# Patient Record
Sex: MALE | Race: WHITE | NOT HISPANIC OR LATINO | Employment: FULL TIME | URBAN - METROPOLITAN AREA
[De-identification: names, ages, dates, MRNs, and addresses within clinical notes are randomized per-mention and may not be internally consistent; named-entity substitution may affect disease eponyms.]

---

## 2017-02-02 ENCOUNTER — TELEPHONE (OUTPATIENT)
Dept: TRANSPLANT | Facility: CLINIC | Age: 47
End: 2017-02-02

## 2017-02-02 DIAGNOSIS — Z94.0 KIDNEY REPLACED BY TRANSPLANT: Primary | ICD-10-CM

## 2017-02-02 RX ORDER — CYCLOSPORINE 100 MG/1
100 CAPSULE, LIQUID FILLED ORAL 2 TIMES DAILY
Qty: 60 CAPSULE | Refills: 0 | Status: SHIPPED | OUTPATIENT
Start: 2017-02-02 | End: 2017-06-06

## 2017-02-02 RX ORDER — SULFAMETHOXAZOLE AND TRIMETHOPRIM 400; 80 MG/1; MG/1
1 TABLET ORAL
Qty: 13 TABLET | Refills: 0 | Status: SHIPPED | OUTPATIENT
Start: 2017-02-03 | End: 2017-06-06

## 2017-02-02 RX ORDER — PREDNISONE 1 MG/1
5 TABLET ORAL DAILY
Qty: 30 TABLET | Refills: 0 | Status: SHIPPED | OUTPATIENT
Start: 2017-02-02 | End: 2017-06-06

## 2017-02-02 RX ORDER — CYCLOSPORINE 25 MG/1
25 CAPSULE, LIQUID FILLED ORAL 2 TIMES DAILY
Qty: 60 CAPSULE | Refills: 0 | Status: SHIPPED | OUTPATIENT
Start: 2017-02-02 | End: 2017-06-06

## 2017-02-02 NOTE — TELEPHONE ENCOUNTER
D: Received call from Kvng Clancy, Pharmacist in [facility], indicating patient in need of medication.   I/A: Patient recently learned needs to use his insurance pharmacy and can no longer use [facility].  Transplant funds will be used to assist with one week of medication until able to establish with new pharmacy.  P: SW will remain available if further needs are identified.

## 2017-02-13 ENCOUNTER — TELEPHONE (OUTPATIENT)
Dept: TRANSPLANT | Facility: CLINIC | Age: 47
End: 2017-02-13

## 2017-02-13 NOTE — TELEPHONE ENCOUNTER
D: Neoral assistance  I/A: Received a voicemail from patient that he had to switch his pharmacy due to insurance reasons and now his neoral is over $100/month. This writer contacted patient to discuss co-pay card eligibility since he has private insurance. Patient reported he found his co-pay card since he left this writer a message and will submit that to his new pharmacy. Patient denied any other needs at this time.  P: Patient to contact this writer should other issues arise.     Yamile Stone [facility]    Kidney/Pancreas/[facility] Transplant Programs

## 2017-05-10 ENCOUNTER — COMMUNICATION - RIVER FALLS (OUTPATIENT)
Dept: FAMILY MEDICINE | Facility: CLINIC | Age: 47
End: 2017-05-10
Payer: COMMERCIAL

## 2017-05-10 ENCOUNTER — OFFICE VISIT - RIVER FALLS (OUTPATIENT)
Dept: FAMILY MEDICINE | Facility: CLINIC | Age: 47
End: 2017-05-10
Payer: COMMERCIAL

## 2017-05-10 PROCEDURE — 80158 DRUG ASSAY CYCLOSPORINE: CPT | Performed by: INTERNAL MEDICINE

## 2017-05-11 LAB
CREAT SERPL-MCNC: 1.74 MG/DL (ref 0.6–1.35)
GLUCOSE BLD-MCNC: 98 MG/DL (ref 65–99)

## 2017-05-12 ENCOUNTER — OFFICE VISIT - RIVER FALLS (OUTPATIENT)
Dept: FAMILY MEDICINE | Facility: CLINIC | Age: 47
End: 2017-05-12
Payer: COMMERCIAL

## 2017-05-12 DIAGNOSIS — Z79.899 ENCOUNTER FOR LONG-TERM CURRENT USE OF MEDICATION: ICD-10-CM

## 2017-05-12 DIAGNOSIS — Z94.0 KIDNEY REPLACED BY TRANSPLANT: Primary | ICD-10-CM

## 2017-05-12 DIAGNOSIS — Z48.298 AFTERCARE FOLLOWING ORGAN TRANSPLANT: ICD-10-CM

## 2017-05-12 LAB
CYCLOSPORINE BLD LC/MS/MS-MCNC: 92 UG/L (ref 50–400)
TME LAST DOSE: NORMAL H

## 2017-05-12 ASSESSMENT — MIFFLIN-ST. JEOR: SCORE: 2062.7

## 2017-05-16 ENCOUNTER — TELEPHONE (OUTPATIENT)
Dept: TRANSPLANT | Facility: CLINIC | Age: 47
End: 2017-05-16

## 2017-05-16 LAB
CREAT SERPL-MCNC: 1.74 MG/DL (ref 0.6–1.35)
GLUCOSE BLD-MCNC: 98 MG/DL (ref 65–99)

## 2017-05-16 NOTE — TELEPHONE ENCOUNTER
Overdue for follow up at the [facility] transplant center [facility] for follow up appointment  With Dr Hurtado  Please call and send a certified  letter for the need for a face to face encounter with transplant  Nephrologist  to obtain RX's   Instructions on how to make an appointment

## 2017-05-16 NOTE — TELEPHONE ENCOUNTER
Call placed to patient: Patient verbalize understanding to schedule annual transplant appointment for future refills. Scheduling number given. Reminder letter sent.

## 2017-05-16 NOTE — LETTER
May 16, 2017      This letter is regarding your medication refills.    For the best outcome for your transplant and in order for us to refill your prescriptions, we encourage you to have a yearly clinic appointment with a physician and to have current labs. If you are unable to return to our transplant center there are several alternatives. Please call your coordinator to discuss them. .  To make an appointment with a physician here at [facility], please call:  The Transplant Office at [phone] or [phone].  .      The Transplant Staff at [facility]

## 2017-06-06 DIAGNOSIS — Z94.0 KIDNEY REPLACED BY TRANSPLANT: Primary | ICD-10-CM

## 2017-06-06 RX ORDER — CYCLOSPORINE 25 MG/1
25 CAPSULE, LIQUID FILLED ORAL 2 TIMES DAILY
Qty: 60 CAPSULE | Refills: 3 | Status: SHIPPED | OUTPATIENT
Start: 2017-06-06 | End: 2017-09-13

## 2017-06-06 RX ORDER — CYCLOSPORINE 100 MG/1
100 CAPSULE, LIQUID FILLED ORAL 2 TIMES DAILY
Qty: 60 CAPSULE | Refills: 3 | Status: SHIPPED | OUTPATIENT
Start: 2017-06-06 | End: 2017-09-13

## 2017-06-06 RX ORDER — PREDNISONE 5 MG/1
5 TABLET ORAL DAILY
Qty: 30 TABLET | Refills: 3 | Status: SHIPPED | OUTPATIENT
Start: 2017-06-06 | End: 2019-11-21

## 2017-06-06 RX ORDER — MYCOPHENOLATE MOFETIL 250 MG/1
250 CAPSULE ORAL 2 TIMES DAILY
Qty: 60 CAPSULE | Refills: 3 | Status: SHIPPED | OUTPATIENT
Start: 2017-06-06 | End: 2017-06-22

## 2017-06-06 RX ORDER — SULFAMETHOXAZOLE AND TRIMETHOPRIM 400; 80 MG/1; MG/1
1 TABLET ORAL
Qty: 13 TABLET | Refills: 3 | Status: SHIPPED | OUTPATIENT
Start: 2017-06-07 | End: 2017-10-23

## 2017-06-22 ENCOUNTER — TELEPHONE (OUTPATIENT)
Dept: TRANSPLANT | Facility: CLINIC | Age: 47
End: 2017-06-22

## 2017-06-22 DIAGNOSIS — Z94.0 KIDNEY REPLACED BY TRANSPLANT: Primary | ICD-10-CM

## 2017-06-22 RX ORDER — MYCOPHENOLATE MOFETIL 250 MG/1
1000 CAPSULE ORAL 2 TIMES DAILY
Qty: 240 CAPSULE | Refills: 0 | Status: SHIPPED | OUTPATIENT
Start: 2017-06-22 | End: 2017-07-11

## 2017-06-22 NOTE — TELEPHONE ENCOUNTER
Issue: Patient overdue for clinic follow up, last appointment 2015.  Plan: Please call patient to review policy for refills and make yearly follow up for requested refills.    Thanks,  Bhavya

## 2017-07-11 DIAGNOSIS — Z94.0 KIDNEY REPLACED BY TRANSPLANT: ICD-10-CM

## 2017-07-11 RX ORDER — MYCOPHENOLATE MOFETIL 250 MG/1
1000 CAPSULE ORAL 2 TIMES DAILY
Qty: 240 CAPSULE | Refills: 11 | Status: SHIPPED | OUTPATIENT
Start: 2017-07-11 | End: 2018-07-23

## 2017-09-06 ENCOUNTER — HOSPITAL ENCOUNTER (OUTPATIENT)
Facility: CLINIC | Age: 47
Setting detail: SPECIMEN
Discharge: HOME | End: 2017-09-06
Admitting: INTERNAL MEDICINE
Payer: COMMERCIAL

## 2017-09-06 PROCEDURE — 80158 DRUG ASSAY CYCLOSPORINE: CPT | Performed by: INTERNAL MEDICINE

## 2017-09-07 ENCOUNTER — OFFICE VISIT - RIVER FALLS (OUTPATIENT)
Dept: FAMILY MEDICINE | Facility: CLINIC | Age: 47
End: 2017-09-07
Payer: COMMERCIAL

## 2017-09-07 ENCOUNTER — AMBULATORY - RIVER FALLS (OUTPATIENT)
Dept: FAMILY MEDICINE | Facility: CLINIC | Age: 47
End: 2017-09-07
Payer: COMMERCIAL

## 2017-09-07 ASSESSMENT — MIFFLIN-ST. JEOR: SCORE: 2064.51

## 2017-09-08 LAB
CREAT SERPL-MCNC: 1.49 MG/DL (ref 0.6–1.35)
GLUCOSE BLD-MCNC: 99 MG/DL (ref 65–99)

## 2017-09-09 DIAGNOSIS — Z48.298 AFTERCARE FOLLOWING ORGAN TRANSPLANT: ICD-10-CM

## 2017-09-09 DIAGNOSIS — Z79.899 ENCOUNTER FOR LONG-TERM CURRENT USE OF MEDICATION: ICD-10-CM

## 2017-09-09 DIAGNOSIS — Z94.0 KIDNEY REPLACED BY TRANSPLANT: ICD-10-CM

## 2017-09-10 LAB
CYCLOSPORINE BLD LC/MS/MS-MCNC: 97 UG/L (ref 50–400)
TME LAST DOSE: NORMAL H

## 2017-09-11 DIAGNOSIS — Z94.0 KIDNEY REPLACED BY TRANSPLANT: Primary | ICD-10-CM

## 2017-09-11 NOTE — TELEPHONE ENCOUNTER
Left detailed message for patient with dose change to CSA if accurate 12 hour level, asked to call back and confirm.

## 2017-09-11 NOTE — TELEPHONE ENCOUNTER
CSA level 97  Goal 75  Please phone patient and ask if CSA level was a good 12 hour level.  If so, decrease CSA from 125 mg Bid to 100 mg Bid  Repeat labs in 1-2 weeks.   today

## 2017-09-13 RX ORDER — CYCLOSPORINE 100 MG/1
100 CAPSULE, LIQUID FILLED ORAL 2 TIMES DAILY
Qty: 60 CAPSULE | Refills: 3
Start: 2017-09-13 | End: 2017-09-27

## 2017-09-13 RX ORDER — CYCLOSPORINE 25 MG/1
CAPSULE, LIQUID FILLED ORAL
Qty: 60 CAPSULE | Refills: 3
Start: 2017-09-13 | End: 2017-09-27

## 2017-09-13 NOTE — TELEPHONE ENCOUNTER
F\u call placed to patient: Patient confirms current lab draw and accurate twelve hour lab draw. Patient verbalize understanding to decrease dose to 100 mg twice a day and recheck level in 1-2 weeks. Rx.order placed.

## 2017-09-22 ENCOUNTER — AMBULATORY - RIVER FALLS (OUTPATIENT)
Dept: FAMILY MEDICINE | Facility: CLINIC | Age: 47
End: 2017-09-22
Payer: COMMERCIAL

## 2017-09-22 ENCOUNTER — OFFICE VISIT - RIVER FALLS (OUTPATIENT)
Dept: FAMILY MEDICINE | Facility: CLINIC | Age: 47
End: 2017-09-22
Payer: COMMERCIAL

## 2017-09-22 DIAGNOSIS — Z94.0 KIDNEY REPLACED BY TRANSPLANT: ICD-10-CM

## 2017-09-22 DIAGNOSIS — Z79.899 ENCOUNTER FOR LONG-TERM CURRENT USE OF MEDICATION: ICD-10-CM

## 2017-09-22 DIAGNOSIS — Z48.298 AFTERCARE FOLLOWING ORGAN TRANSPLANT: ICD-10-CM

## 2017-09-22 PROCEDURE — 80158 DRUG ASSAY CYCLOSPORINE: CPT | Performed by: INTERNAL MEDICINE

## 2017-09-22 ASSESSMENT — MIFFLIN-ST. JEOR: SCORE: 2073.59

## 2017-09-23 LAB
CREAT SERPL-MCNC: 1.66 MG/DL (ref 0.6–1.35)
GLUCOSE BLD-MCNC: 87 MG/DL (ref 65–99)

## 2017-09-26 LAB
CYCLOSPORINE BLD LC/MS/MS-MCNC: 62 UG/L (ref 50–400)
TME LAST DOSE: NORMAL H

## 2017-09-27 ENCOUNTER — TELEPHONE (OUTPATIENT)
Dept: TRANSPLANT | Facility: CLINIC | Age: 47
End: 2017-09-27

## 2017-09-27 DIAGNOSIS — Z94.0 KIDNEY REPLACED BY TRANSPLANT: ICD-10-CM

## 2017-09-27 RX ORDER — CYCLOSPORINE 25 MG/1
25 CAPSULE, LIQUID FILLED ORAL 2 TIMES DAILY
Qty: 60 CAPSULE | Refills: 3 | Status: SHIPPED | OUTPATIENT
Start: 2017-09-27 | End: 2018-01-25

## 2017-09-27 RX ORDER — CYCLOSPORINE 100 MG/1
100 CAPSULE, LIQUID FILLED ORAL 2 TIMES DAILY
Qty: 60 CAPSULE | Refills: 3 | Status: SHIPPED | OUTPATIENT
Start: 2017-09-27 | End: 2018-01-25

## 2017-09-27 NOTE — TELEPHONE ENCOUNTER
CSA level 62   Goal 75  Please phone patient and ask if last Tacrolimus was a good 12 hour level.  If so, increase CSA from 100 mg Bid to 125 mg bid.  Repeat labs in 1-2 weeks

## 2017-09-27 NOTE — TELEPHONE ENCOUNTER
Call placed to patient. Patient confirms current dose and accurate lab level. Patient verbalize understanding to increase dose to 125 mg twice a day and recheck level in 1-2 weeks of dose change. Rx/order sent

## 2017-09-27 NOTE — LETTER
PHYSICIAN ORDERS      DATE & TIME ISSUED: 2017 10:12 AM  PATIENT NAME: Hakeem Romero   : 1970     [facility] MR# [if applicable]: [id number]     DIAGNOSIS:  Kidney transplant   ICD-9 CODE: Z94.0     Please recheck the following lab level within 1-2 weeks of dose change.   Cyclosporine Level     Any questions please call: [phone]    Please fax these results to [phone].    .

## 2017-10-06 ENCOUNTER — AMBULATORY - RIVER FALLS (OUTPATIENT)
Dept: FAMILY MEDICINE | Facility: CLINIC | Age: 47
End: 2017-10-06
Payer: COMMERCIAL

## 2017-10-06 DIAGNOSIS — Z94.0 KIDNEY REPLACED BY TRANSPLANT: ICD-10-CM

## 2017-10-06 PROCEDURE — 80158 DRUG ASSAY CYCLOSPORINE: CPT | Performed by: INTERNAL MEDICINE

## 2017-10-13 ENCOUNTER — TELEPHONE (OUTPATIENT)
Dept: TRANSPLANT | Facility: CLINIC | Age: 47
End: 2017-10-13

## 2017-10-13 LAB
CYCLOSPORINE BLD LC/MS/MS-MCNC: 107 UG/L (ref 50–400)
TME LAST DOSE: NORMAL H

## 2017-10-13 NOTE — TELEPHONE ENCOUNTER
CSA level 107    Goal 75  Please phone patient and ask if last Tacrolimus was a good 12 hour level.  If so,decrease CSA from 125 mg Bid to 100 mg bid.  Repeat labs in 1-2 weeks

## 2017-10-13 NOTE — LETTER
PHYSICIAN ORDERS      DATE & TIME ISSUED: 2017 10:48 AM  PATIENT NAME: Hakeem Romero   : 1970     [facility] MR# [if applicable]: [id number]     DIAGNOSIS:  Kidney transplant   ICD-9 CODE: Z94.0      Recheck cyclosporine level in 1-2 weeks    Any questions please call: [phone] fax these results to [phone].        .

## 2017-10-13 NOTE — TELEPHONE ENCOUNTER
Call placed to patient: No answer. Detailed voice message left requesting patient return call to discuss tacrolimus level, dose change and repeat labs. Will try back

## 2017-10-16 NOTE — TELEPHONE ENCOUNTER
F\U call placed to patient: Patient states that he's going to follow up with his nephrologist prior to making any dose change. States that he's concerned with his level going up and down and is seeking an explanation for this prior to making any future changes. Patient verbalize understanding to recheck level in 1-2 weeks. Order sent

## 2017-10-23 DIAGNOSIS — Z94.0 KIDNEY REPLACED BY TRANSPLANT: ICD-10-CM

## 2017-10-23 RX ORDER — SULFAMETHOXAZOLE AND TRIMETHOPRIM 400; 80 MG/1; MG/1
TABLET ORAL
Qty: 39 TABLET | Refills: 11 | Status: SHIPPED | OUTPATIENT
Start: 2017-10-23

## 2017-12-07 ENCOUNTER — OFFICE VISIT - RIVER FALLS (OUTPATIENT)
Dept: FAMILY MEDICINE | Facility: CLINIC | Age: 47
End: 2017-12-07
Payer: COMMERCIAL

## 2017-12-26 ENCOUNTER — TELEPHONE (OUTPATIENT)
Dept: NEPHROLOGY | Facility: CLINIC | Age: 47
End: 2017-12-26

## 2017-12-26 NOTE — TELEPHONE ENCOUNTER
Contacted pt twice but mailbox is full and it cannot receive messages at this time.  Deisy Henson., CMA

## 2018-01-03 ENCOUNTER — TELEPHONE (OUTPATIENT)
Dept: NEPHROLOGY | Facility: CLINIC | Age: 48
End: 2018-01-03

## 2018-01-05 ENCOUNTER — TELEPHONE (OUTPATIENT)
Dept: TRANSPLANT | Facility: CLINIC | Age: 48
End: 2018-01-05

## 2018-01-05 NOTE — TELEPHONE ENCOUNTER
Provider Call: Transplant Lab  Facility Name: [facility]  Facility Location: [place], [place]  Reason for Call: Annual lab reorder  Callback needed? No           Hakeem is scheduled to come in for lab draw this Monday, January 8th, 2018    [facility] Fax# [phone]

## 2018-01-05 NOTE — LETTER
The Transplant Center  Room 2-200  [facility],  [address]  [address]  Tel [phone]  Toll Free [phone]                OUTPATIENT LABORATORY TEST ORDER    Patient Name: Hakeem Romero  Transplant Date: 8/19/2008 (Kidney)  YOB: 1970  Issue Date & Time:January 5, 201811:36 AM    [facility] MR:  [id number]  Exp. Date (1 year after date issued)      Diagnoses: Kidney Transplant (ICD-10  Z94.0)   Long term use of medications (ICD-10  Z79.899)     Lab results to be available on the same day drawn.   Patient should release information to the [facility], [facility].  Please fax to the Transplant Center at [phone].    Monthly    ?Hemogram and Platelet   ?Basic Metabolic Panel (Sodium, Potassium, Chloride, CO2, Creatinine, Urea Nitrogen, Glucose, Calcium)           ?CSA mail to [facility] mailers and instructions provided by the patient)                     Every 6 Months                                                       ?Urine for protein/creatinine      If you have any questions, please call The Transplant Center at [phone] or [phone].    Please fax labs to [phone]

## 2018-01-08 ENCOUNTER — OFFICE VISIT (OUTPATIENT)
Dept: NEPHROLOGY | Facility: CLINIC | Age: 48
End: 2018-01-08
Attending: INTERNAL MEDICINE
Payer: COMMERCIAL

## 2018-01-08 ENCOUNTER — AMBULATORY - RIVER FALLS (OUTPATIENT)
Dept: FAMILY MEDICINE | Facility: CLINIC | Age: 48
End: 2018-01-08
Payer: COMMERCIAL

## 2018-01-08 VITALS
SYSTOLIC BLOOD PRESSURE: 145 MMHG | WEIGHT: 270 LBS | BODY MASS INDEX: 37.8 KG/M2 | OXYGEN SATURATION: 96 % | HEIGHT: 71 IN | DIASTOLIC BLOOD PRESSURE: 89 MMHG | HEART RATE: 80 BPM

## 2018-01-08 DIAGNOSIS — D84.9 IMMUNOSUPPRESSED STATUS (H): ICD-10-CM

## 2018-01-08 DIAGNOSIS — Z48.298 AFTERCARE FOLLOWING ORGAN TRANSPLANT: ICD-10-CM

## 2018-01-08 DIAGNOSIS — I15.1 HYPERTENSION SECONDARY TO OTHER RENAL DISORDERS: ICD-10-CM

## 2018-01-08 DIAGNOSIS — Z94.0 KIDNEY REPLACED BY TRANSPLANT: Primary | ICD-10-CM

## 2018-01-08 PROCEDURE — G0463 HOSPITAL OUTPT CLINIC VISIT: HCPCS | Mod: ZF

## 2018-01-08 RX ORDER — LOSARTAN POTASSIUM 50 MG/1
50 TABLET ORAL DAILY
COMMUNITY
Start: 2018-01-08

## 2018-01-08 RX ORDER — LOSARTAN POTASSIUM 25 MG/1
50 TABLET ORAL DAILY
Qty: 30 TABLET | COMMUNITY
Start: 2018-01-08 | End: 2018-01-08

## 2018-01-08 ASSESSMENT — PAIN SCALES - GENERAL: PAINLEVEL: NO PAIN (0)

## 2018-01-08 NOTE — PROGRESS NOTES
Assessment and Plan:   1. LDKT - baseline Cr ~ 1.6-1.8, which has remained stable.  Mild proteinuria.  Will make no changes in immunosuppression.  2. HTN - fair control right at target of less than 140/90, but patient forgot to take his antihypertensive medications today.  Will make no changes at this time, but recommend patient check BP at home.  3. Post transplant erythrocytosis - increased Hgb with last labs in September, but patient was taken off losartan prior to those labs for some reason.  He is back on losartan now and today's labs pending.  Recommend patient stay on ACEI/ARB.  4. Hyperkalemia - high normal serum potassium level with last check.  Today's labs pending.  5. Gout - no recent flares since starting probenecid.  6. Overweight - recommend increased exercise and watch caloric intake.  7. Skin cancer risk - no new skin lesions.  Recommend regular follow up with Dermatology.  8. Medical noncompliance - patient only has 2 labs recorded in our system in the last year.  He reports he did get other labs, but isn't sure when.  Will have transplant coordinator call his outside lab.  Discussed importance of regular labs, taking medications and regular medical appointments.  9. Recommend return visit in 12 months.    Assessment and plan was discussed with patient and he voiced his understanding and agreement.    Reason for Visit:  Mr. Romero is here for routine follow up.    HPI:  Mr. 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         Transplant Hx:       Tx: LDKT  Date: 8/19/08       Present Maintenance IS: Cyclosporine and Mycophenolate mofetil       Baseline Creatinine: 1.6-1.8       Recent DSA: Yes  Date last checked: 8/2009       Biopsy: No    Mr. Romero reports feeling good overall with minimal medical complaints.  He was last seen about 3 years ago in clinic.  His only new medical issue is being diagnosed with narcolepsy.  Patient was tried on a  couple of different medications, but seems to be doing okay after starting ritalin.  He has also struggled with gout symptoms, but after being started on probenecid about a year ago, he hasn't had any recent flares.  His energy level has been good and remains normal.  He is active and does get some exercise.  Denies any chest pain or shortness of breath with exertion.  Appetite is good and he has gained about 10 lbs since his last clinic visit.  No nausea, vomiting or diarrhea.  No fever, sweats or chills.  Occasional leg swelling, depending on how much salt he eats.    Home BP: Not checked.  Patient forgot to take his antihypertensive medications this morning.      ROS:  A comprehensive review of systems was obtained and negative, except as noted in the HPI or PMH.    Active Medical Problems:  Patient Active Problem List   Diagnosis     Kidney replaced by transplant     Aftercare following organ transplant     Immunosuppressed status (H)     Hypertension secondary to other renal disorders     Dyslipidemia     Gout     RAUL on CPAP     Narcolepsy     Personal Hx:   Social History     Social History     Marital status:      Spouse name: N/A     Number of children: 0     Years of education: N/A     Occupational History     Not on file.     Social History Main Topics     Smoking status: Never Smoker     Smokeless tobacco: Not on file     Alcohol use 2.0 oz/week     4 drink(s) per week     Drug use: No     Sexual activity: Not on file     Other Topics Concern     Not on file     Social History Narrative     Allergies:  No Known Allergies  Medications:  Prior to Admission medications    Medication Sig Start Date End Date Taking? Authorizing Provider   SIMVASTATIN PO Take 40 mg by mouth At Bedtime   Yes Reported, Patient   METOPROLOL TARTRATE PO Take 30 mg by mouth 2 times daily   Yes Reported, Patient   CLONIDINE HCL PO Take 10 mg by mouth 3 times daily   Yes Reported, Patient   ALLOPURINOL PO Take 30 mg by mouth  "daily   Yes Reported, Patient   PREDNISONE PO Take by mouth as needed   Yes Reported, Patient   NEORAL 100 MG capsule Take 1 capsule (100 mg) by mouth 2 times daily Total dose 125mg 11/7/14  Yes Bakari Hurtado MD   cycloSPORINE modified (NEORAL) 25 MG capsule Take 1 capsule (25 mg) by mouth 2 times daily Total dose 125mg 11/7/14  Yes Bakari Hurtado MD   sulfamethoxazole-trimethoprim (BACTRIM,SEPTRA) 400-80 MG per tablet Take 1 tablet by mouth three times a week 7/17/14  Yes Bakari Hurtado MD   CELLCEPT 250 MG capsule Take 4 capsules (1,000 mg) by mouth 2 times daily 4/2/14  Yes Bakari Hurtado MD     Vitals:  /89  Pulse 80  Ht 1.803 m (5' 11\")  Wt 122.5 kg (270 lb)  SpO2 96%  BMI 37.66 kg/m2    Exam:   GENERAL APPEARANCE: alert and no distress  HENT: mouth without ulcers or lesions  LYMPHATICS: no cervical or supraclavicular nodes  RESP: lungs clear to auscultation - no rales, rhonchi or wheezes  CV: regular rhythm, normal rate, no rub, no murmur  EDEMA: no LE edema bilaterally  ABDOMEN: soft, nondistended, nontender, bowel sounds normal, overweight  MS: extremities normal - no gross deformities noted, no evidence of inflammation in joints, no muscle tenderness  SKIN: no rash  TX KIDNEY: normal    Results:  Recent Results (from the past 3360 hour(s))   Cyclosporine    Collection Time: 09/06/17  8:55 AM   Result Value Ref Range    Cyclosporine Last Dose 09/06/2017 21:15     Cyclosporine Level 97 50 - 400 ug/L   TXP External Lab Result    Collection Time: 09/07/17  8:51 AM   Result Value Ref Range    Glucose (External) 99 65 - 99 mg/dL    Urea Nitrogen (External) 25 7 - 25 mg/dL    Creatinine (External) 1.49 (H) 0.60 - 1.35 mg/dL    GFR Estimated (External) 55 (L) >=60 mL/min/1.73m2    GFR Est if Black (External) 64 >=60 mL/min/1.73m2    BUN/Creatinine Ratio (External) 17 6 - 22 (calc)    Sodium (External) 142 135 - 146 mmol/L    Potassium (External) 5.3 3.5 - 5.3 mmol/L    " Chloride (External) 107 98 - 110 mmol/L    CO2 (External) 28 20 - 31 mmol/L    Calcium (External) 9.7 8.6 - 10.3 mg/dL    WBC Count (External) 6.2 3.8 - 10.8 Thousand/uL    RBC Count (External) 5.72 4.20 - 5.80 Million/uL    Hemoglobin (External) 17.4 (H) 13.2 - 17.1 g/dL    Hematocrit (External) 49.2 38.5 - 50.0 %    MCV (External) 86.0 80.0 - 100.0 fL    MCH (External) 30.4 27.0 - 33.0 pg    MCHC (External) 35.4 32.0 - 36.0 g/dL    RDW (External) 13.4 11.0 - 15.0 %    Platelet Count (External) 190 140 - 400 Thousand/uL    MPV (External) 10.6 7.5 - 12.5 fL   Cyclosporine    Collection Time: 09/22/17 10:10 AM   Result Value Ref Range    Cyclosporine Last Dose 09/21/17 2130     Cyclosporine Level 62 50 - 400 ug/L   Cyclosporine    Collection Time: 10/06/17  9:00 AM   Result Value Ref Range    Cyclosporine Last Dose 2200 10/05/17     Cyclosporine Level 107 50 - 400 ug/L

## 2018-01-08 NOTE — LETTER
1/8/2018      RE: Hakeem Romero  [address]  [facility] [address]       Assessment and Plan:   1. LDKT - baseline Cr ~ 1.6-1.8, which has remained stable.  Mild proteinuria.  Will make no changes in immunosuppression.  2. HTN - fair control right at target of less than 140/90, but patient forgot to take his antihypertensive medications today.  Will make no changes at this time, but recommend patient check BP at home.  3. Post transplant erythrocytosis - increased Hgb with last labs in September, but patient was taken off losartan prior to those labs for some reason.  He is back on losartan now and today's labs pending.  Recommend patient stay on ACEI/ARB.  4. Hyperkalemia - high normal serum potassium level with last check.  Today's labs pending.  5. Gout - no recent flares since starting probenecid.  6. Overweight - recommend increased exercise and watch caloric intake.  7. Skin cancer risk - no new skin lesions.  Recommend regular follow up with Dermatology.  8. Medical noncompliance - patient only has 2 labs recorded in our system in the last year.  He reports he did get other labs, but isn't sure when.  Will have transplant coordinator call his outside lab.  Discussed importance of regular labs, taking medications and regular medical appointments.  9. Recommend return visit in 12 months.    Assessment and plan was discussed with patient and he voiced his understanding and agreement.    Reason for Visit:  Mr. Romero is here for routine follow up.    HPI:  Mr. 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         Transplant Hx:       Tx: LDKT  Date: 8/19/08       Present Maintenance IS: Cyclosporine and Mycophenolate mofetil       Baseline Creatinine: 1.6-1.8       Recent DSA: Yes  Date last checked: 8/2009       Biopsy: No    Mr. Romero reports feeling good overall with minimal medical complaints.  He was last seen about 3 years ago in clinic.  His only new  medical issue is being diagnosed with narcolepsy.  Patient was tried on a couple of different medications, but seems to be doing okay after starting ritalin.  He has also struggled with gout symptoms, but after being started on probenecid about a year ago, he hasn't had any recent flares.  His energy level has been good and remains normal.  He is active and does get some exercise.  Denies any chest pain or shortness of breath with exertion.  Appetite is good and he has gained about 10 lbs since his last clinic visit.  No nausea, vomiting or diarrhea.  No fever, sweats or chills.  Occasional leg swelling, depending on how much salt he eats.    Home BP: Not checked.  Patient forgot to take his antihypertensive medications this morning.      ROS:  A comprehensive review of systems was obtained and negative, except as noted in the HPI or PMH.    Active Medical Problems:  Patient Active Problem List   Diagnosis     Kidney replaced by transplant     Aftercare following organ transplant     Immunosuppressed status (H)     Hypertension secondary to other renal disorders     Dyslipidemia     Gout     RAUL on CPAP     Narcolepsy     Personal Hx:   Social History     Social History     Marital status:      Spouse name: N/A     Number of children: 0     Years of education: N/A     Occupational History     Not on file.     Social History Main Topics     Smoking status: Never Smoker     Smokeless tobacco: Not on file     Alcohol use 2.0 oz/week     4 drink(s) per week     Drug use: No     Sexual activity: Not on file     Other Topics Concern     Not on file     Social History Narrative     Allergies:  No Known Allergies  Medications:  Prior to Admission medications    Medication Sig Start Date End Date Taking? Authorizing Provider   SIMVASTATIN PO Take 40 mg by mouth At Bedtime   Yes Reported, Patient   METOPROLOL TARTRATE PO Take 30 mg by mouth 2 times daily   Yes Reported, Patient   CLONIDINE HCL PO Take 10 mg by mouth 3  "times daily   Yes Reported, Patient   ALLOPURINOL PO Take 30 mg by mouth daily   Yes Reported, Patient   PREDNISONE PO Take by mouth as needed   Yes Reported, Patient   NEORAL 100 MG capsule Take 1 capsule (100 mg) by mouth 2 times daily Total dose 125mg 11/7/14  Yes Bakari Hurtado MD   cycloSPORINE modified (NEORAL) 25 MG capsule Take 1 capsule (25 mg) by mouth 2 times daily Total dose 125mg 11/7/14  Yes Bakari Hurtado MD   sulfamethoxazole-trimethoprim (BACTRIM,SEPTRA) 400-80 MG per tablet Take 1 tablet by mouth three times a week 7/17/14  Yes Bakari Hurtado MD   CELLCEPT 250 MG capsule Take 4 capsules (1,000 mg) by mouth 2 times daily 4/2/14  Yes Bakari Hurtado MD     Vitals:  /89  Pulse 80  Ht 1.803 m (5' 11\")  Wt 122.5 kg (270 lb)  SpO2 96%  BMI 37.66 kg/m2    Exam:   GENERAL APPEARANCE: alert and no distress  HENT: mouth without ulcers or lesions  LYMPHATICS: no cervical or supraclavicular nodes  RESP: lungs clear to auscultation - no rales, rhonchi or wheezes  CV: regular rhythm, normal rate, no rub, no murmur  EDEMA: no LE edema bilaterally  ABDOMEN: soft, nondistended, nontender, bowel sounds normal, overweight  MS: extremities normal - no gross deformities noted, no evidence of inflammation in joints, no muscle tenderness  SKIN: no rash  TX KIDNEY: normal    Results:  Recent Results (from the past 3360 hour(s))   Cyclosporine    Collection Time: 09/06/17  8:55 AM   Result Value Ref Range    Cyclosporine Last Dose 09/06/2017 21:15     Cyclosporine Level 97 50 - 400 ug/L   TXP External Lab Result    Collection Time: 09/07/17  8:51 AM   Result Value Ref Range    Glucose (External) 99 65 - 99 mg/dL    Urea Nitrogen (External) 25 7 - 25 mg/dL    Creatinine (External) 1.49 (H) 0.60 - 1.35 mg/dL    GFR Estimated (External) 55 (L) >=60 mL/min/1.73m2    GFR Est if Black (External) 64 >=60 mL/min/1.73m2    BUN/Creatinine Ratio (External) 17 6 - 22 (calc)    Sodium " (External) 142 135 - 146 mmol/L    Potassium (External) 5.3 3.5 - 5.3 mmol/L    Chloride (External) 107 98 - 110 mmol/L    CO2 (External) 28 20 - 31 mmol/L    Calcium (External) 9.7 8.6 - 10.3 mg/dL    WBC Count (External) 6.2 3.8 - 10.8 Thousand/uL    RBC Count (External) 5.72 4.20 - 5.80 Million/uL    Hemoglobin (External) 17.4 (H) 13.2 - 17.1 g/dL    Hematocrit (External) 49.2 38.5 - 50.0 %    MCV (External) 86.0 80.0 - 100.0 fL    MCH (External) 30.4 27.0 - 33.0 pg    MCHC (External) 35.4 32.0 - 36.0 g/dL    RDW (External) 13.4 11.0 - 15.0 %    Platelet Count (External) 190 140 - 400 Thousand/uL    MPV (External) 10.6 7.5 - 12.5 fL   Cyclosporine    Collection Time: 09/22/17 10:10 AM   Result Value Ref Range    Cyclosporine Last Dose 09/21/17 2130     Cyclosporine Level 62 50 - 400 ug/L   Cyclosporine    Collection Time: 10/06/17  9:00 AM   Result Value Ref Range    Cyclosporine Last Dose 2200 10/05/17     Cyclosporine Level 107 50 - 400 ug/L       Bakari Hurtado MD

## 2018-01-08 NOTE — MR AVS SNAPSHOT
"              After Visit Summary   1/8/2018    Hakeem Romero    MRN: [id number]           Patient Information     Date Of Birth          1970        Visit Information        Provider Department      1/8/2018 4:00 PM Bakari Hurtado MD [facility] Nephrology        Today's Diagnoses     Kidney replaced by transplant    -  1    Aftercare following organ transplant        Immunosuppressed status (H)           Follow-ups after your visit        Follow-up notes from your care team     Return in about 1 year (around 1/8/2019).      Your next 10 appointments already scheduled     Jan 07, 2019  4:00 PM CST   (Arrive by 3:30 PM)   Return Kidney Transplant with Bakari Hurtado MD   [facility] Nephrology ([facility])    [address] [place] [address]  [address]  [facility] [address] [phone]              Who to contact     If you have questions or need follow up information about today's clinic visit or your schedule please contact [facility] NEPHROLOGY directly at [phone].  Normal or non-critical lab and imaging results will be communicated to you by [facility], letter or phone within 4 business days after the clinic has received the results. If you do not hear from us within 7 days, please contact the clinic through [employer] or phone. If you have a critical or abnormal lab result, we will notify you by phone as soon as possible.  Submit refill requests through "[employer] or call your pharmacy and they will forward the refill request to us. Please allow 3 business days for your refill to be completed.          Additional Information About Your Visit        [facility] Information     "[employer] lets you send messages to your doctor, view your test results, renew your prescriptions, schedule appointments and more. To sign up, go to [email or web address] . Click on \"Log in\" on the left side of the screen, which will take you to the Welcome page. Then click on \"Sign up Now\" on the right side of " "the page.     You will be asked to enter the access code listed below, as well as some personal information. Please follow the directions to create your username and password.     Your access code is: [email or web address]  Expires: 3/25/2018  6:30 AM     Your access code will  in 90 days. If you need help or a new code, please call your [place] clinic or [phone].        Care EveryWhere ID     This is your Care EveryWhere ID. This could be used by other organizations to access your [place] medical records  [id number]        Your Vitals Were     Pulse Height Pulse Oximetry BMI (Body Mass Index)          80 1.803 m (5' 11\") 96% 37.66 kg/m2         Blood Pressure from Last 3 Encounters:   18 145/89   01/06/15 (!) 153/93    Weight from Last 3 Encounters:   18 122.5 kg (270 lb)   01/06/15 117.6 kg (259 lb 3.2 oz)              Today, you had the following     No orders found for display         Today's Medication Changes          These changes are accurate as of: 18  4:30 PM.  If you have any questions, ask your nurse or doctor.               These medicines have changed or have updated prescriptions.        Dose/Directions    losartan 50 MG tablet   Commonly known as:  COZAAR   This may have changed:  Another medication with the same name was removed. Continue taking this medication, and follow the directions you see here.   Changed by:  Bakari Hurtado MD        Dose:  50 mg   Take 1 tablet (50 mg) by mouth daily   Refills:  0                Primary Care Provider Office Phone # Fax #    Eric Lei [phone]       [facility] [address] [facility] [address]        Equal Access to Services     [facility]: Hadkerry Richardson, jann maurer, geovani hagen. So [facility] [phone].    ATENCIÓN: Si habla español, tiene a mcgregor disposición servicios gratuitos de asistencia lingüística. " Angela brock [phone].    We comply with applicable federal civil rights laws and [place] laws. We do not discriminate on the basis of race, color, national origin, age, disability, sex, sexual orientation, or gender identity.            Thank you!     Thank you for choosing [facility] NEPHROLOGY  for your care. Our goal is always to provide you with excellent care. Hearing back from our patients is one way we can continue to improve our services. Please take a few minutes to complete the written survey that you may receive in the mail after your visit with us. Thank you!             Your Updated Medication List - Protect others around you: Learn how to safely use, store and throw away your medicines at www.disposemymeds.org.          This list is accurate as of: 1/8/18  4:30 PM.  Always use your most recent med list.                   Brand Name Dispense Instructions for use Diagnosis    ALLOPURINOL PO      Take 30 mg by mouth daily        CLONIDINE HCL PO      Take 10 mg by mouth 3 times daily        * cycloSPORINE modified 100 MG capsule     60 capsule    Take 1 capsule (100 mg) by mouth 2 times daily Total dose 125 mg twice a day    Kidney replaced by transplant       * cycloSPORINE modified 25 MG capsule     60 capsule    Take 1 capsule (25 mg) by mouth 2 times daily Total dose 125 mg twice a day    Kidney replaced by transplant       losartan 50 MG tablet    COZAAR     Take 1 tablet (50 mg) by mouth daily        METOPROLOL TARTRATE PO      Take 30 mg by mouth 2 times daily        mycophenolate 250 MG capsule     240 capsule    Take 4 capsules (1,000 mg) by mouth 2 times daily    Kidney replaced by transplant       PRAVASTATIN SODIUM PO      Take 40 mg by mouth daily        predniSONE 5 MG tablet    DELTASONE    30 tablet    Take 1 tablet (5 mg) by mouth daily    Kidney replaced by transplant       RITALIN PO      Take by mouth 2 times daily        SIMVASTATIN PO      Take 40 mg by mouth At Bedtime         sulfamethoxazole-trimethoprim 400-80 MG per tablet    BACTRIM/SEPTRA    39 tablet    TAKE 1 TABLET BY MOUTH  EVERY MON, WED, FRI MORNING    Kidney replaced by transplant       * Notice:  This list has 2 medication(s) that are the same as other medications prescribed for you. Read the directions carefully, and ask your doctor or other care provider to review them with you.

## 2018-01-08 NOTE — NURSING NOTE
"Chief Complaint   Patient presents with     RECHECK     Post kidney tx follow up        Initial /89  Pulse 80  Ht 1.803 m (5' 11\")  Wt 122.5 kg (270 lb)  SpO2 96%  BMI 37.66 kg/m2 Estimated body mass index is 37.66 kg/(m^2) as calculated from the following:    Height as of this encounter: 1.803 m (5' 11\").    Weight as of this encounter: 122.5 kg (270 lb).  Medication Reconciliation: complete   Rebekah Pedro CMA    "

## 2018-01-09 LAB
CREAT SERPL-MCNC: 1.68 MG/DL (ref 0.6–1.35)
GLUCOSE BLD-MCNC: 96 MG/DL (ref 65–99)

## 2018-01-23 ENCOUNTER — OFFICE VISIT - RIVER FALLS (OUTPATIENT)
Dept: FAMILY MEDICINE | Facility: CLINIC | Age: 48
End: 2018-01-23
Payer: COMMERCIAL

## 2018-01-23 ENCOUNTER — COMMUNICATION - RIVER FALLS (OUTPATIENT)
Dept: FAMILY MEDICINE | Facility: CLINIC | Age: 48
End: 2018-01-23
Payer: COMMERCIAL

## 2018-01-25 DIAGNOSIS — Z94.0 KIDNEY REPLACED BY TRANSPLANT: ICD-10-CM

## 2018-01-26 ENCOUNTER — TELEPHONE (OUTPATIENT)
Dept: TRANSPLANT | Facility: CLINIC | Age: 48
End: 2018-01-26

## 2018-01-26 RX ORDER — CYCLOSPORINE 100 MG/1
100 CAPSULE, LIQUID FILLED ORAL 2 TIMES DAILY
Qty: 60 CAPSULE | Refills: 3 | Status: SHIPPED | OUTPATIENT
Start: 2018-01-26 | End: 2018-05-23

## 2018-01-26 RX ORDER — CYCLOSPORINE 25 MG/1
25 CAPSULE, LIQUID FILLED ORAL 2 TIMES DAILY
Qty: 60 CAPSULE | Refills: 3 | Status: SHIPPED | OUTPATIENT
Start: 2018-01-26 | End: 2018-05-23

## 2018-01-26 NOTE — TELEPHONE ENCOUNTER
Patient Call: Transplant   Reason for call:  Hakeem called to let us know his office has mold.  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  I asked Hakeem if he is feeling ill?  No symptoms of illness per Hakeem.

## 2018-02-16 ENCOUNTER — OFFICE VISIT - RIVER FALLS (OUTPATIENT)
Dept: FAMILY MEDICINE | Facility: CLINIC | Age: 48
End: 2018-02-16
Payer: COMMERCIAL

## 2018-02-16 ASSESSMENT — MIFFLIN-ST. JEOR: SCORE: 2100.8

## 2018-05-22 ENCOUNTER — OFFICE VISIT - RIVER FALLS (OUTPATIENT)
Dept: FAMILY MEDICINE | Facility: CLINIC | Age: 48
End: 2018-05-22
Payer: COMMERCIAL

## 2018-05-22 ENCOUNTER — AMBULATORY - RIVER FALLS (OUTPATIENT)
Dept: FAMILY MEDICINE | Facility: CLINIC | Age: 48
End: 2018-05-22
Payer: COMMERCIAL

## 2018-05-22 DIAGNOSIS — Z48.298 AFTERCARE FOLLOWING ORGAN TRANSPLANT: ICD-10-CM

## 2018-05-22 DIAGNOSIS — Z94.0 KIDNEY REPLACED BY TRANSPLANT: ICD-10-CM

## 2018-05-22 DIAGNOSIS — Z79.899 ENCOUNTER FOR LONG-TERM CURRENT USE OF MEDICATION: ICD-10-CM

## 2018-05-22 PROCEDURE — 80158 DRUG ASSAY CYCLOSPORINE: CPT | Performed by: INTERNAL MEDICINE

## 2018-05-22 ASSESSMENT — MIFFLIN-ST. JEOR
SCORE: 2082.66
SCORE: 2083.57

## 2018-05-23 DIAGNOSIS — Z94.0 KIDNEY REPLACED BY TRANSPLANT: Primary | ICD-10-CM

## 2018-05-23 LAB
CHOLEST SERPL-MCNC: 167 MG/DL
CHOLEST/HDLC SERPL: 4.4 {RATIO}
CREAT SERPL-MCNC: 1.66 MG/DL (ref 0.6–1.35)
GLUCOSE BLD-MCNC: 96 MG/DL (ref 65–99)
HDLC SERPL-MCNC: 38 MG/DL
LDLC SERPL CALC-MCNC: 98 MG/DL
NONHDLC SERPL-MCNC: 129 MG/DL
TRIGL SERPL-MCNC: 222 MG/DL

## 2018-05-23 RX ORDER — CYCLOSPORINE 25 MG/1
25 CAPSULE, LIQUID FILLED ORAL 2 TIMES DAILY
Qty: 60 CAPSULE | Refills: 0 | Status: SHIPPED | OUTPATIENT
Start: 2018-05-23 | End: 2018-07-05

## 2018-05-23 RX ORDER — CYCLOSPORINE 100 MG/1
100 CAPSULE, LIQUID FILLED ORAL 2 TIMES DAILY
Qty: 60 CAPSULE | Refills: 0 | Status: SHIPPED | OUTPATIENT
Start: 2018-05-23 | End: 2018-07-05

## 2018-05-24 LAB
CYCLOSPORINE BLD LC/MS/MS-MCNC: 92 UG/L (ref 50–400)
TME LAST DOSE: NORMAL H

## 2018-07-05 DIAGNOSIS — Z94.0 KIDNEY REPLACED BY TRANSPLANT: ICD-10-CM

## 2018-07-06 RX ORDER — CYCLOSPORINE 100 MG/1
CAPSULE, LIQUID FILLED ORAL
Qty: 60 CAPSULE | Refills: 0 | Status: SHIPPED | OUTPATIENT
Start: 2018-07-06 | End: 2018-08-13

## 2018-07-06 RX ORDER — CYCLOSPORINE 25 MG/1
CAPSULE, LIQUID FILLED ORAL
Qty: 60 CAPSULE | Refills: 0 | Status: SHIPPED | OUTPATIENT
Start: 2018-07-06 | End: 2018-08-13

## 2018-07-23 DIAGNOSIS — Z94.0 KIDNEY REPLACED BY TRANSPLANT: ICD-10-CM

## 2018-07-23 RX ORDER — MYCOPHENOLATE MOFETIL 250 MG/1
CAPSULE ORAL
Qty: 240 CAPSULE | Refills: 2 | Status: SHIPPED | OUTPATIENT
Start: 2018-07-23 | End: 2018-11-04

## 2018-08-13 DIAGNOSIS — Z94.0 KIDNEY REPLACED BY TRANSPLANT: ICD-10-CM

## 2018-08-14 RX ORDER — CYCLOSPORINE 25 MG/1
CAPSULE, LIQUID FILLED ORAL
Qty: 60 CAPSULE | Refills: 0 | Status: SHIPPED | OUTPATIENT
Start: 2018-08-14 | End: 2018-09-25

## 2018-08-14 RX ORDER — CYCLOSPORINE 100 MG/1
CAPSULE, LIQUID FILLED ORAL
Qty: 60 CAPSULE | Refills: 0 | Status: SHIPPED | OUTPATIENT
Start: 2018-08-14 | End: 2018-09-25

## 2018-09-25 DIAGNOSIS — Z94.0 KIDNEY REPLACED BY TRANSPLANT: Primary | ICD-10-CM

## 2018-09-25 RX ORDER — CYCLOSPORINE 100 MG/1
100 CAPSULE, LIQUID FILLED ORAL 2 TIMES DAILY
Qty: 60 CAPSULE | Refills: 0 | Status: SHIPPED | OUTPATIENT
Start: 2018-09-25 | End: 2018-11-04

## 2018-09-25 RX ORDER — CYCLOSPORINE 25 MG/1
25 CAPSULE, LIQUID FILLED ORAL 2 TIMES DAILY
Qty: 60 CAPSULE | Refills: 0 | Status: SHIPPED | OUTPATIENT
Start: 2018-09-25 | End: 2018-11-04

## 2018-10-29 ENCOUNTER — OFFICE VISIT - RIVER FALLS (OUTPATIENT)
Dept: FAMILY MEDICINE | Facility: CLINIC | Age: 48
End: 2018-10-29
Payer: COMMERCIAL

## 2018-10-29 ASSESSMENT — MIFFLIN-ST. JEOR: SCORE: 2105.34

## 2018-10-30 LAB
CREAT SERPL-MCNC: 1.69 MG/DL (ref 0.6–1.35)
GLUCOSE BLD-MCNC: 102 MG/DL (ref 65–99)

## 2018-11-02 ENCOUNTER — TELEPHONE (OUTPATIENT)
Dept: TRANSPLANT | Facility: CLINIC | Age: 48
End: 2018-11-02

## 2018-11-02 NOTE — LETTER
[facility]  Room 2-200  [facility],  [address]  [address]  Tel [phone]  Toll Free [phone]                OUTPATIENT LABORATORY TEST ORDER    Patient Name: Hakeem Romero  Transplant Date: 8/19/2008 (Kidney)  YOB: 1970  Issue Date & Time:November 2, 20182:12 PM    [facility] MR:  [id number]  Exp. Date (1 year after date issued)      Diagnoses: Kidney Transplant (ICD-9  V42.0)   Long term use of medications (ICD-9  V58.69)     Lab results to be available on the same day drawn.   Patient should release information to the [facility].  Please fax to the Transplant Center at [phone].    Every 3 Months    ?Hemogram and Platelet   ?Basic Metabolic Panel (Sodium, Potassium, Chloride, CO2, Creatinine, BUN, Glucose, Calcium)           ?CSA mail to [facility] - [facility] mailers and instructions provided by the patient)                   Every 6 Months                                            ?Urine for protein/creatinine      If you have any questions, please call [facility] at [phone] or [phone].    Please fax labs to [phone]  .

## 2018-11-02 NOTE — TELEPHONE ENCOUNTER
ISSUE:  No CSA level since May and was not collected just last week.     PLAN/TASK:  Please call pt and ask that he obtain CSA level when able. Update lab orders (lab telling the admins they need updated orders)

## 2018-11-04 DIAGNOSIS — Z94.0 KIDNEY REPLACED BY TRANSPLANT: Primary | ICD-10-CM

## 2018-11-05 RX ORDER — CYCLOSPORINE 25 MG/1
25 CAPSULE, LIQUID FILLED ORAL 2 TIMES DAILY
Qty: 60 CAPSULE | Refills: 11 | Status: SHIPPED | OUTPATIENT
Start: 2018-11-05 | End: 2019-02-08

## 2018-11-05 RX ORDER — MYCOPHENOLATE MOFETIL 250 MG/1
1000 CAPSULE ORAL 2 TIMES DAILY
Qty: 240 CAPSULE | Refills: 11 | Status: SHIPPED | OUTPATIENT
Start: 2018-11-05 | End: 2024-02-26

## 2018-11-05 RX ORDER — CYCLOSPORINE 100 MG/1
100 CAPSULE, LIQUID FILLED ORAL 2 TIMES DAILY
Qty: 60 CAPSULE | Refills: 11 | Status: SHIPPED | OUTPATIENT
Start: 2018-11-05 | End: 2022-10-31

## 2018-11-05 NOTE — TELEPHONE ENCOUNTER
Call placed to patient. Patient v\u that repeat CSA level isn't needed and to continue with current quarterly labs. Orders sent

## 2018-12-17 ENCOUNTER — TELEPHONE (OUTPATIENT)
Dept: NEPHROLOGY | Facility: CLINIC | Age: 48
End: 2018-12-17

## 2018-12-17 NOTE — TELEPHONE ENCOUNTER
Spoke with patient for transplant follow up. States he's doing well, denied symptoms or concerns. No questions prior to appointment.    Jumana Salas RN

## 2018-12-21 ENCOUNTER — DOCUMENTATION ONLY (OUTPATIENT)
Dept: TRANSPLANT | Facility: CLINIC | Age: 48
End: 2018-12-21

## 2018-12-21 NOTE — PROGRESS NOTES
Chart Prep    Clinic Visit on: 1/17/19    Last lab completed:  10/29/18    Lab letter updated: 11/2/18    Lab orders up to date in Epic.

## 2019-01-17 ENCOUNTER — OFFICE VISIT (OUTPATIENT)
Dept: NEPHROLOGY | Facility: CLINIC | Age: 49
End: 2019-01-17
Attending: INTERNAL MEDICINE
Payer: COMMERCIAL

## 2019-01-17 VITALS
SYSTOLIC BLOOD PRESSURE: 127 MMHG | HEART RATE: 75 BPM | WEIGHT: 272.2 LBS | BODY MASS INDEX: 38.11 KG/M2 | OXYGEN SATURATION: 96 % | DIASTOLIC BLOOD PRESSURE: 84 MMHG | HEIGHT: 71 IN

## 2019-01-17 DIAGNOSIS — I15.1 HTN, KIDNEY TRANSPLANT RELATED: ICD-10-CM

## 2019-01-17 DIAGNOSIS — Z48.298 AFTERCARE FOLLOWING ORGAN TRANSPLANT: ICD-10-CM

## 2019-01-17 DIAGNOSIS — D75.1 POST-TRANSPLANT ERYTHROCYTOSIS: ICD-10-CM

## 2019-01-17 DIAGNOSIS — Z94.0 HTN, KIDNEY TRANSPLANT RELATED: ICD-10-CM

## 2019-01-17 DIAGNOSIS — Z94.0 KIDNEY REPLACED BY TRANSPLANT: Primary | ICD-10-CM

## 2019-01-17 DIAGNOSIS — D84.9 IMMUNOSUPPRESSION (H): ICD-10-CM

## 2019-01-17 PROCEDURE — G0463 HOSPITAL OUTPT CLINIC VISIT: HCPCS | Mod: ZF

## 2019-01-17 RX ORDER — METHYLPHENIDATE HYDROCHLORIDE 5 MG/1
TABLET ORAL
Refills: 0 | COMMUNITY
Start: 2018-10-29

## 2019-01-17 RX ORDER — PROBENECID 500 MG/1
500 TABLET, FILM COATED ORAL 2 TIMES DAILY
COMMUNITY

## 2019-01-17 ASSESSMENT — MIFFLIN-ST. JEOR: SCORE: 2126.82

## 2019-01-17 ASSESSMENT — PAIN SCALES - GENERAL: PAINLEVEL: NO PAIN (0)

## 2019-01-17 NOTE — PROGRESS NOTES
[facility]  Nephrology Clinic  Kidney/Pancreas Recipient  2019     Name: Hakeem Romero  MRN: [id number]   Age: 48 year old  : 1970  Referring provider: Ronnell De Luna     CHRONIC TRANSPLANT NEPHROLOGY VISIT    Assessment and Plan:   # LDKT: Stable   - Baseline Cr ~ 1.6-1.8;    - Proteinuria: Moderate   - Date of DSA last checked: 2009 Latest DSA: Not checked recently   - BK Viremia: Not checked recently   - Kidney Tx Biopsy: 08; No evidence of acute rejection.  Mild acute tubular injury with minimal interstitial fibrosis and tubular atrophy.    # Immunosuppression: Cyclosporine (goal: , Mycophenolate mofetil (goal: 1-3.5) and Prednisone (dose: 5mg daily)   - Changes: No    # Prophylaxis:    - PJP: Sulfa/TMP (Bactrim).     # Hypertension: Controlled; Goal BP: < 130/80   - Changes: No, continue ARB.    # Post Transplant Erythrocytosis: Hgb: Stable    - Will continue on ARB.    # Gout: Gout flares 2x/year, with recent gout flare about two weeks ago.     # Overweight: Patient has increased exercise, currently strength trains 3x/week.   - Recommend increased exercise and watch caloric intake.    # Narcolepsy: Treating with Ritalin daily in the morning, and two-three times a week at night. Follows up with primary care.    # Skin Cancer:   - New lesions: one   - Discussed sun protection and recommend regular follow up with Dermatology    # Medical Compliance: No.  Discussed importance of checking labs regularly as recommended, taking medications as prescribed and attending scheduled medical appointments    Follow-up: Return in about 1 year (around 2020).     # Transplant History:  Etiology of kidney failure: IgA nephropathy  Tx: DDKT  Transplant: 2008 (Kidney)  Donor Type: Living Donor Class: Standard Criteria Donor  Significant changes in immunosuppression: None  Significant transplant-related complications: None    Transplant Office Phone Number: [phone]    Assessment and plan  was discussed with the patient and they voiced understanding and agreement.    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 The patient was last evaluated on 01/08/2018 by me. At that time, I recommended the patient regularly check his blood pressure at home and discussed the importance of having labs regularly. Please see note for further details. Since the patient's last evaluation, he has increased his exercise to three times a week and notes that Ritalin is working well to treat his narcolepsy. He takes the medication daily in the morning, and two to three times per week at night. His wife recently noticed a lesion a couple days ago, and he plans on following up with Dermatology. The patient recently had a gout flare around two weeks ago, but he said he typically only has them 2x/year.      Recent Hospitalizations:  [x] No [] Yes    New Medical Issues: [x] No [] Yes    Decreased energy: [x] No [] Yes    Chest pain or SOB with exertion:  [x] No [] Yes    Appetite change or weight change: [x] No [] Yes    Nausea, vomiting or diarrhea:  [x] No [] Yes    Fever, sweats or chills: [x] No [] Yes    Leg swelling: [x] No [] Yes If he is on his feet all day he notices it but otherwise he doesn't.      Other medical issues:  No    Home BP: 120-130/70-80     Review of Systems:   A comprehensive review of systems was obtained and negative, except as noted in the HPI or past medical history.     Active Medications:     Current Outpatient Medications:      ALLOPURINOL PO, Take 30 mg by mouth daily, Disp: , Rfl:      CELLCEPT (BRAND) 250 MG CAPSULE, Take 4 capsules (1,000 mg) by mouth 2 times daily, Disp: 240 capsule, Rfl: 11     CLONIDINE HCL PO, Take 10 mg by mouth 3 times daily, Disp: , Rfl:      losartan (COZAAR) 50 MG tablet, Take 1 tablet (50 mg) by  "mouth daily, Disp: , Rfl:      methylphenidate (RITALIN) 5 MG tablet, TAKE 1-2 TABS BY MOUTH TWICE A DAY .LAST DOSE BEFORE 6 PM, Disp: , Rfl: 0     Methylphenidate HCl (RITALIN PO), Take by mouth 2 times daily, Disp: , Rfl:      METOPROLOL TARTRATE PO, Take 30 mg by mouth 2 times daily, Disp: , Rfl:      NEORAL (BRAND) 100 MG CAPSULE, Take 1 capsule (100 mg) by mouth 2 times daily, Disp: 60 capsule, Rfl: 11     NEORAL (BRAND) 25 MG CAPSULE, Take 1 capsule (25 mg) by mouth 2 times daily, Disp: 60 capsule, Rfl: 11     PRAVASTATIN SODIUM PO, Take 40 mg by mouth daily, Disp: , Rfl:      predniSONE (DELTASONE) 5 MG tablet, Take 1 tablet (5 mg) by mouth daily, Disp: 30 tablet, Rfl: 3     probenecid (BENEMID) 500 MG tablet, Take 500 mg by mouth 2 times daily, Disp: , Rfl:      sulfamethoxazole-trimethoprim (BACTRIM/SEPTRA) 400-80 MG per tablet, TAKE 1 TABLET BY MOUTH  EVERY MON, WED, FRI MORNING, Disp: 39 tablet, Rfl: 11     SIMVASTATIN PO, Take 40 mg by mouth At Bedtime, Disp: , Rfl:       Allergies:   No known drug allergies.      Active Medical Problems:  Patient Active Problem List   Diagnosis     Kidney replaced by transplant     Aftercare following organ transplant     Immunosuppression (H)     HTN, kidney transplant related     Dyslipidemia     Gout     RAUL on CPAP     Narcolepsy     Post-transplant erythrocytosis     Social History:   Social History     Tobacco Use     Smoking status: Never Smoker     Smokeless tobacco: Never Used   Substance Use Topics     Alcohol use: Yes     Alcohol/week: 2.0 oz     Types: 4 Standard drinks or equivalent per week     Drug use: No     Physical Exam:   /84   Pulse 75   Ht 1.803 m (5' 11\")   Wt 123.5 kg (272 lb 3.2 oz)   SpO2 96%   BMI 37.96 kg/m     Wt Readings from Last 4 Encounters:   01/17/19 123.5 kg (272 lb 3.2 oz)   01/08/18 122.5 kg (270 lb)   01/06/15 117.6 kg (259 lb 3.2 oz)     GENERAL APPEARANCE: alert and no distress  HENT: mouth without ulcers or " lesions  LYMPHATICS: no cervical or supraclavicular nodes  RESP: lungs clear to auscultation - no rales, rhonchi or wheezes  CV: regular rhythm, normal rate, no rub, no murmur  EDEMA: no LE edema bilaterally  ABDOMEN: soft, nondistended, nontender, bowel sounds normal  MS: extremities normal - no gross deformities noted, no evidence of inflammation in joints, no muscle tenderness  SKIN: no rash  TX KIDNEY: normal  DIALYSIS ACCESS:  None    Data:   Renal Latest Ref Rng & Units 10/29/2018 5/22/2018 1/8/2018   Na 133 - 144 mmol/L - - -   Na (external) 135 - 146 mmol/L 139 139 142   K 3.4 - 5.3 mmol/L - - -   K (external) 3.5 - 5.3 mmol/L 4.7 4.9 5.0   Cl 94 - 109 mmol/L - - -   Cl (external) 98 - 110 mmol/L 104 105 108   CO2 20 - 32 mmol/L - - -   CO2 (external) 20 - 32 mmol/L 28 28 28   BUN 5 - 24 mg/dL - - -   BUN (external) 7 - 25 mg/dL 30(H) 30(H) 31(H)   Cr 0.66 - 1.25 mg/dL - - -   Cr (external) 0.60 - 1.35 mg/dL 1.69(H) 1.66(H) 1.68(H)   Glucose 60 - 99 mg/dL - - -   Glucose (external) 65 - 99 mg/dL 102(H) 96 96   Ca  8.5 - 10.4 mg/dL - - -   Ca (external) 8.6 - 10.3 mg/dL 9.7 9.6 9.3   Mg 1.6 - 2.3 mg/dL - - -     Bone Health Latest Ref Rng & Units 11/24/2008 10/13/2008 10/12/2008   Phos 2.5 - 4.5 mg/dL 4.4 2.9 2.9   PTHi 12 - 72 pg/mL - - 67     Heme Latest Ref Rng & Units 10/29/2018 5/22/2018 1/8/2018   WBC 4.0 - 11.0 10e9/L - - -   WBC (external) 3.8 - 10.8 Thousand/uL 9.7 7.7 6.7   Hgb 13.3 - 17.7 g/dL - - -   Hgb (external) 13.2 - 17.1 g/dL 16.7 17.0 15.5   Plt 150 - 450 10e9/L - - -   Plt (external) 140 - 400 Thousand/uL 230 195 182     Liver Latest Ref Rng & Units 11/24/2008 9/15/2008 8/18/2008   AP 40 - 150 U/L 163(H) 128 76   TBili 0.2 - 1.3 mg/dL 0.5 0.5 0.3   ALT 0 - 70 U/L 28 22 15   AST 0 - 55 U/L 26 27 22   Tot Protein 6.8 - 8.8 g/dL 8.1 7.5 7.5   Albumin 3.9 - 5.1 g/dL 4.7 4.3 4.4     Pancreas Latest Ref Rng & Units 8/18/2008   A1C 4.3 - 6.0 % 4.5     Iron studies Latest Ref Rng & Units  10/12/2008 8/21/2008   Iron 35 - 180 ug/dL 35 36   Ferritin 20 - 300 ng/mL - 727(H)     [facility] Txp Virology Latest Ref Rng & Units 12/4/2008 11/24/2008 9/15/2008   CMV IgG EU/mL - - -   CVM DNA Quant - Whole Blood Whole blood, EDTA anticoagulant Whole blood, EDTA anticoagulant   CMV Quant <100 Copies/mL <100 <100 <100   CMV QT Log <2.0 Log copies/mL <2.0 <2.0 <2.0   BK Spec - Plasma, EDTA anticoagulant Plasma, EDTA anticoagulant Plasma, EDTA anticoagulant   BK Res <1000 copies/mL <1000 <1000 <1000   BK Log <3.0 Log copies/mL <3.0 <3.0 <3.0   EBV IgG - - - -   Hep B Core NEG - - -   Hep B Surf - - - -   HIV 1&2 NEG - - -     Scribe Disclosure:   I, Eri Thomas, am serving as a scribe to document services personally performed by Dr. Hurtado at this visit, based upon the provider's statements to me. All documentation has been reviewed by the aforementioned provider prior to being entered into the official medical record.     Portions of this medical record were completed by a scribe. UPON MY REVIEW AND AUTHENTICATION BY ELECTRONIC SIGNATURE, this confirms (a) I performed the applicable clinical services, and (b) the record is accurate.

## 2019-01-17 NOTE — LETTER
2019      RE: Hakeem Romero  [address]  [facility] [address]       [facility]  Nephrology Clinic  Kidney/Pancreas Recipient  2019     Name: Hakeem Romero  MRN: [id number]   Age: 48 year old  : 1970  Referring provider: Ronnell De Luna     CHRONIC TRANSPLANT NEPHROLOGY VISIT    Assessment and Plan:   # LDKT: Stable   - Baseline Cr ~ 1.6-1.8;    - Proteinuria: Moderate   - Date of DSA last checked: 2009 Latest DSA: Not checked recently   - BK Viremia: Not checked recently   - Kidney Tx Biopsy: 08; No evidence of acute rejection.  Mild acute tubular injury with minimal interstitial fibrosis and tubular atrophy.    # Immunosuppression: Cyclosporine (goal: , Mycophenolate mofetil (goal: 1-3.5) and Prednisone (dose: 5mg daily)   - Changes: No    # Prophylaxis:    - PJP: Sulfa/TMP (Bactrim).     # Hypertension: Controlled; Goal BP: < 130/80   - Changes: No, continue ARB.    # Post Transplant Erythrocytosis: Hgb: Stable    - Will continue on ARB.    # Gout: Gout flares 2x/year, with recent gout flare about two weeks ago.     # Overweight: Patient has increased exercise, currently strength trains 3x/week.   - Recommend increased exercise and watch caloric intake.    # Narcolepsy: Treating with Ritalin daily in the morning, and two-three times a week at night. Follows up with primary care.    # Skin Cancer:   - New lesions: one   - Discussed sun protection and recommend regular follow up with Dermatology    # Medical Compliance: No.  Discussed importance of checking labs regularly as recommended, taking medications as prescribed and attending scheduled medical appointments    Follow-up: Return in about 1 year (around 2020).     # Transplant History:  Etiology of kidney failure: IgA nephropathy  Tx: DDKT  Transplant: 2008 (Kidney)  Donor Type: Living Donor Class: Standard Criteria Donor  Significant changes in immunosuppression: None  Significant transplant-related complications:  None    Transplant Office Phone Number: [phone]    Assessment and plan was discussed with the patient and they voiced understanding and agreement.    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 The patient was last evaluated on 01/08/2018 by me. At that time, I recommended the patient regularly check his blood pressure at home and discussed the importance of having labs regularly. Please see note for further details. Since the patient's last evaluation, he has increased his exercise to three times a week and notes that Ritalin is working well to treat his narcolepsy. He takes the medication daily in the morning, and two to three times per week at night. His wife recently noticed a lesion a couple days ago, and he plans on following up with Dermatology. The patient recently had a gout flare around two weeks ago, but he said he typically only has them 2x/year.      Recent Hospitalizations:  [x] No [] Yes    New Medical Issues: [x] No [] Yes    Decreased energy: [x] No [] Yes    Chest pain or SOB with exertion:  [x] No [] Yes    Appetite change or weight change: [x] No [] Yes    Nausea, vomiting or diarrhea:  [x] No [] Yes    Fever, sweats or chills: [x] No [] Yes    Leg swelling: [x] No [] Yes If he is on his feet all day he notices it but otherwise he doesn't.      Other medical issues:  No    Home BP: 120-130/70-80     Review of Systems:   A comprehensive review of systems was obtained and negative, except as noted in the HPI or past medical history.     Active Medications:     Current Outpatient Medications:      ALLOPURINOL PO, Take 30 mg by mouth daily, Disp: , Rfl:      CELLCEPT (BRAND) 250 MG CAPSULE, Take 4 capsules (1,000 mg) by mouth 2 times daily, Disp: 240 capsule, Rfl: 11     CLONIDINE HCL PO, Take 10 mg by mouth 3 times  "daily, Disp: , Rfl:      losartan (COZAAR) 50 MG tablet, Take 1 tablet (50 mg) by mouth daily, Disp: , Rfl:      methylphenidate (RITALIN) 5 MG tablet, TAKE 1-2 TABS BY MOUTH TWICE A DAY .LAST DOSE BEFORE 6 PM, Disp: , Rfl: 0     Methylphenidate HCl (RITALIN PO), Take by mouth 2 times daily, Disp: , Rfl:      METOPROLOL TARTRATE PO, Take 30 mg by mouth 2 times daily, Disp: , Rfl:      NEORAL (BRAND) 100 MG CAPSULE, Take 1 capsule (100 mg) by mouth 2 times daily, Disp: 60 capsule, Rfl: 11     NEORAL (BRAND) 25 MG CAPSULE, Take 1 capsule (25 mg) by mouth 2 times daily, Disp: 60 capsule, Rfl: 11     PRAVASTATIN SODIUM PO, Take 40 mg by mouth daily, Disp: , Rfl:      predniSONE (DELTASONE) 5 MG tablet, Take 1 tablet (5 mg) by mouth daily, Disp: 30 tablet, Rfl: 3     probenecid (BENEMID) 500 MG tablet, Take 500 mg by mouth 2 times daily, Disp: , Rfl:      sulfamethoxazole-trimethoprim (BACTRIM/SEPTRA) 400-80 MG per tablet, TAKE 1 TABLET BY MOUTH  EVERY MON, WED, FRI MORNING, Disp: 39 tablet, Rfl: 11     SIMVASTATIN PO, Take 40 mg by mouth At Bedtime, Disp: , Rfl:       Allergies:   No known drug allergies.      Active Medical Problems:  Patient Active Problem List   Diagnosis     Kidney replaced by transplant     Aftercare following organ transplant     Immunosuppression (H)     HTN, kidney transplant related     Dyslipidemia     Gout     RAUL on CPAP     Narcolepsy     Post-transplant erythrocytosis     Social History:   Social History     Tobacco Use     Smoking status: Never Smoker     Smokeless tobacco: Never Used   Substance Use Topics     Alcohol use: Yes     Alcohol/week: 2.0 oz     Types: 4 Standard drinks or equivalent per week     Drug use: No     Physical Exam:   /84   Pulse 75   Ht 1.803 m (5' 11\")   Wt 123.5 kg (272 lb 3.2 oz)   SpO2 96%   BMI 37.96 kg/m      Wt Readings from Last 4 Encounters:   01/17/19 123.5 kg (272 lb 3.2 oz)   01/08/18 122.5 kg (270 lb)   01/06/15 117.6 kg (259 lb 3.2 oz) "     GENERAL APPEARANCE: alert and no distress  HENT: mouth without ulcers or lesions  LYMPHATICS: no cervical or supraclavicular nodes  RESP: lungs clear to auscultation - no rales, rhonchi or wheezes  CV: regular rhythm, normal rate, no rub, no murmur  EDEMA: no LE edema bilaterally  ABDOMEN: soft, nondistended, nontender, bowel sounds normal  MS: extremities normal - no gross deformities noted, no evidence of inflammation in joints, no muscle tenderness  SKIN: no rash  TX KIDNEY: normal  DIALYSIS ACCESS:  None    Data:   Renal Latest Ref Rng & Units 10/29/2018 5/22/2018 1/8/2018   Na 133 - 144 mmol/L - - -   Na (external) 135 - 146 mmol/L 139 139 142   K 3.4 - 5.3 mmol/L - - -   K (external) 3.5 - 5.3 mmol/L 4.7 4.9 5.0   Cl 94 - 109 mmol/L - - -   Cl (external) 98 - 110 mmol/L 104 105 108   CO2 20 - 32 mmol/L - - -   CO2 (external) 20 - 32 mmol/L 28 28 28   BUN 5 - 24 mg/dL - - -   BUN (external) 7 - 25 mg/dL 30(H) 30(H) 31(H)   Cr 0.66 - 1.25 mg/dL - - -   Cr (external) 0.60 - 1.35 mg/dL 1.69(H) 1.66(H) 1.68(H)   Glucose 60 - 99 mg/dL - - -   Glucose (external) 65 - 99 mg/dL 102(H) 96 96   Ca  8.5 - 10.4 mg/dL - - -   Ca (external) 8.6 - 10.3 mg/dL 9.7 9.6 9.3   Mg 1.6 - 2.3 mg/dL - - -     Bone Health Latest Ref Rng & Units 11/24/2008 10/13/2008 10/12/2008   Phos 2.5 - 4.5 mg/dL 4.4 2.9 2.9   PTHi 12 - 72 pg/mL - - 67     Heme Latest Ref Rng & Units 10/29/2018 5/22/2018 1/8/2018   WBC 4.0 - 11.0 10e9/L - - -   WBC (external) 3.8 - 10.8 Thousand/uL 9.7 7.7 6.7   Hgb 13.3 - 17.7 g/dL - - -   Hgb (external) 13.2 - 17.1 g/dL 16.7 17.0 15.5   Plt 150 - 450 10e9/L - - -   Plt (external) 140 - 400 Thousand/uL 230 195 182     Liver Latest Ref Rng & Units 11/24/2008 9/15/2008 8/18/2008   AP 40 - 150 U/L 163(H) 128 76   TBili 0.2 - 1.3 mg/dL 0.5 0.5 0.3   ALT 0 - 70 U/L 28 22 15   AST 0 - 55 U/L 26 27 22   Tot Protein 6.8 - 8.8 g/dL 8.1 7.5 7.5   Albumin 3.9 - 5.1 g/dL 4.7 4.3 4.4     Pancreas Latest Ref Rng & Units  8/18/2008   A1C 4.3 - 6.0 % 4.5     Iron studies Latest Ref Rng & Units 10/12/2008 8/21/2008   Iron 35 - 180 ug/dL 35 36   Ferritin 20 - 300 ng/mL - 727(H)     [facility] Txp Virology Latest Ref Rng & Units 12/4/2008 11/24/2008 9/15/2008   CMV IgG EU/mL - - -   CVM DNA Quant - Whole Blood Whole blood, EDTA anticoagulant Whole blood, EDTA anticoagulant   CMV Quant <100 Copies/mL <100 <100 <100   CMV QT Log <2.0 Log copies/mL <2.0 <2.0 <2.0   BK Spec - Plasma, EDTA anticoagulant Plasma, EDTA anticoagulant Plasma, EDTA anticoagulant   BK Res <1000 copies/mL <1000 <1000 <1000   BK Log <3.0 Log copies/mL <3.0 <3.0 <3.0   EBV IgG - - - -   Hep B Core NEG - - -   Hep B Surf - - - -   HIV 1&2 NEG - - -     Scribe Disclosure:   I, Eri Thomas, am serving as a scribe to document services personally performed by Dr. Hurtado at this visit, based upon the provider's statements to me. All documentation has been reviewed by the aforementioned provider prior to being entered into the official medical record.     Portions of this medical record were completed by a scribe. UPON MY REVIEW AND AUTHENTICATION BY ELECTRONIC SIGNATURE, this confirms (a) I performed the applicable clinical services, and (b) the record is accurate.     Bakari Hurtado MD

## 2019-01-17 NOTE — NURSING NOTE
"Chief Complaint   Patient presents with     RECHECK     post kidney tx     /84   Pulse 75   Ht 1.803 m (5' 11\")   Wt 123.5 kg (272 lb 3.2 oz)   SpO2 96%   BMI 37.96 kg/m    Constance Casas CMA    "

## 2019-01-19 PROBLEM — D75.1 POST-TRANSPLANT ERYTHROCYTOSIS: Status: ACTIVE | Noted: 2019-01-19

## 2019-02-04 ENCOUNTER — AMBULATORY - RIVER FALLS (OUTPATIENT)
Dept: FAMILY MEDICINE | Facility: CLINIC | Age: 49
End: 2019-02-04
Payer: COMMERCIAL

## 2019-02-04 PROCEDURE — 80158 DRUG ASSAY CYCLOSPORINE: CPT | Performed by: INTERNAL MEDICINE

## 2019-02-05 LAB
BASOPHILS # BLD MANUAL: 59 10*3/UL (ref 0–200)
BASOPHILS NFR BLD MANUAL: 1.3 %
BUN SERPL-MCNC: 27 MG/DL (ref 7–25)
BUN/CREAT RATIO - HISTORICAL: 14 (ref 6–22)
CALCIUM SERPL-MCNC: 9.5 MG/DL (ref 8.6–10.3)
CHLORIDE BLD-SCNC: 107 MMOL/L (ref 98–110)
CO2 SERPL-SCNC: 28 MMOL/L (ref 20–32)
CREAT SERPL-MCNC: 1.88 MG/DL (ref 0.6–1.35)
EGFRCR SERPLBLD CKD-EPI 2021: 41 ML/MIN/1.73M2
EOSINOPHIL # BLD MANUAL: 261 10*3/UL (ref 15–500)
EOSINOPHIL NFR BLD MANUAL: 5.8 %
ERYTHROCYTE [DISTWIDTH] IN BLOOD BY AUTOMATED COUNT: 13 % (ref 11–15)
GLUCOSE BLD-MCNC: 104 MG/DL (ref 65–99)
HCT VFR BLD AUTO: 44.8 % (ref 38.5–50)
HGB BLD-MCNC: 15.5 GM/DL (ref 13.2–17.1)
LYMPHOCYTES # BLD MANUAL: 1157 10*3/UL (ref 850–3900)
LYMPHOCYTES NFR BLD MANUAL: 25.7 %
MCH RBC QN AUTO: 29.9 PG (ref 27–33)
MCHC RBC AUTO-ENTMCNC: 34.6 GM/DL (ref 32–36)
MCV RBC AUTO: 86.5 FL (ref 80–100)
MONOCYTES # BLD MANUAL: 698 10*3/UL (ref 200–950)
MONOCYTES NFR BLD MANUAL: 15.5 %
NEUTROPHILS # BLD MANUAL: 2327 10*3/UL (ref 1500–7800)
NEUTROPHILS NFR BLD MANUAL: 51.7 %
PLATELET # BLD AUTO: 179 10*3/UL (ref 140–400)
PMV BLD: 11 FL (ref 7.5–12.5)
POTASSIUM BLD-SCNC: 4.9 MMOL/L (ref 3.5–5.3)
RBC # BLD AUTO: 5.18 10*6/UL (ref 4.2–5.8)
SODIUM SERPL-SCNC: 141 MMOL/L (ref 135–146)
WBC # BLD AUTO: 4.5 10*3/UL (ref 3.8–10.8)

## 2019-02-06 ENCOUNTER — TELEPHONE (OUTPATIENT)
Dept: TRANSPLANT | Facility: CLINIC | Age: 49
End: 2019-02-06

## 2019-02-06 DIAGNOSIS — Z94.0 KIDNEY REPLACED BY TRANSPLANT: ICD-10-CM

## 2019-02-06 LAB
CYCLOSPORINE BLD LC/MS/MS-MCNC: 114 UG/L (ref 50–400)
TME LAST DOSE: NORMAL H

## 2019-02-06 NOTE — TELEPHONE ENCOUNTER
ISSUE:  Creatinine above baseline 1.88.    PLAN:  Call placed to pt. No answer. Left detailed vm asking for return call to discuss.

## 2019-02-06 NOTE — LETTER
2019  PHYSICIAN ORDERS      DATE & TIME ISSUED: 2019 4:52 PM  PATIENT NAME: Hakeem Romero   : 1970     [facility] MR# [if applicable]: [id number]     DIAGNOSIS:  post kidney transplant  ICD-10 CODE: Z94.0     Please collect the following the week of 19:  CBC  BMP  Cyclosporine level    Any questions please call: [phone]    Please fax these results to [phone].    .

## 2019-02-08 RX ORDER — CYCLOSPORINE 25 MG/1
25 CAPSULE, LIQUID FILLED ORAL 2 TIMES DAILY
Qty: 60 CAPSULE | Refills: 11
Start: 2019-02-08 | End: 2022-10-31

## 2019-02-08 NOTE — TELEPHONE ENCOUNTER
Second call placed to pt. CSA level also elevated at 114    He states that he has been battling a chest cold for over a week now. Confirms current CSA dose of 125mg BID and 12 hour level. He has been educated to orally hydrate as well as possible, and to DECREASE CSA dose to 100mg BID - and to repeat all txp labs next week. I also asked that he be evaluated by PCP or urgent care next week of URI symptoms do not improve. Rx updated, lab orders sent.   Pt verbalizes understanding of plan

## 2019-03-28 ENCOUNTER — OFFICE VISIT - RIVER FALLS (OUTPATIENT)
Dept: FAMILY MEDICINE | Facility: CLINIC | Age: 49
End: 2019-03-28
Payer: COMMERCIAL

## 2019-03-28 ASSESSMENT — MIFFLIN-ST. JEOR: SCORE: 2123.48

## 2019-04-01 ENCOUNTER — AMBULATORY - RIVER FALLS (OUTPATIENT)
Dept: FAMILY MEDICINE | Facility: CLINIC | Age: 49
End: 2019-04-01
Payer: COMMERCIAL

## 2019-04-01 PROCEDURE — 80158 DRUG ASSAY CYCLOSPORINE: CPT | Performed by: INTERNAL MEDICINE

## 2019-04-02 LAB
CHOLEST SERPL-MCNC: 167 MG/DL
CHOLEST/HDLC SERPL: 5.2 {RATIO}
HDLC SERPL-MCNC: 32 MG/DL
LDLC SERPL CALC-MCNC: 97 MG/DL
NONHDLC SERPL-MCNC: 135 MG/DL
TRIGL SERPL-MCNC: 269 MG/DL
URATE SERPL-MCNC: 4.8 MG/DL (ref 4–8)

## 2019-04-03 ENCOUNTER — TELEPHONE (OUTPATIENT)
Dept: TRANSPLANT | Facility: CLINIC | Age: 49
End: 2019-04-03

## 2019-04-03 DIAGNOSIS — Z79.899 ENCOUNTER FOR LONG-TERM CURRENT USE OF MEDICATION: ICD-10-CM

## 2019-04-03 DIAGNOSIS — Z94.0 KIDNEY REPLACED BY TRANSPLANT: Primary | ICD-10-CM

## 2019-04-03 DIAGNOSIS — Z48.298 AFTERCARE FOLLOWING ORGAN TRANSPLANT: ICD-10-CM

## 2019-04-03 LAB
CYCLOSPORINE BLD LC/MS/MS-MCNC: 883 UG/L (ref 50–400)
TME LAST DOSE: ABNORMAL H

## 2019-04-03 NOTE — TELEPHONE ENCOUNTER
DATE:  4/3/2019   TIME OF RECEIPT FROM LAB:  2:12  LAB TEST:  Cyclosporin   LAB VALUE:  883  RESULTS GIVEN WITH READ-BACK TO (PROVIDER):  Monica JIMENEZ LAB VALUE REPORTED TO PROVIDER:   2:17

## 2019-04-04 ENCOUNTER — TELEPHONE (OUTPATIENT)
Dept: TRANSPLANT | Facility: CLINIC | Age: 49
End: 2019-04-04

## 2019-04-04 NOTE — TELEPHONE ENCOUNTER
Notes recorded by Bakari Hurtado MD on 4/3/2019 at 2:01 PM CDT  Very high cyclosporine drug level, likely due to patient taking medication prior to lab.  Transplant coordinator to review and adjust as needed.    CSA 800s    Call placed to pt. No answer. Left vm asking for return call.

## 2019-04-04 NOTE — LETTER
2019  PHYSICIAN ORDERS      DATE & TIME ISSUED: 2019 10:28 AM  PATIENT NAME: Hakeem Romero   : 1970     [facility] MR# [if applicable]: [id number]     DIAGNOSIS:  post kidney transplant  ICD-10 CODE: Z94.0     Please collect the following upon next lab appt:  CBC  BMP  Cyclosporine level      Any questions please call: [phone]  Please fax these results to [phone].    .

## 2019-04-05 ENCOUNTER — TEAM CONFERENCE (OUTPATIENT)
Dept: TRANSPLANT | Facility: CLINIC | Age: 49
End: 2019-04-05

## 2019-04-05 NOTE — TELEPHONE ENCOUNTER
Pt would like to talk to his care coordinator. Pts pref pharmacy is telling the pt his medication will switch manufacturers for his CELLCEPT (BRAND) 250 MG CAPSULE. Please connect with the pt and pharmacy as soon as possible

## 2019-04-05 NOTE — LETTER
PHYSICIAN ORDERS      DATE & TIME ISSUED: 2019 2:16 PM  PATIENT NAME: Hakeem Romero   : 1970     [facility] MR# [if applicable]: [id number]     DIAGNOSIS:  Kidney transplant   ICD-10 CODE: Z94.0      Please complete the following labs one week after beginning Cellcept from the new  then again 2 weeks later. If levels remain stable return to standing labs.     Cyclosporine level ( Ensure 12 hours between last dose and blood draw)  Mycophenolate level ( Ensure 12 hours between last dose and blood draw)  CBC with platelets  BMP    Any questions please call: [phone] option #5    Please fax results to [phone].

## 2019-04-08 NOTE — TELEPHONE ENCOUNTER
Please call this patient to let him know it is okay. He will need transplant labs one week after change, including MPA level. Then two weeks later. If stable, will return to standing lab schedule.

## 2019-04-08 NOTE — TELEPHONE ENCOUNTER
Left message for patient regarding:  He will need transplant labs one week after change, including MPA level. Then two weeks later. If stable, will return to standing lab schedule.

## 2019-04-09 NOTE — TELEPHONE ENCOUNTER
Call placed to patient. No answer. Voice message left instructing patient that he will need transplant labs one week after change, including MPA level. Then two weeks later. If stable, will return to standing lab schedule. Order sent

## 2019-04-12 ENCOUNTER — TELEPHONE (OUTPATIENT)
Dept: TRANSPLANT | Facility: CLINIC | Age: 49
End: 2019-04-12

## 2019-04-12 NOTE — TELEPHONE ENCOUNTER
Second call placed to pt. No answer. Left detailed vm asking that he repeat all txp labs when able. Orders sent. I asked that he call [facility] to confirm receipt of vm.

## 2019-05-22 ENCOUNTER — COMMUNICATION - RIVER FALLS (OUTPATIENT)
Dept: FAMILY MEDICINE | Facility: CLINIC | Age: 49
End: 2019-05-22
Payer: COMMERCIAL

## 2019-08-06 ENCOUNTER — TELEPHONE (OUTPATIENT)
Dept: TRANSPLANT | Facility: CLINIC | Age: 49
End: 2019-08-06

## 2019-08-06 ENCOUNTER — COMMUNICATION - RIVER FALLS (OUTPATIENT)
Dept: FAMILY MEDICINE | Facility: CLINIC | Age: 49
End: 2019-08-06
Payer: COMMERCIAL

## 2019-08-06 NOTE — TELEPHONE ENCOUNTER
Patient Call: General  Route to LPN    Reason for call: Was a mix up with his mail order pharmacy re his cellcept Has missed 2 doses now  Needs a 1-2 wks called into (Not sure if [facility] would have it in stock  Call pt back  Or who could get him some the fastest     Call back needed? Yes    Return Call Needed  Same as documented in contacts section  When to return call?: Same day: Route High Priority

## 2019-08-08 NOTE — TELEPHONE ENCOUNTER
RNCC left detailed VM asking Hakeem to return phone call to SOT office, we are able to provide script for generic mycophenolate.

## 2019-10-25 ENCOUNTER — AMBULATORY - RIVER FALLS (OUTPATIENT)
Dept: FAMILY MEDICINE | Facility: CLINIC | Age: 49
End: 2019-10-25
Payer: COMMERCIAL

## 2019-10-25 ENCOUNTER — OFFICE VISIT - RIVER FALLS (OUTPATIENT)
Dept: FAMILY MEDICINE | Facility: CLINIC | Age: 49
End: 2019-10-25
Payer: COMMERCIAL

## 2019-10-25 DIAGNOSIS — Z94.0 KIDNEY REPLACED BY TRANSPLANT: ICD-10-CM

## 2019-10-25 DIAGNOSIS — Z79.899 ENCOUNTER FOR LONG-TERM CURRENT USE OF MEDICATION: ICD-10-CM

## 2019-10-25 DIAGNOSIS — Z48.298 AFTERCARE FOLLOWING ORGAN TRANSPLANT: ICD-10-CM

## 2019-10-25 PROCEDURE — 80158 DRUG ASSAY CYCLOSPORINE: CPT | Performed by: INTERNAL MEDICINE

## 2019-10-25 ASSESSMENT — MIFFLIN-ST. JEOR: SCORE: 2088.44

## 2019-10-26 LAB
BUN SERPL-MCNC: 26 MG/DL (ref 7–25)
BUN/CREAT RATIO - HISTORICAL: 17 (ref 6–22)
CALCIUM SERPL-MCNC: 9.1 MG/DL (ref 8.6–10.3)
CHLORIDE BLD-SCNC: 104 MMOL/L (ref 98–110)
CO2 SERPL-SCNC: 28 MMOL/L (ref 20–32)
CREAT SERPL-MCNC: 1.57 MG/DL (ref 0.6–1.35)
CREAT UR-MCNC: 58 MG/DL (ref 20–320)
EGFRCR SERPLBLD CKD-EPI 2021: 51 ML/MIN/1.73M2
ERYTHROCYTE [DISTWIDTH] IN BLOOD BY AUTOMATED COUNT: 12.7 % (ref 11–15)
GLUCOSE BLD-MCNC: 94 MG/DL (ref 65–99)
HCT VFR BLD AUTO: 46.7 % (ref 38.5–50)
HGB BLD-MCNC: 16.3 GM/DL (ref 13.2–17.1)
MCH RBC QN AUTO: 30.2 PG (ref 27–33)
MCHC RBC AUTO-ENTMCNC: 34.9 GM/DL (ref 32–36)
MCV RBC AUTO: 86.5 FL (ref 80–100)
PLATELET # BLD AUTO: 211 10*3/UL (ref 140–400)
PMV BLD: 10.3 FL (ref 7.5–12.5)
POTASSIUM BLD-SCNC: 4.3 MMOL/L (ref 3.5–5.3)
PROT UR-MCNC: 247 MG/DL (ref 5–25)
PROT/CREAT 24H UR: 4.26 MG/G{CREAT} (ref 0.02–0.13)
PROT/CREAT 24H UR: 4259 MG/G{CREAT} (ref 22–128)
RBC # BLD AUTO: 5.4 10*6/UL (ref 4.2–5.8)
SODIUM SERPL-SCNC: 140 MMOL/L (ref 135–146)
WBC # BLD AUTO: 8.2 10*3/UL (ref 3.8–10.8)

## 2019-10-29 LAB
CYCLOSPORINE BLD LC/MS/MS-MCNC: 67 UG/L (ref 50–400)
TME LAST DOSE: NORMAL H

## 2019-10-30 ENCOUNTER — TELEPHONE (OUTPATIENT)
Dept: TRANSPLANT | Facility: CLINIC | Age: 49
End: 2019-10-30

## 2019-10-30 ENCOUNTER — COMMUNICATION - RIVER FALLS (OUTPATIENT)
Dept: FAMILY MEDICINE | Facility: CLINIC | Age: 49
End: 2019-10-30
Payer: COMMERCIAL

## 2019-10-30 DIAGNOSIS — Z94.0 KIDNEY TRANSPLANTED: Primary | ICD-10-CM

## 2019-10-30 NOTE — TELEPHONE ENCOUNTER
Notes recorded by Bakari Hurtado MD on 10/28/2019 at 8:04 PM CDT  Stable kidney function, but worsening proteinuria and recommend kidney transplant biopsy to determine underlying etiology.      Called LVM   To call the transplant  Office to discuss kidney biopsy

## 2019-10-30 NOTE — TELEPHONE ENCOUNTER
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 Dr Hurtado     Indication for transplant renal biopsy: Urine protein   The transplant kidney is identified in the right lower  quadrant and measures 11.6 cm. There are no focal masses, calculi or  hydronephrosis noted within the kidney. There are no perinephric fluid  collections.    Laterality: right  Date of biopsy:      Patient location within 70 miles of [facility]: Yes.  If no, must stay      Hakeem HUSSEIN Marykristan's medication list was reviewed.   Anticoagulant: aspirin confirm holding   Please review fish oil ibuprofen   Ibuprofen:  Fish Oil:     Medications held:      Recent blood pressure readings are WNL or not applicable. Instructed to take medication, especially blood pressure medications, before [facility] at 0600 day of procedure.     Procedure expectations and duration of stay discussed. Expressed pt can expect a phone call from LPN/MA to confirm biopsy date/time/location/directions/review of medications.   Patient verbalized understanding they will need to arrive  by 6 a.m. on   and plan to stay 4-5 hours post biopsy for monitoring. Pt has no additional questions at this time. Transplant Office phone number given to pt for future questions.

## 2019-10-30 NOTE — LETTER
PHYSICIAN ORDERS      DATE & TIME ISSUED: 2019 11:39 AM  PATIENT NAME: Hakeem oRmero   : 1970     [facility] MR# [if applicable]: [id number]     DIAGNOSIS:  Kidney Transplant  ICD-10 CODE: Z94.0     Please repeat the following lab:  Random urine protein/creatinine ratio    Any questions please call: [phone]  Please fax lab results to [phone].    .

## 2019-11-04 DIAGNOSIS — Z94.0 KIDNEY TRANSPLANTED: Primary | ICD-10-CM

## 2019-11-04 NOTE — TELEPHONE ENCOUNTER
LPN/MA  Renal Biopsy Communication    Call to pt to confirm renal biopsy procedure. Biopsy orders entered and patient aware of date 11/8/2019, in [facility] and to arrive at 6:00AM.     No need to be NPO.      Discussed anticoagulants (i.e. fish oil, ASA, Plavix, Coumadin, Ibuprofen). Pt confirms use of anticoagulants. Patient currently holding aspirin.    Take all medicine before arrival for biopsy and bring all medicine bottles with.      Report to 1st floor lab, then 5th floor for biopsy.      Use [employer] Alert services and bring form of entertainment.     Discussed with patient need to stay overnight locally evening of procedure if live more than 70 miles away.    Patient verbalized understanding they will need to stay 4-5 hours post biopsy for monitoring.     Call placed to scheduling at [phone] to schedule and confirm biopsy date/time    Lab appointment scheduled for morning of biopsy.

## 2019-11-06 ENCOUNTER — OFFICE VISIT - RIVER FALLS (OUTPATIENT)
Dept: FAMILY MEDICINE | Facility: CLINIC | Age: 49
End: 2019-11-06
Payer: COMMERCIAL

## 2019-11-06 ENCOUNTER — TELEPHONE (OUTPATIENT)
Dept: TRANSPLANT | Facility: CLINIC | Age: 49
End: 2019-11-06

## 2019-11-06 ASSESSMENT — MIFFLIN-ST. JEOR: SCORE: 2122.92

## 2019-11-06 NOTE — TELEPHONE ENCOUNTER
please reschedule bx for thursday or next week Monday - Thursday   Received: Yesterday   Message Contents   Steven Singh MD Huepfel, Geraldine CHRISTIAN RN     Attempted to call Hakeem Romero on 11/5/2019  To rescheduled     Task   Please attempt to call  To reschedule

## 2019-11-07 ENCOUNTER — RESULTS ONLY (OUTPATIENT)
Dept: OTHER | Facility: CLINIC | Age: 49
End: 2019-11-07

## 2019-11-07 ENCOUNTER — HOSPITAL ENCOUNTER (OUTPATIENT)
Facility: AMBULATORY SURGERY CENTER | Age: 49
End: 2019-11-07
Attending: INTERNAL MEDICINE
Payer: COMMERCIAL

## 2019-11-07 ENCOUNTER — COMMUNICATION - RIVER FALLS (OUTPATIENT)
Dept: FAMILY MEDICINE | Facility: CLINIC | Age: 49
End: 2019-11-07
Payer: COMMERCIAL

## 2019-11-07 VITALS
BODY MASS INDEX: 37.1 KG/M2 | DIASTOLIC BLOOD PRESSURE: 69 MMHG | TEMPERATURE: 97.8 F | RESPIRATION RATE: 16 BRPM | WEIGHT: 265 LBS | HEIGHT: 71 IN | OXYGEN SATURATION: 94 % | HEART RATE: 64 BPM | SYSTOLIC BLOOD PRESSURE: 112 MMHG

## 2019-11-07 DIAGNOSIS — Z94.0 KIDNEY TRANSPLANTED: ICD-10-CM

## 2019-11-07 DIAGNOSIS — Z94.0 STATUS POST KIDNEY TRANSPLANT: ICD-10-CM

## 2019-11-07 LAB
ABO + RH BLD: NORMAL
ABO + RH BLD: NORMAL
ALBUMIN UR-MCNC: 30 MG/DL
ANION GAP SERPL CALCULATED.3IONS-SCNC: 4 MMOL/L (ref 3–14)
APPEARANCE UR: CLEAR
BASOPHILS # BLD AUTO: 0.1 10E9/L (ref 0–0.2)
BASOPHILS NFR BLD AUTO: 0.8 %
BILIRUB UR QL STRIP: NEGATIVE
BLD GP AB SCN SERPL QL: NORMAL
BLOOD BANK CMNT PATIENT-IMP: NORMAL
BUN SERPL-MCNC: 40 MG/DL (ref 7–30)
CALCIUM SERPL-MCNC: 8.8 MG/DL (ref 8.5–10.1)
CHLORIDE SERPL-SCNC: 107 MMOL/L (ref 94–109)
CO2 SERPL-SCNC: 28 MMOL/L (ref 20–32)
COLOR UR AUTO: YELLOW
CREAT SERPL-MCNC: 1.67 MG/DL (ref 0.66–1.25)
CREAT UR-MCNC: 62 MG/DL (ref 20–320)
CREAT UR-MCNC: 72 MG/DL
CYCLOSPORINE BLD LC/MS/MS-MCNC: 135 UG/L (ref 50–400)
DIFFERENTIAL METHOD BLD: NORMAL
EOSINOPHIL # BLD AUTO: 0.2 10E9/L (ref 0–0.7)
EOSINOPHIL NFR BLD AUTO: 3 %
ERYTHROCYTE [DISTWIDTH] IN BLOOD BY AUTOMATED COUNT: 12.8 % (ref 10–15)
GFR SERPL CREATININE-BSD FRML MDRD: 47 ML/MIN/{1.73_M2}
GLUCOSE SERPL-MCNC: 196 MG/DL (ref 70–99)
GLUCOSE UR STRIP-MCNC: NEGATIVE MG/DL
HCT VFR BLD AUTO: 44.1 % (ref 40–53)
HGB BLD-MCNC: 15.1 G/DL (ref 13.3–17.7)
HGB UR QL STRIP: ABNORMAL
IMM GRANULOCYTES # BLD: 0 10E9/L (ref 0–0.4)
IMM GRANULOCYTES NFR BLD: 0.1 %
INR PPP: 0.98 (ref 0.86–1.14)
KETONES UR STRIP-MCNC: NEGATIVE MG/DL
LEUKOCYTE ESTERASE UR QL STRIP: NEGATIVE
LYMPHOCYTES # BLD AUTO: 1.4 10E9/L (ref 0.8–5.3)
LYMPHOCYTES NFR BLD AUTO: 18.5 %
MCH RBC QN AUTO: 30.1 PG (ref 26.5–33)
MCHC RBC AUTO-ENTMCNC: 34.2 G/DL (ref 31.5–36.5)
MCV RBC AUTO: 88 FL (ref 78–100)
MICROALBUMIN UR-MCNC: 309 MG/L
MICROALBUMIN/CREAT UR: 427.98 MG/G CR (ref 0–17)
MONOCYTES # BLD AUTO: 0.5 10E9/L (ref 0–1.3)
MONOCYTES NFR BLD AUTO: 6.6 %
MUCOUS THREADS #/AREA URNS LPF: PRESENT /LPF
NEUTROPHILS # BLD AUTO: 5.2 10E9/L (ref 1.6–8.3)
NEUTROPHILS NFR BLD AUTO: 71 %
NITRATE UR QL: NEGATIVE
NRBC # BLD AUTO: 0 10*3/UL
NRBC BLD AUTO-RTO: 0 /100
PH UR STRIP: 5 PH (ref 5–7)
PLATELET # BLD AUTO: 197 10E9/L (ref 150–450)
POTASSIUM SERPL-SCNC: 4.4 MMOL/L (ref 3.4–5.3)
PROT UR-MCNC: 0.38 G/L
PROT UR-MCNC: 20 MG/DL (ref 5–25)
PROT/CREAT 24H UR: 0.32 MG/G{CREAT} (ref 0.02–0.13)
PROT/CREAT 24H UR: 0.53 G/G CR (ref 0–0.2)
PROT/CREAT 24H UR: 323 MG/G{CREAT} (ref 22–128)
RBC # BLD AUTO: 5.01 10E12/L (ref 4.4–5.9)
RBC #/AREA URNS AUTO: <1 /HPF (ref 0–2)
SODIUM SERPL-SCNC: 139 MMOL/L (ref 133–144)
SOURCE: ABNORMAL
SP GR UR STRIP: 1.01 (ref 1–1.03)
SPECIMEN EXP DATE BLD: NORMAL
TME LAST DOSE: 2130 H
UROBILINOGEN UR STRIP-MCNC: 0 MG/DL (ref 0–2)
WBC # BLD AUTO: 7.3 10E9/L (ref 4–11)
WBC #/AREA URNS AUTO: 1 /HPF (ref 0–5)

## 2019-11-07 ASSESSMENT — MIFFLIN-ST. JEOR: SCORE: 2089.16

## 2019-11-10 LAB
BKV DNA # SPEC NAA+PROBE: NORMAL COPIES/ML
BKV DNA SPEC NAA+PROBE-LOG#: NORMAL LOG COPIES/ML
SPECIMEN SOURCE: NORMAL

## 2019-11-11 LAB
DONOR IDENTIFICATION: NORMAL
DPA1* NMDP: NORMAL
DPA1*: NORMAL
DPB1* LOCUS NMDP: NORMAL
DPB1* NMDP: NORMAL
DPB1*: NORMAL
DPB1*LOCUS: NORMAL
DQA1*: NORMAL
DQA1*LOCUS: NORMAL
DQB1* LOCUS: NORMAL
DQB1*: NORMAL
DQB7: 2502
DR13: 1102
DR51: 1774
DRB1* LOCUS: NORMAL
DRB1*: NORMAL
DRB3* LOCUS: NORMAL
DRB4*: NORMAL
DRSSO TEST METHOD: NORMAL
DSA COMMENTS: NORMAL
DSA PRESENT: YES
DSA TEST METHOD: NORMAL
ORGAN: NORMAL
SA1 CELL: NORMAL
SA1 COMMENTS: NORMAL
SA1 HI RISK ABY: NORMAL
SA1 MOD RISK ABY: NORMAL
SA1 TEST METHOD: NORMAL
SA2 CELL: NORMAL
SA2 COMMENTS: NORMAL
SA2 HI RISK ABY UA: NORMAL
SA2 MOD RISK ABY: NORMAL
SA2 TEST METHOD: NORMAL
UNACCEPTABLE ANTIGEN: NORMAL
UNOS CPRA: 69

## 2019-11-13 NOTE — TELEPHONE ENCOUNTER
Bakari Hurtado MD Huepfel, Mary K, RN             Stable kidney function, but worsening proteinuria and recommend kidney transplant biopsy to determine underlying etiology.

## 2019-11-15 ENCOUNTER — ALLIED HEALTH/NURSE VISIT (OUTPATIENT)
Dept: PHARMACY | Facility: CLINIC | Age: 49
End: 2019-11-15
Payer: COMMERCIAL

## 2019-11-15 DIAGNOSIS — I15.1 HTN, KIDNEY TRANSPLANT RELATED: ICD-10-CM

## 2019-11-15 DIAGNOSIS — Z94.0 KIDNEY REPLACED BY TRANSPLANT: Primary | ICD-10-CM

## 2019-11-15 DIAGNOSIS — Z94.0 HTN, KIDNEY TRANSPLANT RELATED: ICD-10-CM

## 2019-11-15 DIAGNOSIS — G47.419 PRIMARY NARCOLEPSY WITHOUT CATAPLEXY: ICD-10-CM

## 2019-11-15 DIAGNOSIS — M10.9 GOUT, UNSPECIFIED CAUSE, UNSPECIFIED CHRONICITY, UNSPECIFIED SITE: ICD-10-CM

## 2019-11-15 DIAGNOSIS — G47.33 OSA ON CPAP: ICD-10-CM

## 2019-11-15 PROCEDURE — 99207 ZZC NO CHARGE LOS: CPT | Performed by: PHARMACIST

## 2019-11-15 RX ORDER — TADALAFIL 10 MG/1
10 TABLET ORAL DAILY PRN
COMMUNITY

## 2019-11-15 NOTE — PROGRESS NOTES
SUBJECTIVE/OBJECTIVE:                           Hakeem Romero is a 49 year old male called for an initial visit for transplant consult.  He was referred to me from Dr. Singh.    Chief Complaint: Referred to manage HTN. Reviewed medications as well today, patient is unsure of doses. Listed below are the best guesses.    Allergies/ADRs: None  Tobacco:  reports that he has never smoked. He has never used smokeless tobacco.  Alcohol: not currently using  Caffeine: 1 cups/day of coffee, 1 sodas/day  PMH: Reviewed in Epic    Medication Adherence/Access:  Patient uses pill box(es).  Patient takes medications 2 time(s) per day.  Taking at 9PM and 2AM. Due to   Per patient, misses medication 0 times per week.   Medication barriers: none.   The patient fills medications at [facility]: NO, fills medications at [facility].    Hypertension: Current medications include Clonidine 0.2mg BID, Metoprolol 50mg BID, Losartan 50mg daily.  Patient does not self-monitor BP.   Patient reports no current medication side effects.  BP Readings from Last 3 Encounters:   11/07/19 112/69   01/17/19 127/84   01/08/18 145/89     Renal Transplant:  Current immunosuppressants include MMF 1000mg BID and Cyclosporine 125mg BID, not taking Prednisone 5mg daily.  Pt reports no side effects  Holding aspirin due to recent biopsy.   Transplant date: 2008  Estimated Creatinine Clearance: 70.6 mL/min (A) (based on SCr of 1.67 mg/dL (H)).  PCP prophylaxis: Bactrim S S daily  Tx Coordinator: Foreign Lobo MD: Dr. Singh  Immunizations: annual flu shot unknown; [id number]:  2017; Prevnar 13: unknown; TDaP:  2014; Shingrix: unknown    Hyperlipidemia: Current therapy includes Pravastatin once daily.  Pt reports no significant myalgias or other side effects.  The ASCVD Risk score (Franklin DC Jr., et al., 2013) failed to calculate for the following reasons:    Cannot find a previous HDL lab    Cannot find a previous total cholesterol lab     Gout: Pt is taking Allopurinol BID last  gout flare was 6-12 months ago. Started on probenecid BID. Since this start he has not had any gout flares.   No results found for: URIC    Narcelepsy: Pt is taking Methylphenidate qAM prn.     Today's Vitals: There were no vitals taken for this visit.      ASSESSMENT:                            Medication Adherence: poor, patients med doses are only 5 hours apart. Recommend setting an alarm on his phone and bring medications in to work with him. If this is infeasible consider takgni at 2AM and 2-3 PM when he gets home.     Hypertension: Unknown BP at home. Long term plan outlined by Dr. Singh: Maximize Losartan, change metoprolol to carvedilol and taper off Clonidine. Pt to start monitoring home BPs daily and we will review next week.     Renal Transplant:  Not taking prednisone, will inform txp team.     Hyperlipidemia: Stable.     Gout: As patient has gone >6 months without a gout flare, PCP can consider tapering off Probenacid and reduce daily dose by 500mg. Recommend monitoring uric acid and increasing Allopurinol as needed. Goal Uric acid <6.     Narcelepsy: stable.    PLAN:                            Pt to...  1. Discuss tapering Probenecid with PCP, monitor uric acid and increase allopurinol as needed.  2. Monitor BP daily for 1 week, will discuss next visit.    I spent 45 minutes with this patient today. I offer these suggestions for consideration by txp team. A copy of the visit note was provided to the patient's referring provider.    Will follow up in 1 week.    The patient was given a summary of these recommendations as an after visit summary.     Quique Powell, PharmD  [facility] Pharmacist    Phone: [phone]

## 2019-11-15 NOTE — PATIENT INSTRUCTIONS
Recommendations from today's MTM visit:                                                      1. Talk with your primary care doctor about tapering off of Probenecid as you have gone 6 months without a flare. If Uric acid increases past 6, I recommend increasing allopurinol as well.     2. Take your meds at 9am and 9pm, set an alarm on your phone and bring in your medications to work with you. If this is ineffective try taking them at 2am and 2-3pm when you get home. We want to separate these more by a 12 hour window if possible.    3. Start checking your blood pressures every day, we will assess these at our next visit.      It was great to speak with you today.  I value your experience and would be very thankful for your time with providing feedback on our clinic survey. You may receive a survey via email or text message in the next few days.     Next MTM visit: 11/21/19 at 12:30pm    To schedule another MTM appointment, please call the clinic directly or you may call the MTM scheduling line at [phone] or toll-free at [phone].     My Clinical Pharmacist's contact information:                                                      It was a pleasure talking with you today!  Please feel free to contact me with any questions or concerns you have.      Quique Powell, Jasson  MTM Pharmacist    Phone: [phone]

## 2019-11-21 ENCOUNTER — ALLIED HEALTH/NURSE VISIT (OUTPATIENT)
Dept: PHARMACY | Facility: CLINIC | Age: 49
End: 2019-11-21
Payer: COMMERCIAL

## 2019-11-21 DIAGNOSIS — Z94.0 HTN, KIDNEY TRANSPLANT RELATED: ICD-10-CM

## 2019-11-21 DIAGNOSIS — M10.9 GOUT, UNSPECIFIED CAUSE, UNSPECIFIED CHRONICITY, UNSPECIFIED SITE: ICD-10-CM

## 2019-11-21 DIAGNOSIS — I15.1 HTN, KIDNEY TRANSPLANT RELATED: ICD-10-CM

## 2019-11-21 DIAGNOSIS — Z94.0 KIDNEY REPLACED BY TRANSPLANT: Primary | ICD-10-CM

## 2019-11-21 PROCEDURE — 99207 ZZC NO CHARGE LOS: CPT | Performed by: PHARMACIST

## 2019-11-21 NOTE — PATIENT INSTRUCTIONS
Recommendations from today's MTM visit:                                                      1. Decrease your daily Clonidine by 0.1mg every week until you are off (Ie. Take 0.1mg in the morning, 0.2mg in the evening x 1 week; then 0.1mg twice daily for 1 week; then 0.1mg once daily in the evening for 1 week, the off). Call me if your blood pressure increase above 130 over this time frame and stop the taper.     It was great to speak with you today.  I value your experience and would be very thankful for your time with providing feedback on our clinic survey. You may receive a survey via email or text message in the next few days.     Next MTM visit: 1/2/19 at 1 PM via phone.     To schedule another MTM appointment, please call the clinic directly or you may call the MTM scheduling line at [phone] or toll-free at [phone].     My Clinical Pharmacist's contact information:                                                      It was a pleasure talking with you today!  Please feel free to contact me with any questions or concerns you have.      Quique Powell, PharmD  MTM Pharmacist    Phone: [phone]

## 2019-11-21 NOTE — PROGRESS NOTES
SUBJECTIVE/OBJECTIVE:                           Hakeem Romero is a 49 year old male called for an initial visit for transplant consult.  He was referred to me from Dr. Singh.    Chief Complaint: Referred to manage HTN. Reviewed medications as well today, patient is unsure of doses. Listed below are the best guesses.    Allergies/ADRs: None  Tobacco:  reports that he has never smoked. He has never used smokeless tobacco.  Alcohol: not currently using  Caffeine: 1 cups/day of coffee, 1 sodas/day  PMH: Reviewed in Epic    Medication Adherence/Access:  Patient uses pill box(es).  Patient takes medications 2 time(s) per day.  Taking at 9PM and 2AM. Due to   Per patient, misses medication 0 times per week.   Medication barriers: none.   The patient fills medications at [place]: NO, fills medications at [facility].    Hypertension: Current medications include Clonidine 0.2mg BID, Metoprolol 50mg BID, Losartan 50mg daily.  Patient does self-monitor BP.   Patient reports no current medication side effects.  Home BP: 110/78, 112/81, 121/84, 118/82    BP Readings from Last 3 Encounters:   11/07/19 112/69   01/17/19 127/84   01/08/18 145/89     Renal Transplant:  Current immunosuppressants include MMF 1000mg BID and Cyclosporine 125mg BID, not taking Prednisone 5mg daily.  Pt reports no side effects  Holding aspirin due to recent biopsy.   Transplant date: 2008  Estimated Creatinine Clearance: 70.6 mL/min (A) (based on SCr of 1.67 mg/dL (H)).  PCP prophylaxis: Bactrim S S daily  Tx Coordinator: Foreign Lobo MD: Dr. Singh  Immunizations: annual flu shot unknown; Vcozewtaji25:  2017; Prevnar 13: unknown; TDaP:  2014; Shingrix: unknown     Gout: Pt is taking Allopurinol BID last gout flare was 6-12 months ago. Started on probenacid BID. Since this start he has not had any gout flares.  Pt has not talked with PCP about tapering Probenancid yet.   No results found for: URIC    Today's Vitals: There were no vitals taken for this  visit.      ASSESSMENT:                            Medication Adherence: no discussion today.  Will follow up with adherence at next visit. Was struggling with timing last visit.     Hypertension: Pt to taper off Clonidine. Will increase Losartan to max dose and/ or change Metoprolol to Carvedilol if needed for additional BP control per Dr. Singh's direction.    Renal Transplant:  No changes.     Gout: Per last visit: As patient has gone >6 months without a gout flare, PCP can consider tapering off Probenacid and reduce daily dose by 500mg. Recommend monitoring uric acid and increasing Allopurinol as needed. Goal Uric acid <6.     PLAN:                            Pt to...  1. Taper Clonidine over the next 3 weeks.   2. Call if BPs go above 130/80.     I spent 45 minutes with this patient today. I offer these suggestions for consideration by txp team. A copy of the visit note was provided to the patient's referring provider.    Will follow up in 5 weeks.    The patient was given a summary of these recommendations as an after visit summary.     Quique Powell, Jasson  [facility] Pharmacist    Phone: [phone]

## 2019-11-29 ENCOUNTER — TELEPHONE (OUTPATIENT)
Dept: TRANSPLANT | Facility: CLINIC | Age: 49
End: 2019-11-29

## 2019-11-29 NOTE — TELEPHONE ENCOUNTER
Per [facility] JOSEPH Powell, Hakeem Romero not taking Prednisone.    PLAN:  Call Hakeem Romero and discuss why he stopped taking his prednisone.  Did a Provider discontinue it? Was he having side effects? Issues getting refills?    OUTCOME: Left message to confirm current immunosuppressions.

## 2020-01-14 ENCOUNTER — TELEPHONE (OUTPATIENT)
Dept: TRANSPLANT | Facility: CLINIC | Age: 50
End: 2020-01-14

## 2020-04-07 ENCOUNTER — OFFICE VISIT - RIVER FALLS (OUTPATIENT)
Dept: FAMILY MEDICINE | Facility: CLINIC | Age: 50
End: 2020-04-07
Payer: COMMERCIAL

## 2020-04-08 ENCOUNTER — COMMUNICATION - RIVER FALLS (OUTPATIENT)
Dept: FAMILY MEDICINE | Facility: CLINIC | Age: 50
End: 2020-04-08
Payer: COMMERCIAL

## 2020-04-28 ENCOUNTER — COMMUNICATION - RIVER FALLS (OUTPATIENT)
Dept: FAMILY MEDICINE | Facility: CLINIC | Age: 50
End: 2020-04-28
Payer: COMMERCIAL

## 2020-04-28 ENCOUNTER — OFFICE VISIT - RIVER FALLS (OUTPATIENT)
Dept: FAMILY MEDICINE | Facility: CLINIC | Age: 50
End: 2020-04-28
Payer: COMMERCIAL

## 2020-05-14 ENCOUNTER — COMMUNICATION - RIVER FALLS (OUTPATIENT)
Dept: FAMILY MEDICINE | Facility: CLINIC | Age: 50
End: 2020-05-14
Payer: COMMERCIAL

## 2020-05-21 ENCOUNTER — TELEPHONE (OUTPATIENT)
Dept: TRANSPLANT | Facility: CLINIC | Age: 50
End: 2020-05-21

## 2020-05-21 NOTE — TELEPHONE ENCOUNTER
Provider Call: Transplant Lab/Orders  Route to LPN  Post Transplant Days: 4293  When patient is less than 60 days post-transplant, route high priority  Reason for Call: Annual lab reorder  Liver patients reporting abnormal lab results: Route to RN and Page  Document lab facility information when provider is calling about annual lab orders. Delete facility wildcards when not needed.  Facility Name: [facility] Location: [place]  Outside Facility Fax Number: [phone]  Callback needed? no    Return Call Needed  Same as documented in contacts section  When to return call?: Greater than one day: Route standard priority

## 2020-05-21 NOTE — LETTER
[facility]  Room 2[address]200  [facility],  [address]  [address]  Tel [phone]  Toll Free [phone]                OUTPATIENT LABORATORY TEST ORDER    Patient Name: Hakeem Romero  Transplant Date: 8/19/2008 (Kidney)  YOB: 1970  Issue Date & Time:May 21, 51412:37 PM    [facility] MR:  [id number]  Exp. Date (1 year after date issued)      Diagnoses: Kidney Transplant (ICD-10 Z94.0)   Long term use of medications (ICD-10 Z79.899)     Lab results to be available on the same day drawn.   Patient should release information to the [facility], [facility].  Please fax to the Transplant Center at [phone].    Every 3 Months   ?Hemogram and Platelet   ?Basic Metabolic Panel (Sodium, Potassium, Chloride, CO2, Creatinine, Urea Nitrogen, Glucose, Calcium)           ?CSA mail to [facility] - [facility] mailers and instructions provided by the patient)                   Every 6 Months                                        ?Urine for protein/creatinine      If you have any questions, please call [facility] at [phone] or [phone].    Please fax labs to [phone]

## 2020-05-22 ENCOUNTER — AMBULATORY - RIVER FALLS (OUTPATIENT)
Dept: FAMILY MEDICINE | Facility: CLINIC | Age: 50
End: 2020-05-22
Payer: COMMERCIAL

## 2020-05-22 PROCEDURE — 80158 DRUG ASSAY CYCLOSPORINE: CPT | Performed by: INTERNAL MEDICINE

## 2020-05-23 LAB
CREAT UR-MCNC: 75 MG/DL (ref 20–320)
PROT UR-MCNC: 290 MG/DL (ref 5–25)
PROT/CREAT 24H UR: 3.87 MG/G{CREAT} (ref 0.02–0.13)
PROT/CREAT 24H UR: 3867 MG/G{CREAT} (ref 22–128)

## 2020-05-27 LAB
CYCLOSPORINE BLD LC/MS/MS-MCNC: 50 UG/L (ref 50–400)
TME LAST DOSE: NORMAL H

## 2020-05-28 ENCOUNTER — TELEPHONE (OUTPATIENT)
Dept: TRANSPLANT | Facility: CLINIC | Age: 50
End: 2020-05-28

## 2020-05-28 NOTE — TELEPHONE ENCOUNTER
Cyclosporine = 50 (5/22/20)  Goal   Current CSA dose 100 mg BID  --- Time of last dose not listed by lab    PLAN:   Call Hakeem Romero and confirm this was a good 12-hour trough. Verify dose 100 mg BID.   Confirm no new medications or illness (daniel. Diarrhea). Confirm no missed dose.  If good trough, increase dose to 125 mg BID and recheck level in 2 weeks  Otherwise, if not a good level, have CSA levels rechecked and make sure it is a good trough.

## 2020-06-01 NOTE — TELEPHONE ENCOUNTER
Left message for patient regarding:  Cyclosporine = 50 (5/22/20)  Goal   Current CSA dose 100 mg BID  --- Time of last dose not listed by lab     PLAN:   Call Hakeem Romero and confirm this was a good 12-hour trough. Verify dose 100 mg BID.   Confirm no new medications or illness (daniel. Diarrhea). Confirm no missed dose.  If good trough, increase dose to 125 mg BID and recheck level in 2 weeks  Otherwise, if not a good level, have CSA levels rechecked and make sure it is a good trough.

## 2020-06-02 ENCOUNTER — COMMUNICATION - RIVER FALLS (OUTPATIENT)
Dept: FAMILY MEDICINE | Facility: CLINIC | Age: 50
End: 2020-06-02
Payer: COMMERCIAL

## 2021-01-29 ENCOUNTER — OFFICE VISIT - RIVER FALLS (OUTPATIENT)
Dept: FAMILY MEDICINE | Facility: CLINIC | Age: 51
End: 2021-01-29
Payer: COMMERCIAL

## 2021-01-29 ENCOUNTER — TRANSFERRED RECORDS (OUTPATIENT)
Dept: HEALTH INFORMATION MANAGEMENT | Facility: CLINIC | Age: 51
End: 2021-01-29

## 2021-02-25 ENCOUNTER — COMMUNICATION - RIVER FALLS (OUTPATIENT)
Dept: FAMILY MEDICINE | Facility: CLINIC | Age: 51
End: 2021-02-25
Payer: COMMERCIAL

## 2021-04-08 ENCOUNTER — AMBULATORY - RIVER FALLS (OUTPATIENT)
Dept: FAMILY MEDICINE | Facility: CLINIC | Age: 51
End: 2021-04-08
Payer: COMMERCIAL

## 2021-05-06 ENCOUNTER — AMBULATORY - RIVER FALLS (OUTPATIENT)
Dept: FAMILY MEDICINE | Facility: CLINIC | Age: 51
End: 2021-05-06
Payer: COMMERCIAL

## 2021-08-31 ENCOUNTER — TELEPHONE (OUTPATIENT)
Dept: TRANSPLANT | Facility: CLINIC | Age: 51
End: 2021-08-31

## 2021-08-31 NOTE — TELEPHONE ENCOUNTER
Called back Hakeem Romero and discussed that Provider is to follow usual practice.  Verbalized understanding and agreement to plan.

## 2021-08-31 NOTE — TELEPHONE ENCOUNTER
Patient called stated his Nephrologist is out of town and would like to see if [facility] could give a prescription for an antibiotic for his dental appointment on Thursday. Script can be sent to [facility] in [place], [place].

## 2021-09-08 ENCOUNTER — COMMUNICATION - RIVER FALLS (OUTPATIENT)
Dept: FAMILY MEDICINE | Facility: CLINIC | Age: 51
End: 2021-09-08
Payer: COMMERCIAL

## 2021-09-10 ENCOUNTER — COMMUNICATION - RIVER FALLS (OUTPATIENT)
Dept: FAMILY MEDICINE | Facility: CLINIC | Age: 51
End: 2021-09-10
Payer: COMMERCIAL

## 2022-01-06 ENCOUNTER — OFFICE VISIT - RIVER FALLS (OUTPATIENT)
Dept: FAMILY MEDICINE | Facility: CLINIC | Age: 52
End: 2022-01-06
Payer: COMMERCIAL

## 2022-01-28 ENCOUNTER — AMBULATORY - RIVER FALLS (OUTPATIENT)
Dept: FAMILY MEDICINE | Facility: CLINIC | Age: 52
End: 2022-01-28
Payer: COMMERCIAL

## 2022-02-11 VITALS
HEART RATE: 74 BPM | WEIGHT: 275 LBS | DIASTOLIC BLOOD PRESSURE: 96 MMHG | SYSTOLIC BLOOD PRESSURE: 152 MMHG | HEIGHT: 71 IN | BODY MASS INDEX: 38.5 KG/M2

## 2022-02-11 VITALS — HEIGHT: 72 IN | BODY MASS INDEX: 36.44 KG/M2 | HEIGHT: 72 IN | BODY MASS INDEX: 36.44 KG/M2

## 2022-02-12 VITALS
HEART RATE: 76 BPM | OXYGEN SATURATION: 98 % | DIASTOLIC BLOOD PRESSURE: 80 MMHG | SYSTOLIC BLOOD PRESSURE: 213 MMHG | HEIGHT: 71 IN | HEART RATE: 76 BPM | WEIGHT: 274.2 LBS | WEIGHT: 270 LBS | BODY MASS INDEX: 38.24 KG/M2 | SYSTOLIC BLOOD PRESSURE: 136 MMHG | BODY MASS INDEX: 36.98 KG/M2 | BODY MASS INDEX: 37.8 KG/M2 | TEMPERATURE: 98.5 F | HEART RATE: 67 BPM | WEIGHT: 267 LBS | DIASTOLIC BLOOD PRESSURE: 74 MMHG | SYSTOLIC BLOOD PRESSURE: 144 MMHG | DIASTOLIC BLOOD PRESSURE: 82 MMHG

## 2022-02-12 VITALS
DIASTOLIC BLOOD PRESSURE: 88 MMHG | SYSTOLIC BLOOD PRESSURE: 156 MMHG | TEMPERATURE: 97.1 F | BODY MASS INDEX: 38.78 KG/M2 | HEART RATE: 100 BPM | WEIGHT: 282 LBS

## 2022-02-12 VITALS
SYSTOLIC BLOOD PRESSURE: 140 MMHG | HEART RATE: 74 BPM | WEIGHT: 266 LBS | DIASTOLIC BLOOD PRESSURE: 76 MMHG | BODY MASS INDEX: 37.24 KG/M2 | DIASTOLIC BLOOD PRESSURE: 76 MMHG | HEART RATE: 74 BPM | TEMPERATURE: 97.9 F | SYSTOLIC BLOOD PRESSURE: 140 MMHG | WEIGHT: 266.2 LBS | BODY MASS INDEX: 37.27 KG/M2 | HEIGHT: 71 IN | HEIGHT: 71 IN

## 2022-02-12 VITALS
WEIGHT: 271 LBS | SYSTOLIC BLOOD PRESSURE: 134 MMHG | HEART RATE: 101 BPM | HEIGHT: 71 IN | BODY MASS INDEX: 37.94 KG/M2 | DIASTOLIC BLOOD PRESSURE: 91 MMHG

## 2022-02-12 VITALS
HEART RATE: 80 BPM | DIASTOLIC BLOOD PRESSURE: 80 MMHG | SYSTOLIC BLOOD PRESSURE: 150 MMHG | DIASTOLIC BLOOD PRESSURE: 74 MMHG | SYSTOLIC BLOOD PRESSURE: 110 MMHG | BODY MASS INDEX: 37.11 KG/M2 | HEART RATE: 66 BPM | TEMPERATURE: 97.5 F | WEIGHT: 266.4 LBS | BODY MASS INDEX: 36.08 KG/M2 | HEIGHT: 72 IN | WEIGHT: 274 LBS | TEMPERATURE: 97.4 F | HEIGHT: 72 IN

## 2022-02-12 VITALS
HEART RATE: 60 BPM | SYSTOLIC BLOOD PRESSURE: 151 MMHG | DIASTOLIC BLOOD PRESSURE: 98 MMHG | BODY MASS INDEX: 36.96 KG/M2 | HEART RATE: 69 BPM | WEIGHT: 262 LBS | HEIGHT: 71 IN | DIASTOLIC BLOOD PRESSURE: 87 MMHG | HEIGHT: 71 IN | SYSTOLIC BLOOD PRESSURE: 122 MMHG | BODY MASS INDEX: 36.68 KG/M2 | WEIGHT: 264 LBS

## 2022-02-12 VITALS
HEIGHT: 71 IN | TEMPERATURE: 98.6 F | SYSTOLIC BLOOD PRESSURE: 130 MMHG | HEART RATE: 60 BPM | WEIGHT: 261.6 LBS | BODY MASS INDEX: 36.62 KG/M2 | DIASTOLIC BLOOD PRESSURE: 82 MMHG

## 2022-02-16 NOTE — PROGRESS NOTES
Patient:   HAKEEM TEJEDA            MRN: [id number]            FIN: [id number]               Age:   50 years     Sex:  Male     :  1970   Associated Diagnoses:   H/O kidney transplant; Immunosuppressed status   Author:   Dave Lei MD      Visit Information   Visit type:  Telephone Encounter.    Source of history:  Patient.    Location of patient:  Home  Call Start Time:     Call End Time:    _1432      Chief Complaint   2020 3:00 PM CDT    discuss FMLA extension  due to Covid - verbal consent given for telemed visit     _      History of Present Illness   Today's visit was conducted via telephone due to the COVID-19 pandemic. Patient's consent to telephone visit was obtained and documented.      Reason for visit:    I spoke to Hakeem on the phone regarding his extended leave of absence from work in the setting of coronavirus pandemic.  He has had previous discussions with multiple providers here related to his concerns of risk of disease transmission with his chronic immunosuppression related to history of kidney transplant.  He has remained off of work since approximately mid March.  His current FMLA paperwork has an expiration at the end of April and he is wishing to discuss future return to work strategies.  He says there is not sufficient amount of work to be done from home.  He would ideally like to return to work though wants to do so in a safe environment.         Review of Systems   Constitutional:  No fever, No chills.    Respiratory:  No shortness of breath, No cough.    Immunologic:  Immunocompromised, No recurrent fevers, No recurrent infections, No malaise.       Impression and Plan   Diagnosis     H/O kidney transplant (YRO52-FP Z94.0).     Immunosuppressed status (SIM62-ZL D89.9).        Health Status   Allergies:    Allergic Reactions (Selected)  Severity Not Documented  Morphine derivatives (Headache.  nausea.)   Medications:  (Selected)   Prescriptions  Prescribed  Bactrim 400 mg-80  mg oral tablet: See Instructions, Instructions: One on MWF, # 50 EA, 3 Refill(s), Type: Maintenance, Pharmacy: [facility], One on MWF  CellCept 250 mg oral capsule: = 4 cap(s) ( 1,000 mg ), PO, BID, Instructions: Fill generic only, # 112 cap(s), 0 Refill(s), OMAR, Type: Maintenance, Pharmacy: [facility] [id number] [facility], 4 cap(s) Oral bid,Instr:Fill generic only  Cialis 10 mg oral tablet: = 1 tab(s) ( 10 mg ), po, prn, Instructions: take 1 hour prior to sexual activity as needed, # 30 tab(s), 3 Refill(s), Type: Maintenance, Pharmacy: [facility], 1 tab(s) Oral prn,Instr:take 1 hour prior to sexual activity as needed  Metoprolol Tartrate 25 mg oral tablet: = 3 tab(s), Oral, bid, # 540 tab(s), 1 Refill(s), Type: Maintenance, Pharmacy: [facility]  Replacment CPAP +9 cm H2o: Replacment CPAP +9 cm H2o, See Instructions, Instructions: Heated humidifier, heated tubing, filters, nasal or full face mask of choice with headgear.  Replacement cushions and supplies as needed.  Change supplies every 6 mos  Months = 99 (Lifetime),...  Ritalin 5 mg oral tablet: See Instructions, Instructions: 2 tab(s) PO BID  last dose before 6 PM  Fill on 4/25/19, # 120 EA, 0 Refill(s), Type: Maintenance, Pharmacy: [facility], 2 tab(s) PO BID; last dose before 6 PM; Fill on 4/25/19  Ritalin 5 mg oral tablet: See Instructions, Instructions: 2 tab(s) PO BID   last dose before 6 PM, # 90 EA, 0 Refill(s), Type: Maintenance, Pharmacy: [facility], 2 tab(s) PO BID ; last dose before 6 PM  allopurinol 300 mg oral tablet: = 1 tab(s), Oral, bidwm, Instructions: MAINTAIN URINE OUTPUT  GREATER THAN 2 LITERS PER  DAY., # 180 tab(s), 2 Refill(s), Type: Maintenance, Pharmacy: [facility]  cloNIDine 0.2 mg oral tablet: = 0.5 tab(s) ( 0.1 mg ), Oral, bid, # 180 tab(s), 3 Refill(s), Type: Maintenance, Pharmacy: [facility] [facility] [id number] [facility]  cycloSPORINE 100 mg oral capsule: = 1 cap(s) ( 100 mg ), po, q12 hrs, # 180 cap(s), 3  Refill(s), Type: Maintenance, Pharmacy: [facility], 1 cap(s) Oral q12 hrs  cycloSPORINE 25 mg oral capsule: = 1 cap(s) ( 25 mg ), po, q12 hrs, # 180 cap(s), 3 Refill(s), Type: Maintenance, Pharmacy: [facility], faxed to [facility], 1 cap(s) Oral q12 hrs  furosemide 20 mg oral tablet: See Instructions, Instructions: Take 1 tablet by mouth  daily as needed for edema, # 90 tab(s), 3 Refill(s), Type: Soft Stop, Pharmacy: [facility], Take 1 tablet by mouth  daily as needed for edema  losartan 50 mg oral tablet: = 1 tab(s), Oral, daily, # 90 tab(s), 3 Refill(s), Type: Maintenance, Pharmacy: [facility], Pt is due appt, 1 tab(s) Oral daily  pravastatin 40 mg oral tablet: = 1 tab(s), Oral, qhs, # 90 tab(s), 0 Refill(s), Type: Maintenance, Pharmacy: [facility]  Documented Medications  Documented  aspirin 81 mg oral tablet: 1 tab(s) ( 81 mg ), PO, Daily, # 30 tab(s), 0 Refill(s), Type: Maintenance   Problem list:    All Problems  Drug-induced gynecomastia / SNOMED CT 374270528 / Confirmed  Dyslipidemia / SNOMED CT 1323542378 / Confirmed  Lower extremity edema / SNOMED CT 439116864 / Confirmed  GERD (gastroesophageal reflux disease) / SNOMED CT 4457136265 / Confirmed  Acute gout / SNOMED CT 513348317 / Confirmed  H/O kidney transplant / SNOMED CT 9444403843 / Confirmed  Hypertension with goal blood pressure less than 130/80 / SNOMED CT 0188832910 / Confirmed  IgA nephropathy / SNOMED CT 8649967292 / Confirmed  Nephrolithiasis / SNOMED CT 227859378 / Confirmed  Narcolepsy / SNOMED CT 776397647 / Confirmed  Obese / SNOMED CT 4892000962 / Probable  ED (erectile dysfunction) / SNOMED CT 5325018895 / Confirmed  Overweight / SNOMED CT 922639035 / Confirmed  Medication monitoring encounter / SNOMED CT 195807818 / Confirmed  Sleep apnea syndrome / SNOMED CT 843227669 / Confirmed  Plantar wart, left foot / SNOMED CT 486340230 / Confirmed      Histories   Social History:        Alcohol  Assessment            Current, 1-2 times per week, 4 drinks/episode average.      Tobacco Assessment            Never      Substance Abuse Assessment            Never      Employment and Education Assessment            Employed, Work/School description: Manager, [employer].      Home and Environment Assessment            Marital status: .  Spouse/Partner name: Nataly.      Nutrition and Health Assessment            Type of diet: Regular.      Exercise and Physical Activity Assessment            Exercise frequency: 3-4 times/week.  Exercise type: cardio.      Sexual Assessment            Sexually active: Yes.  Sexual orientation: Heterosexual.  Uses condoms: Yes.        Review / Management     .) .) ESRD, h/o LUKT in '08 at [facility] (previously on HD for ~3 months), baseline SCr ~1.8.  Original disease was IgA nephropathy  IS: cyclosporine 100mg BID, MMF 1g BID   - does have baseline 0.5-1g/g proteinuria   - higher suspicions for recurrent IgA.  Wouldn't pursue allograft biopsy at this point unless significant allograft functional decline   - on ASA 81mg daily    .) .) Covid19 pandemic with current immunosuppressed state  - discussed importance of social distancing, hand hygiene, and unique aspects related to individualized health  - discussed s/s and appropriate measures in context of worsening or developing respiratory symptoms   - he has been on FMLA since mid March related to increased risk of disease with his chronic immunosupressed state.  Given current timecourse of pandemic, I'm not confident that his risk of disease transmission will be substantially less in the months moving forward.  Encouraged him to discuss benefits with his HR department.  Plans to extend FMLA through May 26th.  He will need to weigh relative risks of disease transmission and benefits of continued paid employment.  I do think he could continue to do his work and still follow appropriate social distancing

## 2022-02-16 NOTE — TELEPHONE ENCOUNTER
---------------------  From: Caitlyn Turner CMA (Phone Messages Pool ([id number]_[facility]))   To: Dave Lei MD;     Sent: 5/14/2020 9:31:20 AM CDT  Subject: Labs     Phone Message    PCP:   JAE      Time of Call:  0923       Person Calling:  Pt  Phone number:  [phone] Leave a message, okay.    Note:   Over due for labs due to COVID-19. Wonders when he can return to work his boss want an update. Please advise.    Last office visit and reason:  04/28/2020 Telemedicine encounter [facility]---------------------  From: Dave Lei MD   To: Phone Messages Pool ([id number] - [place]);     Sent: 5/14/2020 3:24:38 PM CDT  Subject: RE: Labs     See my telephone note from 4/28.  We had used 5/26 as arbitrary date based on previous [employer] [place] directive for non-essential business return.  Seeing that this was revoked, return to work is really up to Hakeem.  As I've stated with him before, his risk for more serious Covid19 complications will remain high given his immunosuppression and this is not going to change.  I will support decisions if he wishes not to return to work/use FMLA based on this risk, though he will likely go unpaid indefinitely.  I do think it's reasonable to return to work and use social distancing precautions/wear mask/hand hygiene.Called patient given recommendations. He expressed understanding was agreeable with plan.

## 2022-02-16 NOTE — NURSING NOTE
Comprehensive Intake Entered On:  11/6/2019 1:30 PM CST    Performed On:  11/6/2019 1:20 PM CST by Mehnaz Gardner LPN               Summary   Chief Complaint :   feeling light headed. Excess thurst, dry throat.    Weight Measured :   274 lb(Converted to: 274 lb 0 oz, 124.28 kg)    Height Measured :   71.5 in(Converted to: 5 ft 11 in, 181.61 cm)    Body Mass Index :   37.68 kg/m2 (HI)    Body Surface Area :   2.5 m2   Systolic Blood Pressure :   124 mmHg   Diastolic Blood Pressure :   78 mmHg   Mean Arterial Pressure :   93 mmHg   Peripheral Pulse Rate :   66 bpm   BP Site :   Right arm   Pulse Site :   Radial artery   BP Method :   Manual   HR Method :   Manual   Temperature Tympanic :   97.5 DegF(Converted to: 36.4 DegC)  (LOW)    Mehnaz Gardner LPN - 11/6/2019 1:20 PM CST   Health Status   Allergies Verified? :   Yes   Medication History Verified? :   Yes   Pre-Visit Planning Status :   Completed   Mehnaz Gardner LPN - 11/6/2019 1:20 PM CST   Consents   Consent for Immunization Exchange :   Consent Granted   Consent for Immunizations to Providers :   Consent Granted   Mehnaz Gardner LPN - 11/6/2019 1:20 PM CST   Meds / Allergies   (As Of: 11/6/2019 1:30:37 PM CST)   Allergies (Active)   morphine derivatives  Estimated Onset Date:   Unspecified ; Reactions:   Headache.  Nausea. ; Comments:     Comment 1: This is noted on records from the [facility] [place] from September of 2008.   ; Created By:   Joi Fraser; Reaction Status:   Active ; Category:   Drug ; Substance:   morphine derivatives ; Type:   Allergy ; Updated By:   Joi Fraser; Reviewed Date:   11/6/2019 1:26 PM CST        Medication List   (As Of: 11/6/2019 1:30:37 PM CST)   Prescription/Discharge Order    methylphenidate  :   methylphenidate ; Status:   Prescribed ; Ordered As Mnemonic:   Ritalin 5 mg oral tablet ; Simple Display Line:   See Instructions, 2 tab(s) PO BID   last dose before 6 PM, 90 EA, 0 Refill(s) ; Ordering Provider:    Dave Lei MD; Catalog Code:   methylphenidate ; Order Dt/Tm:   10/25/2019 11:50:09 AM CDT          tadalafil  :   tadalafil ; Status:   Prescribed ; Ordered As Mnemonic:   Cialis 10 mg oral tablet ; Simple Display Line:   10 mg, 1 tab(s), po, prn, take 1 hour prior to sexual activity as needed, 30 tab(s), 3 Refill(s) ; Ordering Provider:   Dave Lei MD; Catalog Code:   tadalafil ; Order Dt/Tm:   10/25/2019 11:30:59 AM CDT          cloNIDine  :   cloNIDine ; Status:   Prescribed ; Ordered As Mnemonic:   cloNIDine 0.2 mg oral tablet ; Simple Display Line:   0.2 mg, 1 tab(s), Oral, bid, 180 tab(s), 3 Refill(s) ; Ordering Provider:   Dave Lei MD; Catalog Code:   cloNIDine ; Order Dt/Tm:   10/25/2019 10:49:51 AM CDT          mycophenolate mofetil  :   mycophenolate mofetil ; Status:   Prescribed ; Ordered As Mnemonic:   CellCept 250 mg oral capsule ; Simple Display Line:   1,000 mg, 4 cap(s), PO, BID, Fill generic only, 112 cap(s), 0 Refill(s) ; Ordering Provider:   Chico Bloom MD; Catalog Code:   mycophenolate mofetil ; Order Dt/Tm:   8/6/2019 4:34:44 PM CDT          metoprolol  :   metoprolol ; Status:   Prescribed ; Ordered As Mnemonic:   Metoprolol Tartrate 25 mg oral tablet ; Simple Display Line:   3 tab(s), Oral, bid, 540 tab(s), 1 Refill(s) ; Ordering Provider:   Chico Bloom MD; Catalog Code:   metoprolol ; Order Dt/Tm:   7/26/2019 4:08:40 PM CDT          allopurinol  :   allopurinol ; Status:   Prescribed ; Ordered As Mnemonic:   allopurinol 300 mg oral tablet ; Simple Display Line:   1 tab(s), Oral, bidwm, MAINTAIN URINE OUTPUT  GREATER THAN 2 LITERS PER  DAY., 180 tab(s), 2 Refill(s) ; Ordering Provider:   Chico Bloom MD; Catalog Code:   allopurinol ; Order Dt/Tm:   7/26/2019 4:07:36 PM CDT          methylphenidate  :   methylphenidate ; Status:   Prescribed ; Ordered As Mnemonic:   Ritalin 5 mg oral tablet ; Simple Display Line:   See Instructions, 2 tab(s) PO BID  last dose before 6 PM   Fill on 4/25/19, 120 EA, 0 Refill(s) ; Ordering Provider:   Chico Bloom MD; Catalog Code:   methylphenidate ; Order Dt/Tm:   3/28/2019 6:36:15 PM CDT          probenecid  :   probenecid ; Status:   Prescribed ; Ordered As Mnemonic:   probenecid 500 mg oral tablet ; Simple Display Line:   500 mg, 1 tab(s), Oral, bid, for 90 day(s), 180 tab(s), 3 Refill(s) ; Ordering Provider:   Chico Bloom MD; Catalog Code:   probenecid ; Order Dt/Tm:   3/28/2019 6:27:53 PM CDT          pravastatin  :   pravastatin ; Status:   Prescribed ; Ordered As Mnemonic:   pravastatin 40 mg oral tablet ; Simple Display Line:   1 tab(s), Oral, qhs, 90 tab(s), 3 Refill(s) ; Ordering Provider:   Chico Bloom MD; Catalog Code:   pravastatin ; Order Dt/Tm:   3/28/2019 6:27:01 PM CDT          losartan  :   losartan ; Status:   Prescribed ; Ordered As Mnemonic:   losartan 50 mg oral tablet ; Simple Display Line:   1 tab(s), Oral, daily, 90 tab(s), 3 Refill(s) ; Ordering Provider:   Chico Bloom MD; Catalog Code:   losartan ; Order Dt/Tm:   3/28/2019 6:25:24 PM CDT          furosemide  :   furosemide ; Status:   Prescribed ; Ordered As Mnemonic:   furosemide 20 mg oral tablet ; Simple Display Line:   See Instructions, Take 1 tablet by mouth  daily as needed for edema, 90 tab(s), 3 Refill(s) ; Ordering Provider:   Chico Bloom MD; Catalog Code:   furosemide ; Order Dt/Tm:   3/28/2019 6:25:14 PM CDT          cycloSPORINE  :   cycloSPORINE ; Status:   Prescribed ; Ordered As Mnemonic:   cycloSPORINE 25 mg oral capsule ; Simple Display Line:   25 mg, 1 cap(s), po, q12 hrs, 180 cap(s), 3 Refill(s) ; Ordering Provider:   Chico Bloom MD; Catalog Code:   cycloSPORINE ; Order Dt/Tm:   3/28/2019 6:25:00 PM CDT          cycloSPORINE  :   cycloSPORINE ; Status:   Prescribed ; Ordered As Mnemonic:   cycloSPORINE 100 mg oral capsule ; Simple Display Line:   100 mg, 1 cap(s), po, q12 hrs, 180 cap(s), 3 Refill(s) ; Ordering Provider:   Wolf TOTH,  Chico; Catalog Code:   cycloSPORINE ; Order Dt/Tm:   3/28/2019 6:24:48 PM CDT          sulfamethoxazole-trimethoprim  :   sulfamethoxazole-trimethoprim ; Status:   Prescribed ; Ordered As Mnemonic:   Bactrim 400 mg-80 mg oral tablet ; Simple Display Line:   See Instructions, One on MWF, 50 EA, 3 Refill(s) ; Ordering Provider:   Chico Bloom MD; Catalog Code:   sulfamethoxazole-trimethoprim ; Order Dt/Tm:   3/28/2019 6:28:26 PM CDT          Miscellaneous Rx Supply  :   Miscellaneous Rx Supply ; Status:   Prescribed ; Ordered As Mnemonic:   Replacment CPAP +9 cm H2o ; Simple Display Line:   See Instructions, Heated humidifier, heated tubing, filters, nasal or full face mask of choice with headgear.  Replacement cushions and supplies as needed.  Change supplies every 6 mos  Months = 99 (Lifetime), 1 EA, 11 Refill(s) ; Ordering Provider:   Chico Bloom MD; Catalog Code:   Miscellaneous Rx Supply ; Order Dt/Tm:   11/28/2017 10:32:21 AM CST            Home Meds    aspirin  :   aspirin ; Status:   Documented ; Ordered As Mnemonic:   aspirin 81 mg oral tablet ; Simple Display Line:   81 mg, 1 tab(s), PO, Daily, 30 tab(s) ; Catalog Code:   aspirin ; Order Dt/Tm:   2/1/2013 4:05:48 PM CST

## 2022-02-16 NOTE — TELEPHONE ENCOUNTER
---------------------  From: Kevin Trevino (Phone Messages Pool ([address]))   To: [facility] Message Pool ([address]); Referral Coordinators Pool ([address]);     Sent: 10/30/2019 10:31:21 AM CDT  Subject: Message      Phone Message    PCP:   JAE                          Time of Call:  10:14am                                        Person Calling:  Pt  Phone number:  [phone]; OK to leave a detailed message    Returned call at: 10:27am    Note:   Pt called wanting to let [facility] know that the [facility] transplant center called to inform pt that his UA came back abnormal, there is protein in his urine and they would like to do a kidney bx.     This is more for the referral dept; pt is wondering when he is going to get a call to schedule his colonoscopy since he wants to make it close to each other(different days) so he can arrange days off with work. Please call to help pt schedule.     Last office visit and reason:  10/25/2019; gout, Kidney Tx---------------------  From: Joi Dutton CMA ([facility] Message Pool ([facility]224[facility]))   To: Dave Lei MD;     Sent: 10/30/2019 10:52:25 AM CDT  Subject: FW: Message---------------------  From: Malathi Pineda (Referral Coordinators Pool (32224_[facility]))   To: [facility] Message Pool (32[address]) (Joi Dutton CMA); Phone Messages Pool ([address] [place] [address]);     Sent: 10/30/2019 11:11:58 AM CDT  Subject: RE: Message      I will contact patient and inform him that his Colonoscopy order was sent to [facility] and they should be calling him.  Malathi

## 2022-02-16 NOTE — PROGRESS NOTES
Chief Complaint    med refills  History of Present Illness      Is here for refill of Ritalin or narcolepsy.  He is taking 5 or 10 mg twice a day with very significant improvement in his daytime sleepiness.  He tells me his wife is happy that he is not falling asleep in the evenings.  No drowsy driving.  Continues uses CPAP every night.    Review of Systems      No fever, chills, headache, shortness of breath, tremor.  Physical Exam   Vitals & Measurements    HR: 74(Peripheral)  BP: 140/76     HT: 71.25 in  WT: 266 lb  BMI: 36.84       Patient is overweight but otherwise healthy-appearing.  Alert and oriented.  Assessment/Plan       Narcolepsy (G47.419)         Patient's had a very good effect was not treatment of his narcolepsy with Ritalin.         Orders:          23430 office outpatient visit 15 minutes (Charge), Quantity: 1, Narcolepsy  Sleep apnea syndrome                Sleep apnea syndrome (G47.30)         Patient is compliant and benefiting from therapy.         Orders:          16375 office outpatient visit 15 minutes (Charge), Quantity: 1, Narcolepsy  Sleep apnea syndrome                Orders:         methylphenidate, See Instructions, Instructions: 1-2 tab(s) PO BID last dose before 6 PM, # 120 EA, 0 Refill(s), Type: Maintenance, [id number], 1-2 tab(s) PO BID ; last dose before 6 PM, (Ordered)         methylphenidate, See Instructions, Instructions: 1-2 tab(s) PO BID last dose before 6 PM Fill on 6/21/18, # 120 EA, 0 Refill(s), Type: Maintenance, [id number], 1-2 tab(s) PO BID; last dose before 6 PM; Fill on 6/21/18, (Ordered)  Patient Information     Name:CRYSTAL TEJEDA      Address:      [address] [place] [id number]     Sex:Male     YOB: 1970     [phone]     Emergency Contact:SACHIN TEJEDA     MRN:[id number]     FIN:[id number]     Location:[facility]     Date of Service:05/22/2018      Primary Care  Physician:       Dave Lei MD, [phone]      Attending Physician:       Chico Bloom MD, [phone]  Problem List/Past Medical History    Ongoing     Acute gout     Drug-induced gynecomastia       Comments: Cyclosporin. Consult with Dr. Quique Freeman on 09/03/2009. Plan: Speak with nephrologist about altering med.     Dyslipidemia     ED (erectile dysfunction)     Excessive sleepiness     GERD (gastroesophageal reflux disease)     H/O kidney transplant     HTN (hypertension)     IgA nephropathy     Lower extremity edema       Comments: Discharged from [facility] on 10/13/2008. Evaluated at [facility] to rule out DVT for bilateral lower extremity swelling. Admited: 10/10/2008. Chronic. Needs compression and elevation.     Narcolepsy     Nephrolithiasis     Obese     Overweight     Sleep apnea syndrome       Comments: AHI 6.6 REM AHI 34 RERA 6.8 O2 sat alok 84%    Historical     Abnormal stress test     History of shingles  Procedure/Surgical History     Kidney transplant (2008)  Medications        Bactrim: See Instructions, One on MWF.        aspirin 81 mg oral tablet: 81 mg, 1 tab(s), PO, Daily, 30 tab(s).        CellCept 250 mg oral capsule: 1,000 mg, 4 cap(s), PO, BID, for 30 day(s), 240 cap(s), 3 Refill(s).        cycloSPORINE 100 mg oral capsule: 100 mg, 1 cap(s), po, q12 hrs, 180 cap(s), 0 Refill(s).        Cialis 10 mg oral tablet: 10 mg, 1 tab(s), po, prn, take 1 hour prior to sexual activity as needed, 30 tab(s), 11 Refill(s).        allopurinol 300 mg oral tablet: See Instructions, Take 1 tablet by mouth  twice a day with food  Maintain urine output  greater than 2 liters per  day, 180 tab(s), 1 Refill(s).        furosemide 20 mg oral tablet: See Instructions, Take 1 tablet by mouth  daily as needed for edema, 90 tab(s).        losartan 50 mg oral tablet: 50 mg, 1 tab(s), PO, Daily, 90 tab(s), 3 Refill(s).        Replacment CPAP +9 cm H2o: See Instructions, Heated humidifier,  heated tubing, filters, nasal or full face mask of choice with headgear.  Replacement cushions and supplies as needed.  Change supplies every 6 mos  Months = 99 (Lifetime), 1 EA, 11 Refill(s).        cycloSPORINE 25 mg oral capsule: 25 mg, 1 cap(s), po, q12 hrs, 180 cap(s), 1 Refill(s).        cloNIDine 0.1 mg oral tablet: 0.1 mg, 1 tab(s), PO, BID, 180 tab(s), 1 Refill(s).        Metoprolol Tartrate 25 mg oral tablet: 75 mg, 3 tab(s), po, bid, 540 tab(s), 0 Refill(s).        pravastatin 40 mg oral tablet: 40 mg, 1 tab(s), po, hs, 90 tab(s), 0 Refill(s).        probenecid 500 mg oral tablet: 500 mg, 1 tab(s), po, bid, 180 tab(s), 0 Refill(s).        Ritalin 5 mg oral tablet: See Instructions, 1-2 tab(s) PO BID   last dose before 6 PM, 120 EA, 0 Refill(s).        Ritalin 5 mg oral tablet: See Instructions, 1-2 tab(s) PO BID  last dose before 6 PM  Fill on 6/21/18, 120 EA, 0 Refill(s).                Allergies    morphine derivatives (Headache. 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 Exercise type: cardio., 05/16/2017     Home and Environment      Marital status: . Spouse/Partner name: Nataly., 05/16/2017     Nutrition and Health      Type of diet: Regular., 05/16/2017     Sexual      Sexually active: Yes. Sexual orientation: Heterosexual. Uses condoms: Yes., 05/16/2017     Substance Abuse      Never, 06/13/2014     Tobacco      Never, 06/13/2014  Family History    Cataract: Brother.    Heart disease: Father.    Sister: History is negative  Immunizations      Vaccine Date Status Comments      pneumococcal (PPSV23) 09/22/2017 Given      influenza virus vaccine, inactivated 09/22/2017 Given      influenza virus vaccine,  inactivated 09/22/2017 Given      influenza virus vaccine, inactivated 09/22/2017 Given      influenza virus vaccine, inactivated 09/23/2016 Given      influenza virus vaccine, inactivated 10/05/2015 Given      tetanus/diphth/pertuss (Tdap) adult/adol 02/06/2014 Given      influenza virus vaccine, inactivated 09/19/2013 Given      influenza virus vaccine, inactivated 01/09/2013 Given      influenza 09/13/2011 Given      influenza, H1N1, inactivated 01/06/2010 Recorded      influenza 09/25/2009 Recorded

## 2022-02-16 NOTE — PROGRESS NOTES
Chief Complaint    med refills, wart on left foot  History of Present Illness      Patient is here for refill of his Ritalin for narcolepsy.  He takes 5-10 mg twice a day to good effect.  No daytime sleepiness or drowsy driving.  His wife is happy.  He continues uses CPAP every night.  Complains of wart on the left heel.  It is improving.  Was treated with liquid nitrogen in May.  We discussed treating wart with cryotherapy or topical therapy he prefers the latter as it is improving.  He like to get a refill for prednisone which he uses for gout as needed.  His gout is been less frequent only 2 or 3 times per year on his current therapy.  Review of Systems      No headache, chest pain, dyspnea, edema.  No headache or nocturia.  Physical Exam   Vitals & Measurements    HR: 101(Peripheral)  BP: 134/91     HT: 71.25 in  WT: 271 lb  BMI: 37.53       Patient is overweight but otherwise healthy-appearing.  Alert and oriented.  In good spirits.  Chest clear.  Cardiac exam regular.  Trace ankle edema.  Cranial nerves normal.  A 5 mm wart left heel which appears to be improving.  Assessment/Plan       Acute gout (M10.9)         Refill prednisone for as needed use.                HTN (hypertension) (I10)         Controlled.         Orders:          Return to Clinic (Request), RFV: [facility] blood pressure check, Return in 2 weeks                Narcolepsy (G47.419)         Doing well on stimulant therapy.                  Plantar wart, left foot (B07.0)          Improving.  Aldara.                Sleep apnea syndrome (G47.30)         Patient is compliant with CPAP therapy and benefiting with decreased daytime sleepiness.                Orders:         imiquimod topical, See Instructions, Instructions: Apply 5 times per week applied directly to wart apply prior to normal sleeping hrs and leave on for 6 to 10 hrs, # 24 EA, 0 Refill(s), Type: Acute, Pharmacy: [facility] [id number] [facility], Apply 5 times per week applied directly..., (Ordered)          methylphenidate, See Instructions, Instructions: 1-2 tab(s) PO BID last dose before 6 PM, # 120 EA, 0 Refill(s), Type: Maintenance, [id number], 1-2 tab(s) PO BID ; last dose before 6 PM, (Ordered)         methylphenidate, See Instructions, Instructions: 1-2 tab(s) PO BID last dose before 6 PM Fill on 11/28/18, # 120 EA, 0 Refill(s), Type: Maintenance, [id number], 1-2 tab(s) PO BID; last dose before 6 PM; Fill on 11/28/18, (Ordered)         predniSONE, See Instructions, Instructions: 2 tab(s) Oral daily for 3-5 days prn, # 30 EA, 1 Refill(s), Type: Maintenance, [id number], 2 tab(s) Oral daily for 3-5 days prn, (Ordered)         Return to Clinic (Request), RFV: Medication check for Narcolepsy, Return in 2 months  Patient Information     Name:CRYSTAL TEJEDA      Address:      [address] [place] [id number]     Sex:Male     YOB: 1970     [phone]     Emergency Contact:SACHIN TEJEDA     MRN:[id number]     FIN:[id number]     Location:[facility]     Date of Service:10/29/2018      Primary Care Physician:       Dave Lei MD, [phone]      Attending Physician:       Chico Bloom MD, [phone]  Problem List/Past Medical History    Ongoing     Acute gout     Drug-induced gynecomastia       Comments: Cyclosporin. Consult with Dr. Quique Freeman on 09/03/2009. Plan: Speak with nephrologist about altering med.     Dyslipidemia     ED (erectile dysfunction)     Excessive sleepiness     GERD (gastroesophageal reflux disease)     H/O kidney transplant     HTN (hypertension)     IgA nephropathy     Lower extremity edema       Comments: Discharged from  of  on 10/13/2008. Evaluated at [facility] to rule out DVT for bilateral lower extremity swelling. Admited: 10/10/2008. Chronic. Needs compression and elevation.     Narcolepsy     Nephrolithiasis     Obese     Overweight     Plantar  wart, left foot     Sleep apnea syndrome       Comments: AHI 6.6 REM AHI 34 RERA 6.8 O2 sat alok 84%    Historical     Abnormal stress test     History of shingles  Procedure/Surgical History     Kidney transplant (2008)  Medications        Bactrim: See Instructions, One on MWF.        aspirin 81 mg oral tablet: 81 mg, 1 tab(s), PO, Daily, 30 tab(s).        CellCept 250 mg oral capsule: 1,000 mg, 4 cap(s), PO, BID, for 30 day(s), 240 cap(s), 3 Refill(s).        cycloSPORINE 100 mg oral capsule: 100 mg, 1 cap(s), po, q12 hrs, 180 cap(s), 0 Refill(s).        Cialis 10 mg oral tablet: 10 mg, 1 tab(s), po, prn, take 1 hour prior to sexual activity as needed, 30 tab(s), 11 Refill(s).        allopurinol 300 mg oral tablet: See Instructions, Take 1 tablet by mouth  twice a day with food  Maintain urine output  greater than 2 liters per  day, 180 tab(s), 1 Refill(s).        furosemide 20 mg oral tablet: See Instructions, Take 1 tablet by mouth  daily as needed for edema, 90 tab(s).        losartan 50 mg oral tablet: 50 mg, 1 tab(s), PO, Daily, 90 tab(s), 3 Refill(s).        Replacment CPAP +9 cm H2o: See Instructions, Heated humidifier, heated tubing, filters, nasal or full face mask of choice with headgear.  Replacement cushions and supplies as needed.  Change supplies every 6 mos  Months = 99 (Lifetime), 1 EA, 11 Refill(s).        cycloSPORINE 25 mg oral capsule: 25 mg, 1 cap(s), po, q12 hrs, 180 cap(s), 1 Refill(s).        cloNIDine 0.1 mg oral tablet: 0.1 mg, 1 tab(s), PO, BID, 180 tab(s), 1 Refill(s).        Metoprolol Tartrate 25 mg oral tablet: 75 mg, 3 tab(s), po, bid, 540 tab(s), 0 Refill(s).        pravastatin 40 mg oral tablet: 40 mg, 1 tab(s), Oral, hs, 90 tab(s), 0 Refill(s).        probenecid 500 mg oral tablet: 500 mg, 1 tab(s), Oral, bid, 60 tab(s), 0 Refill(s).        Ritalin 5 mg oral tablet: See Instructions, 1-2 tab(s) PO BID   last dose before 6 PM, 120 EA, 0 Refill(s).        Ritalin 5 mg oral tablet:  See Instructions, 1-2 tab(s) PO BID  last dose before 6 PM  Fill on 11/28/18, 120 EA, 0 Refill(s).        predniSONE 20 mg oral tablet: See Instructions, 2 tab(s) Oral daily for 3-5 days prn, 30 EA, 1 Refill(s).        Aldara 5% topical cream: See Instructions, Apply 5 times per week applied directly to wart  apply prior to normal sleeping hrs and leave on for 6 to 10 hrs, 24 EA, 0 Refill(s).         Allergies    morphine derivatives (Headache. 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 Exercise type: cardio., 05/16/2017     Home and Environment      Marital status: . Spouse/Partner name: Nataly., 05/16/2017     Nutrition and Health      Type of diet: Regular., 05/16/2017     Sexual      Sexually active: Yes. Sexual orientation: Heterosexual. Uses condoms: Yes., 05/16/2017     Substance Abuse      Never, 06/13/2014     Tobacco      Never, 06/13/2014  Family History    Cataract: Brother.    Heart disease: Father.    Sister: History is negative  Immunizations      Vaccine Date Status Comments      pneumococcal (PPSV23) 09/22/2017 Given      influenza virus vaccine, inactivated 09/22/2017 Given      influenza virus vaccine, inactivated 09/22/2017 Given      influenza virus vaccine, inactivated 09/22/2017 Given      influenza virus vaccine, inactivated 09/23/2016 Given      influenza virus vaccine, inactivated 10/05/2015 Given      tetanus/diphth/pertuss (Tdap) adult/adol 02/06/2014 Given      influenza virus vaccine, inactivated 09/19/2013 Given      influenza virus vaccine, inactivated 01/09/2013 Given      influenza 09/13/2011 Given      influenza, H1N1, inactivated 01/06/2010 Recorded      influenza 09/25/2009 Recorded

## 2022-02-16 NOTE — NURSING NOTE
Comprehensive Intake Entered On:  1/29/2021 2:57 PM CST    Performed On:  1/29/2021 2:53 PM CST by Joi Dutton CMA               Summary   Chief Complaint :   1.  check lesion on chest - noticed 1 1/2 wks ago, bleeding, increase in size    Weight Measured :   282 lb(Converted to: 282 lb 0 oz, 127.913 kg)    Systolic Blood Pressure :   158 mmHg (HI)    Diastolic Blood Pressure :   82 mmHg (HI)    Mean Arterial Pressure :   107 mmHg   Peripheral Pulse Rate :   100 bpm   Temperature Tympanic :   97.1 DegF(Converted to: 36.2 DegC)  (LOW)    Joi Dutton CMA - 1/29/2021 2:53 PM CST   Health Status   Allergies Verified? :   Yes   Medication History Verified? :   Yes   Medical History Verified? :   Yes   Pre-Visit Planning Status :   Completed   Tobacco Use? :   Never smoker   Joi Dutton CMA - 1/29/2021 2:53 PM CST   ID Risk Screen   Recent Travel History :   No recent travel   Family Member Travel History :   No recent travel   Other Exposure to Infectious Disease :   Unknown   COVID-19 Testing Status :   No positive COVID-19 test   Joi Dutton CMA - 1/29/2021 2:53 PM CST   Social History   Social History   (As Of: 1/29/2021 2:57:23 PM CST)   Alcohol:        Current, 1-2 times per week, 4 drinks/episode average.   (Last Updated: 6/17/2014 9:34:22 AM CDT by Amilcar Freeman CMA)          Tobacco:        Never (less than 100 in lifetime)   (Last Updated: 1/29/2021 2:55:02 PM CST by Joi Dutton CMA)          Electronic Cigarette/Vaping:        Electronic Cigarette Use: Never.   (Last Updated: 1/29/2021 2:55:12 PM CST by Joi Dutton CMA)          Substance Abuse:        Never   (Last Updated: 6/13/2014 5:27:00 PM CDT by Joi Dutton CMA)          Employment/School:        Employed, Work/School description: Manager, [employer].   (Last Updated: 2/7/2013 8:46:28 AM CST by Joi Fraser)          Home/Environment:        Marital status: .  Spouse/Partner name: Nataly.   (Last Updated: 5/16/2017 10:45:12 AM CDT by  Gertrudis Jones)          Nutrition/Health:        Type of diet: Regular.   (Last Updated: 5/16/2017 10:45:17 AM CDT by Gertrudis Jones)          Exercise:        Exercise frequency: 3-4 times/week.  Exercise type: cardio.   (Last Updated: 5/16/2017 10:45:29 AM CDT by Gertrudis Jones)          Sexual:        Sexually active: Yes.  Sexual orientation: Heterosexual.  Uses condoms: Yes.   (Last Updated: 5/16/2017 10:45:38 AM CDT by Gertrudis Jones)

## 2022-02-16 NOTE — PROGRESS NOTES
Patient:   CRYSTAL TEJEDA            MRN: [id number]            FIN: [id number]               Age:   49 years     Sex:  Male     :  1970   Associated Diagnoses:   LAFB (left anterior fascicular block)   Author:   Dave Lei MD      Procedure   EKG procedure   Indication: pre-op.     Position: supine.     EKG findings   Interpretation: by primary care provider.     Rhythm: heart rate  74  beats/min, sinus normal.     Axis: left axis deviation.     Within normal limits.     Intervals: DE normal, QRS normal, QT normal.     Normal EKG.     P waves: normal.     QRS complex: normal, no Q waves present.     ST-T-U complex: normal.     Interpretation: no ST-T wave abnormalities.     Discussed: with patient.        Impression and Plan   Diagnosis     LAFB (left anterior fascicular block) ([id number] I44.4).     Orders

## 2022-02-16 NOTE — TELEPHONE ENCOUNTER
---------------------  From: Angelo Hay MD   To: CRYSTAL TEJEDA    Sent: 11/7/2019 8:49:11 PM CST  Subject: Patient Message - Results Notification        Your urine protein has improved.    Results:  Date Result Name Ind Value Ref Range   11/6/2019 2:22 PM U Protein  20 mg/dL (5 - 25)   11/6/2019 2:22 PM U Protein/Creatinine Ratio ((H)) 323   previous 4,529 (22 - 128)   11/6/2019 2:22 PM U Protein/Creatinine Ratio ((H)) 0.323             4.529 (0.022 - 0.128)   11/6/2019 2:22 PM Ur Creatinine  62 mg/dL (20 - 320)   11/6/2019 2:26 PM Sodium Level  136 mmol/L (135 - 145)   11/6/2019 2:26 PM Potassium Level  4.6 mmol/L (3.5 - 5.0)   11/6/2019 2:26 PM Chloride Level  103 mmol/L (98 - 110)   11/6/2019 2:26 PM CO2 Level  24 mmol/L (21 - 31)   11/6/2019 2:26 PM AGAP  9 (5 - 18)   11/6/2019 2:26 PM Glucose Level ((H)) 152 mg/dL (65 - 100)   11/6/2019 2:26 PM BUN ((H)) 38 mg/dL (8 - 25)   11/6/2019 2:26 PM Creatinine Level ((H)) 1.88 mg/dL (0.72 - 1.25)   11/6/2019 2:26 PM BUN/Creat Ratio  20 (10 - 20)   11/6/2019 2:26 PM eGFR  ((L)) 46 (>60 - )   11/6/2019 2:26 PM eGFR Non- ((L)) 38 (>60 - )   11/6/2019 2:26 PM Calcium Level  9.0 mg/dL (8.5 - 10.5)

## 2022-02-16 NOTE — TELEPHONE ENCOUNTER
---------------------  From: Briana Colunga LPN (Phone Messages Pool ([id number]_[facility]))   To: Elizabeth Brothers;     Sent: 4/12/2019 1:19:54 PM CDT  Subject: labs     Please fax labs from 4/1 to [facility]    Phone Message    PCP:   JAE      Time of Call:  11:43am       Person Calling:  Angel- [facility]plant [facility]  Phone number:  [phone]- Not Correct number    Returned call at: 1:11pm    Note:   Angel LM stating pt had labs done 4-1 and they are looking for CBC and BMP.    Called Number Angel left and it was for someone else at a medical office and it states not to leave a message because call will not be returned. Voicemail message gave number [phone] to call and select option 5. I called this number and was told there was nobody named Angel at that office and that I reached the [employer].    Called the number that is listed on the [facility] orders that were recently faxed over for Dr. Bakari Hurtado [phone].    Angel was at lunch so another nurse took message. Told her CBC and BMP were not done on 4/1 but that pt had uric acid level and lipid panel done.    They would like these labs faxed to them at [phone].    Last office visit and reason:  3/28/19 Medication refills---------------------  From: Elizabeth Brothers   To: Phone Messages Pool ([id number] [facility] [place]);     Sent: 4/12/2019 2:45:08 PM CDT  Subject: RE: labs     Faxed to Angel @ [phone], 4/12/19 2:08pm, Confirmation received. thanks

## 2022-02-16 NOTE — NURSING NOTE
Phone Message    PCP:   JAE      Time of Call:  0904       Person Calling:  Sheila w/[facility]  Phone number:  [phone]    Returned call at: 0915    Note:   Sheila called stating they are in need of a PA for patient's medication. No medication information given in message. Asked for return call to number listed above to initiate PA. Tried to return call to number given - does not connect to specialty pharmacy.    Last office visit and reason:  3-28-19 Medication Refill w/[facility]

## 2022-02-16 NOTE — CARE COORDINATION
Pt appears on  [facility] chronic disease panel as out of parameters for elevated BP  will resume clonidine 0.1 mg twice daily with goal blood pressure less than 130/80. Per [facility]'s note on 2/16/18

## 2022-02-16 NOTE — NURSING NOTE
Comprehensive Intake Entered On:  4/7/2020 10:21 AM CDT    Performed On:  4/7/2020 10:15 AM CDT by Minal Srivastava               Summary   Chief Complaint :   Verbal consent given for telephone visit. Pt is wanting his work note extended as [facility] wrote him a note 3 weeks ago and it expires tomorrow. Patient wondering what the recommendation is for him going back to work as he is a kidney transplant pt.    Height Measured :   71.5 in(Converted to: 5 ft 11 in, 181.61 cm)    Minal Srivastava - 4/7/2020 10:15 AM CDT   Health Status   Allergies Verified? :   Yes   Medication History Verified? :   Yes   Medical History Verified? :   Yes   Pre-Visit Planning Status :   N/A   Tobacco Use? :   Never smoker   Minal Srivastava - 4/7/2020 10:15 AM CDT   Consents   Consent for Immunization Exchange :   Consent Granted   Consent for Immunizations to Providers :   Consent Granted   Minal Srivastava - 4/7/2020 10:15 AM CDT   Meds / Allergies   (As Of: 4/7/2020 10:21:31 AM CDT)   Allergies (Active)   morphine derivatives  Estimated Onset Date:   Unspecified ; Reactions:   Headache.  Nausea. ; Comments:     Comment 1: This is noted on records from the [facility] [place] [facility] [place] from September of 2008.   ; Created By:   Joi Fraser; Reaction Status:   Active ; Category:   Drug ; Substance:   morphine derivatives ; Type:   Allergy ; Updated By:   Joi Fraser; Reviewed Date:   4/7/2020 10:21 AM CDT        Medication List   (As Of: 4/7/2020 10:21:31 AM CDT)   Prescription/Discharge Order    probenecid  :   probenecid ; Status:   Processing ; Ordered As Mnemonic:   probenecid 500 mg oral tablet ; Ordering Provider:   Dave Lei MD; Action Display:   Complete ; Catalog Code:   probenecid ; Order Dt/Tm:   4/7/2020 10:18:35 AM CDT          pravastatin  :   pravastatin ; Status:   Prescribed ; Ordered As Mnemonic:   pravastatin 40 mg oral tablet ; Simple Display Line:   1 tab(s), Oral, qhs, 90 tab(s), 0 Refill(s) ; Ordering Provider:   Quique  Dave TOTH; Catalog Code:   pravastatin ; Order Dt/Tm:   2/3/2020 6:26:24 PM CST          methylphenidate  :   methylphenidate ; Status:   Prescribed ; Ordered As Mnemonic:   Ritalin 5 mg oral tablet ; Simple Display Line:   See Instructions, 2 tab(s) PO BID   last dose before 6 PM, 90 EA, 0 Refill(s) ; Ordering Provider:   Dvae Lei MD; Catalog Code:   methylphenidate ; Order Dt/Tm:   10/25/2019 11:50:09 AM CDT          tadalafil  :   tadalafil ; Status:   Prescribed ; Ordered As Mnemonic:   Cialis 10 mg oral tablet ; Simple Display Line:   10 mg, 1 tab(s), po, prn, take 1 hour prior to sexual activity as needed, 30 tab(s), 3 Refill(s) ; Ordering Provider:   Dave Lei MD; Catalog Code:   tadalafil ; Order Dt/Tm:   10/25/2019 11:30:59 AM CDT          cloNIDine  :   cloNIDine ; Status:   Prescribed ; Ordered As Mnemonic:   cloNIDine 0.2 mg oral tablet ; Simple Display Line:   0.1 mg, 0.5 tab(s), Oral, bid, 180 tab(s), 3 Refill(s) ; Ordering Provider:   Angelo Hay MD; Catalog Code:   cloNIDine ; Order Dt/Tm:   10/25/2019 10:49:51 AM CDT          mycophenolate mofetil  :   mycophenolate mofetil ; Status:   Prescribed ; Ordered As Mnemonic:   CellCept 250 mg oral capsule ; Simple Display Line:   1,000 mg, 4 cap(s), PO, BID, Fill generic only, 112 cap(s), 0 Refill(s) ; Ordering Provider:   Chico Bloom MD; Catalog Code:   mycophenolate mofetil ; Order Dt/Tm:   8/6/2019 4:34:44 PM CDT          metoprolol  :   metoprolol ; Status:   Prescribed ; Ordered As Mnemonic:   Metoprolol Tartrate 25 mg oral tablet ; Simple Display Line:   3 tab(s), Oral, bid, 540 tab(s), 1 Refill(s) ; Ordering Provider:   Chico Bloom MD; Catalog Code:   metoprolol ; Order Dt/Tm:   7/26/2019 4:08:40 PM CDT          allopurinol  :   allopurinol ; Status:   Prescribed ; Ordered As Mnemonic:   allopurinol 300 mg oral tablet ; Simple Display Line:   1 tab(s), Oral, bidwm, MAINTAIN URINE OUTPUT  GREATER THAN 2 LITERS PER  DAY., 180  tab(s), 2 Refill(s) ; Ordering Provider:   Chico Bloom MD; Catalog Code:   allopurinol ; Order Dt/Tm:   7/26/2019 4:07:36 PM CDT          methylphenidate  :   methylphenidate ; Status:   Prescribed ; Ordered As Mnemonic:   Ritalin 5 mg oral tablet ; Simple Display Line:   See Instructions, 2 tab(s) PO BID  last dose before 6 PM  Fill on 4/25/19, 120 EA, 0 Refill(s) ; Ordering Provider:   Chico Bloom MD; Catalog Code:   methylphenidate ; Order Dt/Tm:   3/28/2019 6:36:15 PM CDT          losartan  :   losartan ; Status:   Prescribed ; Ordered As Mnemonic:   losartan 50 mg oral tablet ; Simple Display Line:   1 tab(s), Oral, daily, 90 tab(s), 3 Refill(s) ; Ordering Provider:   Chico Bloom MD; Catalog Code:   losartan ; Order Dt/Tm:   3/28/2019 6:25:24 PM CDT          furosemide  :   furosemide ; Status:   Prescribed ; Ordered As Mnemonic:   furosemide 20 mg oral tablet ; Simple Display Line:   See Instructions, Take 1 tablet by mouth  daily as needed for edema, 90 tab(s), 3 Refill(s) ; Ordering Provider:   Chico Bloom MD; Catalog Code:   furosemide ; Order Dt/Tm:   3/28/2019 6:25:14 PM CDT          cycloSPORINE  :   cycloSPORINE ; Status:   Prescribed ; Ordered As Mnemonic:   cycloSPORINE 25 mg oral capsule ; Simple Display Line:   25 mg, 1 cap(s), po, q12 hrs, 180 cap(s), 3 Refill(s) ; Ordering Provider:   Chico Bloom MD; Catalog Code:   cycloSPORINE ; Order Dt/Tm:   3/28/2019 6:25:00 PM CDT          cycloSPORINE  :   cycloSPORINE ; Status:   Prescribed ; Ordered As Mnemonic:   cycloSPORINE 100 mg oral capsule ; Simple Display Line:   100 mg, 1 cap(s), po, q12 hrs, 180 cap(s), 3 Refill(s) ; Ordering Provider:   Chico Bloom MD; Catalog Code:   cycloSPORINE ; Order Dt/Tm:   3/28/2019 6:24:48 PM CDT          sulfamethoxazole-trimethoprim  :   sulfamethoxazole-trimethoprim ; Status:   Prescribed ; Ordered As Mnemonic:   Bactrim 400 mg-80 mg oral tablet ; Simple Display Line:   See Instructions, One  on MWF, 50 EA, 3 Refill(s) ; Ordering Provider:   Chico Bloom MD; Catalog Code:   sulfamethoxazole-trimethoprim ; Order Dt/Tm:   3/28/2019 6:28:26 PM CDT          Miscellaneous Rx Supply  :   Miscellaneous Rx Supply ; Status:   Prescribed ; Ordered As Mnemonic:   Replacment CPAP +9 cm H2o ; Simple Display Line:   See Instructions, Heated humidifier, heated tubing, filters, nasal or full face mask of choice with headgear.  Replacement cushions and supplies as needed.  Change supplies every 6 mos  Months = 99 (Lifetime), 1 EA, 11 Refill(s) ; Ordering Provider:   Chico Bloom MD; Catalog Code:   Miscellaneous Rx Supply ; Order Dt/Tm:   11/28/2017 10:32:21 AM CST            Home Meds    aspirin  :   aspirin ; Status:   Documented ; Ordered As Mnemonic:   aspirin 81 mg oral tablet ; Simple Display Line:   81 mg, 1 tab(s), PO, Daily, 30 tab(s) ; Catalog Code:   aspirin ; Order Dt/Tm:   2/1/2013 4:05:48 PM CST

## 2022-02-16 NOTE — NURSING NOTE
Comprehensive Intake Entered On:  10/25/2019 10:31 AM CDT    Performed On:  10/25/2019 10:29 AM CDT by Joi Dutton CMA               Summary   Chief Complaint :   Physical   Weight Measured :   266.4 lb(Converted to: 266 lb 6 oz, 120.84 kg)    Height Measured :   71.5 in(Converted to: 5 ft 11 in, 181.61 cm)    Body Mass Index :   36.63 kg/m2 (HI)    Body Surface Area :   2.47 m2   Systolic Blood Pressure :   150 mmHg (HI)    Diastolic Blood Pressure :   80 mmHg   (Comment: no metoprolol or Losartan [Joi Dutton CMA - 10/25/2019 10:29 AM CDT] )   Mean Arterial Pressure :   103 mmHg   Peripheral Pulse Rate :   80 bpm   BP Site :   Right arm   Temperature Tympanic :   97.4 DegF(Converted to: 36.3 DegC)  (LOW)    Joi Dutton CMA - 10/25/2019 10:29 AM CDT   Health Status   Allergies Verified? :   Yes   Medication History Verified? :   Yes   Medical History Verified? :   Yes   Pre-Visit Planning Status :   N/A   Tobacco Use? :   Never smoker   Joi Dutton CMA - 10/25/2019 10:29 AM CDT   Consents   Consent for Immunization Exchange :   Consent Granted   Consent for Immunizations to Providers :   Consent Granted   Joi Dutton CMA - 10/25/2019 11:35 AM CDT

## 2022-02-16 NOTE — PROGRESS NOTES
Patient:   CRYSTAL TEJEDA            MRN: [id number]            FIN: [id number]               Age:   48 years     Sex:  Male     :  1970   Associated Diagnoses:   HTN - Hypertension; H/O kidney transplant; Erectile Dysfunction; IgA Nephropathy; Gout   Author:   Dave Lei MD      Visit Information      Date of Service: 2018 10:24 am  Performing Location: [facility]  Encounter#: [id number]      Primary Care Provider (PCP):  Dave Lei MD    NPI# [id number]      Referring Provider:  Dave Lei MD    NPI# [id number]      Chief Complaint       2018 11:25 AM CDT med refills   2018 10:32 AM CDT c/o lump on heel of left foot, painful for the past 3-4 weeks               Additional Information:No additional information recorded during visit.   Chief complaint and symptoms as noted above and confirmed with patient.  Recent lab and diagnostic studies reviewed with patient      History of Present Illness   2014: Presents to clinic for routine health examination. He has a history of living unrelated kidney transplant performed in , wife serving as his donor and doing well. His underlying kidney history is IgA nephropathy and also follows with Dr. Rene for general kidney cares, potentially interested in transferring his care more locally. Main complaints today related to worsening libido over time. He has a history of erectile dysfunction, fairly responsive to vardenafil therapy.  He s not noticed any change in testes size. He doesn t undergo screening posttransplant labs on a regular basis. Baseline creatinine is 1.8. He does not see a dermatologist on a regular basis. He does not check his blood pressure at home on a regular frequency.     2018: Presents with persistent pain in left heel now for approximately last 4 weeks.  Can feel a callus lump on that area.  No associated trauma         Review of Systems   Constitutional:  Fatigue, No fever, No chills.    Eye:  Negative  except as documented in history of present illness.    Ear/Nose/Mouth/Throat:  Negative except as documented in history of present illness.    Respiratory:  No shortness of breath.    Cardiovascular:  No chest pain, No palpitations, No peripheral edema, No syncope.    Gastrointestinal:  No nausea, No vomiting, No diarrhea, No abdominal pain.    Genitourinary:  No dysuria, No hematuria, No change in urine stream.    Hematology/Lymphatics:  Negative except as documented in history of present illness.    Endocrine:  No excessive thirst, No polyuria.    Immunologic:  No recurrent fevers.    Musculoskeletal:  heel pain, No joint pain, No muscle pain, No decreased range of motion.    Neurologic:  Alert and oriented X4, No numbness, No tingling, No headache.       Health Status   Allergies:    Allergic Reactions (Selected)  Severity Not Documented  Morphine derivatives (Headache.  nausea.)   Medications:  (Selected)   Prescriptions  Prescribed  CellCept 250 mg oral capsule: 4 cap(s) ( 1,000 mg ), PO, BID, # 240 cap(s), 3 Refill(s), OMAR, Type: Maintenance, Pharmacy: [facility], 4 cap(s) po bid,x30 day(s)  Cialis 10 mg oral tablet: 1 tab(s) ( 10 mg ), po, prn, Instructions: take 1 hour prior to sexual activity as needed, # 30 tab(s), 11 Refill(s), Type: Maintenance, Pharmacy: [facility], Please keep on file and pt will notify when needed., 1 tab(s) po prn,Instr:take 1...  Metoprolol Tartrate 25 mg oral tablet: 3 tab(s) ( 75 mg ), po, bid, # 540 tab(s), 0 Refill(s), Type: Maintenance, Pharmacy: [facility], Pt due for med check in May 2018 per Dr. Lei.  Replacment CPAP +9 cm H2o: Replacment CPAP +9 cm H2o, See Instructions, Instructions: Heated humidifier, heated tubing, filters, nasal or full face mask of choice with headgear.  Replacement cushions and supplies as needed.  Change supplies every 6 mos  Months = 99 (Lifetime),...  Ritalin 5 mg oral tablet: See Instructions,  Instructions: 1-2 tab(s) PO BID   last dose before 6 PM, # 120 EA, 0 Refill(s), Type: Maintenance, Pharmacy: [place] [address] [facility], 1-2 tab(s) PO BID ; last dose before 6 PM  Ritalin 5 mg oral tablet: See Instructions, Instructions: 1-2 tab(s) PO BID  last dose before 6 PM  Fill on 6/21/18, # 120 EA, 0 Refill(s), Type: Maintenance, Pharmacy: [place] [address] [facility], 1-2 tab(s) PO BID; last dose before 6 PM; Fill on 6/21/18  allopurinol 300 mg oral tablet: See Instructions, Instructions: Take 1 tablet by mouth  twice a day with food  Maintain urine output  greater than 2 liters per  day, # 180 tab(s), 1 Refill(s), Type: Maintenance, Pharmacy: [facility], Take 1 tablet by mouth  twice a day wit...  cloNIDine 0.1 mg oral tablet: 1 tab(s) ( 0.1 mg ), PO, BID, # 180 tab(s), 1 Refill(s), Type: Maintenance, Pharmacy: [facility], 1 tab(s) po bid  cycloSPORINE 100 mg oral capsule: 1 cap(s) ( 100 mg ), po, q12 hrs, # 180 cap(s), 0 Refill(s), Type: Maintenance, 1 cap(s) po q12 hrs  cycloSPORINE 25 mg oral capsule: 1 cap(s) ( 25 mg ), po, q12 hrs, # 180 cap(s), 1 Refill(s), Type: Maintenance, faxed to pharmacy (Rx), faxed to [facility] rx  furosemide 20 mg oral tablet: See Instructions, Instructions: Take 1 tablet by mouth  daily as needed for edema, # 90 tab(s), Type: Soft Stop, Pharmacy: [facility]  losartan 50 mg oral tablet: 1 tab(s) ( 50 mg ), PO, Daily, # 90 tab(s), 3 Refill(s), Type: Maintenance, Pharmacy: [facility], 1 tab(s) po daily  pravastatin 40 mg oral tablet: 1 tab(s) ( 40 mg ), po, hs, # 90 tab(s), 0 Refill(s), Type: Maintenance, Pharmacy: [facility], Pt due for med check and fasting labs per [facility].  probenecid 500 mg oral tablet: 1 tab(s) ( 500 mg ), po, bid, # 180 tab(s), 0 Refill(s), Type: Maintenance, Pharmacy: [facility], Pt due for med check and fasting labs soon per [facility].  Suspended  colchicine 0.6 mg oral tablet: 1 tab(s) ( 0.6 mg ), PO, daily, PRN: for gout  pain, # 90 tab(s), 1 Refill(s), Type: Maintenance, Pharmacy: [facility], please keep on file and pt will notify when needed, 1 tab(s) po daily,PRN:for gout pain  Documented Medications  Documented  Bactrim: See Instructions, Instructions: One on MWF, 0 Refill(s), Type: Maintenance  aspirin 81 mg oral tablet: 1 tab(s) ( 81 mg ), PO, Daily, # 30 tab(s), 0 Refill(s), Type: Maintenance   Problem list:    All Problems  Drug-induced gynecomastia / SNOMED CT 803380411 / Confirmed  Dyslipidemia / SNOMED CT 7109422780 / Confirmed  Lower extremity edema / SNOMED CT 787113314 / Confirmed  GERD (gastroesophageal reflux disease) / SNOMED CT 0422115946 / Confirmed  Acute gout / SNOMED CT 749149019 / Confirmed  H/O kidney transplant / SNOMED CT 2246836709 / Confirmed  HTN (hypertension) / SNOMED CT 4058745963 / Confirmed  IgA nephropathy / SNOMED CT 1403542430 / Confirmed  Nephrolithiasis / SNOMED CT 900357472 / Confirmed  Narcolepsy / SNOMED CT 785528144 / Confirmed  Obese / SNOMED CT 5945729108 / Probable  ED (erectile dysfunction) / SNOMED CT 3060997539 / Confirmed  Overweight / SNOMED CT 915325909 / Confirmed  Sleep apnea syndrome / SNOMED CT 767182018 / Confirmed  Inactive: Excessive sleepiness / SNOMED CT 4137805103  Resolved: Abnormal stress test / SNOMED CT 2358249779  Resolved: History of shingles / SNOMED CT 9597651480  Canceled: Erectile Dysfunction / ICD-9-.84      Histories   Past Medical History:    Active  Sleep apnea syndrome (857354480): Onset on 10/7/2010 at 40 years.  Comments:  10/14/2010 CDT 4:06 PM CDT - Chico Bloom MD  AHI 6.6 REM AHI 34 RERA 6.8 O2 sat alok 84%  Drug-induced gynecomastia (823195504): Onset in the month of 9/2009 at 39 years  Comments:  2/7/2013 CST 8:54 AM CST - Joi Fraser.   Consult with Dr. Quique Freeman on 09/03/2009.  Plan:  Speak with nephrologist about altering med.  Lower extremity edema (729653639): Onset in 2008 at 38 years.  Comments:  2/7/2013  CST 8:57 AM Joi Mariee  Chronic.  Needs compression and elevation.    2/7/2013 CST 9:09 AM Joi Mariee  Evaluated at [facility] to rule out DVT for bilateral lower extremity swelling.  Admited:  10/10/2008.    2/7/2013 CST 9:10 AM Joi Mariee  Discharged from [facility] on 10/13/2008.  Nephrolithiasis (438734102)  IgA nephropathy (7546232725)  H/O kidney transplant (4320102083)  HTN (hypertension) (7431885154)  Dyslipidemia (0710041422)  ED (erectile dysfunction) (4997978955)  Obese (7644936188)  Overweight (046177129)  GERD (gastroesophageal reflux disease) (9186755835)  Resolved  Abnormal stress test (8808387722): Onset in 2008 at 38 years.  Resolved.  History of shingles (3532808905):  Resolved.   Family History:    Cataract  Brother (Abimael)  Heart disease  Father  Comments:  2/7/2013 9:22 AM - Joi Fraser  Bypass grafting performed.    2/7/2013 8:45 AM - Joi Fraser  CAD at 50 years of age.     Procedure history:    Kidney transplant (057419662) in 2008 at 38 Years.   Social History:        Alcohol Assessment            Current, 1-2 times per week, 4 drinks/episode average.      Tobacco Assessment            Never      Substance Abuse Assessment            Never      Employment and Education Assessment            Employed, Work/School description: Manager, [employer].      Home and Environment Assessment            Marital status: .  Spouse/Partner name: Naatly.      Nutrition and Health Assessment            Type of diet: Regular.      Exercise and Physical Activity Assessment            Exercise frequency: 3-4 times/week.  Exercise type: cardio.      Sexual Assessment            Sexually active: Yes.  Sexual orientation: Heterosexual.  Uses condoms: Yes.        Physical Examination   vital signs stable, as noted above   Vital Signs   5/22/2018 11:25 AM CDT Peripheral Pulse Rate 74 bpm    Systolic Blood Pressure 140 mmHg  HI    Diastolic Blood Pressure 76 mmHg    Mean Arterial  Pressure 97 mmHg   5/22/2018 10:32 AM CDT Temperature Tympanic 97.9 DegF    Peripheral Pulse Rate 74 bpm    Pulse Site Radial artery    HR Method Manual    Systolic Blood Pressure 140 mmHg  HI    Diastolic Blood Pressure 76 mmHg    Mean Arterial Pressure 97 mmHg    BP Site Right arm    BP Method Manual      Measurements from flowsheet : Measurements   5/22/2018 11:25 AM CDT Height Measured - Standard 71.25 in    Weight Measured - Standard 266 lb    BSA 2.46 m2    Body Mass Index 36.84 kg/m2  HI   5/22/2018 10:32 AM CDT Height Measured - Standard 71.25 in    Weight Measured - Standard 266.2 lb    BSA 2.46 m2    Body Mass Index 36.86 kg/m2  HI      General:  Alert and oriented, No acute distress.    Eye:  Extraocular movements are intact.    HENT:  Normocephalic, Oral mucosa is moist.    Neck:  Supple.    Respiratory:  Respirations are non-labored.    Cardiovascular:  Normal rate.    Integumentary:  left heel callous with bubble-appearing inclusion.    Neurologic:  Alert, Oriented.    Cognition and Speech:  Oriented, Speech clear and coherent.    Psychiatric:  Appropriate mood & affect.       Review / Management   Results review      Impression and Plan   Diagnosis     HTN - Hypertension (ZDB05-DE I10).     H/O kidney transplant (UPM19-GB Z94.0).     Erectile Dysfunction (JQI63-YD N52.9).     IgA Nephropathy (CZS89-XI N05.9).     Gout (SID26-KT M10.9).           .) viral wart on left heel  Procedure: shaving and cryotherapy of wart  Performer: Dr. Lei  Indications: pain  Anesthesia: none  EBL: none  Using 11 blade scalpal, pared down wart to base where some capillaries visible with mild bleeding.  Used liquid nitrogen at exposed base for 3 freeze-thaw cycles      plan as previously outlined:     .) gout; improved   - on allopurinol 300mg BID, probenecid 500mg BID   - colchicine 0.6mg daily prn use   - flares generally responsive to prednisone therapy    .) HtN, controlled  current antihypertensive regimen:   metoprolol 75mg BID, clonidine 0.1mg BID, losartan 25mg   regimen changes: none   intolerance:  future titration/work-up plan:     .) sleep disorder; suspected narcolepsy based on previous evaluation by Dr. Bloom   - advised to follow-up with JSEGUNDO for further assessment    .) ESRD, h/o LUKT in '08 at [facility] (previously on HD for ~3 months), baseline SCr ~1.8.  Original disease was IgA nephropathy  IS: cyclosporine 100mg BID, MMF 1g BID   - does have baseline 0.5-1g/g proteinuria   - higher suspicions for recurrent IgA.  Wouldn't pursue allograft biopsy at this point unless significant allograft functional decline   - on ASA 81mg daily     RTC as previously scheduled

## 2022-02-16 NOTE — TELEPHONE ENCOUNTER
---------------------  From: Minal Srivastava (Phone Messages [employer] ([id number]))   To: Chico Bloom MD;     Sent: 4/23/2020 3:02:32 PM CDT  Subject: FMLA     Phone Message    PCP: JAE    Time of Call: 5228    Phone Number: [phone]    Note: Patient called stating that there is going to be additional paperwork being faxed here. Patient states the ppw goes along with his FMLA papers that JDL had filled out. Patient just calling as an FYI. Please advise.---------------------  From: Chico Bloom MD   To: Phone [employer] ([id number]);     Sent: 4/23/2020 3:33:34 PM CDT  Subject: RE: FMLA     completedPer HI, these were just faxed. Patient notified.

## 2022-02-16 NOTE — TELEPHONE ENCOUNTER
---------------------  From: Adele Arias RN (Phone Messages Pool ([address]))   To: [facility] Message Pool ([id number]);     Sent: 5/22/2019 5:59:52 PM CDT  Subject: Phone Message     Phone Message    PCP:   JAE listed - called for [facility]      Time of Call:  1730       Person Calling:  Maliha - Pharmacist w/[facility]  Phone number:  [phone]    Returned call at: [phone]    Note:   Maliha called stating that the patient has a prescription for Cyclosporine 100mg and Cyclosporine 25mg. She states there is a strong interaction between the Cyclosporine and the Pravastatin 40mg that the patient is also taking. Maliha is wanting to clarify that [facility] is ok with the patient taking these two medications together. Please Advise.    Reference Number: [id number]    Last office visit and reason:  3-28-19 med refills w/JDL---------------------  From: Rosetta Neal CMA ([facility] Message Pool ([address]))   To: Chico Bloom MD;     Sent: 5/23/2019 8:20:54 AM CDT  Subject: FW: Phone Message---------------------  From: Chico Bloom MD   To: [facility] Message Pool ([id number]);     Sent: 5/23/2019 10:25:14 AM CDT  Subject: RE: Phone Message     Needs to continue bothCalled and spoke with pharmacist Abimael, gave directive and he verbalized a good understanding.        ALFONSO Neal CMA

## 2022-02-16 NOTE — NURSING NOTE
Comprehensive Intake Entered On:  3/28/2019 6:12 PM CDT    Performed On:  3/28/2019 6:10 PM CDT by Jovita Bruno MA               Summary   Chief Complaint :   med refills   Weight Measured :   275 lb(Converted to: 275 lb 0 oz, 124.74 kg)    Height Measured :   71.25 in(Converted to: 5 ft 11 in, 180.97 cm)    Body Mass Index :   38.08 kg/m2 (HI)    Body Surface Area :   2.5 m2   Systolic Blood Pressure :   158 mmHg (HI)    Diastolic Blood Pressure :   96 mmHg (HI)    Mean Arterial Pressure :   117 mmHg   Peripheral Pulse Rate :   74 bpm   BP Site :   Right arm   Jovita Bruno MA - 3/28/2019 6:10 PM CDT   Health Status   Allergies Verified? :   Yes   Medication History Verified? :   Yes   Tobacco Use? :   Never smoker   Jovita Bruno MA - 3/28/2019 6:10 PM CDT   Meds / Allergies   (As Of: 3/28/2019 6:12:53 PM CDT)   Allergies (Active)   morphine derivatives  Estimated Onset Date:   Unspecified ; Reactions:   Headache.  Nausea. ; Comments:     Comment 1: This is noted on records from the [facility] from September of 2008.   ; Created By:   Joi Fraser; Reaction Status:   Active ; Category:   Drug ; Substance:   morphine derivatives ; Type:   Allergy ; Updated By:   Joi Fraser; Reviewed Date:   5/22/2018 10:34 AM CDT        Medication List   (As Of: 3/28/2019 6:12:53 PM CDT)   Prescription/Discharge Order    allopurinol  :   allopurinol ; Status:   Prescribed ; Ordered As Mnemonic:   allopurinol 300 mg oral tablet ; Simple Display Line:   See Instructions, TAKE 1 TABLET BY MOUTH  TWICE A DAY WITH FOOD,  MAINTAIN URINE OUTPUT  GREATER THAN 2 LITERS PER  DAY., 28 tab(s), 0 Refill(s) ; Ordering Provider:   Dave Lei MD; Catalog Code:   allopurinol ; Order Dt/Tm:   3/25/2019 8:38:53 AM          cloNIDine 0.1 mg oral tablet  :   cloNIDine 0.1 mg oral tablet ; Status:   Prescribed ; Ordered As Mnemonic:   cloNIDine 0.1 mg oral tablet ; Simple Display Line:   1 tab(s), Oral, bid, 180 tab(s), 3 Refill(s) ; Ordering  Provider:   Chico Bloom MD; Catalog Code:   cloNIDine ; Order Dt/Tm:   3/26/2019 9:09:49 AM          cycloSPORINE  :   cycloSPORINE ; Status:   Prescribed ; Ordered As Mnemonic:   cycloSPORINE 100 mg oral capsule ; Simple Display Line:   100 mg, 1 cap(s), po, q12 hrs, 180 cap(s), 0 Refill(s) ; Ordering Provider:   Dave Lei MD; Catalog Code:   cycloSPORINE ; Order Dt/Tm:   3/10/2017 2:46:38 PM          cycloSPORINE  :   cycloSPORINE ; Status:   Prescribed ; Ordered As Mnemonic:   cycloSPORINE 25 mg oral capsule ; Simple Display Line:   25 mg, 1 cap(s), po, q12 hrs, 180 cap(s), 1 Refill(s) ; Ordering Provider:   Dave Lei MD; Catalog Code:   cycloSPORINE ; Order Dt/Tm:   1/24/2018 5:28:20 PM          furosemide 20 mg oral tablet  :   furosemide 20 mg oral tablet ; Status:   Prescribed ; Ordered As Mnemonic:   furosemide 20 mg oral tablet ; Simple Display Line:   See Instructions, Take 1 tablet by mouth  daily as needed for edema, 90 tab(s) ; Ordering Provider:   Dave Lei MD; Catalog Code:   furosemide ; Order Dt/Tm:   7/10/2017 10:26:36 AM          losartan  :   losartan ; Status:   Prescribed ; Ordered As Mnemonic:   losartan 50 mg oral tablet ; Simple Display Line:   1 tab(s), Oral, daily, 30 tab(s), 0 Refill(s) ; Ordering Provider:   Chico Bloom MD; Catalog Code:   losartan ; Order Dt/Tm:   12/6/2018 8:25:09 AM          methylphenidate  :   methylphenidate ; Status:   Prescribed ; Ordered As Mnemonic:   Ritalin 5 mg oral tablet ; Simple Display Line:   See Instructions, 1-2 tab(s) PO BID   last dose before 6 PM, 120 EA, 0 Refill(s) ; Ordering Provider:   Chico Bloom MD; Catalog Code:   methylphenidate ; Order Dt/Tm:   10/29/2018 2:56:20 PM          methylphenidate  :   methylphenidate ; Status:   Prescribed ; Ordered As Mnemonic:   Ritalin 5 mg oral tablet ; Simple Display Line:   See Instructions, 1-2 tab(s) PO BID  last dose before 6 PM  Fill on 11/28/18, 120 EA, 0 Refill(s) ; Ordering  Provider:   Chico Bloom MD; Catalog Code:   methylphenidate ; Order Dt/Tm:   10/29/2018 2:56:12 PM          metoprolol  :   metoprolol ; Status:   Prescribed ; Ordered As Mnemonic:   Metoprolol Tartrate 25 mg oral tablet ; Simple Display Line:   3 tab(s), Oral, bid, 42 tab(s), 0 Refill(s) ; Ordering Provider:   Dave Lei MD; Catalog Code:   metoprolol ; Order Dt/Tm:   3/25/2019 8:39:41 AM          Miscellaneous Rx Supply  :   Miscellaneous Rx Supply ; Status:   Prescribed ; Ordered As Mnemonic:   Replacment CPAP +9 cm H2o ; Simple Display Line:   See Instructions, Heated humidifier, heated tubing, filters, nasal or full face mask of choice with headgear.  Replacement cushions and supplies as needed.  Change supplies every 6 mos  Months = 99 (Lifetime), 1 EA, 11 Refill(s) ; Ordering Provider:   Chico Bloom MD; Catalog Code:   Miscellaneous Rx Supply ; Order Dt/Tm:   11/28/2017 10:32:21 AM          mycophenolate mofetil  :   mycophenolate mofetil ; Status:   Prescribed ; Ordered As Mnemonic:   CellCept 250 mg oral capsule ; Simple Display Line:   1,000 mg, 4 cap(s), PO, BID, for 30 day(s), 240 cap(s), 3 Refill(s) ; Ordering Provider:   Dave Lei MD; Catalog Code:   mycophenolate mofetil ; Order Dt/Tm:   9/27/2016 7:07:12 PM          pravastatin  :   pravastatin ; Status:   Prescribed ; Ordered As Mnemonic:   pravastatin 40 mg oral tablet ; Simple Display Line:   1 tab(s), Oral, qhs, 30 tab(s), 0 Refill(s) ; Ordering Provider:   Dave Lei MD; Catalog Code:   pravastatin ; Order Dt/Tm:   3/25/2019 8:40:28 AM          predniSONE  :   predniSONE ; Status:   Prescribed ; Ordered As Mnemonic:   predniSONE 20 mg oral tablet ; Simple Display Line:   See Instructions, 2 tab(s) Oral daily for 3-5 days prn, 30 EA, 1 Refill(s) ; Ordering Provider:   Chico Bloom MD; Catalog Code:   predniSONE ; Order Dt/Tm:   10/29/2018 2:57:56 PM          probenecid  :   probenecid ; Status:   Prescribed ; Ordered As Mnemonic:    probenecid 500 mg oral tablet ; Simple Display Line:   500 mg, 1 tab(s), Oral, bid, 60 tab(s), 0 Refill(s) ; Ordering Provider:   Dave Lei MD; Catalog Code:   probenecid ; Order Dt/Tm:   8/1/2018 3:26:16 PM          tadalafil  :   tadalafil ; Status:   Prescribed ; Ordered As Mnemonic:   Cialis 10 mg oral tablet ; Simple Display Line:   10 mg, 1 tab(s), po, prn, take 1 hour prior to sexual activity as needed, 30 tab(s), 11 Refill(s) ; Ordering Provider:   Dave Lei MD; Catalog Code:   tadalafil ; Order Dt/Tm:   5/16/2017 8:46:42 AM            Home Meds    aspirin  :   aspirin ; Status:   Documented ; Ordered As Mnemonic:   aspirin 81 mg oral tablet ; Simple Display Line:   81 mg, 1 tab(s), PO, Daily, 30 tab(s) ; Catalog Code:   aspirin ; Order Dt/Tm:   2/1/2013 4:05:48 PM          sulfamethoxazole-trimethoprim  :   sulfamethoxazole-trimethoprim ; Status:   Documented ; Ordered As Mnemonic:   Bactrim ; Simple Display Line:   See Instructions, One on MWF ; Catalog Code:   sulfamethoxazole-trimethoprim ; Order Dt/Tm:   2/2/2010 11:12:29 AM            Social History   Social History   (As Of: 3/28/2019 6:12:53 PM CDT)   Alcohol:        Current, 1-2 times per week, 4 drinks/episode average.   (Last Updated: 6/17/2014 9:34:22 AM CDT by Amilcar Freeman CMA)          Tobacco:        Never   (Last Updated: 6/13/2014 4:16:30 PM CDT by Joi Dutton CMA)          Substance Abuse:        Never   (Last Updated: 6/13/2014 5:27:00 PM CDT by Joi Dutton CMA)          Employment and Education:        Employed, Work/School description: Manager, [employer].   (Last Updated: 2/7/2013 8:46:28 AM CST by Joi Fraser)          Home and Environment:        Marital status: .  Spouse/Partner name: Nataly.   (Last Updated: 5/16/2017 10:45:12 AM CDT by Jones , Gertrudis)          Nutrition and Health:        Type of diet: Regular.   (Last Updated: 5/16/2017 10:45:17 AM CDT by Gertrudis Jones)          Exercise and Physical Activity:         Exercise frequency: 3-4 times/week.  Exercise type: cardio.   (Last Updated: 5/16/2017 10:45:29 AM CDT by Gertrudis Jones)          Sexual:        Sexually active: Yes.  Sexual orientation: Heterosexual.  Uses condoms: Yes.   (Last Updated: 5/16/2017 10:45:38 AM CDT by Gertrudis Jones)

## 2022-02-16 NOTE — TELEPHONE ENCOUNTER
---------------------  From: Joi Dutton CMA   Sent: 9/15/2021 1:07:51 PM CDT  Subject: General Message-cyclosporine     PA initiated through covermymeds for cyclosporinesee other message - due for labsalso would be recommended he get his booster Covid vaccine

## 2022-02-16 NOTE — TELEPHONE ENCOUNTER
Entered by Bhavya Mroales CMA on March 25, 2019 8:40:54 AM CDT  ---------------------  From: Bhavya Morales CMA   To: [facility]    Sent: 3/25/2019 8:40:54 AM CDT  Subject: Medication Management     ** Submitted: **  Order:pravastatin (pravastatin 40 mg oral tablet)  1 tab(s)  Oral  qhs  Qty:  30 tab(s)        Refills:  0          Substitutions Allowed     Route To Pharmacy - [facility]    Signed by Bhavya Morales CMA  3/25/2019 8:40:00 AM    ** Submitted: **  Complete:pravastatin (pravastatin 40 mg oral tablet)   Signed by Bhavya Morales CMA  3/25/2019 8:40:00 AM    ** Not Approved:  **  pravastatin (PRAVASTATIN SOD 40MG TABLET)  TAKE 1 TABLET BY MOUTH AT  BEDTIME  Qty:  90 tab(s)        Days Supply:  90        Refills:  0          Substitutions Allowed     Route To Pharmacy - [facility]   Signed by Bhavya Morales CMA            ** Submitted: **  Order:metoprolol (Metoprolol Tartrate 25 mg oral tablet)  3 tab(s)  Oral  bid  Qty:  42 tab(s)        Refills:  0          Substitutions Allowed     Route To Pharmacy - [facility]    Signed by Bhavya Morales CMA  3/25/2019 8:39:00 AM    ** Submitted: **  Complete:metoprolol (Metoprolol Tartrate 25 mg oral tablet)   Signed by Bhavya Morales CMA  3/25/2019 8:40:00 AM    ** Not Approved:  **  metoprolol (METOPROLOL TARTRATE  25MG  TAB)  TAKE 3 TABLETS BY MOUTH TWO TIMES DAILY  Qty:  540 tab(s)        Days Supply:  90        Refills:  0          Substitutions Allowed     Route To Pharmacy - [facility]   Signed by Bhavya Morales CMA            ** Submitted: **  Order:allopurinol (allopurinol 300 mg oral tablet)  See Instructions  TAKE 1 TABLET BY MOUTH  TWICE A DAY WITH FOOD,  MAINTAIN URINE OUTPUT  GREATER THAN 2 LITERS PER  DAY.  Qty:  28 tab(s)        Refills:  0          Substitutions Allowed     Route To Pharmacy - [facility]    Signed by Bhavya Morales CMA  3/25/2019 8:38:00 AM    ** Submitted: **  Complete:allopurinol  (allopurinol 300 mg oral tablet)   Signed by Bhavya Morales CMA  3/25/2019 8:39:00 AM    ** Submitted: **  Complete:allopurinol (allopurinol 300 mg oral tablet)   Signed by Bhavya Morales CMA  3/25/2019 8:39:00 AM    ** Not Approved:  **  allopurinol (ALLOPURINOL  300MG  TAB)  TAKE 1 TABLET BY MOUTH  TWICE A DAY WITH FOOD,  MAINTAIN URINE OUTPUT  GREATER THAN 2 LITERS PER  DAY.  Qty:  180 tab(s)        Days Supply:  90        Refills:  0          Substitutions Allowed     Route To Pharmacy - [facility]   Signed by Bhavya Morales CMA            ** Patient matched by Bhavya Morales CMA on 3/25/2019 8:34:06 AM CDT **      ------------------------------------------  From: [facility]  To: Dave Lei MD  Sent: March 23, 2019 4:33:56 AM CDT  Subject: Medication Management  Due: March 24, 2019 4:33:56 AM CDT    ** On Hold Pending Signature **  Drug: metoprolol (Metoprolol Tartrate 25 mg oral tablet)  TAKE 3 TABLETS BY MOUTH TWO TIMES DAILY  Quantity: 540 tab(s)    Days Supply: 90        Refills: 0  Substitutions Allowed  Notes from Pharmacy: - First Attempt Ref: [id number]    Dispensed Drug: metoprolol (Metoprolol Tartrate 25 mg oral tablet)  TAKE 3 TABLETS BY MOUTH TWO TIMES DAILY  Quantity: 540 tab(s)    Days Supply: 90        Refills: 0  Substitutions Allowed  Notes from Pharmacy:     ** On Hold Pending Signature **  Drug: pravastatin (pravastatin 40 mg oral tablet)  TAKE 1 TABLET BY MOUTH AT  BEDTIME  Quantity: 90 tab(s)     Days Supply: 90        Refills: 0  Substitutions Allowed  Notes from Pharmacy: - First Attempt Ref: [id number]    Dispensed Drug: pravastatin (pravastatin 40 mg oral tablet)  TAKE 1 TABLET BY MOUTH AT  BEDTIME  Quantity: 90 tab(s)     Days Supply: 90        Refills: 0  Substitutions Allowed  Notes from Pharmacy:     ** On Hold Pending Signature **  Drug: allopurinol (allopurinol 300 mg oral tablet)  TAKE 1 TABLET BY MOUTH  TWICE A DAY WITH FOOD,  MAINTAIN URINE OUTPUT  GREATER THAN 2  LITERS PER  DAY.  Quantity: 180 tab(s)    Days Supply: 90        Refills: 0  Substitutions Allowed  Notes from Pharmacy: - First Attempt Ref: [id number]    Dispensed Drug: allopurinol (allopurinol 300 mg oral tablet)  TAKE 1 TABLET BY MOUTH  TWICE A DAY WITH FOOD,  MAINTAIN URINE OUTPUT  GREATER THAN 2 LITERS PER  DAY.  Quantity: 180 tab(s)    Days Supply: 90        Refills: 0  Substitutions Allowed  Notes from Pharmacy:   ------------------------------------------Date of last office visit and reason:  10/29/18      Date of last Med Check / Px:   _  Date of last labs pertaining to med:  2/4/19    RTC order in chart:  yes    For Protocol refill, has patient been contacted:  patient overdue for med f/u. Spoke to patient at 0840. 2 week supply sent, patient transferred to scheduling.

## 2022-02-16 NOTE — PROGRESS NOTES
Patient:   HAKEEM TEJEDA            MRN: [id number]            FIN: [id number]               Age:   51 years     Sex:  Male     :  1970   Associated Diagnoses:   HTN - Hypertension; H/O kidney transplant; Erectile Dysfunction; IgA Nephropathy; Gout; Sleep apnea syndrome   Author:   Dave Lei MD      Visit Information      Date of Service: 2021 02:40 pm  Performing Location: [facility]  Encounter#: [id number]      Primary Care Provider (PCP):  Dave Lei MD    NPI# [id number]      Referring Provider:  Dave Lei MD    NPI# [id number]      Chief Complaint   2021 2:53 PM CST    1.  check lesion on chest - noticed 1 1/2 wks ago, bleeding, increase in size              Additional Information:No additional information recorded during visit.   Chief complaint and symptoms as noted above and confirmed with patient.  Recent lab and diagnostic studies reviewed with patient      History of Present Illness   2014: Presents to clinic for routine health examination. He has a history of living unrelated kidney transplant performed in , wife serving as his donor and doing well. His underlying kidney history is IgA nephropathy and also follows with Dr. Rene for general kidney cares, potentially interested in transferring his care more locally. Main complaints today related to worsening libido over time. He has a history of erectile dysfunction, fairly responsive to vardenafil therapy.  He s not noticed any change in testes size. He doesn t undergo screening posttransplant labs on a regular basis. Baseline creatinine is 1.8. He does not see a dermatologist on a regular basis. He does not check his blood pressure at home on a regular frequency.     21: Hakeem is noticed an exophytic growth protruding from his chest over the past week and a half.  Initially thought this was more of a pimple lesion though it did bled considerably after disrupting it.  Since that time it has expanded in  size and continues to bleed intermittently.  Primarily given his immunosuppression he was worried this could reflects a more concerning skin lesion.  He does have a scheduled appointment with a dermatologist this coming week         Review of Systems   Constitutional:  No fever, No chills, No fatigue.    Eye:  Negative except as documented in history of present illness.    Ear/Nose/Mouth/Throat:  Negative except as documented in history of present illness.    Respiratory:  No shortness of breath.    Cardiovascular:  No chest pain, No palpitations, No peripheral edema, No syncope.    Gastrointestinal:  No nausea, No vomiting, No diarrhea, No abdominal pain.    Genitourinary:  No dysuria, No hematuria, No change in urine stream.    Hematology/Lymphatics:  Negative except as documented in history of present illness.    Endocrine:  No excessive thirst, No polyuria.    Immunologic:  No recurrent fevers.    Musculoskeletal:  No joint pain, No muscle pain, No decreased range of motion.    Integumentary:  Skin lesion.    Neurologic:  Alert and oriented X4, No numbness, No tingling, No headache.       Health Status   Allergies:    Allergic Reactions (Selected)  Severity Not Documented  Morphine derivatives (Headache.  nausea.)   Medications:  (Selected)   Prescriptions  Prescribed  Bactrim 400 mg-80 mg oral tablet: See Instructions, Instructions: One on MWF, # 50 EA, 3 Refill(s), Type: Maintenance, Pharmacy: [facility], One on MWF  CellCept 250 mg oral capsule: = 4 cap(s) ( 1,000 mg ), PO, BID, Instructions: Fill generic only, # 240 cap(s), 5 Refill(s), OMAR, Type: Maintenance, Pharmacy: [facility], 4 cap(s) Oral bid,Instr:Fill generic only, 71.5, in, 04/28/20 15:00:00 CDT, Height Measured, 274, lb,...  Cialis 10 mg oral tablet: = 1 tab(s) ( 10 mg ), po, prn, Instructions: take 1 hour prior to sexual activity as needed, # 30 tab(s), 3 Refill(s), Type: Maintenance, Pharmacy: [facility], 1 tab(s)  Oral prn,Instr:take 1 hour prior to sexual activity as needed  Metoprolol Tartrate 25 mg oral tablet: = 3 tab(s), Oral, bid, # 540 tab(s), 1 Refill(s), Type: Maintenance, Pharmacy: [facility]  Replacment CPAP +9 cm H2o: Replacment CPAP +9 cm H2o, See Instructions, Instructions: Heated humidifier, heated tubing, filters, nasal or full face mask of choice with headgear.  Replacement cushions and supplies as needed.  Change supplies every 6 mos  Months = 99 (Lifetime),...  Ritalin 5 mg oral tablet: See Instructions, Instructions: 2 tab(s) PO BID  last dose before 6 PM  Fill on 4/25/19, # 120 EA, 0 Refill(s), Type: Maintenance, Pharmacy: [facility], 2 tab(s) PO BID; last dose before 6 PM; Fill on 4/25/19  Ritalin 5 mg oral tablet: See Instructions, Instructions: 2 tab(s) PO BID   last dose before 6 PM, # 90 EA, 0 Refill(s), Type: Maintenance, Pharmacy: [facility], 2 tab(s) PO BID ; last dose before 6 PM  allopurinol 300 mg oral tablet: = 1 tab(s), Oral, bidwm, Instructions: MAINTAIN URINE OUTPUT  GREATER THAN 2 LITERS PER  DAY., # 60 tab(s), 3 Refill(s), Type: Maintenance, [facility]: [facility], 1 tab(s) Oral bidwm,Instr:MAINTAIN URINE OUTPUT  GREATER THAN 2 LITERS PER  DAY...  cloNIDine 0.2 mg oral tablet: = 0.5 tab(s) ( 0.1 mg ), Oral, bid, # 180 tab(s), 3 Refill(s), Type: Maintenance, Pharmacy: [id number]  cycloSPORINE 100 mg oral capsule: = 1 cap(s) ( 100 mg ), po, q12 hrs, # 180 cap(s), 0 Refill(s), Type: Maintenance, Pharm[facility]: [facility], 1 cap(s) Oral q12 hrs  cycloSPORINE 25 mg oral capsule: = 1 cap(s) ( 25 mg ), po, q12 hrs, # 180 cap(s), 0 Refill(s), Type: Maintenance, Pharmacy: [facility], 1 cap(s) Oral q12 hrs  furosemide 20 mg oral tablet: See Instructions, Instructions: Take 1 tablet by mouth  daily as needed for edema, # 90 tab(s), 3 Refill(s), Type: Soft Stop, Pharmacy: [facility], Take 1 tablet by mouth  daily as needed for  edema  losartan 50 mg oral tablet: = 1 tab(s), Oral, daily, # 90 tab(s), 3 Refill(s), Type: Maintenance, [facility]: [facility], Pt is due appt, 1 tab(s) Oral daily  pravastatin 40 mg oral tablet: = 1 tab(s), Oral, qhs, # 90 tab(s), 0 Refill(s), Type: Maintenance, Pharmacy: [facility], Pt now due for 6 month f/u per [facility] for further refills., 71.5, in, 04/28/20 15:00:00 CDT, Height Measured, 274, lb, 11/06/19 13:20:00 CST, Weight Measured...  Documented Medications  Documented  aspirin 81 mg oral tablet: 1 tab(s) ( 81 mg ), PO, Daily, # 30 tab(s), 0 Refill(s), Type: Maintenance,    Medications          *denotes recorded medication          Replacment CPAP +9 cm H2o: See Instructions, Heated humidifier, heated tubing, filters, nasal or full face mask of choice with headgear.  Replacement cushions and supplies as needed.  Change supplies every 6 mos  Months = 99 (Lifetime), 1 EA, 11 Refill(s).          allopurinol 300 mg oral tablet: 1 tab(s), Oral, bidwm, MAINTAIN URINE OUTPUT  GREATER THAN 2 LITERS PER  DAY., 60 tab(s), 3 Refill(s).          *aspirin 81 mg oral tablet: 81 mg, 1 tab(s), PO, Daily, 30 tab(s).          cloNIDine 0.2 mg oral tablet: 0.1 mg, 0.5 tab(s), Oral, bid, 180 tab(s), 3 Refill(s).          cycloSPORINE 100 mg oral capsule: 100 mg, 1 cap(s), po, q12 hrs, 180 cap(s), 0 Refill(s).          cycloSPORINE 25 mg oral capsule: 25 mg, 1 cap(s), po, q12 hrs, 180 cap(s), 0 Refill(s).          furosemide 20 mg oral tablet: See Instructions, Take 1 tablet by mouth  daily as needed for edema, 90 tab(s), 3 Refill(s).          losartan 50 mg oral tablet: 1 tab(s), Oral, daily, 90 tab(s), 3 Refill(s).          Ritalin 5 mg oral tablet: See Instructions, 2 tab(s) PO BID  last dose before 6 PM  Fill on 4/25/19, 120 EA, 0 Refill(s).          Ritalin 5 mg oral tablet: See Instructions, 2 tab(s) PO BID   last dose before 6 PM, 90 EA, 0 Refill(s).          Metoprolol Tartrate 25 mg oral tablet: 3  tab(s), Oral, bid, 540 tab(s), 1 Refill(s).          CellCept 250 mg oral capsule: 1,000 mg, 4 cap(s), PO, BID, Fill generic only, 240 cap(s), 5 Refill(s).          pravastatin 40 mg oral tablet: 1 tab(s), Oral, qhs, 90 tab(s), 0 Refill(s).          Bactrim 400 mg-80 mg oral tablet: See Instructions, One on MWF, 50 EA, 3 Refill(s).          Cialis 10 mg oral tablet: 10 mg, 1 tab(s), po, prn, take 1 hour prior to sexual activity as needed, 30 tab(s), 3 Refill(s).       Problem list:    All Problems  Drug-induced gynecomastia / SNOMED CT 162875303 / Confirmed  Dyslipidemia / SNOMED CT 7653974540 / Confirmed  Lower extremity edema / SNOMED CT 859886696 / Confirmed  GERD (gastroesophageal reflux disease) / SNOMED CT 5217217942 / Confirmed  Acute gout / SNOMED CT 599112207 / Confirmed  H/O kidney transplant / SNOMED CT 7485768112 / Confirmed  Hypertension with goal blood pressure less than 130/80 / SNOMED CT 1526113998 / Confirmed  IgA nephropathy / SNOMED CT 9911408224 / Confirmed  Nephrolithiasis / SNOMED CT 652766222 / Confirmed  Narcolepsy / SNOMED CT 590455238 / Confirmed  Obese / SNOMED CT 0014092319 / Probable  ED (erectile dysfunction) / SNOMED CT 0408471439 / Confirmed  Overweight / SNOMED CT 256037135 / Confirmed  Medication monitoring encounter / SNOMED CT 361083741 / Confirmed  Sleep apnea syndrome / SNOMED CT 110304266 / Confirmed  Plantar wart, left foot / SNOMED CT 229132991 / Confirmed  Inactive: Excessive sleepiness / SNOMED CT 8122801275  Resolved: Abnormal stress test / SNOMED CT 1747006269  Resolved: History of shingles / SNOMED CT 6790940865  Canceled: Erectile Dysfunction / ICD-9-.84  Canceled: HTN (hypertension) / SNOMED CT 0364110030      Histories   Past Medical History:    Active  Sleep apnea syndrome (742130958): Onset on 10/7/2010 at 40 years.  Comments:  10/14/2010 CDT 4:06 PM CDT - Chico Bloom MD  AHI 6.6 REM AHI 34 RERA 6.8 O2 sat alok 84%  Drug-induced gynecomastia (686077655):  Onset in the month of 9/2009 at 39 years  Comments:  2/7/2013 CST 8:54 AM Joi Mariee  Cyclosporin.   Consult with Dr. Quique Freeman on 09/03/2009.  Plan:  Speak with nephrologist about altering med.  Lower extremity edema (921111634): Onset in 2008 at 38 years.  Comments:  2/7/2013 CST 8:57 AM Joi Mariee  Chronic.  Needs compression and elevation.    2/7/2013 CST 9:09 AM Joi Mariee  [facility] rule out DVT for bilateral lower extremity swelling.  Admited:  10/10/2008.    2/7/2013 CST 9:10 AM MEJIA Fraser  Joi  Discharged from [facility] on 10/13/2008.  Nephrolithiasis (908343292)  IgA nephropathy (1245350530)  H/O kidney transplant (5338180954)  Dyslipidemia (6505249975)  ED (erectile dysfunction) (7385579380)  Obese (4157095859)  Overweight (710118594)  GERD (gastroesophageal reflux disease) (0822788231)  Resolved  Abnormal stress test (8960649846): Onset in 2008 at 38 years.  Resolved.  History of shingles (1807377910):  Resolved.   Family History:    Cataract  Brother (Abimael)  Heart disease  Father  Comments:  2/7/2013 9:22 AM Joi Mariee  Bypass grafting performed.    2/7/2013 8:45 AM Joi Mariee  CAD at 50 years of age.  CA - Cancer of colon  Father     Procedure history:    Kidney transplant (160062917) in 2008 at 38 Years.   Social History:        Electronic Cigarette/Vaping Assessment            Electronic Cigarette Use: Never.      Alcohol Assessment            Current, 1-2 times per week, 4 drinks/episode average.      Tobacco Assessment            Never (less than 100 in lifetime)      Substance Abuse Assessment            Never      Employment and Education Assessment            Employed, Work/School description: Manager, [employer].      Home and Environment Assessment            Marital status: .  Spouse/Partner name: Nataly.      Nutrition and Health Assessment            Type of diet: Regular.      Exercise and Physical Activity  Assessment            Exercise frequency: 3-4 times/week.  Exercise type: cardio.      Sexual Assessment            Sexually active: Yes.  Sexual orientation: Heterosexual.  Uses condoms: Yes.        Physical Examination   vital signs stable, as noted above   Vital Signs   1/29/2021 3:32 PM CST Systolic Blood Pressure 156 mmHg  HI    Diastolic Blood Pressure 88 mmHg  HI    Mean Arterial Pressure 111 mmHg   1/29/2021 2:53 PM CST Temperature Tympanic 97.1 DegF  LOW    Peripheral Pulse Rate 100 bpm    Systolic Blood Pressure 158 mmHg  HI    Diastolic Blood Pressure 82 mmHg  HI    Mean Arterial Pressure 107 mmHg      Measurements from flowsheet : Measurements   1/29/2021 2:53 PM CST    Weight Measured - Standard                282 lb     General:  Alert and oriented, No acute distress.    Respiratory:  Respirations are non-labored.    Cardiovascular:  Normal rate.    Integumentary:  exophytic reddened nodule ~1cm extending from midline of chest.    Neurologic:  Alert, Oriented.    Cognition and Speech:  Oriented, Speech clear and coherent.    Psychiatric:  Appropriate mood & affect.       Review / Management   Results review      Impression and Plan   Diagnosis     HTN - Hypertension (CVY93-TO I10).     H/O kidney transplant (QVZ96-UT Z94.0).     Erectile Dysfunction (VER33-ZJ N52.9).     IgA Nephropathy (HKD18-MU N05.9).     Gout (KVM16-PY M10.9).     Sleep apnea syndrome (OZP15-RH G47.30).           .) sub-acute formation of skin lesion, looking most c/w pyogenic granuloma  - already scheduled for dermatology appt this coming week  - general reassurance given - suspect benign pathology, though encouraged removal given these typically bleed frequently and given his immunosuppressed status    plan as previously outlined and not discussed at today's visit     .) gout; controlled   - on allopurinol 300mg BID, probenecid 500mg BID   - colchicine 0.6mg daily prn use   - last uric acid 4.8 ('19)   - flares generally  responsive to prednisone therapy    .) HtN, uncontrolled  current antihypertensive regimen:  metoprolol 75mg BID, clonidine 0.2mg BID, losartan 50mg   regimen changes: none  intolerance:  future titration/work-up plan:     .) ESRD, h/o LUKT in '08 at [facility] (previously on HD for ~3 months), baseline SCr ~1.8.  Original disease was IgA nephropathy  IS: cyclosporine 100mg BID, MMF 1g BID   - does have baseline 0.5-1g/g proteinuria   - higher suspicions for recurrent IgA.  Wouldn't pursue allograft biopsy at this point unless significant allograft functional decline   - on ASA 81mg daily    .) sleep disorder; RAUL with suspected narcolepsy based on previous evaluation by Dr. Bloom   - on Ritalin   - on CPAP    .) health maintenance  - arrange for colonoscopy now (father diagnosed with CRC)   - ASA 3: needing ECG  - adult vaccinations updated     RTC q6 months

## 2022-02-16 NOTE — PROGRESS NOTES
Chief Complaint    Verbal consent given for telephone visit. Pt is wanting his work note extended as FRANC wrote him a note 3 weeks ago and it expires tomorrow. Patient wondering what the recommendation is for going back to work during COVID as he is a kidney transplant pt.  History of Present Illness      Today's visit was conducted via telephone due to the COVID-19 pandemic.  Patient's consent to telephone visit was obtained and documented.      Call Start Time:  1050      Call End Time:   1111      Hakeem is a 50-year-old with a history of renal transplant, sleep apnea, and narcolepsy.  He has been working at home for the last 3 weeks due to his immune suppressed status during the coronavirus outbreak.  He has been well.  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  Review of Systems      No fever, chills, cough, headache, myalgia.  Physical Exam   Vitals & Measurements    HT: 71.5 in   Assessment/Plan       H/O kidney transplant (Z94.0)         Immunosuppressed and at very high risk for poor outcome with coronavirus infection.  I wrote a letter for him to continue working at home till May 1 at which time we will reevaluate.  We will look for guidance as far as immunosuppression and Coban risk goes.         Ordered:          29213 physician telephone evaluation 11-20 min (Charge), Quantity: 1, H/O kidney transplant  Narcolepsy  Sleep apnea syndrome  Hypertension with goal blood pressure less than 130/80                Hypertension with goal blood pressure less than 130/80 (I10)         Reports good control.         Ordered:          17931 physician telephone evaluation 11-20 min (Charge), Quantity: 1, H/O kidney transplant  Narcolepsy  Sleep apnea syndrome  Hypertension with goal blood pressure less than 130/80                Narcolepsy (G47.419)         Benefiting from therapy         Ordered:          54199 physician telephone evaluation 11-20 min (Charge), Quantity:  1, H/O kidney transplant  Narcolepsy  Sleep apnea syndrome  Hypertension with goal blood pressure less than 130/80                Sleep apnea syndrome (G47.30)         Compliant and benefiting from therapy         Ordered:          67001 physician telephone evaluation 11-20 min (Charge), Quantity: 1, H/O kidney transplant  Narcolepsy  Sleep apnea syndrome  Hypertension with goal blood pressure less than 130/80           Patient Information     Name:CRYSTAL TEJEDA      Address:      [address] [place] [id number]     Sex:Male     YOB: 1970     [phone]     Emergency Contact:SACHIN TEJEDA     MRN:[id number]     FIN:[id number]     Location:[facility]     Date of Service:04/07/2020      Primary Care Physician:       Dave Lei MD, [phone]      Attending Physician:       Chico Bloom MD, [phone]  Problem List/Past Medical History    Ongoing     Acute gout     Drug-induced gynecomastia       Comments: Cyclosporin. Consult with Dr. Quique Freeman on 09/03/2009. Plan: Speak with nephrologist about altering med.     Dyslipidemia     ED (erectile dysfunction)     Excessive sleepiness     GERD (gastroesophageal reflux disease)     H/O kidney transplant     Hypertension with goal blood pressure less than 130/80     IgA nephropathy     Lower extremity edema       Comments: Discharged from [facility] on 10/13/2008. Evaluated at [facility] to rule out DVT for bilateral lower extremity swelling. Admited: 10/10/2008. Chronic. Needs compression and elevation.     Medication monitoring encounter     Narcolepsy     Nephrolithiasis     Obese     Overweight     Plantar wart, left foot     Sleep apnea syndrome       Comments: AHI 6.6 REM AHI 34 RERA 6.8 O2 sat alok 84%    Historical     Abnormal stress test     History of shingles  Medications    allopurinol 300 mg oral tablet, 1 tab(s), Oral, bidwm, 2 refills    aspirin 81 mg oral tablet, 81 mg= 1  tab(s), Oral, daily    Bactrim 400 mg-80 mg oral tablet, See Instructions, 3 refills    CellCept 250 mg oral capsule, 1000 mg= 4 cap(s), Oral, bid    Cialis 10 mg oral tablet, 10 mg= 1 tab(s), Oral, prn, 3 refills    cloNIDine 0.2 mg oral tablet, 0.1 mg= 0.5 tab(s), Oral, bid, 3 refills    cycloSPORINE 100 mg oral capsule, 100 mg= 1 cap(s), Oral, q12 hrs, 3 refills    cycloSPORINE 25 mg oral capsule, 25 mg= 1 cap(s), Oral, q12 hrs, 3 refills    furosemide 20 mg oral tablet, See Instructions, 3 refills    losartan 50 mg oral tablet, 1 tab(s), Oral, daily, 3 refills    Metoprolol Tartrate 25 mg oral tablet, 3 tab(s), Oral, bid, 1 refills    pravastatin 40 mg oral tablet, 1 tab(s), Oral, qhs    Replacment CPAP +9 cm H2o, See Instructions, 11 refills    Ritalin 5 mg oral tablet, See Instructions    Ritalin 5 mg oral tablet, See Instructions  Allergies    morphine derivatives (Headache. 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 Exercise type: cardio., 05/16/2017     Home/Environment      Marital status: . Spouse/Partner name: Nataly., 05/16/2017     Nutrition/Health      Type of diet: Regular., 05/16/2017     Sexual      Sexually active: Yes. Sexual orientation: Heterosexual. Uses condoms: Yes., 05/16/2017     Substance Abuse      Never, 06/13/2014     Tobacco      Never, 06/13/2014  Family History    CA - Cancer of colon: Father.    Cataract: Brother.    Heart disease: Father.    Sister: History is negative

## 2022-02-16 NOTE — CARE COORDINATION
Patient:   HAKEEM TEJEDA            MRN: [id number]            FIN: [id number]               Age:   50 years     Sex:  Male     :  1970   Associated Diagnoses:   None   Author:   Joi Dutton CMA            2020    Hakeem can return to work as of 2020.  He should continue to follow the recommended guidelines of social distancing, personal protection-hand hygiene, sanitizing work areas to reduce any risk of COVID exposure.    Sincerely,            Dave Lei MD  [address] [address]    [phone] (p)  [phone] (f)

## 2022-02-16 NOTE — TELEPHONE ENCOUNTER
---------------------  From: Briana Colunga LPN (Phone Messages Pool ([id number]_[facility]))   To: [facility] Message Pool ([id number]_[place] [facility] [place]);     Sent: 4/8/2020 8:03:26 AM CDT  Subject: CONSUMER MESSAGE FW: Request for reasonable accommodation - Dr De Luna           ---------------------  From: CRYSTAL TEJEDA  To: [facility]  Sent: 04/08/2020 04:48 a.m. CDT  Subject: Request for reasonable accommodation - Dr De Luna  I requested a letter from Dr Chico De Luna  indicating the need for reasonable accommodations regarding the Covid-19 virus.  That was to be sent to my fax number of [phone].  I am wondering if it was sent to the fax number I provided last time, which is different.  I have not recieved it yet.Wrong fax number in chart. Typed new note per recommendations from [facility] note. Faxed to number listed below. Patient notified.     Assessment/Plan   H/O kidney transplant (Z94.0)    Immunosuppressed and at very high risk for poor outcome with coronavirus infection.  I wrote a letter for him to continue working at home till May 1 at which time we will reevaluate.  We will look for guidance as far as immunosuppression and Coban risk goes.  Ordered:  [id number] physician telephone evaluation 11-20 min (Charge), Quantity: 1, H/O kidney transplant  Narcolepsy  Sleep apnea syndrome  Hypertension with goal blood pressure less than 130/80

## 2022-02-16 NOTE — PROGRESS NOTES
Chief Complaint    discuss narcolepsy  History of Present Illness      Hakeem complains of sleepiness severe enough that he avoids driving at times.  He has had no drowsy driving incidents.  His wife notes that he snores to the CPAP sometimes.  He wears CPAP every night.  He has been on 9 cm of water CPAP since 2010 when he had his sleep study and subsequent titration he had an abnormal M SLT as well but I am not sure his sleep apnea have been sufficiently treated prior to that test.  There is an abnormal EEG on his which led to a neurology consultation with Dr. Mata.  Dr. Mata prescribed Nuvigil however the patient did not fill the prescription due to cost.  Review of Systems      No cataplexy, heartburn, headache, chest pain, dyspnea, edema.  Rare nocturia.  Physical Exam   Vitals & Measurements    HR: 60(Peripheral)  BP: 151/98     HT: 71.25 in  WT: 262 lb  BMI: 36.28       Patient appears comfortable.  Alert and oriented.  HEENT exam is a chronic airway.  Neck is thick.  Chest clear.  Cardiac exam regular.  Remedies have no edema.  Cranial nerves normal.  Assessment/Plan       Excessive sleepiness         I would like to be certain that his sleep apnea is well treated and that he is off sedating drugs in particular clonidine prior to treating him for narcolepsy or perhaps repeating the M SLT based on these results.       HTN (hypertension)         Difficult to control.  Will discuss what to substitute for clonidine with his nephrologist Dr. Lei as a first step.       Sleep apnea syndrome         Will interrogate his device to ascertain the degree of compliance and benefit.  May need a repeat sleep study.  Problem List/Past Medical History    Ongoing     Acute gout     Drug-induced gynecomastia     Dyslipidemia     ED (erectile dysfunction)     Excessive sleepiness     GERD (gastroesophageal reflux disease)     H/O kidney transplant     HTN (hypertension)     IgA nephropathy     Lower extremity edema      Nephrolithiasis     Obese     Overweight     Sleep apnea syndrome    Historical     Abnormal stress test     History of shingles  Procedure/Surgical History     Kidney transplant (2008)  Medications    allopurinol 300 mg oral tablet, See Instructions, 1 refills    aspirin 81 mg oral tablet, 81 mg= 1 tab(s), po, daily    Bactrim, See Instructions    CellCept 250 mg oral capsule, 1000 mg= 4 cap(s), po, bid, 3 refills    Cialis 10 mg oral tablet, 10 mg= 1 tab(s), po, prn, 11 refills    cloNIDine 0.1 mg oral tablet, 0.1 mg= 1 tab(s), po, bid, 1 refills    colchicine 0.6 mg oral tablet, 0.6 mg= 1 tab(s), po, daily, PRN, 1 refills    cycloSPORINE 100 mg oral capsule, 100 mg= 1 cap(s), po, q12 hrs    cycloSPORINE 25 mg oral capsule, 25 mg= 1 cap(s), po, q12 hrs    furosemide 20 mg oral tablet, See Instructions    losartan 25 mg oral tablet, 25 mg= 1 tab(s), po, daily, 1 refills    Metoprolol Tartrate 25 mg oral tablet, 75 mg= 3 tab(s), po, bid, 1 refills    pravastatin 40 mg oral tablet, 40 mg= 1 tab(s), po, hs, 1 refills    probenecid 500 mg oral tablet, 500 mg= 1 tab(s), po, bid, 1 refills  Allergies    morphine derivatives (Headache. Nausea.)  Social History    Smoking Status - 09/07/2017     Never smoker     Alcohol - 05/16/2017      Current, 1-2 times per week, 4 drinks/episode average.     Employment and Education - 05/16/2017      Employed, Work/School description: Manager, [employer].     Exercise and Physical Activity - 05/16/2017      Exercise frequency: 3-4 times/week. Exercise type: cardio.     Home and Environment - 05/16/2017      Marital status: . Spouse/Partner name: Nataly.     Nutrition and Health - 05/16/2017      Type of diet: Regular.     Sexual - 05/16/2017      Sexually active: Yes. Sexual orientation: Heterosexual. Uses condoms: Yes.     Substance Abuse - 05/16/2017      Never     Tobacco - 05/16/2017      Never  Family History    Cataract: Brother.    Heart disease: Father.  Immunizations       Vaccine Date Status Comments      influenza virus vaccine, inactivated 09/23/2016 Given      influenza virus vaccine, inactivated 10/05/2015 Given      tetanus/diphth/pertuss (Tdap) adult/adol 02/06/2014 Given      influenza virus vaccine, inactivated 09/19/2013 Given      influenza virus vaccine, inactivated 01/09/2013 Given      influenza 09/13/2011 Given      influenza, H1N1, inactivated 01/06/2010 Recorded      influenza 09/25/2009 Recorded  Lab Results      Results (Last 90 days)      No results located.

## 2022-02-16 NOTE — PROGRESS NOTES
Chief Complaint    med refills  History of Present Illness      Hakeem is here for refill of methylphenidate.  He is taking 5-10 mg twice a day and is very pleased with the improvement in his sleepiness.  Tells me that his wife is very happy with this as well.  He is not falling asleep every evening.  No drowsy driving or daytime sleepiness concerns at all.  He continues to use his CPAP every night.  He has noticed blood pressure is been a bit higher.  We stopped clonidine due to his sleepiness and that really had no effect.  Review of Systems      No fever, chills, chest pain, dyspnea, edema, headache, nausea, or vomiting.  Physical Exam   Vitals & Measurements    HR: 67(Peripheral)  BP: 144/74     HT: 71.25 in  WT: 270 lb  BMI: 37.39       Patient is overweight but otherwise healthy-appearing young man in no distress.  H&T exam unremarkable.  Neck supple no adenopathy thyromegaly.  Chest is clear.  Cardiac exam is regular no edema.  Neurologic exam is nonfocal.  Assessment/Plan       H/O kidney transplant         Followed by nephrology         Ordered:          77201 office outpatient visit 25 minutes (Charge), Quantity: 1, Narcolepsy  Sleep apnea syndrome  HTN (hypertension)  H/O kidney transplant                HTN (hypertension)         Not at goal will resume clonidine 0.1 mg twice daily with goal blood pressure less than 130/80.         Ordered:          51885 office outpatient visit 25 minutes (Charge), Quantity: 1, Narcolepsy  Sleep apnea syndrome  HTN (hypertension)  H/O kidney transplant                Narcolepsy         Patient has had excellent response to methylphenidate.  Refill methylphenidate, follow-up in 3 months.         Ordered:          63422 office outpatient visit 25 minutes (Charge), Quantity: 1, Narcolepsy  Sleep apnea syndrome  HTN (hypertension)  H/O kidney transplant                Sleep apnea syndrome         Patient is compliant with therapy and benefiting from it.          Ordered:          54008 office outpatient visit 25 minutes (Charge), Quantity: 1, Narcolepsy  Sleep apnea syndrome  HTN (hypertension)  H/O kidney transplant                Orders:         cloNIDine, 1 tab(s) ( 0.1 mg ), PO, BID, # 180 tab(s), 1 Refill(s), Type: Maintenance, Pharmacy: [facility], 1 tab(s) po bid         methylphenidate, See Instructions, Instructions: 1-2 tab(s) PO BID last dose before 6 PM, # 120 EA, 0 Refill(s), Type: Maintenance, Pharmacy: [id number], 1-2 tab(s) PO BID ; last dose before 6 PM         methylphenidate, See Instructions, Instructions: 1-2 tab(s) PO BID last dose before 6 PM Fill on 3/16/18, # 120 EA, 0 Refill(s), Type: Maintenance, Pharmacy: [id number], 1-2 tab(s) PO BID; last dose before 6 PM; Fill on 3/16/18         Return to Clinic (Request), RFV: Narcolepsy, Return in 3 months  Patient Information     Name:CRYSTAL TEJEDA      Address:      [address] [place] [id number]     Sex:Male     YOB: 1970     [phone]     Emergency Contact:SACHIN TEJEDA     MRN:[id number]     FIN:[id number]     Location:[facility]     Date of Service:02/16/2018      Primary Care Physician:       Quique TOTH, Dave, [phone]  Problem List/Past Medical History    Ongoing     Acute gout     Drug-induced gynecomastia      Comments: Cyclosporin. Consult with Dr. Quique Freeman on 09/03/2009. Plan: Speak with nephrologist about altering med.     Dyslipidemia     ED (erectile dysfunction)     Excessive sleepiness     GERD (gastroesophageal reflux disease)     H/O kidney transplant     HTN (hypertension)     IgA nephropathy     Lower extremity edema      Comments: Discharged from [facility] on 10/13/2008. Evaluated at [facility] to rule out DVT for bilateral lower extremity swelling. Admited: 10/10/2008. Chronic. Needs compression and elevation.     Narcolepsy     Nephrolithiasis     Obese     Overweight     Sleep  apnea syndrome      Comments: AHI 6.6 REM AHI 34 RERA 6.8 O2 sat alok 84%    Historical     Abnormal stress test     History of shingles  Procedure/Surgical History     Kidney transplant (2008)  Medications        Replacment CPAP +9 cm H2o: See Instructions, Heated humidifier, heated tubing, filters, nasal or full face mask of choice with headgear.  Replacement cushions and supplies as needed.  Change supplies every 6 mos  Months = 99 (Lifetime), 1 EA, 11 Refill(s).        allopurinol 300 mg oral tablet: See Instructions, Take 1 tablet by mouth  twice a day with food  Maintain urine output  greater than 2 liters per  day, 180 tab(s), 1 Refill(s).        aspirin 81 mg oral tablet: 81 mg, 1 tab(s), PO, Daily, 30 tab(s).        cloNIDine 0.1 mg oral tablet: 0.1 mg, 1 tab(s), PO, BID, 180 tab(s), 1 Refill(s).        cycloSPORINE 100 mg oral capsule: 100 mg, 1 cap(s), po, q12 hrs, 180 cap(s), 0 Refill(s).        cycloSPORINE 25 mg oral capsule: 25 mg, 1 cap(s), po, q12 hrs, 180 cap(s), 1 Refill(s).        furosemide 20 mg oral tablet: See Instructions, Take 1 tablet by mouth  daily as needed for edema, 90 tab(s).        losartan 50 mg oral tablet: 50 mg, 1 tab(s), PO, Daily, 90 tab(s), 3 Refill(s).        Ritalin 5 mg oral tablet: See Instructions, 1-2 tab(s) PO BID   last dose before 6 PM, 120 EA, 0 Refill(s).        Ritalin 5 mg oral tablet: See Instructions, 1-2 tab(s) PO BID  last dose before 6 PM  Fill on 3/16/18, 120 EA, 0 Refill(s).        Metoprolol Tartrate 25 mg oral tablet: See Instructions, TAKE 3 TABLETS BY MOUTH TWO TIMES DAILY, 540 tab(s).        CellCept 250 mg oral capsule: 1,000 mg, 4 cap(s), PO, BID, for 30 day(s), 240 cap(s), 3 Refill(s).        pravastatin 40 mg oral tablet: 40 mg, 1 tab(s), po, hs, 30 tab(s), 0 Refill(s).        probenecid 500 mg oral tablet: 500 mg, 1 tab(s), po, bid, 180 tab(s), 1 Refill(s).        Bactrim: See Instructions, One on MWF.        Cialis 10 mg oral tablet: 10 mg, 1  tab(s), po, prn, take 1 hour prior to sexual activity as needed, 30 tab(s), 11 Refill(s).                Allergies    morphine derivatives (Headache. Nausea.)  Social History    Smoking Status - 02/16/2018     Never smoker     Alcohol - 05/16/2017      Current, 1-2 times per week, 4 drinks/episode average.     Employment and Education - 05/16/2017      Employed, Work/School description: Manager, [employer].     Exercise and Physical Activity - 05/16/2017      Exercise frequency: 3-4 times/week. Exercise type: cardio.     Home and Environment - 05/16/2017      Marital status: . Spouse/Partner name: Nataly.     Nutrition and Health - 05/16/2017      Type of diet: Regular.     Sexual - 05/16/2017      Sexually active: Yes. Sexual orientation: Heterosexual. Uses condoms: Yes.     Substance Abuse - 05/16/2017      Never     Tobacco - 05/16/2017      Never  Family History    Cataract: Brother.    Heart disease: Father.  Immunizations      Vaccine Date Status Comments      pneumococcal (PPSV23) 09/22/2017 Given      influenza virus vaccine, inactivated 09/22/2017 Given      influenza virus vaccine, inactivated 09/22/2017 Given      influenza virus vaccine, inactivated 09/22/2017 Given      influenza virus vaccine, inactivated 09/23/2016 Given      influenza virus vaccine, inactivated 10/05/2015 Given      tetanus/diphth/pertuss (Tdap) adult/adol 02/06/2014 Given      influenza virus vaccine, inactivated 09/19/2013 Given      influenza virus vaccine, inactivated 01/09/2013 Given      influenza 09/13/2011 Given      influenza, H1N1, inactivated 01/06/2010 Recorded      influenza 09/25/2009 Recorded  Lab Results      Results (Last 90 days)                Laboratory                     Chemistry                          General Chemistry                               BUN:      H 31 mg/dL (Range 7 - 25)  (01/08/18 08:46 AM CST)                                                                                                                                           BUN/Creat Ratio:      18   (01/08/18 08:46 AM CST)                                                                                                                                          Basic Metabolic Profile:         (01/08/18 08:46 AM CST)                                                                                                                                          CO2 Level:      28 mmol/L  (01/08/18 08:46 AM CST)                                                                                                                                          Calcium Level:      9.3 mg/dL  (01/08/18 08:46 AM CST)                                                                                                                                          Chloride Level:      108 mmol/L  (01/08/18 08:46 AM CST)                                                                                                                                          Creatinine Level:      H 1.68 mg/dL (Range 0.60 - 1.35)  (01/08/18 08:46 AM CST)                                                                                                                                          Glucose Level:      96 mg/dL  (01/08/18 08:46 AM CST)                                                                                                                                          Potassium Level:      5.0 mmol/L  (01/08/18 08:46 AM CST)                                                                                                                                          Sodium Level:      142 mmol/L  (01/08/18 08:46 AM CST)                                                                                                                                          eGFR:      L 48 mL/min/1.73m2 (Range > OR = 60 - )  (01/08/18 08:46 AM CST)                                                                                                                                           eGFR African American:      L 55 mL/min/1.73m2 (Range > OR = 60 - )  (01/08/18 08:46 AM CST)                                                                                                                                     Random Urine Chem                               U Protein:      H 239 mg/dL (Range 5 - 25)  (01/08/18 08:46 AM CST)                                                                                                                                          U Protein/Creatinine Ratio                                        (01/08/18 08:46 AM CST)                                                                                                                                                H 1414  (Range 22 - 128)  (01/08/18 08:46 AM CST)                                                                                                                                     Timed Urine Chem                               Ur Creatinine:      169 mg/dL  (01/08/18 08:46 AM CST)                                                                                                                                Hematology                          CBC                               Hct:      45.0 %  (01/08/18 08:46 AM CST)                                                                                                                                          Hgb:      15.5 gm/dL  (01/08/18 08:46 AM CST)                                                                                                                                          MCH:      29.8 pg  (01/08/18 08:46 AM CST)                                                                                                                                          MCHC:      34.4 gm/dL  (01/08/18 08:46 AM CST)                                                                                                                                           MCV:      86.5 fL  (01/08/18 08:46 AM CST)                                                                                                                                          MPV:      10.7 fL  (01/08/18 08:46 AM CST)                                                                                                                                          Platelet:      182   (01/08/18 08:46 AM CST)                                                                                                                                          RBC:      5.20   (01/08/18 08:46 AM CST)                                                                                                                                          RDW:      12.8 %  (01/08/18 08:46 AM CST)                                                                                                                                          WBC                                        (01/08/18 08:46 AM CST)                                                                                                                                                6.7   (01/08/18 08:46 AM CST)                                                                                                                                     Differential                               Abs Basophils:      47   (01/08/18 08:46 AM CST)                                                                                                                                          Abs Eosinophils:      181   (01/08/18 08:46 AM CST)                                                                                                                                          Abs Lymphocytes:      1508   (01/08/18 08:46 AM CST)                                                                                                                                          Abs Monocytes:      623   (01/08/18 08:46 AM  CST)                                                                                                                                          Abs Neutrophils:      4342   (01/08/18 08:46 AM CST)                                                                                                                                          Basophils:      0.7 %  (01/08/18 08:46 AM CST)                                                                                                                                          Eosinophils:      2.7 %  (01/08/18 08:46 AM CST)                                                                                                                                          Lymphocytes:      22.5 %  (01/08/18 08:46 AM CST)                                                                                                                                          Monocytes:      9.3 %  (01/08/18 08:46 AM CST)                                                                                                                                          Neutrophils:      64.8 %  (01/08/18 08:46 AM CST)                                                                                                                                Immunology/Serology                          Immunology General                               Aspergillus Ag Index                                        (01/23/18 12:12 PM CST)                                                                                                                                                0.09   (01/23/18 12:12 PM CST)                                                                                                                                          U Histoplasma Galactomannan Ag                                        (01/23/18 12:12 PM CST)                                                                                                                                                 <0.5 ng/mL  (01/23/18 12:12 PM CST)                                                                                                                                Microbiology                          Mycology                               Aspergillus Ag:      Not Detected   (01/23/18 12:12 PM CST)

## 2022-02-16 NOTE — NURSING NOTE
Comprehensive Intake Entered On:  4/28/2020 3:01 PM CDT    Performed On:  4/28/2020 3:00 PM CDT by Joi Dutton CMA               Summary   Chief Complaint :   discuss FMLA extension  due to Covid - verbal consent given for telemed visit   Height Measured :   71.5 in(Converted to: 5 ft 11 in, 181.61 cm)    Joi Dutton CMA - 4/28/2020 3:00 PM CDT   Health Status   Allergies Verified? :   Yes   Medication History Verified? :   Yes   Medical History Verified? :   Yes   Pre-Visit Planning Status :   Completed   Tobacco Use? :   Never smoker   Joi Dutton CMA - 4/28/2020 3:00 PM CDT   Social History   Social History   (As Of: 4/28/2020 3:01:04 PM CDT)   Alcohol:        Current, 1-2 times per week, 4 drinks/episode average.   (Last Updated: 6/17/2014 9:34:22 AM CDT by Amilcar Freeman CMA)          Tobacco:        Never   (Last Updated: 6/13/2014 4:16:30 PM CDT by Joi Dutton CMA)          Substance Abuse:        Never   (Last Updated: 6/13/2014 5:27:00 PM CDT by Joi Dutton CMA)          Employment/School:        Employed, Work/School description: Manager, [employer].   (Last Updated: 2/7/2013 8:46:28 AM CST by Joi Fraser)          Home/Environment:        Marital status: .  Spouse/Partner name: Nataly.   (Last Updated: 5/16/2017 10:45:12 AM CDT by Gertrudis Jones)          Nutrition/Health:        Type of diet: Regular.   (Last Updated: 5/16/2017 10:45:17 AM CDT by Gertrudis Jones)          Exercise:        Exercise frequency: 3-4 times/week.  Exercise type: cardio.   (Last Updated: 5/16/2017 10:45:29 AM CDT by Gertrudis Jones)          Sexual:        Sexually active: Yes.  Sexual orientation: Heterosexual.  Uses condoms: Yes.   (Last Updated: 5/16/2017 10:45:38 AM CDT by Gertrudis Jones)

## 2022-02-16 NOTE — NURSING NOTE
Vital Signs Entered On:  1/29/2021 3:32 PM CST    Performed On:  1/29/2021 3:32 PM CST by Joi Dutton CMA               Vital Signs   Systolic Blood Pressure :   156 mmHg (HI)    Diastolic Blood Pressure :   88 mmHg (HI)    Mean Arterial Pressure :   111 mmHg   Joi Dutton CMA - 1/29/2021 3:32 PM CST

## 2022-02-16 NOTE — TELEPHONE ENCOUNTER
---------------------  From: Joi Dutton CMA   Sent: 1/29/2021 3:51:41 PM CST  Subject: General Message-transplant labs     Hakeem past due for transplant labs - last 5/2020.  He was advised and states he's due for his yrly appt at [facility] and will plan on scheduling.  We also discussed elevated BP, he was sent home with his med list and asked for him to review and let us know if anything is incorrect, also, to verify that he was taking everthing listed

## 2022-02-16 NOTE — TELEPHONE ENCOUNTER
Entered by Bhavya Morales CMA on March 25, 2019 8:33:34 AM CDT  PCP:   JAE    Medication:   Clonidine 0.1 mg  Last Filled:  2/16/18   Quantity:  180 tabs Refills:  1    Date of last office visit and reason:   10/29/18 Med Rx/Wart/L.foot pain  Date of last labs pertaining to condition:  _    Note:  Patient is overdue for med f/u.     Return to Clinic order placed?  yes    Resource:   eRx pool  Phone:   [phone]        ------------------------------------------  From: [facility]  To: Chico Bloom MD  Sent: March 23, 2019 4:33:55 AM CDT  Subject: Medication Management  Due: March 24, 2019 4:33:55 AM CDT    ** On Hold Pending Signature **  Drug: cloNIDine (cloNIDine 0.1 mg oral tablet)  TAKE 1 TABLET BY MOUTH TWO  TIMES DAILY  Quantity: 180 tab(s)    Days Supply: 90        Refills: 0  Substitutions Allowed  Notes from Pharmacy: - First Attempt Ref: [id number]    Dispensed Drug: cloNIDine (cloNIDine 0.1 mg oral tablet)  TAKE 1 TABLET BY MOUTH TWO  TIMES DAILY  Quantity: 180 tab(s)    Days Supply: 90        Refills: 0  Substitutions Allowed  Notes from Pharmacy:   ---------------------------------------------------------------  From: Bhavya Morales CMA (eRx Pool ([id number]_[place] [facility]))   To: [facility] Message Pool ([id number] [facility]);     Sent: 3/25/2019 8:33:45 AM CDT  Subject: FW: Medication Management   Due Date/Time: 3/24/2019 4:33:00 AM CDT---------------------  From: Joi Dutton CMA ([facility] Message Pool ([id number] [facility]))   To: [facility] Message Pool ([id number]_[place]);     Sent: 3/26/2019 8:25:21 AM CDT  Subject: FW: Medication Management   Due Date/Time: 3/24/2019 4:33:00 AM CDT     pt has appt 3/29 with [id number]---------------------  From: Jovita Bruno MA ([facility] Message Pool ([id number]))   To: Chico Bloom MD;     Sent: 3/26/2019 8:28:13 AM CDT  Subject: FW: Medication Management   Due Date/Time: 3/24/2019 4:33:00 AM CDT---------------------  From: Chico Bloom MD   To: [facility]    Sent: 3/26/2019 9:09:51 AM CDT  Subject: FW: Medication Management     ** Submitted: **  Complete:cloNIDine (cloNIDine 0.1 mg oral tablet)   Signed by Chico Bloom MD  3/26/2019 9:09:00 AM    ** Approved with modifications: **  cloNIDine (CLONIDINE  0.1MG  TAB)  TAKE 1 TABLET BY MOUTH TWO  TIMES DAILY  Qty:  180 tab(s)        Days Supply:  90        Refills:  3          Substitutions Allowed     Route To Pharmacy - [facility]

## 2022-02-16 NOTE — TELEPHONE ENCOUNTER
---------------------  From: Kevin Trevino (Phone Messages Pool ([id number]))   To: [facility] Message Pool ([facility]_[place]);     Sent: 4/28/2020 10:28:39 AM CDT  Subject: FMLA     Phone Message    PCP:   BRM                     Time of Call:  917am                                        Person Calling:  pt  Phone number:  [phone]     Note:   FMLA is scheduled to end on 04/30/2020. He is high risk for COVID-19 as he is transplant pt. Would like BR recommendation when he should return to work? Please advise.     Last office visit and reason:  04/07/2020; Work restrictions.---------------------  From: Joi Dutton CMA ([facility] Message Pool ([id number]))   To: Dave Lei MD;     Sent: 4/28/2020 10:35:11 AM CDT  Subject: FW: FMLA---------------------  From: Dave Lei MD   To: [facility] Message Pool ([id number]);     Sent: 4/28/2020 12:35:31 PM CDT  Subject: RE: FMLA     difficult to say.  His risk of covid19 complications will remain high and not likely to dissipate in the months ahead.  I do think his FMLA can be continued indefinitely, though I worry about his future employment and ability to make an income.  It's best we discuss this via telephone/video visit.contacted pt at 1358 and advised, he agrees and was transferred to scheduling

## 2022-02-16 NOTE — PROGRESS NOTES
Chief Complaint    med refills  History of Present Illness      Hakeem has been in good health.  He is here for refill of his medications.  He has not had gout for over for 5 months.  He will be coming back for blood work next week.  He typically does not need more than 5 mg of Ritalin in the morning to feel awake and good all day.  He is compliant with his CPAP using it every night.  No daytime drowsiness or drowsy driving.  Review of Systems      No chest pain, dyspnea, myalgia, headache, edema or change in weight.  Physical Exam   Vitals & Measurements    HR: 74(Peripheral)  BP: 152/96     HT: 71.25 in  WT: 275 lb  BMI: 38.08       Patient appears comfortable and in no acute distress.  Alert and oriented.  HEENT exam is unremarkable.  Neck is thick.  Chest is clear.  Cardiac exam is regular.  No pitting edema.  Neurologic exam nonfocal.  Assessment/Plan       Dyslipidemia (E78.5)        On statin, needs annual lipid panel.          Ordered:          64148 office outpatient visit 25 minutes (Charge), Quantity: 1, HTN (hypertension)  H/O kidney transplant  Dyslipidemia  Lower extremity edema  Medication monitoring encounter  Narcolepsy  Overweight  Sleep apnea syndrome                H/O kidney transplant (Z94.0)         Doing well.         Ordered:          00579 office outpatient visit 25 minutes (Charge), Quantity: 1, HTN (hypertension)  H/O kidney transplant  Dyslipidemia  Lower extremity edema  Medication monitoring encounter  Narcolepsy  Overweight  Sleep apnea syndrome                HTN (hypertension) (I10)         Not controlled today however the patient believes he forgot his morning medications.  He will monitor at home and return if not less than 130/80 and/or have it checked when he is in for his blood work.         Ordered:          71225 office outpatient visit 25 minutes (Charge), Quantity: 1, HTN (hypertension)  H/O kidney transplant  Dyslipidemia  Lower extremity edema  Medication  monitoring encounter  Narcolepsy  Overweight  Sleep apnea syndrome                Lower extremity edema (R60.0)         Controlled         Ordered:          54186 office outpatient visit 25 minutes (Charge), Quantity: 1, HTN (hypertension)  H/O kidney transplant  Dyslipidemia  Lower extremity edema  Medication monitoring encounter  Narcolepsy  Overweight  Sleep apnea syndrome                Medication monitoring encounter (Z51.81)         Ordered:          08556 office outpatient visit 25 minutes (Charge), Quantity: 1, HTN (hypertension)  H/O kidney transplant  Dyslipidemia  Lower extremity edema  Medication monitoring encounter  Narcolepsy  Overweight  Sleep apnea syndrome                Narcolepsy (G47.419)         Doing quite well on low-dose of Ritalin.         Ordered:          01585 office outpatient visit 25 minutes (Charge), Quantity: 1, HTN (hypertension)  H/O kidney transplant  Dyslipidemia  Lower extremity edema  Medication monitoring encounter  Narcolepsy  Overweight  Sleep apnea syndrome                Overweight (E66.3)         Encouraged weight loss and regular physical activity.         Ordered:          11763 office outpatient visit 25 minutes (Charge), Quantity: 1, HTN (hypertension)  H/O kidney transplant  Dyslipidemia  Lower extremity edema  Medication monitoring encounter  Narcolepsy  Overweight  Sleep apnea syndrome                Sleep apnea syndrome (G47.30)         Patient is compliant with CPAP therapy and clinically benefiting.         Ordered:          49375 office outpatient visit 25 minutes (Charge), Quantity: 1, HTN (hypertension)  H/O kidney transplant  Dyslipidemia  Lower extremity edema  Medication monitoring encounter  Narcolepsy  Overweight  Sleep apnea syndrome                Orders:         allopurinol, See Instructions, Instructions: TAKE 1 TABLET BY MOUTH TWICE A DAY WITH FOOD, MAINTAIN URINE OUTPUT GREATER THAN 2 LITERS PER DAY., # 180  EA, 3 Refill(s), Type: Maintenance, Pharmacy: [facility], pt due for med check., TAKE 1 TABLET BY M..., (Ordered)         cloNIDine, = 1 tab(s), Oral, bid, # 180 tab(s), 3 Refill(s), Type: Soft Stop, Pharmacy: [facility], 1 tab(s) Oral bid, (Ordered)         cycloSPORINE, = 1 cap(s) ( 100 mg ), po, q12 hrs, # 180 cap(s), 3 Refill(s), Type: Maintenance, Pharmacy: [facility], 1 cap(s) Oral q12 hrs, (Ordered)         cycloSPORINE, = 1 cap(s) ( 25 mg ), po, q12 hrs, # 180 cap(s), 3 Refill(s), Type: Maintenance, Pharmacy: [facility], faxed to [facility], 1 cap(s) Oral q12 hrs, (Ordered)         furosemide, See Instructions, Instructions: Take 1 tablet by mouth daily as needed for edema, # 90 tab(s), 3 Refill(s), Type: Soft Stop, Pharmacy: [facility], Take 1 tablet by mouth daily as needed for edema, (Ordered)         losartan, = 1 tab(s), Oral, daily, # 90 tab(s), 3 Refill(s), Type: Maintenance, Pharmacy: [facility], Pt is due appt, 1 tab(s) Oral daily, (Ordered)         methylphenidate, See Instructions, Instructions: 2 tab(s) PO BID last dose before 6 PM Fill on 4/25/19, # 120 EA, 0 Refill(s), Type: Hard Stop, Pharmacy: [facility], (Completed)         methylphenidate, See Instructions, Instructions: 2 tab(s) PO BID last dose before 6 PM, # 120 EA, 0 Refill(s), Type: Maintenance, Pharmacy: [id number], 2 tab(s) PO BID ; last dose before 6 PM, (Ordered)         methylphenidate, See Instructions, Instructions: 1-2 tab(s) PO BID last dose before 6 PM, # 120 EA, 0 Refill(s), Type: Hard Stop, Pharmacy: [facility] [id number], (Completed)         methylphenidate, See Instructions, Instructions: 2 tab(s) PO BID last dose before 6 PM Fill on 4/25/19, # 120 EA, 0 Refill(s), Type: Maintenance, Pharmacy: [facility] [id number] [facility], 2 tab(s) PO BID; last dose before 6 PM; Fill on 4/25/19, (Ordered)         methylphenidate, See Instructions, Instructions: 1-2 tab(s)  PO BID last dose before 6 PM Fill on 11/28/18, # 120 EA, 0 Refill(s), Type: Hard Stop, Pharmacy: [facility] [id number] [facility], (Completed)         methylphenidate, See Instructions, Instructions: 2 tab(s) PO BID last dose before 6 PM, # 120 EA, 0 Refill(s), Type: Hard Stop, Pharmacy: [facility], (Completed)         metoprolol, = 3 tab(s), Oral, bid, # 540 tab(s), 3 Refill(s), Type: Maintenance, Pharmacy: [facility], pt due for med check., 3 tab(s) Oral bid,x90 day(s), (Ordered)         mycophenolate mofetil, = 4 cap(s) ( 1,000 mg ), PO, BID, # 720 cap(s), 3 Refill(s), OMAR, Type: Maintenance, Pharmacy: [facility], 4 cap(s) Oral bid,x90 day(s), (Ordered)         pravastatin, = 1 tab(s), Oral, qhs, # 90 tab(s), 3 Refill(s), Type: Maintenance, Pharmacy: [facility], pt due for med check., 1 tab(s) Oral qhs, (Ordered)         probenecid, = 1 tab(s) ( 500 mg ), Oral, bid, # 180 tab(s), 3 Refill(s), Type: Maintenance, Pharmacy: [facility], 1 tab(s) Oral bid,x90 day(s), (Ordered)         sulfamethoxazole-trimethoprim, See Instructions, Instructions: One on MWF, # 50 EA, 3 Refill(s), Type: Maintenance, Pharmacy: [facility], One on MWF, (Ordered)         tadalafil, = 1 tab(s) ( 10 mg ), po, prn, Instructions: take 1 hour prior to sexual activity as needed, # 30 tab(s), 3 Refill(s), Type: Maintenance, Pharmacy: [facility], Please keep on file and pt will notify when needed., 1 tab(s) Oral prn,Instr:take..., (Ordered)         Return to Clinic (Request), RFV: Medication check for Narcolepsy, Return in 2 months         Return to Clinic (Request), RFV: Annual lipids and uric acid  Patient Information     Name:NIKHIL CRYSTAL HUSSEIN      Address:      [address] [place] [id number]     Sex:Male     YOB: 1970     [phone]     Emergency Contact:SACHIN TEJEDA     MRN:[id number]     FIN:[id number]     Location:[facility]     Date  of Service:03/28/2019      Primary Care Physician:       Quique TOTH, Dave, [phone]      Attending Physician:       Chico Bloom MD, [phone]  Problem List/Past Medical History    Ongoing     Acute gout     Drug-induced gynecomastia       Comments: Cyclosporin. Consult with Dr. Quique Freeman on 09/03/2009. Plan: Speak with nephrologist about altering med.     Dyslipidemia     ED (erectile dysfunction)     Excessive sleepiness     GERD (gastroesophageal reflux disease)     H/O kidney transplant     HTN (hypertension)     IgA nephropathy     Lower extremity edema       Comments: Discharged from [facility] on 10/13/2008. Evaluated at [facility] to rule out DVT for bilateral lower extremity swelling. Admited: 10/10/2008. Chronic. Needs compression and elevation.     Medication monitoring encounter     Narcolepsy     Nephrolithiasis     Obese     Overweight     Plantar wart, left foot     Sleep apnea syndrome       Comments: AHI 6.6 REM AHI 34 RERA 6.8 O2 sat alok 84%    Historical     Abnormal stress test     History of shingles  Procedure/Surgical History     Kidney transplant (2008)  Medications        aspirin 81 mg oral tablet: 81 mg, 1 tab(s), PO, Daily, 30 tab(s).        Replacment CPAP +9 cm H2o: See Instructions, Heated humidifier, heated tubing, filters, nasal or full face mask of choice with headgear.  Replacement cushions and supplies as needed.  Change supplies every 6 mos  Months = 99 (Lifetime), 1 EA, 11 Refill(s).        predniSONE 20 mg oral tablet: See Instructions, 2 tab(s) Oral daily for 3-5 days prn, 30 EA, 1 Refill(s).        cloNIDine 0.1 mg oral tablet: 1 tab(s), Oral, bid, 180 tab(s), 3 Refill(s).        furosemide 20 mg oral tablet: See Instructions, Take 1 tablet by mouth  daily as needed for edema, 90 tab(s), 3 Refill(s).        losartan 50 mg oral tablet: 1 tab(s), Oral, daily, 90 tab(s), 3 Refill(s).        pravastatin 40 mg oral tablet: 1 tab(s), Oral, qhs, 90 tab(s),  3 Refill(s).        probenecid 500 mg oral tablet: 500 mg, 1 tab(s), Oral, bid, for 90 day(s), 180 tab(s), 3 Refill(s).        Bactrim 400 mg-80 mg oral tablet: See Instructions, One on MWF, 50 EA, 3 Refill(s).        Cialis 10 mg oral tablet: 10 mg, 1 tab(s), po, prn, take 1 hour prior to sexual activity as needed, 30 tab(s), 3 Refill(s).        allopurinol 300 mg oral tablet: See Instructions, TAKE 1 TABLET BY MOUTH  TWICE A DAY WITH FOOD,  MAINTAIN URINE OUTPUT  GREATER THAN 2 LITERS PER  DAY., 180 EA, 3 Refill(s).        Metoprolol Tartrate 25 mg oral tablet: 3 tab(s), Oral, bid, for 90 day(s), 540 tab(s), 3 Refill(s).        cycloSPORINE 100 mg oral capsule: 100 mg, 1 cap(s), po, q12 hrs, 180 cap(s), 3 Refill(s).        cycloSPORINE 25 mg oral capsule: 25 mg, 1 cap(s), po, q12 hrs, 180 cap(s), 3 Refill(s).        CellCept 250 mg oral capsule: 1,000 mg, 4 cap(s), PO, BID, for 90 day(s), 720 cap(s), 3 Refill(s).        Ritalin 5 mg oral tablet: See Instructions, 2 tab(s) PO BID  last dose before 6 PM  Fill on 4/25/19, 120 EA, 0 Refill(s).        Ritalin 5 mg oral tablet: See Instructions, 2 tab(s) PO BID   last dose before 6 PM, 120 EA, 0 Refill(s).         Allergies    morphine derivatives (Headache. 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 Exercise type: cardio., 05/16/2017     Home and Environment      Marital status: . Spouse/Partner name: Nataly., 05/16/2017     Nutrition and Health      Type of diet: Regular., 05/16/2017     Sexual      Sexually active: Yes. Sexual orientation: Heterosexual. Uses condoms: Yes., 05/16/2017     Substance Abuse      Never, 06/13/2014     Tobacco      Never, 06/13/2014  Family  History    Cataract: Brother.    Heart disease: Father.    Sister: History is negative  Immunizations      Vaccine Date Status Comments      influenza virus vaccine, inactivated 10/29/2018 Given      pneumococcal (PPSV23) 09/22/2017 Given      influenza virus vaccine, inactivated 09/22/2017 Given      influenza virus vaccine, inactivated 09/22/2017 Given      influenza virus vaccine, inactivated 09/22/2017 Given      influenza virus vaccine, inactivated 09/23/2016 Given      influenza virus vaccine, inactivated 10/05/2015 Given      tetanus/diphth/pertuss (Tdap) adult/adol 02/06/2014 Given      influenza virus vaccine, inactivated 09/19/2013 Given      influenza virus vaccine, inactivated 01/09/2013 Given      influenza 09/13/2011 Given      influenza, H1N1, inactivated 01/06/2010 Recorded      influenza 09/25/2009 Recorded  Lab Results          Lab Results (Last 4 results within 90 days)           Sodium Level: 141 mmol/L [135 mmol/L - 146 mmol/L] (02/04/19 10:03:00)          Potassium Level: 4.9 mmol/L [3.5 mmol/L - 5.3 mmol/L] (02/04/19 10:03:00)          Chloride Level: 107 mmol/L [98 mmol/L - 110 mmol/L] (02/04/19 10:03:00)          CO2 Level: 28 mmol/L [20 mmol/L - 32 mmol/L] (02/04/19 10:03:00)          Glucose Level: 104 mg/dL High [65 mg/dL - 99 mg/dL] (02/04/19 10:03:00)          BUN: 27 mg/dL High [7 mg/dL - 25 mg/dL] (02/04/19 10:03:00)          Creatinine Level: 1.88 mg/dL High [0.6 mg/dL - 1.35 mg/dL] (02/04/19 10:03:00)          BUN/Creat Ratio: 14 [6  - 22] (02/04/19 10:03:00)          eGFR: 41 mL/min/1.73m2 Low (02/04/19 10:03:00)          eGFR African American: 48 mL/min/1.73m2 Low (02/04/19 10:03:00)          Calcium Level: 9.5 mg/dL [8.6 mg/dL - 10.3 mg/dL] (02/04/19 10:03:00)          WBC: 4.5 [3.8  - 10.8] (02/04/19 10:03:00)          RBC: 5.18 [4.2  - 5.8] (02/04/19 10:03:00)          Hgb: 15.5 gm/dL [13.2 gm/dL - 17.1 gm/dL] (02/04/19 10:03:00)          Hct: 44.8 % [38.5 % - 50 %] (02/04/19  10:03:00)          MCV: 86.5 fL [80 fL - 100 fL] (02/04/19 10:03:00)          MCH: 29.9 pg [27 pg - 33 pg] (02/04/19 10:03:00)          MCHC: 34.6 gm/dL [32 gm/dL - 36 gm/dL] (02/04/19 10:03:00)          RDW: 13 % [11 % - 15 %] (02/04/19 10:03:00)          Platelet: 179 [140  - 400] (02/04/19 10:03:00)          MPV: 11 fL [7.5 fL - 12.5 fL] (02/04/19 10:03:00)          Lymphocytes: 25.7 % (02/04/19 10:03:00)          Abs Lymphocytes: 1157 [850  - 3900] (02/04/19 10:03:00)          Neutrophils: 51.7 % (02/04/19 10:03:00)          Abs Neutrophils: 2327 [1500  - 7800] (02/04/19 10:03:00)          Monocytes: 15.5 % (02/04/19 10:03:00)          Abs Monocytes: 698 [200  - 950] (02/04/19 10:03:00)          Eosinophils: 5.8 % (02/04/19 10:03:00)          Abs Eosinophils: 261 [15  - 500] (02/04/19 10:03:00)          Basophils: 1.3 % (02/04/19 10:03:00)          Abs Basophils: 59 [0  - 200] (02/04/19 10:03:00)

## 2022-02-16 NOTE — TELEPHONE ENCOUNTER
Entered by Siomara Dove MA on July 07, 2020 9:30:07 AM CDT  ---------------------  From: Siomara Dove MA   To: [facility]    Sent: 7/7/2020 9:30:07 AM CDT  Subject: Medication Management     ** Submitted: **  Order:pravastatin (pravastatin 40 mg oral tablet)  1 tab(s)  Oral  qhs  Qty:  90 tab(s)        Days Supply:  90        Refills:  0          Substitutions Allowed     Route To Pharmacy - [facility]    Signed by Siomara Dove MA  7/7/2020 2:29:00 PM [facility]    ** Submitted: **  Complete:pravastatin (pravastatin 40 mg oral tablet)   Signed by Siomara Dove MA  7/7/2020 2:30:00 PM [facility]    ** Not Approved:  **  pravastatin (PRAVASTATIN SOD 40MG TABLET)  TAKE 1 TABLET BY MOUTH  EVERY NIGHT AT BEDTIME  Qty:  90 tab(s)        Days Supply:  90        Refills:  0          Substitutions Allowed     Route To Pharmacy - [facility]   Signed by Siomara Dove MA            ------------------------------------------  From: [facility]  To: Dave Lei MD  Sent: July 4, 2020 3:59:19 AM CDT  Subject: Medication Management  Due: June 24, 2020 10:20:04 PM CDT     ** On Hold Pending Signature **     Drug: pravastatin (pravastatin 40 mg oral tablet), TAKE 1 TABLET BY MOUTH EVERY NIGHT AT BEDTIME  Quantity: 90 tab(s)  Days Supply: 90  Refills: 0  Substitutions Allowed  Notes from Pharmacy: - First Attempt Ref: [id number]     Dispensed Drug: pravastatin (pravastatin 40 mg oral tablet), TAKE 1 TABLET BY MOUTH EVERY NIGHT AT BEDTIME  Quantity: 90 tab(s)  Days Supply: 90  Refills: 0  Substitutions Allowed  Notes from Pharmacy:  ------------------------------------------Med Refill      Date of last office visit and reason:  4/28/2020 w/ [facility] for FMLA      Date of last Med Check / Px:   10/25/19 w/ [facility] for px  Date of last labs pertaining to med:  _    Note:  _    RTC order in chart:  RTC now due    For Protocol refill, has patient been contacted:  message sent to pharmacy

## 2022-02-16 NOTE — PROGRESS NOTES
Chief Complaint    follow up  History of Present Illness      Last visit we discontinued clonidine and increased losartan because of drowsiness.  Patient is noticed no difference in his drowsiness.  He is using his CPAP every night he forgot to bring the chip to have it interrogated.  He had a basic metabolic panel drawn today.  He is using his CPAP every night.  Review of Systems      No cataplexy, heartburn, headache, chest pain, dyspnea, edema.  Rare nocturia.  Physical Exam   Vitals & Measurements    HR: 69(Peripheral)  BP: 122/87     HT: 71.25 in  WT: 264 lb  BMI: 36.56       Patient is comfortable in no distress.  Alert and oriented.  Assessment/Plan       H/O kidney transplant         Ordered:          46510 office outpatient visit 25 minutes (Charge), Quantity: 1, Sleep apnea syndrome  HTN (hypertension)  H/O kidney transplant                HTN (hypertension)         Controlled.  Basic metabolic profile done today.         Ordered:          81625 office outpatient visit 25 minutes (Charge), Quantity: 1, Sleep apnea syndrome  HTN (hypertension)  H/O kidney transplant          Basic Metabolic Panel* ([facility]), Specimen Type: Serum, Collection Date: 09/22/17 9:52:00 CDT          Return to Clinic (Request), RFV: BMP- lab only, Return in 2 weeks                Sleep apnea syndrome        We will get the patient's CPAP device interrogated and start him on Nuvigil.  Follow-up in 6 to weeks.         Ordered:          armodafinil, 1 tab(s) ( 250 mg ), po, daily, # 90 tab(s), 1 Refill(s), Type: Maintenance, Pharmacy: [facility], 1 tab(s) po daily          31418 office outpatient visit 25 minutes (Charge), Quantity: 1, Sleep apnea syndrome  HTN (hypertension)  H/O kidney transplant                Orders:         Return to Clinic (Request), Return in 6-8 weeks  Problem List/Past Medical History    Ongoing     Acute gout     Drug-induced gynecomastia     Dyslipidemia     ED (erectile dysfunction)      Excessive sleepiness     GERD (gastroesophageal reflux disease)     H/O kidney transplant     HTN (hypertension)     IgA nephropathy     Lower extremity edema     Nephrolithiasis     Obese     Overweight     Sleep apnea syndrome    Historical     Abnormal stress test     History of shingles  Procedure/Surgical History     Kidney transplant (2008)  Medications    allopurinol 300 mg oral tablet, See Instructions, 1 refills    aspirin 81 mg oral tablet, 81 mg= 1 tab(s), po, daily    Bactrim, See Instructions    CellCept 250 mg oral capsule, 1000 mg= 4 cap(s), po, bid, 3 refills    Cialis 10 mg oral tablet, 10 mg= 1 tab(s), po, prn, 11 refills    cycloSPORINE 100 mg oral capsule, 100 mg= 1 cap(s), po, q12 hrs    cycloSPORINE 25 mg oral capsule, 25 mg= 1 cap(s), po, q12 hrs    furosemide 20 mg oral tablet, See Instructions    losartan 25 mg oral tablet, 50 mg= 2 tab(s), po, daily    Metoprolol Tartrate 25 mg oral tablet, 75 mg= 3 tab(s), po, bid, 1 refills    Nuvigil 250 mg oral tablet, 250 mg= 1 tab(s), po, daily, 1 refills    pravastatin 40 mg oral tablet, 40 mg= 1 tab(s), po, hs, 1 refills    probenecid 500 mg oral tablet, 500 mg= 1 tab(s), po, bid, 1 refills  Allergies    morphine derivatives (Headache. Nausea.)  Social History    Smoking Status - 09/22/2017     Never smoker     Alcohol - 05/16/2017      Current, 1-2 times per week, 4 drinks/episode average.     Employment and Education - 05/16/2017      Employed, Work/School description: Manager, [employer].     Exercise and Physical Activity - 05/16/2017      Exercise frequency: 3-4 times/week. Exercise type: cardio.     Home and Environment - 05/16/2017      Marital status: . Spouse/Partner name: Nataly.     Nutrition and Health - 05/16/2017      Type of diet: Regular.     Sexual - 05/16/2017      Sexually active: Yes. Sexual orientation: Heterosexual. Uses condoms: Yes.     Substance Abuse - 05/16/2017      Never     Tobacco - 05/16/2017      Never  Family  History    Cataract: Brother.    Heart disease: Father.  Immunizations      Vaccine Date Status Comments      influenza virus vaccine, inactivated 09/23/2016 Given      influenza virus vaccine, inactivated 10/05/2015 Given      tetanus/diphth/pertuss (Tdap) adult/adol 02/06/2014 Given      influenza virus vaccine, inactivated 09/19/2013 Given      influenza virus vaccine, inactivated 01/09/2013 Given      influenza 09/13/2011 Given      influenza, H1N1, inactivated 01/06/2010 Recorded      influenza 09/25/2009 Recorded  Lab Results      Results (Last 90 days)                Laboratory                     Chemistry                          General Chemistry                               BUN:      25 mg/dL  (09/07/17 08:51 AM CDT)                                                                                                                                          BUN/Creat Ratio:      17   (09/07/17 08:51 AM CDT)                                                                                                                                          CO2 Level:      28 mmol/L  (09/07/17 08:51 AM CDT)                                                                                                                                          Calcium Level:      9.7 mg/dL  (09/07/17 08:51 AM CDT)                                                                                                                                          Chloride Level:      107 mmol/L  (09/07/17 08:51 AM CDT)                                                                                                                                          Creatinine Level:      H 1.49 mg/dL (Range 0.60 - 1.35)  (09/07/17 08:51 AM CDT)                                                                                                                                          Glucose Level:      99 mg/dL  (09/07/17 08:51 AM CDT)                                                                                                                                           Potassium Level:      5.3 mmol/L  (09/07/17 08:51 AM CDT)                                                                                                                                          Sodium Level:      142 mmol/L  (09/07/17 08:51 AM CDT)                                                                                                                                          eGFR:      L 55 mL/min/1.73m2 (Range > OR = 60 - )  (09/07/17 08:51 AM CDT)                                                                                                                                          eGFR African American:      64 mL/min/1.73m2  (09/07/17 08:51 AM CDT)                                                                                                                                Hematology                          CBC                               Hct:      49.2 %  (09/07/17 08:51 AM CDT)                                                                                                                                          Hgb:      H 17.4 gm/dL (Range 13.2 - 17.1)  (09/07/17 08:51 AM CDT)                                                                                                                                          MCH:      30.4 pg  (09/07/17 08:51 AM CDT)                                                                                                                                          MCHC:      35.4 gm/dL  (09/07/17 08:51 AM CDT)                                                                                                                                          MCV:      86.0 fL  (09/07/17 08:51 AM CDT)                                                                                                                                          MPV:      10.6 fL  (09/07/17 08:51 AM CDT)                                                                                                                                           Platelet:      190   (09/07/17 08:51 AM CDT)                                                                                                                                          RBC:      5.72   (09/07/17 08:51 AM CDT)                                                                                                                                          RDW:      13.4 %  (09/07/17 08:51 AM CDT)                                                                                                                                          WBC                                        (09/07/17 08:51 AM CDT)                                                                                                                                                6.2   (09/07/17 08:51 AM CDT)                                                                                                                                     Differential                               Abs Basophils:      50   (09/07/17 08:51 AM CDT)                                                                                                                                          Abs Eosinophils:      161   (09/07/17 08:51 AM CDT)                                                                                                                                          Abs Lymphocytes:      1494   (09/07/17 08:51 AM CDT)                                                                                                                                          Abs Monocytes:      694   (09/07/17 08:51 AM CDT)                                                                                                                                          Abs Neutrophils:      3801   (09/07/17 08:51 AM CDT)                                                                                                                                           Basophils:      0.8 %  (09/07/17 08:51 AM CDT)                                                                                                                                          Eosinophils:      2.6 %  (09/07/17 08:51 AM CDT)                                                                                                                                          Lymphocytes:      24.1 %  (09/07/17 08:51 AM CDT)                                                                                                                                          Monocytes:      11.2 %  (09/07/17 08:51 AM CDT)                                                                                                                                          Neutrophils:      61.3 %  (09/07/17 08:51 AM CDT)

## 2022-02-16 NOTE — TELEPHONE ENCOUNTER
---------------------  From: Chrystal Georges CMA (eRx Pool ([id number]))   To: [facility] Message Pool ([id number] [facility] [place]);     Sent: 2/25/2021 1:19:45 PM CST  Subject: FW: Medication Management   Due Date/Time: 2/25/2021 9:18:00 PM CST     Please advise further refills - hasn't been filled since 7/26/19 6 month supply        ** Patient matched by Chrystal Georges CMA on 2/25/2021 1:10:59 PM CST **      ------------------------------------------  From: [facility]  To: Chico Bloom MD  Sent: February 24, 2021 9:18:34 PM CST  Subject: Medication Management  Due: February 19, 2021 9:56:59 PM CST     ** On Hold Pending Signature **     Drug: metoprolol (Metoprolol Tartrate 25 mg oral tablet), TAKE 3 TABLETS BY MOUTH TWO TIMES DAILY  Quantity: 540 tab(s)  Days Supply: 90  Refills: 4  Substitutions Allowed  Notes from Pharmacy: - First Attempt Ref: [id number]     Dispensed Drug: metoprolol (Metoprolol Tartrate 25 mg oral tablet), TAKE 3 TABLETS BY MOUTH TWO TIMES DAILY  Quantity: 540 tab(s)  Days Supply: 90  Refills: 3  Substitutions Allowed  Notes from Pharmacy: Requesting 1 year supply  ---------------------------------------------------------------  From: Joi Dutton CMA ([facility] Message Pool ([id number]))   To: aDve Lei MD;     Sent: 2/25/2021 3:15:23 PM CST  Subject: FW: Medication Management   Due Date/Time: 2/25/2021 9:18:00 PM CST     please advise  ? change dosing 75mg BID??---------------------  From: Dave Lei MD   To: [facility] Pool ([id number] [facility] [place]);     Sent: 2/25/2021 3:28:58 PM CST  Subject: RE: Medication Management     yes, let's change to 75mg BID (assuming 75mg tablet available).  Otherwise, can be 50mg BID---------------------  From: Joi Dutton CMA   To: [facility]    Sent: 2/25/2021 4:19:28 PM CST  Subject: RE: Medication Management     ** Submitted: **  Order:metoprolol (Metoprolol Tartrate 75 mg oral tablet)  1 tab(s)  Oral  bid  Qty:  180 tab(s)         Refills:  0          Substitutions Allowed     Route To Pharmacy - [facility]    Signed by Joi Dutton CMA  2/25/2021 10:18:00 PM [facility]    ** Submitted: **  Complete:metoprolol (Metoprolol Tartrate 25 mg oral tablet)   Signed by Joi Dutton CMA  2/25/2021 10:19:00 PM [facility]    ** Not Approved:  **  metoprolol (METOPROLOL TARTRATE  25MG  TAB)  TAKE 3 TABLETS BY MOUTH TWO TIMES DAILY  Qty:  540 tab(s)        Days Supply:  90        Refills:  3          Substitutions Allowed     Route To Pharmacy - [facility]   Note from Pharmacy:  Requesting 1 year supply  Signed by Manuel Dutton CMA contacted and advised of change in dosing

## 2022-02-16 NOTE — TELEPHONE ENCOUNTER
Entered by Joselyn Ambrosio on July 26, 2019 4:09:45 PM CDT  ---------------------  From: Joselyn Ambrosio   To: [facility]    Sent: 7/26/2019 4:09:45 PM CDT  Subject: Medication Management     ** Submitted: **  Order:metoprolol (Metoprolol Tartrate 25 mg oral tablet)  3 tab(s)  Oral  bid  Qty:  540 tab(s)        Refills:  1          Substitutions Allowed     Route To Pharmacy - [facility]    Signed by Joselyn Ambrosio  7/26/2019 4:08:00 PM    ** Submitted: **  Complete:metoprolol (Metoprolol Tartrate 25 mg oral tablet)   Signed by Joselyn Ambrosio  7/26/2019 4:09:00 PM    ** Not Approved:  **  metoprolol (METOPROLOL TARTRATE  25MG  TAB)  TAKE 3 TABLETS BY MOUTH TWO TIMES DAILY  Qty:  42 tab(s)        Days Supply:  7        Refills:  0          Substitutions Allowed     Route To Pharmacy - [facility]   Signed by Joselyn Ambrosio            ** Submitted: **  Order:allopurinol (allopurinol 300 mg oral tablet)  1 tab(s)  Oral  bidwm  MAINTAIN URINE OUTPUT  GREATER THAN 2 LITERS PER  DAY.  Qty:  180 tab(s)        Refills:  2          Substitutions Allowed     Route To Pharmacy - [facility]    Signed by Joselyn Ambrosio  7/26/2019 4:07:00 PM    ** Submitted: **  Complete:allopurinol (allopurinol 300 mg oral tablet)   Signed by Joselyn Ambrosio  7/26/2019 4:08:00 PM    ** Not Approved:  **  allopurinol (ALLOPURINOL  300MG  TAB)  TAKE 1 TABLET BY MOUTH 2  TIMES DAILY WITH FOOD.  MAINTAIN URINE OUTPUT  GREATER THAN 2 LITERS PER  DAY.  Qty:  28 tab(s)        Days Supply:  14        Refills:  0          Substitutions Allowed     Route To Pharmacy - [facility]   Signed by Joselyn Ambrosio          Med Refill      Date of last office visit and reason:  3/28/19 for Med check with [facility]      Date of last Med Check / Px:   _  Date of last labs pertaining to med:  BMP 2/4/19, Uric Acid 4/1/19    RTC order in chart:  Yes, was due in may per [facility] for narcolepsy check up.  All  meds were filled for 1 year in March.  will resend rx's to pharmacy    For Protocol refill, has patient been contacted:  _    ------------------------------------------  From: [facility]  To: Dave Lei MD  Sent: July 25, 2019 3:33:58 PM CDT  Subject: Medication Management  Due: July 26, 2019 3:33:58 PM CDT    ** On Hold Pending Signature **  Drug: allopurinol (allopurinol 300 mg oral tablet)  TAKE 1 TABLET BY MOUTH 2  TIMES DAILY WITH FOOD.  MAINTAIN URINE OUTPUT  GREATER THAN 2 LITERS PER  DAY.  Quantity: 28 tab(s)     Days Supply: 14        Refills: 0  Substitutions Allowed  Notes from Pharmacy: - Ref: [id number]    Dispensed Drug: allopurinol (allopurinol 300 mg oral tablet)  TAKE 1 TABLET BY MOUTH 2  TIMES DAILY WITH FOOD.  MAINTAIN URINE OUTPUT  GREATER THAN 2 LITERS PER  DAY.  Quantity: 28 tab(s)     Days Supply: 14        Refills: 0  Substitutions Allowed  Notes from Pharmacy:     ** On Hold Pending Signature **  Drug: metoprolol (Metoprolol Tartrate 25 mg oral tablet)  TAKE 3 TABLETS BY MOUTH TWO TIMES DAILY  Quantity: 42 tab(s)     Days Supply: 7         Refills: 0  Substitutions Allowed  Notes from Pharmacy: - Ref: [id number]    Dispensed Drug: metoprolol (Metoprolol Tartrate 25 mg oral tablet)  TAKE 3 TABLETS BY MOUTH TWO TIMES DAILY  Quantity: 42 tab(s)     Days Supply: 7         Refills: 0  Substitutions Allowed  Notes from Pharmacy:   ------------------------------------------

## 2022-02-16 NOTE — PROGRESS NOTES
Patient:   HAKEEM TEJEDA            MRN: [id number]            FIN: [id number]               Age:   47 years     Sex:  Male     :  1970   Associated Diagnoses:   HTN - Hypertension; H/O kidney transplant; Erectile Dysfunction; IgA Nephropathy; Gout   Author:   Dave Lei MD      Visit Information      Date of Service: 2017 03:11 pm  Performing Location: [facility]  Encounter#: [id number]      Primary Care Provider (PCP):  Dave Lei MD    NPI# [id number]      Referring Provider:  No referring provider recorded for selected visit.      Chief Complaint            Additional Information:No additional information recorded during visit.   Chief complaint and symptoms as noted above and confirmed with patient.  Recent lab and diagnostic studies reviewed with patient      History of Present Illness   2014: Presents to clinic for routine health examination. He has a history of living unrelated kidney transplant performed in , wife serving as his donor and doing well. His underlying kidney history is IgA nephropathy and also follows with Dr. Rene for general kidney cares, potentially interested in transferring his care more locally. Main complaints today related to worsening libido over time. He has a history of erectile dysfunction, fairly responsive to vardenafil therapy.  He s not noticed any change in testes size. He doesn t undergo screening posttransplant labs on a regular basis. Baseline creatinine is 1.8. He does not see a dermatologist on a regular basis. He does not check his blood pressure at home on a regular frequency.     2016: Presents with complaints of migrating arthritic pains involving both feet, primarily in the left ankle and since has moved to the right foot.  He has been taking colchicine daily as well as allopurinol related to his underlying gout.  Typically colchicine has been quite responsive for his gout    2016: Hakeem returns to clinic with 2 day  history of exquisite right ankle pain.  He says that symptoms are similar to what he is expected experiencing with recent gout attacks.  He was started on prednisone 20 mg twice daily beginning on June 30 for 7 days which did help his symptoms fully resolve until this most recent flare.  He is taking allopurinol 300 mg twice daily for some time.  Also takes colchicine 0.6 mg daily which does not help in the setting of acute flares.  He does bring up concerns whether any these symptoms may be related to Lyme s disease.  He is requesting to see a rheumatologist.    9/23/2016: Hakeem presents to clinic for follow-up.  He states that his gout complaints are remarkably better.  He thinks this times with introduction of probenecid.  He would like to reduce colchicine use to as needed.  Several other orthopedic base complaints.  Describes having progressively worsening left shoulder pains with abduction and extension.  Also describes having bilateral knee discomfort.  Describes a buckling sensation that happened while playing golf earlier this summer.  He feels worse unstable on his left knee in general.    5/12/2017: Hakeem returns to clinic for regular follow-up.  His gout has been very well maintained since addition of probenecid and with his allopurinol.  He has not had to use any prednisone therapy for quite some time.  No changes in terms of his immune suppression medications.  No history of opportunistic infections.  Primary complaint revolves around daytime sleepiness.  He has been evaluated by Dr. De Luna for this complaint in the past and was told he had a form of narcolepsy.  He has not been maintained on any stimulant medication in the past.  Now finding himself drifting off to sleep with driving and with daytime somnolence during work.  Questioning whether he needs to reassess his need for medication         Review of Systems   Constitutional:  Fatigue, No fever, No chills.    Eye:  Negative except as documented in  history of present illness.    Ear/Nose/Mouth/Throat:  Negative except as documented in history of present illness.    Respiratory:  No shortness of breath.    Cardiovascular:  No chest pain, No palpitations, No peripheral edema, No syncope.    Gastrointestinal:  No nausea, No vomiting, No diarrhea, No abdominal pain.    Genitourinary:  No dysuria, No hematuria, No change in urine stream.    Hematology/Lymphatics:  Negative except as documented in history of present illness.    Endocrine:  No excessive thirst, No polyuria.    Immunologic:  No recurrent fevers.    Musculoskeletal:  No joint pain, No muscle pain, No decreased range of motion.    Neurologic:  Alert and oriented X4, No numbness, No tingling, No headache.       Health Status   Allergies:    Allergic Reactions (Selected)  Severity Not Documented  Morphine derivatives (Headache.  nausea.)   Medications:  (Selected)   Prescriptions  Prescribed  CellCept 250 mg oral capsule: 4 cap(s) ( 1,000 mg ), PO, BID, # 240 cap(s), 3 Refill(s), OMAR, Type: Maintenance, Pharmacy: [facility], 4 cap(s) po bid,x30 day(s)  Cialis 10 mg oral tablet: 1 tab(s) ( 10 mg ), po, prn, Instructions: take 1 hour prior to sexual activity as needed, # 30 tab(s), 11 Refill(s), Type: Maintenance, Pharmacy: [facility], This is to replace rx sent on 9/27 due to insurance quantity limits. P...  Metoprolol Tartrate 25 mg oral tablet: See Instructions, Instructions: Take 3 tablets by mouth  twice a day, # 180 tab(s), Type: Soft Stop, Pharmacy: [email or web address]  allopurinol 300 mg oral tablet: See Instructions, Instructions: Take 1 tablet by mouth  twice a day with food  Maintain urine output  greater than 2 liters per  day, # 60 tab(s), 0 Refill(s), Type: Maintenance, Pharmacy: [email or web address], Pt due for 6 month f/u and labs. Thanks!...  cloNIDine 0.1 mg oral tablet: 1 tab(s) ( 0.1 mg ), PO, BID, # 180 tab(s), 2 Refill(s), Type: Maintenance,  Pharmacy: [facility], 1 tab(s) po bid  colchicine 0.6 mg oral tablet: 1 tab(s) ( 0.6 mg ), PO, daily, PRN: for gout pain, # 90 tab(s), 1 Refill(s), Type: Maintenance, Pharmacy: [facility], please keep on file and pt will notify when needed, 1 tab(s) po daily,PRN:for gout pain  cycloSPORINE 100 mg oral capsule: 1 cap(s) ( 100 mg ), po, q12 hrs, # 180 cap(s), 0 Refill(s), Type: Maintenance, Pharmacy: [facility], 1 cap(s) po q12 hrs  cycloSPORINE 25 mg oral capsule: 1 cap(s) ( 25 mg ), po, q12 hrs, # 180 cap(s), 0 Refill(s), Type: Maintenance, Pharmacy: [facility], 1 cap(s) po q12 hrs  furosemide 20 mg oral tablet: See Instructions, Instructions: Take 1 tablet by mouth  daily as needed for edema, # 30 tab(s), 0 Refill(s), Type: Maintenance, Pharmacy: [facility], Pt due for 6 month f/u and labs. Thanks!  losartan 25 mg oral tablet: See Instructions, Instructions: Take 1 tablet by mouth  daily, # 30 tab(s), 0 Refill(s), Type: Maintenance, Pharmacy: [facility], Pt due for 6 month f/u and labs. Thanks!  pravastatin 40 mg oral tablet: See Instructions, Instructions: Take 1 tablet by mouth  every night at bedtime, # 30 tab(s), Type: Soft Stop, Pharmacy: [facility]  probenecid 500 mg oral tablet: See Instructions, Instructions: Take 1 tablet by mouth  twice a day, # 60 tab(s), 0 Refill(s), Type: Maintenance, Pharmacy: [facility], Pt due for 6 month f/u and labs. Thanks!  Documented Medications  Documented  Bactrim: See Instructions, Instructions: One on MWF, 0 Refill(s), Type: Maintenance  aspirin 81 mg oral tablet: 1 tab(s) ( 81 mg ), PO, Daily, # 30 tab(s), 0 Refill(s), Type: Maintenance   Problem list:    All Problems  Drug-induced gynecomastia / SNOMED CT 851668456 / Confirmed  Dyslipidemia / SNOMED CT 4826918348 / Confirmed  GERD (gastroesophageal reflux disease) / SNOMED CT 4791290746 / Confirmed  Acute gout / SNOMED CT 274478129 /  Confirmed  H/O kidney transplant / SNOMED CT 0134729179 / Confirmed  HTN (hypertension) / SNOMED CT 6302030610 / Confirmed  IgA nephropathy / SNOMED CT 0345969855 / Confirmed  Nephrolithiasis / SNOMED CT 576842176 / Confirmed  Lower Extremity Edema / ICD-9-.3 / Confirmed  Obese / SNOMED CT 5288490953 / Probable  ED (erectile dysfunction) / SNOMED CT 6213841442 / Confirmed  Overweight / ICD-9-.02 / Confirmed  Sleep apnea syndrome / SNOMED CT 282312365 / Confirmed  Inactive: Excessive Sleepiness / ICD-9-.54  Resolved: Abnormal Stress Test / ICD-9-.39  Resolved: History of shingles / SNOMED CT 9358858013  Canceled: Erectile Dysfunction / ICD-9-.84      Histories   Past Medical History:    Active  Sleep apnea syndrome (642770138): Onset on 10/7/2010 at 40 years.  Comments:  10/14/2010 CDT 4:06 PM CDT - Chico Bloom MD  AHI 6.6 REM AHI 34 RERA 6.8 O2 sat alok 84%  Drug-induced gynecomastia (706940048): Onset in the month of 9/2009 at 39 years  Comments:  2/7/2013 CST 8:54 AM Joi Mariee  Cyclosporin.   Consult with Dr. Quique Freeman on 09/03/2009.  Plan:  Speak with nephrologist about altering med.  Lower Extremity Edema (782.3): Onset in 2008 at 37 years.  Comments:  2/7/2013 CST 8:57 AM Joi Mariee  Chronic.  Needs compression and elevation.    2/7/2013 CST 9:09 AM Joi Mariee  Evaluated at [facility] to rule out DVT for bilateral lower extremity swelling.  Admited:  10/10/2008.    2/7/2013 CST 9:10 AM Joi Mariee  Discharged from [facility] on 10/13/2008.  Nephrolithiasis (741178902)  IgA nephropathy (6910429767)  H/O kidney transplant (9910605484)  HTN (hypertension) (3640604443)  Dyslipidemia (8652820738)  ED (erectile dysfunction) (6931693098)  Obese (6364607856)  Overweight (278.02)  GERD (gastroesophageal reflux disease) (0147110327)  Resolved  Abnormal Stress Test (794.39): Onset in 2008 at 37 years.  Resolved.  History of shingles (2632205066):   Resolved.   Family History:    Cataract  Brother (Abimael)  Heart disease  Father  Comments:  2/7/2013 9:22 AM - Joi Fraser  Bypass grafting performed.    2/7/2013 8:45 AM - Joi Fraser  CAD at 50 years of age.     Procedure history:    Kidney transplant (577430638) in 2008 at 38 Years.   Social History:        Alcohol Assessment: Current            Current, 1-2 times per week, 4 drinks/episode average.      Tobacco Assessment: Denies Tobacco Use            Never      Substance Abuse Assessment: Denies Substance Abuse            Never      Employment and Education Assessment            Employed, Work/School description: Manager, [employer].      Home and Environment Assessment            Marital status: .  Spouse/Partner name: Libertad.                     Comments:                      02/07/2013 - Joi Fraser                      in 2005.      Exercise and Physical Activity Assessment: Regular exercise            Exercise frequency: 3-4 times/week.  Exercise type: Running, Walking, Weight lifting.      Other Assessment            Golf.  Fish.  Hunt.        Physical Examination   vital signs stable, as noted above   VS/Measurements   General:  Alert and oriented, No acute distress.    Eye:  Extraocular movements are intact.    HENT:  Normocephalic, Oral mucosa is moist.    Neck:  Supple.    Respiratory:  Lungs are clear to auscultation, Respirations are non-labored.    Cardiovascular:  Normal rate, Regular rhythm, No murmur, No edema.    Gastrointestinal:  Soft, Non-tender, Non-distended, Normal bowel sounds.    Musculoskeletal:  Normal strength.    Neurologic:  Alert, Oriented, Normal motor function, No focal deficits.    Cognition and Speech:  Oriented, Speech clear and coherent.    Psychiatric:  Appropriate mood & affect.       Review / Management   Results review:  Lab results   5/10/2017 8:50 AM CDT Sodium Level 141 mmol/L    Sodium Level 141 mmol/L    Potassium Level 5.3 mmol/L    Potassium Level 5.3  mmol/L    Chloride Level 107 mmol/L    Chloride Level 107 mmol/L    CO2 Level 27 mmol/L    CO2 Level 27 mmol/L    Glucose Level 98 mg/dL    Glucose Level 98 mg/dL    BUN 30 mg/dL  HI    BUN 30 mg/dL  HI    Creatinine 1.74 mg/dL  HI    Creatinine 1.74 mg/dL  HI    BUN/Creat Ratio 17    BUN/Creat Ratio 17    eGFR 46 mL/min/1.73m2  LOW    eGFR 46 mL/min/1.73m2  LOW    eGFR African American 53 mL/min/1.73m2  LOW    eGFR African American 53 mL/min/1.73m2  LOW    Calcium Level 9.6 mg/dL    Calcium Level 9.6 mg/dL    U Protein 103 mg/dL  HI    U Protein 103 mg/dL  HI    U Protein/Creatinine Ratio 837  HI    U Protein/Creatinine Ratio 837  HI    Ur Creatinine 123 mg/dL    Ur Creatinine 123 mg/dL    WBC 7.9    WBC 7.9    RBC 5.78    RBC 5.78    Hgb 16.9 gm/dL    Hgb 16.9 gm/dL    Hct 50.9 %  HI    Hct 50.9 %  HI    MCV 88.1 fL    MCV 88.1 fL    MCH 29.2 pg    MCH 29.2 pg    MCHC 33.1 gm/dL    MCHC 33.1 gm/dL    RDW 13.6 %    RDW 13.6 %    Platelet 176    Platelet 176    MPV 9.2 fL    MPV 9.2 fL   .       Impression and Plan   Diagnosis     HTN - Hypertension (JME16-UD I10).     H/O kidney transplant (ZKJ56-EE Z94.0).     Erectile Dysfunction (UKO33-NU N52.9).     IgA Nephropathy (RVI50-BM N05.9).     Gout (BEN59-HP M10.9).         .) gout; improved   - on allopurinol 300mg BID, probenecid 500mg BID   - colchicine 0.6mg daily prn use   - flares generally responsive to prednisone therapy    .) HtN, controlled  current antihypertensive regimen:  metoprolol 75mg BID, clonidine 0.1mg BID, losartan 25mg   regimen changes: none   intolerance:  future titration/work-up plan:     .) sleep disorder; suspected narcolepsy based on previous evaluation by Dr. Bloom   - advised to follow-up with [facility] for further assessment    .) ESRD, h/o LUKT in '08 at [facility] (previously on HD for ~3 months), baseline SCr ~1.8.  Original disease was IgA nephropathy  IS: cyclosporine 100mg BID, MMF 1g BID   - does have baseline 0.5-1g/g proteinuria   -  higher suspicions for recurrent IgA.  Wouldn't pursue allograft biopsy at this point unless significant allograft functional decline   - on ASA 81mg daily     RTC q6 months

## 2022-02-16 NOTE — TELEPHONE ENCOUNTER
Entered by Tita Ford CMA on February 03, 2020 6:26:34 PM CST  ---------------------  From: Tita Ford CMA   To: [facility]    Sent: 2/3/2020 6:26:34 PM CST  Subject: Medication Management     ** Submitted: **  Order:pravastatin (pravastatin 40 mg oral tablet)  1 tab(s)  Oral  qhs  Qty:  90 tab(s)        Days Supply:  90        Refills:  0          Substitutions Allowed     Route To Pharmacy - [facility]    Signed by Tita Ford CMA  2/3/2020 6:26:00 PM    ** Submitted: **  Complete:pravastatin (pravastatin 40 mg oral tablet)   Signed by Tita Ford CMA  2/3/2020 6:26:00 PM    ** Not Approved:  **  pravastatin (PRAVASTATIN SOD 40MG TABLET)  TAKE 1 TABLET BY MOUTH  EVERY NIGHT AT BEDTIME  Qty:  90 tab(s)        Days Supply:  90        Refills:  0          Substitutions Allowed     Route To Pharmacy - [facility]   Signed by Tita Ford CMA            ------------------------------------------  From: [facility]  To: Dave Lei MD  Sent: February 3, 2020 11:05:18 AM CST  Subject: Medication Management  Due: February 4, 2020 11:05:18 AM CST    ** On Hold Pending Signature **  Drug: pravastatin (pravastatin 40 mg oral tablet)  TAKE 1 TABLET BY MOUTH  EVERY NIGHT AT BEDTIME  Quantity: 90 tab(s)  Days Supply: 90  Refills: 0  Substitutions Allowed  Notes from Pharmacy: - Ref: [id number]    Dispensed Drug: pravastatin (pravastatin 40 mg oral tablet)  TAKE 1 TABLET BY MOUTH  EVERY NIGHT AT BEDTIME  Quantity: 90 tab(s)  Days Supply: 90  Refills: 0  Substitutions Allowed  Notes from Pharmacy:   ------------------------------------------Med Refill      Date of last office visit and reason:  11/6/19 fatigue      Date of last Med Check / Px:   10/25/19  Date of last labs pertaining to med:  na    RTC order in chart:  yes- due visit in April. Sent in refills

## 2022-02-16 NOTE — NURSING NOTE
Quick Intake Entered On:  3/28/2019 6:23 PM CDT    Performed On:  3/28/2019 6:22 PM CDT by Chico Bloom MD               Summary   Systolic Blood Pressure :   152 mmHg (HI)    Diastolic Blood Pressure :   96 mmHg (HI)    Mean Arterial Pressure :   115 mmHg   BP Site :   Right arm   BP Method :   Manual   Chico Bloom MD - 3/28/2019 6:22 PM CDT

## 2022-02-16 NOTE — TELEPHONE ENCOUNTER
---------------------  From: Briana Colunga LPN (Phone Messages Pool ([id number]_[facility]))   To: [facility] Message Pool ([id number]_[place] [facility] [place]);     Sent: 2/12/2021 3:28:37 PM CST  Subject: General Message     Phone Message    PCP:   JAE      Time of Call:  2:32pm       Person Calling:  pt  Phone number:  [phone]    Note:   Pt LM asking to speak directly with BR regarding a question that only he can answer.   Please advise- pt did call earlier regarding covid test for travel and was advised to schedule appt.    Last office visit and reason:  1/29/21 pyogenic granuloma---------------------  From: Joi Dutton CMA ([facility] Message Pool ([id number]))   To: Dave Lei MD;     Sent: 2/12/2021 5:02:25 PM CST  Subject: FW: General Message     spoke with Hakeem at 1700  they are planning on traveling on the 27th and his wife is very concerned/nervous if he get COVID  Asking if there is anything he could take along, in case he would get COVID to help lessen symptoms---------------------  From: Dave Lei MD   To: [facility] Message Pool ([id number]);     Sent: 2/12/2021 5:33:05 PM CST  Subject: RE: General Message     I think anything I could advise would be experimental.  No data to support use of medication like hydroxychloroquine, ivermectin for preventative purposes of disease.  I think best defense is being especially mindful of social distancing, mask usage (especially availability of medical grade mask with double masking of cloth mask).  Ideally availability of COVID vaccine to Hakeem would be best means of defense.pt contacted at 1745 and reviewed recommendations, answered questions  advised to reach out to his transplant coordinator to discuss possibility of obtaining a vaccine sooner.

## 2022-02-16 NOTE — CARE COORDINATION
Pt appears on  [facility] chronic disease panel as out of parameters for Elevated BP.  Pt is overdue for medication f/u.  No response to reminder letter. CC called on 01/30/19 at 0315 and left a message to call back.    Yeimi Pretty CMA.

## 2022-02-16 NOTE — TELEPHONE ENCOUNTER
---------------------  From: Mali Deleon RN   Sent: 6/4/2020 12:20:01 PM CDT  Subject: return to work     Time of Call:  1122  Return call at:1212     Person Calling:  patient  Phone number:      Note:   He leaves the message to  fax the return to work on 6/8/20  letter from 6/2/20 to . [employer]. This is done. Confirmed with patient in return call.

## 2022-02-16 NOTE — LETTER
(Inserted Image. Unable to display)     July 26, 2019      CRYSTAL NIKHIL  [address] [place] [id number]          Dear CRYSTAL,      Thank you for selecting [facility] (previously [facility]) for your healthcare needs.      Our records indicate you are due for the following services:     Clinical Support Staff (CSS)-Only Blood Pressure Check ~ Please stop in anytime to have your blood pressure rechecked. This is a free service and no appointment necessary.     So we can best determine if your medications are effective in lowering your blood pressure, please make sure your blood pressure medicine has been in your system for at least 1-2 hours prior to coming in.  We encourage you to avoid caffeine or other stimulants prior to having your blood pressure checked and come at a time when you are not feeling rushed.     If you check your blood pressure at home, please bring in your blood pressure monitor and home blood pressure readings.  We will check your machine for accuracy and also share your home readings with your Healthcare Provider.       To schedule an appointment or if you have further questions, please contact your primary clinic:   [facility]       [phone]   [facility]       [phone]              [facility]     [phone]      Powered by [employer]    Sincerely,    Dave Lei MD

## 2022-02-16 NOTE — PROGRESS NOTES
"Chief Complaint    feeling light headed. Excess thirst, dry throat.  History of Present Illness      Patient is feeling off, Light headed when standing up and also happen when walking.  Fatigue, increase in thirst. Has voided three times today. No chest pain or SOB. Not taking OTC decongestion. Not around anyone with same symptoms.  Has concern about worsening kidney function.  Has increased proteinuria.  Has been in contact with transplant dept at [facility]  Review of Systems      \"See HPI.  All other review of systems negative.\"  Physical Exam   Vitals & Measurements    T: 97.5   F (Tympanic)  HR: 66(Peripheral)  BP: 124/78     HT: 71.5 in  WT: 274 lb  BMI: 37.68       General: Alert and oriented, No acute distress.      Eye: Pupils are equal, round and reactive to light, Extraocular movements are intact, Normal conjunctiva.      HENT: Normocephalic, Tympanic membranes are clear, Oral mucosa is moist, No pharyngeal erythema,      Neck: Supple.      Respiratory: Lungs are clear to auscultation, Respirations are non-labored.      Cardiovascular: Normal rate, Regular rhythm, No murmur, No gallop, No edema.      Gastrointestinal: Soft, Non-tender, Non-distended, Normal bowel sounds, No organomegaly.      Genitourinary: No costovertebral angle tenderness.      Lymphatics: WNL.      Musculoskeletal: Normal gait      Integumentary: Warm, Dry, No rash.      Neurologic: Alert, Oriented, Normal sensory, Normal motor function, No focal deficits.      Psychiatric: Cooperative, Appropriate mood & affect.      BMP results reviewed.  Cr up from last check.  Assessment/Plan       Fatigue (R53.83)        He had mild orthostatic changes which correlated with symptoms.        Decrease Clonidine to 0.1 mg BID for next several days to see if symptoms improve.  Keep up fluid intake.  updated patient with BMP lab results, will notify patient when other lab results are available.         Ordered:          Basic Metabolic Panel (Request), " Priority: Urgent, Fatigue                Orders:         cloNIDine, = 0.5 tab(s) ( 0.1 mg ), Oral, bid, # 180 tab(s), 3 Refill(s), Type: Maintenance, Pharmacy: [facility] [id number] [facility], (Ordered)         Protein, Total W/Creat, Random Urine* ([facility]), Specimen Type: Urine, Collection Date: 11/06/19 14:07:00 CST      I, Mehnaz Gardner LPN, acted solely as a scribe for, and in the presence of Dr. Angelo Hay who performed the services.  Patient Information     Name:CRYSTAL TEJEDA      Address:      [address] [place] [phone]     Sex:Male     YOB: 1970     [phone]     Emergency Contact:SACHIN TEJEDA     MRN:[id number]     FIN:[id number]     Location:[facility]     Date of Service:11/06/2019      Primary Care Physician:       Dave Lei MD, [phone]      Attending Physician:       Angelo Hay MD, [phone]  Problem List/Past Medical History    Ongoing     Acute gout     Drug-induced gynecomastia       Comments: Cyclosporin. Consult with Dr. Quique Freeman on 09/03/2009. Plan: Speak with nephrologist about altering med.     Dyslipidemia     ED (erectile dysfunction)     Excessive sleepiness     GERD (gastroesophageal reflux disease)     H/O kidney transplant     Hypertension with goal blood pressure less than 130/80     IgA nephropathy     Lower extremity edema       Comments: Discharged from [facility] on 10/13/2008. Evaluated at [facility] to rule out DVT for bilateral lower extremity swelling. Admited: 10/10/2008. Chronic. Needs compression and elevation.     Medication monitoring encounter     Narcolepsy     Nephrolithiasis     Obese     Overweight     Plantar wart, left foot     Sleep apnea syndrome       Comments: AHI 6.6 REM AHI 34 RERA 6.8 O2 sat alok 84%    Historical     Abnormal stress test     History of shingles  Procedure/Surgical History     Kidney transplant (2008)        Medications    allopurinol 300 mg oral  tablet, 1 tab(s), Oral, bidwm, 2 refills    aspirin 81 mg oral tablet, 81 mg= 1 tab(s), Oral, daily    Bactrim 400 mg-80 mg oral tablet, See Instructions, 3 refills    CellCept 250 mg oral capsule, 1000 mg= 4 cap(s), Oral, bid    Cialis 10 mg oral tablet, 10 mg= 1 tab(s), Oral, prn, 3 refills    cloNIDine 0.2 mg oral tablet, 0.1 mg= 0.5 tab(s), Oral, bid, 3 refills    cycloSPORINE 100 mg oral capsule, 100 mg= 1 cap(s), Oral, q12 hrs, 3 refills    cycloSPORINE 25 mg oral capsule, 25 mg= 1 cap(s), Oral, q12 hrs, 3 refills,   Not taking    furosemide 20 mg oral tablet, See Instructions, 3 refills,   Not taking    losartan 50 mg oral tablet, 1 tab(s), Oral, daily, 3 refills,   Not taking    Metoprolol Tartrate 25 mg oral tablet, 3 tab(s), Oral, bid, 1 refills    pravastatin 40 mg oral tablet, 1 tab(s), Oral, qhs, 3 refills    probenecid 500 mg oral tablet, 500 mg= 1 tab(s), Oral, bid, 3 refills    Replacment CPAP +9 cm H2o, See Instructions, 11 refills    Ritalin 5 mg oral tablet, See Instructions    Ritalin 5 mg oral tablet, See Instructions  Allergies    morphine derivatives (Headache. 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 Exercise type: cardio., 05/16/2017     Home/Environment      Marital status: . Spouse/Partner name: Nataly., 05/16/2017     Nutrition/Health      Type of diet: Regular., 05/16/2017     Sexual      Sexually active: Yes. Sexual orientation: Heterosexual. Uses condoms: Yes., 05/16/2017     Substance Abuse      Never, 06/13/2014     Tobacco      Never, 06/13/2014  Family History    CA - Cancer of colon: Father.    Cataract: Brother.    Heart disease: Father.    Sister: History is negative  Immunizations      Vaccine Date Status  Comments      pneumococcal (PCV13) 10/25/2019 Given      influenza virus vaccine, inactivated 10/25/2019 Given      influenza virus vaccine, inactivated 10/29/2018 Given      pneumococcal (PPSV23) 09/22/2017 Given      influenza virus vaccine, inactivated 09/22/2017 Given      influenza virus vaccine, inactivated 09/22/2017 Given      influenza virus vaccine, inactivated 09/22/2017 Given      influenza virus vaccine, inactivated 09/23/2016 Given      influenza virus vaccine, inactivated 10/05/2015 Given      tetanus/diphth/pertuss (Tdap) adult/adol 02/06/2014 Given      influenza virus vaccine, inactivated 09/19/2013 Given      influenza virus vaccine, inactivated 01/09/2013 Given      influenza 09/13/2011 Given      influenza, H1N1, inactivated 01/06/2010 Recorded      influenza 09/25/2009 Recorded  Lab Results       Lab Results (Last 4 results within 90 days)        Sodium Level: 136 [135 mmol/L - 145 mmol/L] (11/06/19 14:26:00)       Sodium Level: 140 mmol/L [135 mmol/L - 146 mmol/L] (10/25/19 09:26:00)       Potassium Level: 4.6 [3.5 mmol/L - 5 mmol/L] (11/06/19 14:26:00)       Potassium Level: 4.3 mmol/L [3.5 mmol/L - 5.3 mmol/L] (10/25/19 09:26:00)       Chloride Level: 103 [98 mmol/L - 110 mmol/L] (11/06/19 14:26:00)       Chloride Level: 104 mmol/L [98 mmol/L - 110 mmol/L] (10/25/19 09:26:00)       CO2 Level: 24 [21 mmol/L - 31 mmol/L] (11/06/19 14:26:00)       CO2 Level: 28 mmol/L [20 mmol/L - 32 mmol/L] (10/25/19 09:26:00)       AGAP: 9 [5  - 18] (11/06/19 14:26:00)       Glucose Level: 152 High [65 mg/dL - 100 mg/dL] (11/06/19 14:26:00)       Glucose Level: 94 mg/dL [65 mg/dL - 99 mg/dL] (10/25/19 09:26:00)       BUN: 38 High [8 mg/dL - 25 mg/dL] (11/06/19 14:26:00)       BUN: 26 mg/dL High [7 mg/dL - 25 mg/dL] (10/25/19 09:26:00)       Creatinine Level: 1.88 High [0.72 mg/dL - 1.25 mg/dL] (11/06/19 14:26:00)       Creatinine Level: 1.57 mg/dL High [0.6 mg/dL - 1.35 mg/dL] (10/25/19 09:26:00)        BUN/Creat Ratio: 20 [10  - 20] (11/06/19 14:26:00)       BUN/Creat Ratio: 17 [6  - 22] (10/25/19 09:26:00)       eGFR: 51 mL/min/1.73m2 Low (10/25/19 09:26:00)       eGFR : 46 Low (11/06/19 14:26:00)       eGFR : 59 mL/min/1.73m2 Low (10/25/19 09:26:00)       eGFR Non-: 38 Low (11/06/19 14:26:00)       Calcium Level: 9.0 [8.5 mg/dL - 10.5 mg/dL] (11/06/19 14:26:00)       Calcium Level: 9.1 mg/dL [8.6 mg/dL - 10.3 mg/dL] (10/25/19 09:26:00)       U Protein: 247 mg/dL High [5 mg/dL - 25 mg/dL] (10/25/19 09:26:00)       U Protein/Creatinine Ratio: 4259 High [22  - 128] (10/25/19 09:26:00)       U Protein/Creatinine Ratio: 4.259 High [0.022  - 0.128] (10/25/19 09:26:00)       Ur Creatinine: 58 mg/dL [20 mg/dL - 320 mg/dL] (10/25/19 09:26:00)       WBC: 8.2 [3.8  - 10.8] (10/25/19 09:26:00)       RBC: 5.4 [4.2  - 5.8] (10/25/19 09:26:00)       Hgb: 16.3 gm/dL [13.2 gm/dL - 17.1 gm/dL] (10/25/19 09:26:00)       Hct: 46.7 % [38.5 % - 50 %] (10/25/19 09:26:00)       MCV: 86.5 fL [80 fL - 100 fL] (10/25/19 09:26:00)       MCH: 30.2 pg [27 pg - 33 pg] (10/25/19 09:26:00)       MCHC: 34.9 gm/dL [32 gm/dL - 36 gm/dL] (10/25/19 09:26:00)       RDW: 12.7 % [11 % - 15 %] (10/25/19 09:26:00)       Platelet: 211 [140  - 400] (10/25/19 09:26:00)       MPV: 10.3 fL [7.5 fL - 12.5 fL] (10/25/19 09:26:00)

## 2022-02-16 NOTE — TELEPHONE ENCOUNTER
---------------------  From: Briana Colunga LPN (Phone Messages Pool ([id number]_[facility]))   Sent: 2/12/2021 1:54:27 PM CST  Subject: travel/covid test     Phone Message    PCP:   JAE      Time of Call:  1:20pm       Person Calling:  pt  Phone number:  [phone] OK to LM     Returned call at: 1:43pm    Note:   Pt LM stating him and his wife will be traveling out of the country and need covid tests. Pt asking if we offer PCR or antigen testing.    Returned call and informed pt our testing is PCR. Told pt they will need appts to obtain orders.    Pt also wondering what treatment he should ask for if he gets covid when they are out of the country and is hospitalized. Told pt treatment would depend on symptoms/severity and would be up to the hospital.    Last office visit and reason:  1/29/21 pyogenic granuloma

## 2022-02-16 NOTE — TELEPHONE ENCOUNTER
Entered by Joi Dutton CMA on October 06, 2021 8:45:01 AM CDT  faxed to [facility] at [phone]  confirmation rec'd and scanned to chart      ---------------------  From: Dave Lei MD   To: [employer] ([id number] [facility] [place]);     Sent: 10/6/2021 8:08:25 AM CDT  Subject: General Message     Please provide the following letter with attached [facility] denial    To Whom It may concern,    I obviously deem continued use of Hakeem's cyclosporine 100mg capsules as safe and medically necessary with consideration of his medical history (kidney transplantation) and any attempt to delay his receipt of immunosuppressant medication as unsafe and unnecessary.      Eric Lei MD

## 2022-02-16 NOTE — PROGRESS NOTES
Patient:   CRYSTAL TEJEDA            MRN: [id number]            FIN: [id number]               Age:   47 years     Sex:  Male     :  1970   Associated Diagnoses:   H/O kidney transplant; IgA nephropathy; Suspected exposure to mold   Author:   Dave Lei MD      Visit Information      Date of Service: 2018 11:17 am  Performing Location: [facility]  Encounter#: [id number]      Primary Care Provider (PCP):  Dave Lei MD    NPI# [id number]      Referring Provider:  Dave Lei MD    NPI# [id number]      Chief Complaint   2018 11:26 AM CST   Mold was recently found in his office of 11yrs - would like to discuss        History of Present Illness     2018: Presents with specific concerns related to environmental mold exposure at his workplace.  He says his office is currently being treated for evidence of extensive mold in the facility.  He brings up concerns whether this has any relationship to his underlying chronic conditions especially given his immunosuppression for kidney transplant history.  Denies any fever chills.  No ongoing fever of unknown origin.  Denies any history of rhinosinusitis.  No underlying respiratory conditions or problems.  Denies any asthma or any wheezing problems.  Questions whether there may be any relationship to his underlying IgA nephropathy and environmental mold.         Review of Systems   Constitutional:  No fever, No chills.    Respiratory:  No shortness of breath, No cough.    Cardiovascular:  No chest pain.    Gastrointestinal:  No nausea, No vomiting, No diarrhea.    Neurologic:  Alert and oriented X4.    Psychiatric:  No anxiety.             Health Status   Allergies:    Allergic Reactions (Selected)  Severity Not Documented  Morphine derivatives (Headache.  nausea.)   Medications:  (Selected)   Prescriptions  Prescribed  CellCept 250 mg oral capsule: 4 cap(s) ( 1,000 mg ), PO, BID, # 240 cap(s), 3 Refill(s), OMAR, Type: Maintenance, Pharmacy:  [facility], 4 cap(s) po bid,x30 day(s)  Cialis 10 mg oral tablet: 1 tab(s) ( 10 mg ), po, prn, Instructions: take 1 hour prior to sexual activity as needed, # 30 tab(s), 11 Refill(s), Type: Maintenance, Pharmacy: [facility], Please keep on file and pt will notify when needed., 1 tab(s) po prn,Instr:take 1...  Metoprolol Tartrate 25 mg oral tablet: See Instructions, Instructions: TAKE 3 TABLETS BY MOUTH TWO TIMES DAILY, # 540 tab(s), Type: Soft Stop, Pharmacy: [email or web address]  Replacment CPAP +9 cm H2o: Replacment CPAP +9 cm H2o, See Instructions, Instructions: Heated humidifier, heated tubing, filters, nasal or full face mask of choice with headgear.  Replacement cushions and supplies as needed.  Change supplies every 6 mos  Months = 99 (Lifetime),...  Ritalin 5 mg oral tablet: See Instructions, Instructions: 1-2 tab(s) PO BID   last dose before 6 PM, # 120 EA, 0 Refill(s), Type: Maintenance, Pharmacy: [facility] [id number] [facility], 1-2 tab(s) PO BID ; last dose before 6 PM  Ritalin 5 mg oral tablet: See Instructions, Instructions: 1-2 tab(s) PO BID  last dose before 6 PM  Fill on 1/7/18., # 120 EA, 0 Refill(s), Type: Maintenance, Pharmacy: [facility] [id number] [facility], 1-2 tab(s) PO BID; last dose before 6 PM; Fill on 1/7/18.  allopurinol 300 mg oral tablet: See Instructions, Instructions: Take 1 tablet by mouth  twice a day with food  Maintain urine output  greater than 2 liters per  day, # 180 tab(s), 1 Refill(s), Type: Maintenance, Pharmacy: [email or web address], Take 1 tablet by mouth  twice a day wit...  cycloSPORINE 100 mg oral capsule: 1 cap(s) ( 100 mg ), po, q12 hrs, # 180 cap(s), 0 Refill(s), Type: Maintenance, 1 cap(s) po q12 hrs  cycloSPORINE 25 mg oral capsule: 1 cap(s) ( 25 mg ), po, q12 hrs, # 180 cap(s), 0 Refill(s), Type: Maintenance, Pharmacy: [facility], 1 cap(s) po q12 hrs  furosemide 20 mg oral tablet: See Instructions, Instructions: Take 1 tablet by mouth  daily as needed for  edema, # 90 tab(s), Type: Soft Stop, Pharmacy: [facility]  losartan 50 mg oral tablet: 1 tab(s) ( 50 mg ), PO, Daily, # 90 tab(s), 3 Refill(s), Type: Maintenance, Pharmacy: [facility], 1 tab(s) po daily  pravastatin 40 mg oral tablet: 1 tab(s) ( 40 mg ), po, hs, # 30 tab(s), 0 Refill(s), Type: Maintenance, Pharmacy: [facility], Patient to see primary MD for visit and labs for further refills.  probenecid 500 mg oral tablet: 1 tab(s) ( 500 mg ), po, bid, # 180 tab(s), 1 Refill(s), Type: Maintenance, Pharmacy: [facility], 1 tab(s) po bid  Suspended  colchicine 0.6 mg oral tablet: 1 tab(s) ( 0.6 mg ), PO, daily, PRN: for gout pain, # 90 tab(s), 1 Refill(s), Type: Maintenance, Pharmacy: [facility], please keep on file and pt will notify when needed, 1 tab(s) po daily,PRN:for gout pain  Documented Medications  Documented  Bactrim: See Instructions, Instructions: One on MWF, 0 Refill(s), Type: Maintenance  aspirin 81 mg oral tablet: 1 tab(s) ( 81 mg ), PO, Daily, # 30 tab(s), 0 Refill(s), Type: Maintenance      Histories   Social History.   Physical Examination   Vital Signs   1/23/2018 11:26 AM CST Temperature Tympanic 98.5 DegF    Peripheral Pulse Rate 76 bpm    Pulse Site Brachial artery    HR Method Electronic    Systolic Blood Pressure 143 mmHg  HI    Diastolic Blood Pressure 89 mmHg  HI    Mean Arterial Pressure 107 mmHg    BP Site Left arm    BP Method Electronic    BP Systolic Repeat 213 mmHg    BP Diastolic Repeat 80 mmHg    Oxygen Saturation 98 %      General:  Alert and oriented, No acute distress.    Eye:  Extraocular movements are intact.    HENT:  Normocephalic, Tympanic membranes are clear, No pharyngeal erythema, no sinus tenderness.    Neck:  Supple, Non-tender, No carotid bruit, No jugular venous distention, No lymphadenopathy.    Respiratory:  Lungs are clear to auscultation, Respirations are non-labored, Breath sounds are equal.    Cardiovascular:   Normal rate, Regular rhythm.    Musculoskeletal:  Normal range of motion, Normal strength.    Neurologic:  Alert, Oriented.    Psychiatric:  Appropriate mood & affect.       Impression and Plan   Diagnosis     H/O kidney transplant (HEN41-VI Z94.0).     IgA nephropathy (GAJ19-XS N07.9).     Suspected exposure to mold (LKG08-ZA Z77.120).       .) environmental mold exposures in history of transplant related immunosuppression  - no description of localizing respiratory based disease (no h/o recurrent sinusitis, bronchitis, asthma-like illnesses)   - nothing to support invasive testing with bronchoscopy  - no clinical evidence of disseminated endemic mycoses (no fever of unknown origin, night sweats, etc)   - for completeness, will check serum aspergillus EIA and urine histoplasma antigen to assess for any disseminated disease    Advised he discuss further with transplant coordinator whether any further testing more specific to his transplant status would be recommended   no

## 2022-02-16 NOTE — CARE COORDINATION
Pt appears on [facility] chronic disease panel as out of parameters for HTN.  Placed RTC CSS BP in 2 wks, RTC 9/2018 medication follow up.

## 2022-02-16 NOTE — NURSING NOTE
Vital Signs Entered On:  11/6/2019 4:21 PM CST    Performed On:  11/6/2019 4:20 PM CST by Mehnaz Gardner LPN               Vital Signs   Systolic Blood Pressure Supine :   127 mmHg   Diastolic Blood Pressure Supine :   85 mmHg   Systolic Blood Pressure Sitting :   123 mmHg   Diastolic Blood Pressure Sitting :   86 mmHg   Systolic Blood Pressure Standing :   110 mmHg   Diastolic Blood Pressure Standing :   74 mmHg   Pulse Rate Supine :   56 bpm   Pulse Rate Sitting :   60 bpm   Pulse Rate Standing :   66 bpm   Mehnaz Gardner LPN - 11/6/2019 4:20 PM CST

## 2022-02-16 NOTE — TELEPHONE ENCOUNTER
Order, notes and insurance card are sent to [facility] and they will contact patient.

## 2022-02-16 NOTE — TELEPHONE ENCOUNTER
Entered by Chrystal Georges CMA on November 18, 2019 11:27:10 AM CST  ---------------------  From: Chrystal Georges CMA   To: [facility]    Sent: 11/18/2019 11:27:10 AM CST  Subject: Medication Management     ** Submitted: **  Order:pravastatin (pravastatin 40 mg oral tablet)  1 tab(s)  Oral  qhs  Qty:  90 tab(s)        Refills:  0          Substitutions Allowed     Route To Pharmacy - [facility]    Signed by Chrystal Georges CMA  11/18/2019 11:26:00 AM    ** Submitted: **  Complete:pravastatin (pravastatin 40 mg oral tablet)   Signed by Chrystal Georges CMA  11/18/2019 11:27:00 AM    ** Not Approved:  **  pravastatin (PRAVASTATIN SOD 40MG TABLET)  TAKE 1 TABLET BY MOUTH  EVERY NIGHT AT BEDTIME  Qty:  30 tab(s)        Days Supply:  30        Refills:  0          Substitutions Allowed     Route To Pharmacy - [facility]   Signed by Chrystal Georges CMA            ------------------------------------------  From: [facility]  To: Dave Lei MD  Sent: November 18, 2019 10:31:49 AM CST  Subject: Medication Management  Due: November 19, 2019 10:31:49 AM CST    ** On Hold Pending Signature **  Drug: pravastatin (pravastatin 40 mg oral tablet)  TAKE 1 TABLET BY MOUTH  EVERY NIGHT AT BEDTIME  Quantity: 30 tab(s)  Days Supply: 30  Refills: 0  Substitutions Allowed  Notes from Pharmacy: - Ref: [id number]    Dispensed Drug: pravastatin (pravastatin 40 mg oral tablet)  TAKE 1 TABLET BY MOUTH  EVERY NIGHT AT BEDTIME  Quantity: 30 tab(s)  Days Supply: 30  Refills: 0  Substitutions Allowed  Notes from Pharmacy:   ------------------------------------------Med Refill      Date of last office visit and reason:  11/6/19; fatigue      Date of last Med Check / Px:   10/25/19; px  Date of last labs pertaining to med:  11/6/19    RTC order in chart:  yes; February    For Protocol refill, has patient been contacted:  n/a

## 2022-02-16 NOTE — PROGRESS NOTES
Patient:   HAKEEM TEJEDA            MRN: [id number]            FIN: [id number]               Age:   49 years     Sex:  Male     :  1970   Associated Diagnoses:   HTN - Hypertension; H/O kidney transplant; Erectile Dysfunction; IgA Nephropathy; Gout; Sleep apnea syndrome   Author:   Dave Lei MD      Visit Information      Date of Service: 10/25/2019 10:10 am  Performing Location: [facility]  Encounter#: [id number]      Primary Care Provider (PCP):  Dave Lei MD    NPI# [id number]      Referring Provider:  Dave Lei MD, NPI# [id number]      Chief Complaint   10/25/2019 10:29 AM CDT  Physical              Additional Information:No additional information recorded during visit.   Chief complaint and symptoms as noted above and confirmed with patient.  Recent lab and diagnostic studies reviewed with patient      History of Present Illness   2014: Presents to clinic for routine health examination. He has a history of living unrelated kidney transplant performed in , wife serving as his donor and doing well. His underlying kidney history is IgA nephropathy and also follows with Dr. Rene for general kidney cares, potentially interested in transferring his care more locally. Main complaints today related to worsening libido over time. He has a history of erectile dysfunction, fairly responsive to vardenafil therapy.  He s not noticed any change in testes size. He doesn t undergo screening posttransplant labs on a regular basis. Baseline creatinine is 1.8. He does not see a dermatologist on a regular basis. He does not check his blood pressure at home on a regular frequency.     10/25/2019: Hakeem returns for regular follow-up.  Overall doing well.  Continues to do surveillance labs through [facility] for his kidney transplant.  Remains on immunosuppression without any significant infectious complications.  Stable overall allograft function.  Blood pressures have been running  consistently on the higher side here in clinic.  He does not monitor blood pressures at home.  Gout care has improved considerably with dual therapy allopurinol and probenecid.  Uric acid levels checked earlier this spring which are remaining at goal.  He did learn that his father being diagnosed with colon cancer earlier this year.  He would like to begin colon cancer screening now.  Did discuss prostate cancer screening and does not express a strong interest in PSA screening.         Review of Systems   Constitutional:  Fatigue, No fever, No chills.    Eye:  Negative except as documented in history of present illness.    Ear/Nose/Mouth/Throat:  Negative except as documented in history of present illness.    Respiratory:  No shortness of breath.    Cardiovascular:  No chest pain, No palpitations, No peripheral edema, No syncope.    Gastrointestinal:  No nausea, No vomiting, No diarrhea, No abdominal pain.    Genitourinary:  No dysuria, No hematuria, No change in urine stream.    Hematology/Lymphatics:  Negative except as documented in history of present illness.    Endocrine:  No excessive thirst, No polyuria.    Immunologic:  No recurrent fevers.    Musculoskeletal:  No joint pain, No muscle pain, No decreased range of motion.    Neurologic:  Alert and oriented X4, No numbness, No tingling, No headache.       Health Status   Allergies:    Allergic Reactions (Selected)  Severity Not Documented  Morphine derivatives (Headache.  nausea.)   Medications:  (Selected)   Prescriptions  Prescribed  Bactrim 400 mg-80 mg oral tablet: See Instructions, Instructions: One on MWF, # 50 EA, 3 Refill(s), Type: Maintenance, Pharmacy: [facility], One on MWF  CellCept 250 mg oral capsule: = 4 cap(s) ( 1,000 mg ), PO, BID, Instructions: Fill generic only, # 112 cap(s), 0 Refill(s), OMAR, Type: Maintenance, Pharmacy: [id number], 4 cap(s) Oral bid,Instr:Fill generic only  Cialis 10 mg oral tablet: = 1 tab(s) ( 10 mg ),  po, prn, Instructions: take 1 hour prior to sexual activity as needed, # 30 tab(s), 3 Refill(s), Type: Maintenance, Pharmacy: [facility], Please keep on file and pt will notify when needed., 1 tab(s) Oral prn,Instr:take...  Metoprolol Tartrate 25 mg oral tablet: = 3 tab(s), Oral, bid, # 540 tab(s), 1 Refill(s), Type: Maintenance, Pharmacy: [facility]  Replacment CPAP +9 cm H2o: Replacment CPAP +9 cm H2o, See Instructions, Instructions: Heated humidifier, heated tubing, filters, nasal or full face mask of choice with headgear.  Replacement cushions and supplies as needed.  Change supplies every 6 mos  Months = 99 (Lifetime),...  Ritalin 5 mg oral tablet: See Instructions, Instructions: 2 tab(s) PO BID  last dose before 6 PM  Fill on 4/25/19, # 120 EA, 0 Refill(s), Type: Maintenance, [facility], 2 tab(s) PO BID; last dose before 6 PM; Fill on 4/25/19  Ritalin 5 mg oral tablet: See Instructions, Instructions: 2 tab(s) PO BID   last dose before 6 PM, # 120 EA, 0 Refill(s), Type: Maintenance, Pharmacy: [facility], 2 tab(s) PO BID ; last dose before 6 PM  allopurinol 300 mg oral tablet: = 1 tab(s), Oral, bidwm, Instructions: MAINTAIN URINE OUTPUT  GREATER THAN 2 LITERS PER  DAY., # 180 tab(s), 2 Refill(s), Type: Maintenance, Pharmacy: [facility]  cloNIDine 0.2 mg oral tablet: = 1 tab(s) ( 0.2 mg ), Oral, bid, # 180 tab(s), 3 Refill(s), Type: Maintenance, Pharmacy: [facility], 1 tab(s) Oral bid  cycloSPORINE 100 mg oral capsule: = 1 cap(s) ( 100 mg ), po, q12 hrs, # 180 cap(s), 3 Refill(s), Type: Maintenance, Pharmacy: [facility], 1 cap(s) Oral q12 hrs  cycloSPORINE 25 mg oral capsule: = 1 cap(s) ( 25 mg ), po, q12 hrs, # 180 cap(s), 3 Refill(s), Type: Maintenance, Pharmacy: [facility], faxed to Ozzy snell, 1 cap(s) Oral q12 hrs  furosemide 20 mg oral tablet: See Instructions, Instructions: Take 1 tablet by mouth  daily as needed for  edema, # 90 tab(s), 3 Refill(s), Type: Soft Stop, Pharmacy: [facility], Take 1 tablet by mouth  daily as needed for edema  losartan 50 mg oral tablet: = 1 tab(s), Oral, daily, # 90 tab(s), 3 Refill(s), Type: Maintenance, [facility], Pt is due appt, 1 tab(s) Oral daily  pravastatin 40 mg oral tablet: = 1 tab(s), Oral, qhs, # 90 tab(s), 3 Refill(s), Type: Maintenance, [facility]: [facility], pt due for med check., 1 tab(s) Oral qhs  probenecid 500 mg oral tablet: = 1 tab(s) ( 500 mg ), Oral, bid, # 180 tab(s), 3 Refill(s), Type: Maintenance, [facility], 1 tab(s) Oral bid,x90 day(s)  Documented Medications  Documented  aspirin 81 mg oral tablet: 1 tab(s) ( 81 mg ), PO, Daily, # 30 tab(s), 0 Refill(s), Type: Maintenance,    Medications          *denotes recorded medication          Replacment CPAP +9 cm H2o: See Instructions, Heated humidifier, heated tubing, filters, nasal or full face mask of choice with headgear.  Replacement cushions and supplies as needed.  Change supplies every 6 mos  Months = 99 (Lifetime), 1 EA, 11 Refill(s).          allopurinol 300 mg oral tablet: 1 tab(s), Oral, bidwm, MAINTAIN URINE OUTPUT  GREATER THAN 2 LITERS PER  DAY., 180 tab(s), 2 Refill(s).          *aspirin 81 mg oral tablet: 81 mg, 1 tab(s), PO, Daily, 30 tab(s).          cloNIDine 0.2 mg oral tablet: 0.2 mg, 1 tab(s), Oral, bid, 180 tab(s), 3 Refill(s).          cycloSPORINE 100 mg oral capsule: 100 mg, 1 cap(s), po, q12 hrs, 180 cap(s), 3 Refill(s).          cycloSPORINE 25 mg oral capsule: 25 mg, 1 cap(s), po, q12 hrs, 180 cap(s), 3 Refill(s).          furosemide 20 mg oral tablet: See Instructions, Take 1 tablet by mouth  daily as needed for edema, 90 tab(s), 3 Refill(s).          losartan 50 mg oral tablet: 1 tab(s), Oral, daily, 90 tab(s), 3 Refill(s).          Ritalin 5 mg oral tablet: See Instructions, 2 tab(s) PO BID  last dose before 6 PM  Fill on 4/25/19, 120 EA, 0  Refill(s).          Ritalin 5 mg oral tablet: See Instructions, 2 tab(s) PO BID   last dose before 6 PM, 120 EA, 0 Refill(s).          Metoprolol Tartrate 25 mg oral tablet: 3 tab(s), Oral, bid, 540 tab(s), 1 Refill(s).          CellCept 250 mg oral capsule: 1,000 mg, 4 cap(s), PO, BID, Fill generic only, 112 cap(s), 0 Refill(s).          pravastatin 40 mg oral tablet: 1 tab(s), Oral, qhs, 90 tab(s), 3 Refill(s).          probenecid 500 mg oral tablet: 500 mg, 1 tab(s), Oral, bid, for 90 day(s), 180 tab(s), 3 Refill(s).          Bactrim 400 mg-80 mg oral tablet: See Instructions, One on MWF, 50 EA, 3 Refill(s).          Cialis 10 mg oral tablet: 10 mg, 1 tab(s), po, prn, take 1 hour prior to sexual activity as needed, 30 tab(s), 3 Refill(s).       Problem list:    All Problems  Drug-induced gynecomastia / SNOMED CT 224186259 / Confirmed  Dyslipidemia / SNOMED CT 4847849612 / Confirmed  Lower extremity edema / SNOMED CT 641558151 / Confirmed  GERD (gastroesophageal reflux disease) / SNOMED CT 7312762392 / Confirmed  Acute gout / SNOMED CT 786471017 / Confirmed  H/O kidney transplant / SNOMED CT 1880099542 / Confirmed  HTN (hypertension) / SNOMED CT 7384425513 / Confirmed  IgA nephropathy / SNOMED CT 1116924787 / Confirmed  Nephrolithiasis / SNOMED CT 403054209 / Confirmed  Narcolepsy / SNOMED CT 570486070 / Confirmed  Obese / SNOMED CT 5920528694 / Probable  ED (erectile dysfunction) / SNOMED CT 2053846671 / Confirmed  Overweight / SNOMED CT 399857053 / Confirmed  Medication monitoring encounter / SNOMED CT 407832770 / Confirmed  Sleep apnea syndrome / SNOMED CT 072597759 / Confirmed  Plantar wart, left foot / SNOMED CT 534515765 / Confirmed  Inactive: Excessive sleepiness / SNOMED CT 4249670916  Resolved: Abnormal stress test / SNOMED CT 8342277595  Resolved: History of shingles / SNOMED CT 9788725419  Canceled: Erectile Dysfunction / ICD-9-.84      Histories   Past Medical History:    Active  Sleep apnea  syndrome (178280073): Onset on 10/7/2010 at 40 years.  Comments:  10/14/2010 CDT 4:06 PM CDT - Chico Bloom MD  AHI 6.6 REM AHI 34 RERA 6.8 O2 sat alok 84%  Drug-induced gynecomastia (608060383): Onset in the month of 9/2009 at 39 years  Comments:  2/7/2013 CST 8:54 AM Joi Mariee  Cyclosporin.   Consult with Dr. Quique Freeman on 09/03/2009.  Plan:  Speak with nephrologist about altering med.  Lower extremity edema (143773670): Onset in 2008 at 38 years.  Comments:  2/7/2013 CST 8:57 AM Joi Mariee  Chronic.  Needs compression and elevation.    2/7/2013 CST 9:09 AM Joi Mariee  Evaluated at [facility] to rule out DVT for bilateral lower extremity swelling.  Admited:  10/10/2008.    2/7/2013 CST 9:10 AM Joi Mariee  Discharged from [facility] on 10/13/2008.  Nephrolithiasis (110953267)  IgA nephropathy (0150199766)  H/O kidney transplant (1835394443)  HTN (hypertension) (2684952014)  Dyslipidemia (4208598025)  ED (erectile dysfunction) (1517374298)  Obese (1507957435)  Overweight (972462947)  GERD (gastroesophageal reflux disease) (2644091945)  Resolved  Abnormal stress test (5287087432): Onset in 2008 at 38 years.  Resolved.  History of shingles (2788871605):  Resolved.   Family History:    Cataract  Brother (Abimael)  Heart disease  Father  Comments:  2/7/2013 9:22 AM Joi Mariee  Bypass grafting performed.    2/7/2013 8:45 AM Joi Mariee  CAD at 50 years of age.  CA - Cancer of colon  Father     Procedure history:    Kidney transplant (085820245) in 2008 at 38 Years.   Social History:        Alcohol Assessment            Current, 1-2 times per week, 4 drinks/episode average.      Tobacco Assessment            Never      Substance Abuse Assessment            Never      Employment and Education Assessment            Employed, Work/School description: Manager, [employer].      Home and Environment Assessment            Marital status: .  Spouse/Partner name:  Nataly.      Nutrition and Health Assessment            Type of diet: Regular.      Exercise and Physical Activity Assessment            Exercise frequency: 3-4 times/week.  Exercise type: cardio.      Sexual Assessment            Sexually active: Yes.  Sexual orientation: Heterosexual.  Uses condoms: Yes.        Physical Examination   vital signs stable, as noted above   Vital Signs   10/25/2019 10:29 AM CDT Temperature Tympanic 97.4 DegF  LOW    Peripheral Pulse Rate 80 bpm    Systolic Blood Pressure 150 mmHg  HI    Diastolic Blood Pressure 80 mmHg    Mean Arterial Pressure 103 mmHg    BP Site Right arm      Measurements from flowsheet : Measurements   10/25/2019 10:29 AM CDT Height Measured - Standard 71.5 in    Weight Measured - Standard 266.4 lb    BSA 2.47 m2    Body Mass Index 36.63 kg/m2  HI      General:  Alert and oriented, No acute distress.    Eye:  Extraocular movements are intact.    HENT:  Normocephalic, Tympanic membranes are clear, Oral mucosa is moist, No pharyngeal erythema.    Neck:  Supple.    Respiratory:  Lungs are clear to auscultation, Respirations are non-labored, Breath sounds are equal.    Cardiovascular:  Normal rate, Regular rhythm, No murmur, No edema.    Gastrointestinal:  Soft, Non-tender, Non-distended, Normal bowel sounds.    Musculoskeletal:  Normal strength.    Neurologic:  Alert, Oriented, Normal motor function, No focal deficits.    Cognition and Speech:  Oriented, Speech clear and coherent.    Psychiatric:  Appropriate mood & affect.       Review / Management   Results review      Impression and Plan   Diagnosis     HTN - Hypertension (PMD53-GJ I10).     H/O kidney transplant (GMB66-GI Z94.0).     Erectile Dysfunction (PUL95-XF N52.9).     IgA Nephropathy (JPK73-LB N05.9).     Gout (UCI25-SP M10.9).     Sleep apnea syndrome (YDI47-YO G47.30).         .) gout; improved   - on allopurinol 300mg BID, probenecid 500mg BID   - colchicine 0.6mg daily prn use   - last uric acid  4.8 ('19)   - flares generally responsive to prednisone therapy    .) HtN, uncontrolled  current antihypertensive regimen:  metoprolol 75mg BID, clonidine 0.1mg BID, losartan 50mg   regimen changes: increase clonidine to 0.2mg BID  intolerance:  future titration/work-up plan:     .) ESRD, h/o LUKT in '08 at [facility] (previously on HD for ~3 months), baseline SCr ~1.8.  Original disease was IgA nephropathy  IS: cyclosporine 100mg BID, MMF 1g BID   - does have baseline 0.5-1g/g proteinuria   - higher suspicions for recurrent IgA.  Wouldn't pursue allograft biopsy at this point unless significant allograft functional decline   - on ASA 81mg daily    .) sleep disorder; RAUL with suspected narcolepsy based on previous evaluation by Dr. Bloom   - on Ritalin   - on CPAP    .) health maintenance  - arrange for colonoscopy now (father diagnosed with CRC)   - ASA 3: needing ECG  - adult vaccinations updated     RTC q6 months

## 2022-02-16 NOTE — TELEPHONE ENCOUNTER
---------------------  From: Mali Deleon RN (Phone Messages Pool ([id number]_[facility]))   To: [employer] Message Pool ([id number]_[place]);     Sent: 9/8/2021 1:15:29 PM CDT  Subject: Rx RF     Time of Call:  1243  Return call at:1305     Person Calling:  patient  Phone number:  [phone]    Note:   Rx RF requested  Date of last office visit: 1/29/21 [facility]     Prescription for Cellcept  last written on: 7/10/2021   Quantity: 240 Refill(s): 0        Prescription for Cyclosporin last written on: 4/30/2020   Quantity: 180 Refill(s): _---------------------  From: Minal Srivastava ([employer] Message Pool ([id number]_[facility]))   To: Dave Lei MD;     Sent: 9/8/2021 1:16:32 PM CDT  Subject: FW: Rx RF---------------------  From: Dave Lei MD   To: [employer] Message Pool ([id number]_[place]);     Sent: 9/8/2021 2:09:51 PM CDT  Subject: RE: Rx RF     okay with renewal x 1 yr.  He should continue to have his transplant labs done as [facility]- which pharmacy should this go to?  ** Submitted: **  Order:mycophenolate mofetil (CellCept 250 mg oral capsule)  4 cap(s)  PO  BID  Fill generic only  Qty:  240 cap(s)        Refills:  5          OMAR     Route To Pharmacy - [facility] [place]    Signed by Amilcar Freeman CMA  9/9/2021 11:31:00 PM UT  ** Submitted: **  Order:cycloSPORINE (cycloSPORINE 100 mg oral capsule)  1 cap(s)  po  q12 hrs  Qty:  180 cap(s)        Refills:  5          Substitutions Allowed     Route To Pharmacy - [facility]    Signed by Amilcar Freeman CMA  9/9/2021 11:31:00 PM UT  ** Submitted: **  Order:cycloSPORINE (cycloSPORINE 25 mg oral capsule)  1 cap(s)  po  q12 hrs  Qty:  180 cap(s)        Refills:  5          Substitutions Allowed     Route To Pharmacy - [facility]    Signed by Amilcar Freeman CMA  9/9/2021 11:32:00 PM UTCI called pt and left message that Rxs were filled per [facility] orders.  Amilcar Freeman CMAleft another  message for pt - overdue for labs - last labs recorded 5/2020   needs visit and labs - soon

## 2022-02-16 NOTE — TELEPHONE ENCOUNTER
Entered by Chrystal Georges CMA on January 24, 2020 7:36:52 AM CST  ---------------------  From: Chrystal Georges CMA   To: [facility]    Sent: 1/24/2020 7:36:52 AM CST  Subject: Medication Management     ** Not Approved: Patient has requested refill too soon, #90 sent 11/18/19 - needs appt for further refills **  pravastatin (PRAVASTATIN SOD 40MG TABLET)  TAKE 1 TABLET BY MOUTH  EVERY NIGHT AT BEDTIME  Qty:  90 tab(s)        Days Supply:  90        Refills:  0          Substitutions Allowed     Route To Pharmacy - [facility]   Signed by Chrystal Georges CMA            ------------------------------------------  From: [facility]  To: Dave Lei MD  Sent: January 24, 2020 4:07:09 AM CST  Subject: Medication Management  Due: January 25, 2020 4:07:09 AM CST    ** On Hold Pending Signature **  Drug: pravastatin (pravastatin 40 mg oral tablet)  TAKE 1 TABLET BY MOUTH  EVERY NIGHT AT BEDTIME  Quantity: 90 tab(s)  Days Supply: 90  Refills: 0  Substitutions Allowed  Notes from Pharmacy: - First Attempt Ref: [id number]    Dispensed Drug: pravastatin (pravastatin 40 mg oral tablet)  TAKE 1 TABLET BY MOUTH  EVERY NIGHT AT BEDTIME  Quantity: 90 tab(s)  Days Supply: 90  Refills: 0  Substitutions Allowed  Notes from Pharmacy:   ------------------------------------------

## 2022-02-16 NOTE — TELEPHONE ENCOUNTER
---------------------  From: Siomara Dove MA (Phone Messages Pool ([id number]))   To: Phone Messages Pool ([id number]);     Sent: 8/6/2019 11:37:47 AM CDT  Subject: Cellcept refill     Phone Message    PCP:   DASHAWN      Time of Call:  1127       Person Calling:  pt  Phone number:  [phone]    Reason for call:  pt  Returned call at: 1130    Note:   pt called stating that he is completely out of his CellCept and pharmacy has been trying to contact clinic for refill.  Per med list, [facility] refilled ex 3/28/19 for #720 w/ 3 refills.  [facility]MTCB that his pharmacy should have refills left and to see if he was wanting a small supply sent to a local pharmacy so he doesn't need to wait for MO supply to come in.    Last office visit and reason:  3/28/19 w/ [facility] for med check    Transferred to: _Patient returned call and stated that he has been told that he has no refills on his CellCept. He states that he is completely out. Contacted [employer] who stated a PA needs to be completed for patient's remaining refill. Asked that they please refax the PA.   PA: IRVING  Called [facility] in [place] to see if they carried CellCept. Pharmacist stated that they would have enough for a two week supply. She stated to send the rx and she would contact pt's Insurance try to get small supply covered until he can get his mail order.   Rx sent.---------------------  From: Deisy Wu CMA (Phone Messages Pool (32224[facility]))   To: J Message Pool (12224_[place]);     Sent: 8/6/2019 2:28:33 PM CDT  Subject: FW: Cellcept refill     Patient called @ 5119 stating insurance will only cover the generic version of Cellcept. He is requesting a call to [employer] to [place] to verify/fill generic version. She will need to be contacted to rush the process, which normally takes 7 days.---------------------  From: Kulwinder/Minal GOLDBERG ([facility] Message Pool (32224_[place]))   To: Chico Bloom MD;     Sent:  8/6/2019 2:38:02 PM CDT  Subject: FW: Cellcept refill---------------------  From: Chico Bloom MD   To: [employer] Message Pool ([id number]);     Sent: 8/6/2019 3:43:19 PM CDT  Subject: RE: Cellcept refill     OK to fill generic;Same dose as last time? They will cover Mycophenolate.---------------------  From: Minal Srivastava ([facility] Message Pool ([address] [place] [address]))   To: Chico Bloom MD;     Sent: 8/6/2019 4:07:00 PM CDT  Subject: FW: Cellcept refillPatient called stating he would like us to contact [facility] and confirm the small supply of generic. Patient would like a call immediately.---------------------  From: Deisy Wu CMA (Phone Messages Pool ([address] [place] [address]))   To: [employer] Message Pool ([address] [place] [address]);     Sent: 8/6/2019 4:18:26 PM CDT  Subject: FW: Cellcept refill---------------------  From: Chico Bloom MD   To: [employer] Message Pool ([address] [place] [address]);     Sent: 8/6/2019 4:23:42 PM CDT  Subject: RE: Cellcept refill     OKPer [facility]DL, dosage is the same. [facility] and [facility] for patient.---------------------  From: Deisy Wu CMA (Phone Messages Pool ([address] [place] [address]))   To: [employer] Message Pool ([address] [place] [address]);     Sent: 8/6/2019 5:06:28 PM CDT  Subject: FW: Cellcept refill     Please call [facility] and clarify if they want a new Rx sent on the generic or if we should go ahead with the PA. This is being refaxed.Spoke to [email or web address]. They stated they needed a PA for medication. Stated that the PA was faxed last night. Pharmacy said that this med cannot be filled until 8/17/19. Pharmacy filled Generic version. Patient notified.

## 2022-02-16 NOTE — TELEPHONE ENCOUNTER
---------------------  From: Tita Ford CMA ([facility]Rx Pool (32224_[facility]))   To: [facility] Message Pool (32224_[place]);     Sent: 6/24/2020 2:25:35 PM CDT  Subject: General Message     Medication Refill needing approval    PCP:   JAE    Medication:   Allopurinol    Date of last office visit and reason:   4/28/20 FMLA ppw.  Date of last labs pertaining to condition:  5/22/20    Note:  I refilled for 1 month. Please advise if are ok with filling longer and when you wnat to see pt back.    Return to Clinic order placed?  Yes pt was to return in Jan for Narcolepsy.     Resource:   [facility]---------------------  From: Joi Dutton CMA ([facility] Message Pool (32224_[facility]))   To: [employer] Message Pool (32224_[place]);     Sent: 6/24/2020 2:34:20 PM CDT  Subject: FW: General Message---------------------  From: Minal Srivastava ([employer] Message Pool (32224_[place]))   To: Chico Bloom MD;     Sent: 6/30/2020 3:32:09 PM CDT  Subject: FW: General Message---------------------  From: Chico Bloom MD   To: [employer] Message Pool (32224[facility]);     Sent: 7/6/2020 1:18:21 PM CDT  Subject: RE: General Message     OK to fill for one yearMedication filled per [employer].

## 2022-02-16 NOTE — TELEPHONE ENCOUNTER
---------------------  From: Joi Dutton CMA   To: Phone Messages Pool ([id number]_[place]);     Sent: 4/29/2020 4:53:43 PM CDT  Subject: General Message-cyclosporine     LM for pt to return call at 1652    request for pt's cyclosporine 25 and 100mg caps from [facility]  prev [facility]---------------------  From: Adele Arias RN (Phone Messages Pool ([id number]))   To: Joi Dutton CMA;     Sent: 4/29/2020 5:34:33 PM CDT  Subject: RE: General Message-cyclosporine     Pt returned call and states the [facility] is the specialty pharmacy through [facility] that he receives the Cyclosporine.  Unsure of how long [facility] wants refilled for.---------------------  From: Joi Dutton CMA   To: [facility] Message Pool ([id number]);     Sent: 4/30/2020 11:04:29 AM CDT  Subject: FW: General Message-cyclosporine---------------------  From: Joi Dutton CMA ([facility] Message Pool ([id number]))   To: Dave Lei MD;     Sent: 4/30/2020 12:44:44 PM CDT  Subject: FW: General Message-cyclosporine     okay to refill pt's cyclosporine 125mg caps?---------------------  From: Dave Lei MD   To: [facility] Message Pool ([id number]_[place]);     Sent: 4/30/2020 1:13:17 PM CDT  Subject: RE: General Message-cyclosporine     yesdone

## 2022-02-16 NOTE — LETTER
(Inserted Image. Unable to display)   May 29, 2019        CRYSTAL TEJEDA  [address], [place] [id number]        Dear CRYSTAL,      Thank you for selecting [facility] (previously [facility]) for your healthcare needs.    Our records indicate you are due for the following services:     Medication Check    To schedule an appointment or if you have further questions, please contact your primary clinic:   [facility]       [phone]   [facility]       [phone]              [facility]     [phone]      Powered by [employer]    Sincerely,    Chico Bloom M.D., FACP

## 2022-02-16 NOTE — NURSING NOTE
Depression Screening Entered On:  10/25/2019 12:01 PM CDT    Performed On:  10/25/2019 12:00 PM CDT by Joi Dutton CMA               Depression Screening   Little Interest - Pleasure in Activities :   Not at all   Feeling Down, Depressed, Hopeless :   Not at all   Initial Depression Screen Score :   0    Trouble Falling or Staying Asleep :   Not at all   Feeling Tired or Little Energy :   Several days   Poor Appetite or Overeating :   Not at all   Feeling Bad About Yourself :   Not at all   Trouble Concentrating :   Not at all   Moving or Speaking Slowly :   Not at all   Thoughts Better Off Dead or Hurting Self :   Not at all   Detailed Depression Screen Score :   1    Total Depression Screen Score :   1    BREANA Difficulty with Work, Home, Others :   Somewhat difficult   Joi Dutton CMA - 10/25/2019 12:00 PM CDT

## 2022-02-16 NOTE — TELEPHONE ENCOUNTER
---------------------  From: Bhavya Childers CMA (Phone Messages Pool (32224_[facility]))   To: [facility] Message Pool ([address]_[place]);     Sent: 6/2/2020 10:05:44 AM CDT  Subject: General Message     PCP:  JAE     Time of Call:  1004       Person Calling:  Hakeem  Phone number: [phone]    Returned call at: n/a    Note:   Patient called requesting a return to work note, please advise.     Last office visit and reason:  4/28/2020- telemed---------------------  From: Joi Dutton CMA ([facility] Message Pool ([address]224_[facility]))   To: Dave Lei MD;     Sent: 6/2/2020 12:51:58 PM CDT  Subject: FW: General Message---------------------  From: Dave Lei MD   To: [facility] Message Pool ([address]_[place]);     Sent: 6/2/2020 1:34:06 PM CDT  Subject: RE: General Message     Can you draft a note based on already documented conversations?talked with Hakeem and planning on returning to work 6/8  will call back with fax # and [facility] to [facility] [phone]

## 2022-02-16 NOTE — NURSING NOTE
PA for Methylphenidate was done today and denied because it was not noted on the form that Narcolepsy was confirmed by a sleep study.  Per [facility] Narcolepsy was confirmed by a sleep study.    Called [facility] to discuss.  Was told they will fax a new form to fill out so that we can note that on there, and then refax to [facility].  Will wait for fax.New form received and faxed with supporting documentation.  Will await decision.

## 2022-02-16 NOTE — TELEPHONE ENCOUNTER
---------------------  From: Lolita Bellamy CMA   Sent: 11/29/2019 10:45:43 AM CST  Subject: methylphenidate PA     Done via [facility].     Pt ID # [id number]

## 2022-02-16 NOTE — TELEPHONE ENCOUNTER
---------------------  From: Yodit Bolton RN   Sent: 10/6/2021 1:50:30 PM CDT  Subject: [facility]     [facility] called to inform us that cyclosporine has been approved.  They stated they would be faxing this information at well.

## 2022-02-16 NOTE — TELEPHONE ENCOUNTER
---------------------  From: Siomara Dove MA (Phone Messages Pool (32224_[facility]))   To: Jana Adames MD;     Sent: 4/6/2020 12:10:43 PM CDT  Subject: Work note     Phone Message    PCP:   JAE--OC all week      Time of Call:  1158       Person Calling:  pt  Phone number:  [phone]    Reason for call:  pt was given work note 3/18/2020 excusing him from work for the next 3 weeks due to COVID and that he is immunosuppressed.  He is due to go back to work this Wednesday.  He is wanting to know HCP recommendations.  He is feeling well, no s/s of URI/COVID.  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  Please advise.  Returned call at: _    Note:   _    Last office visit and reason:  _    Transferred to: _---------------------  From: Jana Adames MD   To: Phone Messages Pool ([address]_[place]);     Sent: 4/6/2020 12:23:00 PM CDT  Subject: RE: Work note     please schedule phone appointmentLM for pt that scheduling will contact him to schedule a telemed appt to further discuss going back to work.---------------------  From: Siomara Dove MA (Phone Messages Pool (32224_[facility]))   To: Appointment Pool ([address]_[place]);     Sent: 4/6/2020 1:27:28 PM CDT  Subject: FW: Work noteleft v/m to call back and schedulept calls at 0844 stating he called yesterday and waiting for a c/b on the Dr perspective of when he can RTW due to being on immunosuppressed medication.    GJKAILEY wrote a note to have him out x 3wks - due to go back tomorrow  called pt back at 09+00 and advised telemed visit per MJP recommendation, pt agrees and transferred to scheduling

## 2022-02-16 NOTE — LETTER
(Inserted Image. Unable to display)     February 28, 2020      CRYSTAL TEJEDA  [address] [facility]  [place], [place] [id number]          Dear CRYSTAL,      Thank you for selecting [facility] ([facility]viously [facility] for your healthcare needs.      Our records indicate you are due for the following services:     Follow-up office visit / Medication Check.      To schedule an appointment or if you have further questions, please contact your primary clinic:   [facility]       [phone]   [facility]       [phone]              [facility]     [phone]      Powered by [employer]    Sincerely,    Dave Lei MD

## 2022-02-16 NOTE — TELEPHONE ENCOUNTER
---------------------  From: Adele Arias RN (Phone Messages Pool (32224_[facility]))   Sent: 8/6/2019 9:25:11 AM CDT  Subject: General Message     Phone Message    PCP:   JAE      Time of Call:  0920       Person Calling:  Pt  Phone number:  [phone]    Returned call at: 0923    Note:   Patient called stating his pharmacy had sent refill requests last week and he has not received his refills. He stated that he is now out of medication. Per chart, Metoprolol and Allopurinol were refilled on 7-26-19. Left message for patient informing him of those refills and asked him to return call if he was needing refills of other medications.     Last office visit and reason:  3-28-19

## 2022-02-16 NOTE — LETTER
(Inserted Image. Unable to display)   April 02, 2019      CRYSTAL TEJEDA      [address] [place] [id number]        Dear CRYSTAL,    Thank you for selecting [facility] ([facility] for your healthcare needs.  Below you will find the results of your recent tests done at our clinic.     Results are unchanged.      Result Name Current Result Previous Result Reference Range   Uric Acid (mg/dL)  4.8 4/1/2019  4.0 - 8.0   Cholesterol (mg/dL)  167 4/1/2019  167 5/22/2018  - <200   Non-HDL Cholesterol ((H)) 135 4/1/2019  129 5/22/2018  - <130   HDL (mg/dL) ((L)) 32 4/1/2019 ((L)) 38 5/22/2018 >40 -    Cholesterol/HDL Ratio ((H)) 5.2 4/1/2019  4.4 5/22/2018  - <5.0   LDL  97 4/1/2019  98 5/22/2018    Triglyceride (mg/dL) ((H)) 269 4/1/2019 ((H)) 222 5/22/2018  - <150       Please contact me or my assistant at [phone] if you have any questions.     Sincerely,        Chico Bloom MD

## 2022-02-16 NOTE — TELEPHONE ENCOUNTER
---------------------  From: Lelsie Dutton CMAe ([facility] Message Pool (32224_[facility]))   To: [facility] Message Pool (32224_[place]);     Sent: 4/14/2020 10:40:48 AM CDT  Subject: General Message-FMLA  paperwork     Phone Message    PCP:   JDL or [facility]M      Time of Call:  1014       Person Calling:  self  Phone number:  [phone], ok LM    Note:   pt rec'd FMLA paperwork yesterday, needing completed by 4/24,   Please advise     Last office visit and reason:  telemed visit 4/7  ? pt able to drop off papers---------------------  From: Minal Srivastava ([facility] Message Pool (32[address]))   To: Chico Bloom MD;     Sent: 4/14/2020 11:01:12 AM CDT  Subject: FW: General Message-FMLA  paperwork---------------------  From: Chico Bloom MD   To: [facility] Message Pool ([address]_[place]);     Sent: 4/14/2020 11:26:04 AM CDT  Subject: RE: General Message-FMLA  paperwork     OK, he should not drop them off in person due to immunosuppression, however.LVMTCB.Pt called back advised to mail FMLA paperwork to us and we'll fax it the [facility] once Dr Bloom completes filling it out. He said it's due in 10 days. Pt requests that we mail him a copy of completed FMLA paperwork for his records.---------------------  From: Minal Srivastava ([facility] Message Pool (32224[facility]))   To: Chico Bloom MD;     Sent: 4/14/2020 2:21:34 PM CDT  Subject: FW: General Message-FMLA  paperwork---------------------  From: Chico Bloom MD   To: [facility]ASHAWN Message Pool ([address]_[place]);     Sent: 4/14/2020 2:47:27 PM CDT  Subject: RE: General Message-FMLA  paperwork     ok

## 2022-02-16 NOTE — TELEPHONE ENCOUNTER
---------------------  From: Lolita Bellamy CMA   Sent: 1/29/2021 1:23:48 PM CST  Subject: growth on neck     Pt LM at 1235 stating he noticed last week a growth on his neck that has bled and increased in size. Concerns d/t being immunosuppressed. Vacation end of month - wants to start process asap. Pt has already made an appt with [facility] when call was received. Appt at 1400.

## 2022-02-16 NOTE — NURSING NOTE
CAGE Assessment Entered On:  10/25/2019 12:00 PM CDT    Performed On:  10/25/2019 12:00 PM CDT by Joi Dutton CMA               Assessment   Have you ever felt you should cut down on your drinking :   Yes   Have people annoyed you by criticizing your drinking :   No   Have you ever felt bad or guilty about your drinking :   No   Have you ever taken a drink first thing in the morning to steady your nerves or get rid of a hangover (Eye-opener) :   No   CAGE Score :   1    Joi Dutton CMA - 10/25/2019 12:00 PM CDT

## 2022-02-16 NOTE — NURSING NOTE
9:42am Called and requested updated lab orders from Dr HETAL Clements' office. They were informed that pt is scheduled for a lab appt on 1/8/18.Orders received from Dr. Hurtado of  Transplant center; placing in pt chart

## 2022-02-16 NOTE — TELEPHONE ENCOUNTER
---------------------  From: Chrystal Georges CMA (eRx Pool ([id number]_[facility]))   To: Dave Lei MD;     Sent: 1/28/2019 2:48:03 PM CST  Subject: FW: Medication Management   Due Date/Time: 1/29/2019 2:40:00 PM CST       Medication Refill needing approval    PCP:   JAE    Medication:   allopurinol  Last Filled:  5/16/17    Quantity:  180  Refills:  1  CSA on file?   no     Date of last office visit and reason:   10/29/18; narcolepsy, gout, wart  Date of last labs pertaining to condition:  10/29/18    Return to Clinic order placed?  yes; overdue    Please advise refills as it has almost been 2 years since a 6 month supply was sent in.       ------------------------------------------  From: [employer] [facility]  To: Dave Lei MD  Sent: January 28, 2019 2:40:48 PM CST  Subject: Medication Management  Due: January 29, 2019 2:40:48 PM CST    ** On Hold Pending Signature **  Drug: allopurinol (allopurinol 300 mg oral tablet)  TAKE 1 TABLET BY MOUTH  TWICE A DAY WITH FOOD,  MAINTAIN URINE OUTPUT  GREATER THAN 2 LITERS PER  DAY.  Quantity: 180 tab(s)    Days Supply: 90        Refills: 0  Substitutions Allowed  Notes from Pharmacy: - Ref: [id number]    Dispensed Drug: allopurinol (allopurinol 300 mg oral tablet)  TAKE 1 TABLET BY MOUTH  TWICE A DAY WITH FOOD,  MAINTAIN URINE OUTPUT  GREATER THAN 2 LITERS PER  DAY.  Quantity: 180 tab(s)    Days Supply: 90        Refills: 0  Substitutions Allowed  Notes from Pharmacy:   ------------------------------------------

## 2022-02-16 NOTE — PROGRESS NOTES
Chief Complaint    denied a med, cpap compliance  History of Present Illness      Tract was diagnosed with sleep apnea in 2010 and is been on CPAP at current settings ever since.  It was initially diagnosed because of complaints of snoring.  His wife is happy.  He has had progressive daytime sleepiness over the past 3 or 4 years.  He is at work very early.  He arrives to 6 AM tends to get sleeping towards late morning about 10 or 11 which is lunchtime for him.  He is not sleeping in the drive to work or the drive home at 4 to 4:30 PM.  He is drowsy in the evening at home which annoys his wife.  He uses a CPAP every night.  He has had hypnagogic and hypnopompic hallucinations.  He has not had cataplexy.  He has had sleep paralysis.  He is experienced dreams intruding into wakefulness at times.  He does have trouble when he has to take a long drive with staying awake but not his commute to work.  Review of Systems      No headache.  No chest pain dyspnea edema.  No history of epilepsy or sheet seizures.  Physical Exam   Vitals & Measurements    HR: 76(Peripheral)  BP: 136/82     WT: 267 lb       Patient appears comfortable in no distress.  Alert and oriented.  Cranial nerves normal.  Chest clear.  Cardiac exam regular.  No edema.  Assessment/Plan       Daytime sleepiness         Given the favorable CPAP download doubt this is due to untreated or undertreated sleep apnea.  Differential is a residual chronic sleepiness from sleep apnea versus narcolepsy.       H/O kidney transplant       HTN (hypertension)        Controlled.       Narcolepsy        History has features suggestive of narcolepsy with cataplexy.  An MSLT in 2010 following a CPAP titration consistent with narcolepsy.        We will start low-dose Ritalin at the trouble some times a day mainly late morning and evening.  Follow-up in 2 months.  Discussed the risks and benefits of Ritalin.  His insurance provider would not cover armodafinil.       Sleep apnea  syndrome         Appears to be well treated.       Orders:         methylphenidate, See Instructions, Instructions: 1-2 tab(s) PO BID last dose before 6 PM, # 120 EA, 0 Refill(s), Type: Maintenance, Pharmacy: [id number], 1-2 tab(s) PO BID; last dose before 6 PM         methylphenidate, See Instructions, Instructions: 1-2 tab(s) PO BID last dose before 6 PM, # 120 EA, 0 Refill(s), Type: Maintenance, Pharmacy: [id number], 1-2 tab(s) PO BID ; last dose before 6 PM         Return to Clinic (Request), RFV: Narcolepsy, Return in 7-8 weeks  Patient Information     Name:CRYSTAL TEJEDA      Address:      [address] [place] [id number]     Sex:Male     YOB: 1970     [phone]     Emergency Contact:SACHIN TEJEDA     MRN:[id number]     FIN:[id number]     [facility]     Date of Service:12/07/2017      Primary Care Physician:       Dave Lei MD, [phone]  Problem List/Past Medical History    Ongoing     Acute gout     Drug-induced gynecomastia      Comments: Cyclosporin. Consult with Dr. Quique Freeman on 09/03/2009. Plan: Speak with nephrologist about altering med.     Dyslipidemia     ED (erectile dysfunction)     Excessive sleepiness     GERD (gastroesophageal reflux disease)     H/O kidney transplant     HTN (hypertension)     IgA nephropathy     Lower extremity edema      Comments: Discharged from [facility] on 10/13/2008. Evaluated at [facility] to rule out DVT for bilateral lower extremity swelling. Admited: 10/10/2008. Chronic. Needs compression and elevation.     Narcolepsy     Nephrolithiasis     Obese     Overweight     Sleep apnea syndrome      Comments: AHI 6.6 REM AHI 34 RERA 6.8 O2 sat alok 84%    Historical     Abnormal stress test     History of shingles  Procedure/Surgical History     Kidney transplant (2008)  Medications    allopurinol 300 mg oral tablet, See Instructions, 1 refills    aspirin 81 mg oral tablet,  81 mg= 1 tab(s), po, daily    Bactrim, See Instructions    CellCept 250 mg oral capsule, 1000 mg= 4 cap(s), po, bid, 3 refills    Cialis 10 mg oral tablet, 10 mg= 1 tab(s), po, prn, 11 refills    cycloSPORINE 100 mg oral capsule, 100 mg= 1 cap(s), po, q12 hrs    cycloSPORINE 25 mg oral capsule, 25 mg= 1 cap(s), po, q12 hrs    furosemide 20 mg oral tablet, See Instructions    losartan 50 mg oral tablet, 50 mg= 1 tab(s), po, daily, 3 refills    Metoprolol Tartrate 25 mg oral tablet, 75 mg= 3 tab(s), po, bid, 1 refills    pravastatin 40 mg oral tablet, 40 mg= 1 tab(s), po, hs, 1 refills    probenecid 500 mg oral tablet, 500 mg= 1 tab(s), po, bid, 1 refills    Replacment CPAP +9 cm H2o, See Instructions, 11 refills    Ritalin 5 mg oral tablet, See Instructions    Ritalin 5 mg oral tablet, See Instructions  Allergies    morphine derivatives (Headache. Nausea.)  Social History    Smoking Status - 09/22/2017     Never smoker     Alcohol - 05/16/2017      Current, 1-2 times per week, 4 drinks/episode average.     Employment and Education - 05/16/2017      Employed, Work/School description: Manager, [employer].     Exercise and Physical Activity - 05/16/2017      Exercise frequency: 3-4 times/week. Exercise type: cardio.     Home and Environment - 05/16/2017      Marital status: . Spouse/Partner name: Nataly.     Nutrition and Health - 05/16/2017      Type of diet: Regular.     Sexual - 05/16/2017      Sexually active: Yes. Sexual orientation: Heterosexual. Uses condoms: Yes.     Substance Abuse - 05/16/2017      Never     Tobacco - 05/16/2017      Never  Family History    Cataract: Brother.    Heart disease: Father.  Immunizations      Vaccine Date Status Comments      pneumococcal (PPSV23) 09/22/2017 Given      influenza virus vaccine, inactivated 09/22/2017 Given      influenza virus vaccine, inactivated 09/22/2017 Given      influenza virus vaccine, inactivated 09/22/2017 Given      influenza virus vaccine,  inactivated 09/23/2016 Given      influenza virus vaccine, inactivated 10/05/2015 Given      tetanus/diphth/pertuss (Tdap) adult/adol 02/06/2014 Given      influenza virus vaccine, inactivated 09/19/2013 Given      influenza virus vaccine, inactivated 01/09/2013 Given      influenza 09/13/2011 Given      influenza, H1N1, inactivated 01/06/2010 Recorded      influenza 09/25/2009 Recorded  Lab Results      Results (Last 90 days)                Laboratory                     Chemistry                          General Chemistry                               BUN:      H 32 mg/dL (Range 7 - 25)  (09/22/17 10:06 AM CDT)                                                                                                                                          BUN/Creat Ratio:      19   (09/22/17 10:06 AM CDT)                                                                                                                                          CO2 Level:      25 mmol/L  (09/22/17 10:06 AM CDT)                                                                                                                                          Calcium Level:      9.5 mg/dL  (09/22/17 10:06 AM CDT)                                                                                                                                          Chloride Level:      108 mmol/L  (09/22/17 10:06 AM CDT)                                                                                                                                          Creatinine Level:      H 1.66 mg/dL (Range 0.60 - 1.35)  (09/22/17 10:06 AM CDT)                                                                                                                                          Glucose Level:      87 mg/dL  (09/22/17 10:06 AM CDT)                                                                                                                                          Potassium Level:       5.2 mmol/L  (09/22/17 10:06 AM CDT)                                                                                                                                          Sodium Level:      140 mmol/L  (09/22/17 10:06 AM CDT)                                                                                                                                          eGFR:      L 48 mL/min/1.73m2 (Range > OR = 60 - )  (09/22/17 10:06 AM CDT)                                                                                                                                          eGFR :      L 56 mL/min/1.73m2 (Range > OR = 60 - )  (09/22/17 10:06 AM CDT)                                                                                                                    Diagnostic Results   Device interrogation for August 30 27th 17 median usage of 6 hours and 30 minutes pressure of 9 cm of water average AHI of 0.7.    Polysomnogram October 7, 2010 revealed mild obstructive sleep apnea with an RDI of 13.47 EEG abnormality.    CPAP titration November 11, 2010 revealed an optimal CPAP titration study denies any use of water pressure.    M SLT November 12, 2010 revealed a mean sleep latency of 9.8 minutes and 2 episodes of sleep onset REM..    It should be noted that the patient was referred to neurology for workup because of abnormality.

## 2022-02-16 NOTE — TELEPHONE ENCOUNTER
---------------------  From: Briana Colunga LPN (Phone Messages Pool ([address] [place] [address]))   To: JDL Message Pool ([address] [place] [address]);     Sent: 4/17/2020 10:01:58 AM CDT  Subject: FMLA paperwork     Phone Message    PCP:   JAE      Time of Call:  9:06am       Person Calling:  pt  Phone number:  [phone] OK to LM     Note:   Pt LM wondering if [facility] paperwork has been received and if it has been completed/faxed back to [place].     Pt says this is time sensitive as it needs to be completed and given to [facility] by 4/24 otherwise they may deny his claim.    Pt says if he is not able to answer just leave a voicemail with the status of paperwork.    Last office visit and reason:  4/7/20 Work restrictionsLooked in JDL box- ppw was not in there. Spoke to pt and let pt know that we have not got it yet. Patient stated it should be here by now. Will keep looking throughout the day for ppw. Pt aware that forms will not be able to be filled out until Monday as JDL OC today.---------------------  From: Minal Srivastava (JDL Message Pool ([address] [place] [address]))   To: Chico Bloom MD;     Sent: 4/17/2020 10:31:51 AM CDT  Subject: FW: FMLA paperwork---------------------  From: Mehnaz Gardner LPN (JDL Message Pool ([address] [place] [address]))   To: Chico Bloom MD;     Sent: 4/20/2020 11:52:24 AM CDT  Subject: FW: FMLA paperwork     Patient LVM today asking if JDL about forms.---------------------  From: Chico Bloom MD   To: JDL Message Pool ([address] [place] [address]);     Sent: 4/20/2020 12:53:32 PM CDT  Subject: RE: [facility] paperwork     Have not received it.---------------------  From: Chico Bloom MD   To: DASHAWN [employer] Pool ([id number]_[place]);     Sent: 4/20/2020 4:40:47 PM CDT  Subject: RE: [facility] paperwork     Completed and left with Health info for faxing.Pt notified-I advised to call them to confirm they received but we did fax.

## 2022-02-16 NOTE — TELEPHONE ENCOUNTER
---------------------  From: Deisy Wu CMA   To: Chico Bloom MD;     Sent: 7/8/2020 9:56:58 AM CDT  Subject: Mycophenolate refill     Received fax from [facility] requesting refill of Mycophenolate. Last filled 8/6/2019 for #112 and 0 refills. Please advise refill---------------------  From: Chico Bloom MD   To: Deisy Wu CMA;     Sent: 7/9/2020 10:51:25 AM CDT  Subject: RE: Mycophenolate refill     fill for one yearCould only fill x6mths as 90 day supply was 720 tablets.

## 2022-03-02 NOTE — TELEPHONE ENCOUNTER
---------------------  From: Braina Colunga LPN (Phone Messages Pool ([id number]))   To: JDL Message Pool ([id number]);     Sent: 1/7/2022 11:54:21 AM CST  Subject: ritalin Rx     Phone Message    PCP:  JAE asked for JDL      Time of Call:  11:33am       Person Calling:  pt  Phone number:  [phone]    Returned call at: _    Note:   Pt LM stating she is having an issue getting his Rx from [facility]. Pt says Rx currently is for 2 tabs BID but it should be 2 tabs once a day. Pt says he needs this corrected to get Rx.    2 Rx's for Ritalin 5mg sent 1/6/22 with different quantities. 1 says 2 tabs PO BID last dose before 6pm fill in 1 month #120 the other is only for #90    Please advise    Last office visit and reason:  1/6/22 Refills, replace CPAP with JDL---------------------  From: Kulwinder/Minal GOLDBERG (JEgenera Message Pool ([id number]))   To: Chico Bloom MD;     Sent: 1/7/2022 12:51:26 PM CST  Subject: FW: ritalin Rx---------------------  From: Chico Bloom MD   To: Fablic Message Pool ([id number]);     Sent: 1/7/2022 2:40:10 PM CST  Subject: RE: ritalin Rx     donePatient notified

## 2022-03-02 NOTE — NURSING NOTE
Depression Screening Entered On:  1/6/2022 8:43 AM CST    Performed On:  1/6/2022 8:42 AM CST by Joi Dutton CMA               Depression Screening   Little Interest - Pleasure in Activities :   Not at all   Feeling Down, Depressed, Hopeless :   Not at all   Initial Depression Screen Score :   0 Score   Poor Appetite or Overeating :   Not at all   Trouble Falling or Staying Asleep :   Not at all   Feeling Tired or Little Energy :   Several days   Feeling Bad About Yourself :   Not at all   Trouble Concentrating :   Not at all   Moving or Speaking Slowly :   Not at all   Thoughts Better Off Dead or Hurting Self :   Not at all   Difficulty at Work, Home, Getting Along :   Not difficult at all   Detailed Depression Screen Score :   1    Total Depression Screen Score :   1    Joi Dutton CMA - 1/6/2022 8:42 AM CST

## 2022-03-02 NOTE — NURSING NOTE
Comprehensive Intake Entered On:  1/6/2022 8:40 AM CST    Performed On:  1/6/2022 8:39 AM CST by Joi Dutton CMA               Summary   Chief Complaint :   phone visit - needing refills on meds - advised due for updated order for Transplant labs.  States getting his Booster shot next wk at [facility], advised due for flu shot   Joi Dutton CMA - 1/6/2022 8:39 AM CST   Health Status   Allergies Verified? :   Yes   Medication History Verified? :   Yes   Medical History Verified? :   Yes   Pre-Visit Planning Status :   Completed   Tobacco Use? :   Never smoker   Joi Dutton CMA - 1/6/2022 8:39 AM CST   Consents   Consent for Immunization Exchange :   Consent Granted   Consent for Immunizations to Providers :   Consent Granted   Joi Dutton CMA - 1/6/2022 8:39 AM CST   Social History   Social History   (As Of: 1/6/2022 8:40:45 AM CST)   Alcohol:        Current, 1-2 times per week, 4 drinks/episode average.   (Last Updated: 6/17/2014 9:34:22 AM CDT by Amilcar Freeman CMA)          Tobacco:        Never (less than 100 in lifetime)   (Last Updated: 1/29/2021 2:55:02 PM CST by Joi Dutton CMA)          Electronic Cigarette/Vaping:        Electronic Cigarette Use: Never.   (Last Updated: 1/29/2021 2:55:12 PM CST by Joi Dutton CMA)          Substance Abuse:        Never   (Last Updated: 6/13/2014 5:27:00 PM CDT by Joi Dutton CMA)          Employment/School:        Employed, Work/School description: Manager, [employer].   (Last Updated: 2/7/2013 8:46:28 AM CST by Joi Fraser)          Home/Environment:        Marital status: .  Spouse/Partner name: Nataly.   (Last Updated: 5/16/2017 10:45:12 AM CDT by Gertrudis Jones)          Nutrition/Health:        Type of diet: Regular.   (Last Updated: 5/16/2017 10:45:17 AM CDT by Gertrudis Jones)          Exercise:        Exercise frequency: 3-4 times/week.  Exercise type: cardio.   (Last Updated: 5/16/2017 10:45:29 AM CDT by Gertrudis Jones)          Sexual:        Sexually  active: Yes.  Sexual orientation: Heterosexual.  Uses condoms: Yes.   (Last Updated: 5/16/2017 10:45:38 AM CDT by Gertrudis Jones)

## 2022-03-02 NOTE — TELEPHONE ENCOUNTER
Entered by Joi Dutton CMA on December 31, 2021 3:27:29 PM CST  Due for appt, pt contacted and he agrees to set up appt.  States he has enough med to get by until appt  transferred to scheduling      ** Patient matched by Marija KAPOORJoi on 12/31/2021 3:24:11 PM CST **      ------------------------------------------  From: [facility]  To: Dave Lei MD  Sent: December 29, 2021 2:43:07 PM CST  Subject: Medication Management  Due: December 15, 2021 8:09:06 PM CST     ** On Hold Pending Signature **     Drug: metoprolol (Metoprolol Tartrate 75 mg oral tablet), TAKE 1 TABLET BY MOUTH TWICE DAILY  Quantity: 180 tab(s)  Days Supply: 90  Refills: 0  Substitutions Allowed  Notes from Pharmacy: - Ref: [id number]     Dispensed Drug: metoprolol (Metoprolol Tartrate 75 mg oral tablet), TAKE 1 TABLET BY MOUTH TWICE DAILY  Quantity: 180 tab(s)  Days Supply: 90  Refills: 3  Substitutions Allowed  Notes from Pharmacy: Requesting 1 year supply     ** On Hold Pending Signature **     Drug: metoprolol (Metoprolol Tartrate 75 mg oral tablet), TAKE 1 TABLET BY MOUTH TWICE DAILY  Quantity: 180 tab(s)  Days Supply: 90  Refills: 0  Substitutions Allowed  Notes from Pharmacy: - 2nd Attempt Ref: [id number]     Dispensed Drug: metoprolol (Metoprolol Tartrate 75 mg oral tablet), TAKE 1 TABLET BY MOUTH TWICE DAILY  Quantity: 180 tab(s)  Days Supply: 90  Refills: 3  Substitutions Allowed  Notes from Pharmacy: Requesting 1 year supply  ------------------------------------------  ** Not Approved: Refill not appropriate, pt needs appt, pt contacted **  metoprolol (METOPROLOL TART TAB 75MG)  TAKE 1 TABLET BY MOUTH  TWICE DAILY  Qty:  180 tab(s)        Days Supply:  90        Refills:  3          Substitutions Allowed     Route To Pharmacy - [facility]   Note from Pharmacy:  Requesting 1 year supply  Signed by Joi Dutton CMA---------------------  From: Joi Dutton CMA   To: [facility]    Sent: 12/31/2021 3:28:04  PM CST  Subject: Medication Management     ** Not Approved: Refill not appropriate **  metoprolol (METOPROLOL TART TAB 75MG)  TAKE 1 TABLET BY MOUTH  TWICE DAILY  Qty:  180 tab(s)        Days Supply:  90        Refills:  3          Substitutions Allowed     Route To Pharmacy - [facility]   Note from Pharmacy:  Requesting 1 year supply  Signed by Joi Dutton CMA

## 2022-03-02 NOTE — TELEPHONE ENCOUNTER
---------------------  From: Joi Dutton CMA   Sent: 1/6/2022 12:10:37 PM CST  Subject: General Message-ritalin PA     PA initiated for methylphenidate in covermymedsapprovedVM received from pt, informing there was an insurance issue re: methylphenidate.   TC with pt, informing the PA has just been completed and should be able to  medication later today. Pt expressed appreciation.

## 2022-03-02 NOTE — NURSING NOTE
CAGE Assessment Entered On:  1/6/2022 8:43 AM CST    Performed On:  1/6/2022 8:42 AM CST by Joi Dutton CMA               Assessment   Have you ever felt you should cut down on your drinking :   No   Have people annoyed you by criticizing your drinking :   No   Have you ever felt bad or guilty about your drinking :   No   Have you ever taken a drink first thing in the morning to steady your nerves or get rid of a hangover (Eye-opener) :   No   CAGE Score :   0    Joi Dutton CMA - 1/6/2022 8:42 AM CST

## 2022-03-02 NOTE — PROGRESS NOTES
Chief Complaint    phone visit - needing refills on meds - advised due for updated order for Transplant labs.  States getting his Booster shot next wk at [facility], advised due for flu shot  History of Present Illness       Today's visit was conducted via telephone due to the COVID-19 pandemic.  Patient's consent to telephone visit was obtained and documented.       Call Start Time:  0900       Call End Time:   0915       Hakeem is a 51-year-old with history of hypertension, narcolepsy, gout, dyslipidemia kidney transplant, and sleep apnea who needs his medications refilled.  He is planning on getting a flu shot and a Covid booster at the pharmacy.  He will be in contact with his transplant center for follow-up and lab order.  His CPAP device is old and he would like a replacement which seems to be in order.  He is using every night.  He has chronic daytime sleepiness uses his Ritalin very sparingly.  He is working at the office but social distances near.  Review of Systems       No fever, chills, cough, dyspnea.  No edema.  Assessment/Plan       Acute gout (M10.9)          no gout on allopurinol.         Ordered:          [id number] physician telephone evaluation 11-20 min (Charge), Quantity: 1, Hypertension with goal blood pressure less than 130/80  Narcolepsy  Acute gout  Dyslipidemia  H/O kidney transplant  Sleep apnea syndrome                Dyslipidemia (E78.5)          On statin.         Ordered:          [id number] physician telephone evaluation 11-20 min (Charge), Quantity: 1, Hypertension with goal blood pressure less than 130/80  Narcolepsy  Acute gout  Dyslipidemia  H/O kidney transplant  Sleep apnea syndrome                H/O kidney transplant (Z94.0)          Needs follow-up and labs.         Ordered:          [id number] physician telephone evaluation 11-20 min (Charge), Quantity: 1, Hypertension with goal blood pressure less than 130/80  Narcolepsy  Acute gout  Dyslipidemia  H/O kidney transplant   Sleep apnea syndrome                Hypertension with goal blood pressure less than 130/80 (I10)          Asked him to get his blood pressure checked at his earliest convenience.         Ordered:          [id number] physician telephone evaluation 11-20 min (Charge), Quantity: 1, Hypertension with goal blood pressure less than 130/80  Narcolepsy  Acute gout  Dyslipidemia  H/O kidney transplant  Sleep apnea syndrome                Narcolepsy (G47.419)          Renewed Ritalin.  Follow-up in 2 months.         Ordered:          95455 physician telephone evaluation 11-20 min (Charge), Quantity: 1, Hypertension with goal blood pressure less than 130/80  Narcolepsy  Acute gout  Dyslipidemia  H/O kidney transplant  Sleep apnea syndrome                Sleep apnea syndrome (G47.30)          Patient is compliant with therapy and benefiting.  Replacement CPAP.         Ordered:          04553 physician telephone evaluation 11-20 min (Charge), Quantity: 1, Hypertension with goal blood pressure less than 130/80  Narcolepsy  Acute gout  Dyslipidemia  H/O kidney transplant  Sleep apnea syndrome                Orders:         allopurinol, See Instructions, Instructions: TAKE 1 TABLET BY MOUTH TWICE DAILY WITH MEALS MAINTAIN URINE OUTPUT GREATER THAN 2 LITERS PER DAY, # 180 tab(s), 3 Refill(s), Type: Maintenance, Pharmacy: [facility], TAKE 1 TABLET BY MOUTH TWICE DAILY [place]..., (Ordered)         cloNIDine, See Instructions, Instructions: TAKE 1 TABLET BY MOUTH TWICE DAILY, # 180 tab(s), 3 Refill(s), Pharmacy: [facility], TAKE 1 TABLET BY MOUTH TWICE DAILY, 71.5, in, 04/28/20 15:00:00 CDT, Height Measured, 282, lb, 01/29/21 14:53:00 CST, Zuly..., (Ordered)         cycloSPORINE, = 1 cap(s), Oral, q12 hrs, # 180 cap(s), 3 Refill(s), Type: Maintenance, Pharmacy: [facility], 1 cap(s) Oral q12 hrs, 71.5, in, 04/28/20 15:00:00 CDT, Height Measured, 282, lb, 01/29/21 14:53:00 CST, Weight Measured,  (Ordered)         cycloSPORINE, = 1 cap(s), Oral, q12 hrs, # 180 cap(s), 3 Refill(s), Type: Maintenance, Pharmacy: [facility], 1 cap(s) Oral q12 hrs, 71.5, in, 04/28/20 15:00:00 CDT, Height Measured, 282, lb, 01/29/21 14:53:00 CST, Weight Measured, (Ordered)         losartan, = 1 tab(s), Oral, daily, # 90 tab(s), 3 Refill(s), Type: Maintenance, Pharmacy: [facility], Pt is due appt, 1 tab(s) Oral daily, 71.5, in, 04/28/20 15:00:00 CDT, Height Measured, 282, lb, 01/29/21 14:53:00 CST, Weight Measured, (Ordered)         methylphenidate, See Instructions, Instructions: 2 tab(s) PO BID last dose before 6 PM, # 90 EA, 0 Refill(s), Type: Hard Stop, Pharmacy: [facility], (Completed)         methylphenidate, See Instructions, Instructions: 2 tab(s) PO BID last dose before 6 PM Fill in one month, # 120 EA, 0 Refill(s), Type: Maintenance, Pharmacy: [facility], 2 tab(s) PO BID; last dose before 6 PM; Fill in one month, 71.5, in, 04/28/20 15:00:00..., (Ordered)         methylphenidate, See Instructions, Instructions: 2 tab(s) PO BID last dose before 6 PM Fill on 4/25/19, # 120 EA, 0 Refill(s), Type: Hard Stop, Pharmacy: [facility], (Completed)         methylphenidate, See Instructions, Instructions: 2 tab(s) PO BID last dose before 6 PM, # 90 EA, 0 Refill(s), Type: Maintenance, Pharmacy: [facility], 2 tab(s) PO BID ; last dose before 6 PM, 71.5, in, 04/28/20 15:00:00 CDT, Height Measured, 282, lb, 01/29/..., (Ordered)         mycophenolate mofetil, See Instructions, Instructions: TAKE 4 CAPSULES BY MOUTH TWICE DAILY, # 720 cap(s), 3 Refill(s), OMAR, Pharmacy: [facility], TAKE 4 CAPSULES BY MOUTH TWICE DAILY, 71.5, in, 04/28/20 15:00:00 CDT, Height Measured, 282, lb, 01/29/21 14:53:00..., (Ordered)         pravastatin, = 1 tab(s), Oral, qhs, # 90 tab(s), 3 Refill(s), Type: Maintenance, Pharmacy: [facility], Pt now due for 6 month f/u per [facility] for further  refills., 1 tab(s) Oral qhs, 71.5, in, 04/28/20 15:00:00 CDT, Height Measured, 282, lb, 01/29/21 14:53:0..., (Ordered)         sulfamethoxazole-trimethoprim, See Instructions, Instructions: One on MWF, # 50 EA, 3 Refill(s), Type: Maintenance, Pharmacy: [facility], One on MWF, 71.5, in, 04/28/20 15:00:00 CDT, Height Measured, 282, lb, 01/29/21 14:53:00 CST, Weight Measured, (Ordered)         tadalafil, = 1 tab(s) ( 10 mg ), po, prn, Instructions: take 1 hour prior to sexual activity as needed, # 30 tab(s), 3 Refill(s), Type: Maintenance, Pharmacy: [facility], 1 tab(s) Oral prn,Instr:take 1 hour prior to sexual activity as needed, 71.5, in,..., (Ordered)         Return to Clinic (Request), RFV: Medication check for Narcolepsy, Return in 2 months         Return to Clinic (Request), RFV: BP check with CSS  Patient Information     Name:CRYSTAL TEJEDA      Address:      [address] [place] [id number]     Sex:Male     YOB: 1970     [phone]     Emergency Contact:SACHIN TEJEDA     MRN:[id number]     FIN:[id number]     Location:[facility]     Date of Service:01/06/2022      Primary Care Physician:       Dave Lei MD, [phone]      Attending Physician:       Chico Bloom MD, [phone]  Problem List/Past Medical History    Ongoing     Acute gout     Drug-induced gynecomastia       Comments: Cyclosporin. Consult with Dr. Quique Freeman on 09/03/2009. Plan: Speak with nephrologist about altering med.     Dyslipidemia     ED (erectile dysfunction)     Excessive sleepiness     GERD (gastroesophageal reflux disease)     H/O kidney transplant     Hypertension with goal blood pressure less than 130/80     IgA nephropathy     Lower extremity edema       Comments: Discharged from [facility] on 10/13/2008. Evaluated at [facility] to rule out DVT for bilateral lower extremity swelling. Admited: 10/10/2008. Chronic. Needs compression and elevation.      Medication monitoring encounter     Narcolepsy     Nephrolithiasis     Obese     Overweight     Plantar wart, left foot     Sleep apnea syndrome       Comments: AHI 6.6 REM AHI 34 RERA 6.8 O2 sat alok 84%    Historical     Abnormal stress test     History of shingles  Procedure/Surgical History     Kidney transplant (2008)  Medications    allopurinol 300 mg oral tablet, See Instructions, 3 refills    aspirin 81 mg oral tablet, 81 mg= 1 tab(s), Oral, daily    Bactrim 400 mg-80 mg oral tablet, See Instructions, 3 refills    CellCept 250 mg oral capsule, See Instructions, 3 refills    Cialis 10 mg oral tablet, 10 mg= 1 tab(s), Oral, prn, 3 refills    cloNIDine 0.1 mg oral tablet, See Instructions, 3 refills    cycloSPORINE 100 mg oral capsule, 1 cap(s), Oral, q12 hrs, 3 refills    cycloSPORINE 25 mg oral capsule, 1 cap(s), Oral, q12 hrs, 3 refills    losartan 50 mg oral tablet, 1 tab(s), Oral, daily, 3 refills    Metoprolol Tartrate 75 mg oral tablet, 75 mg= 1 tab(s), Oral, bid    pravastatin 40 mg oral tablet, 1 tab(s), Oral, qhs, 3 refills    Replacment CPAP +9 cm H2o, See Instructions, 11 refills    Ritalin 5 mg oral tablet, See Instructions    Ritalin 5 mg oral tablet, See Instructions  Allergies    morphine derivatives (Headache. Nausea.)  Social History    Smoking Status     Never smoker     Alcohol      Current, 1-2 times per week, 4 drinks/episode average.     Electronic Cigarette/Vaping      Electronic Cigarette Use: Never.     Employment/School      Employed, Work/School description: Manager, [employer].     Exercise      Exercise frequency: 3-4 times/week. Exercise type: cardio.     Home/Environment      Marital status: . Spouse/Partner name: Nataly.     Nutrition/Health      Type of diet: Regular.     Sexual      Sexually active: Yes. Sexual orientation: Heterosexual. Uses condoms: Yes.     Substance Abuse      Never     Tobacco      Never (less than 100 in lifetime)  Family History    CA - Cancer of  colon: Father.    Cataract: Brother.    Heart disease: Father.    Sister: History is negative  Immunizations          Scheduled Immunizations          Dose Date(s)          influenza virus vaccine, inactivated          10/01/2017          Other Immunizations          influenza          09/25/2009, 09/13/2011          influenza virus vaccine, inactivated          01/09/2013, 09/19/2013, 10/05/2015, 09/23/2016, 09/22/2017, 09/22/2017, 09/22/2017, 10/29/2018, 10/25/2019, 01/29/2021          pneumococcal (PPSV23)          09/22/2017          tetanus/diphth/pertuss (Tdap) adult/adol          02/06/2014          influenza, H1N1, inactivated          01/06/2010          pneumococcal (PCV13)          10/25/2019          SARS-CoV-2 (COVID-19) Moderna-1273          04/08/2021, 05/06/2021

## 2022-03-02 NOTE — TELEPHONE ENCOUNTER
---------------------  From: Chico Bloom MD   To: Sleep Message Pool ([id number]_[place]);     Sent: 1/6/2022 9:03:43 AM CST  Subject: General Message   Actions: Notify the patient for appointment      Replacement CPAP with download in 90 days.Spoke with patient.   Patient's information has been faxed.

## 2022-03-02 NOTE — LETTER
(Inserted Image. Unable to display)   January 20, 2022  CRYSTAL TEJEDA  [address] [place] [address]        Dear CRYSTAL,    Thank you for selecting [facility] for your healthcare needs.    Our records indicate you are due for the following services:     Clinical Support Staff (CSS)-Only Blood Pressure Check ~ Please stop in anytime to have your blood pressure rechecked. This is a free service and no appointment necessary.     So we can best determine if your medications are effective in lowering your blood pressure, please make sure your blood pressure medicine has been in your system for at least 1-2 hours prior to coming in.  We encourage you to avoid caffeine or other stimulants prior to having your blood pressure checked and come at a time when you are not feeling rushed.     If you check your blood pressure at home, please bring in your blood pressure monitor and home blood pressure readings.  We will check your machine for accuracy and also share your home readings with your Healthcare Provider.     (FYI   Regarding office visits: In some instances, a video visit or telephone visit may be offered as an option.)    To schedule an appointment or if you have further questions, please contact your clinic at [phone].    Powered by [email or web address] and [employer]    Sincerely,    Chico Bloom MD, FACP

## 2022-07-27 DIAGNOSIS — I15.1 HTN, KIDNEY TRANSPLANT RELATED: Primary | ICD-10-CM

## 2022-07-27 DIAGNOSIS — Z94.0 HTN, KIDNEY TRANSPLANT RELATED: Primary | ICD-10-CM

## 2022-07-28 ENCOUNTER — TELEPHONE (OUTPATIENT)
Dept: FAMILY MEDICINE | Facility: CLINIC | Age: 52
End: 2022-07-28

## 2022-07-28 DIAGNOSIS — M10.9 GOUT, UNSPECIFIED CAUSE, UNSPECIFIED CHRONICITY, UNSPECIFIED SITE: Primary | ICD-10-CM

## 2022-07-28 RX ORDER — PREDNISONE 20 MG/1
20 TABLET ORAL 2 TIMES DAILY
Qty: 20 TABLET | Refills: 0 | Status: SHIPPED | OUTPATIENT
Start: 2022-07-28 | End: 2022-08-07

## 2022-07-28 RX ORDER — METOPROLOL TARTRATE 75 MG/1
TABLET, FILM COATED ORAL
Qty: 180 TABLET | Refills: 1 | Status: SHIPPED | OUTPATIENT
Start: 2022-07-28 | End: 2023-05-26

## 2022-07-28 NOTE — TELEPHONE ENCOUNTER
Patient called requesting Prednisone for his gout flare up.    Medication is not on med list.  Medication is for acute flare up.    Pharmacy: [facility] in [place].

## 2022-07-28 NOTE — TELEPHONE ENCOUNTER
Reason for Call:  Medication or medication refill:    Do you use a [facility]?  Name of the pharmacy and phone number for the current request:  [facility] [place]    Name of the medication requested: prednosone    Other request: pt is not sure if this got sent to the right pharmacy when last saw JDL in Jan of 2022  Patient uses as needed basis and is having a gout flare up.  Please address as soon as possible    Can we leave a detailed message on this number? YES    Phone number patient can be reached at: Home number on file [phone] (home)    Best Time: anytime    Call taken on 7/28/2022 at 11:27 AM by Blanca Rosenbaum

## 2022-07-28 NOTE — TELEPHONE ENCOUNTER
Routing refill request to provider for review/approval because:  BP is out of range. Per Epic, BMP and CBC last completed 5/22/20.  Last visit 1/6/22. Please advise on refill request.   Metoprolol patient reported.   Last office visit: 1/6/22

## 2022-09-20 ENCOUNTER — TELEPHONE (OUTPATIENT)
Dept: FAMILY MEDICINE | Facility: CLINIC | Age: 52
End: 2022-09-20

## 2022-09-20 DIAGNOSIS — Z94.0 KIDNEY REPLACED BY TRANSPLANT: Primary | ICD-10-CM

## 2022-09-20 RX ORDER — CYCLOSPORINE 25 MG/1
25 CAPSULE, LIQUID FILLED ORAL 2 TIMES DAILY
Qty: 180 CAPSULE | Refills: 0 | Status: SHIPPED | OUTPATIENT
Start: 2022-09-20 | End: 2023-10-12

## 2022-09-20 RX ORDER — CYCLOSPORINE 100 MG/1
100 CAPSULE, LIQUID FILLED ORAL 2 TIMES DAILY
Qty: 180 CAPSULE | Refills: 0 | Status: SHIPPED | OUTPATIENT
Start: 2022-09-20 | End: 2023-01-03

## 2022-09-20 NOTE — TELEPHONE ENCOUNTER
Last Written Prescription Date: 1/6/22  Last Fill Quantity: 180,  # refills: 3   Last office visit: 1/6/22

## 2022-09-20 NOTE — TELEPHONE ENCOUNTER
9-20-22    Medication Question or Refill        What medication are you calling about (include dose and sig)?:   NEORAL (BRAND) 100 MG CAPSULE  Cyclosporine 100mg  Cyclosporine 25mg      Controlled Substance Agreement on file:   CSA -- Patient Level:    CSA: None found at the patient level.       Who prescribed the medication?: shayna    Do you need a refill? Yes:     When did you use the medication last? daily    Patient offered an appointment? No    Do you have any questions or concerns?  No    Preferred Pharmacy:       [facility]  ([facility]) - [address] [address]  Phone: [phone] Fax: [phone]          Okay to leave a detailed message?: Yes at Cell number on file:    Telephone Information:   Mobile [phone]

## 2022-09-23 DIAGNOSIS — Z94.0 KIDNEY REPLACED BY TRANSPLANT: ICD-10-CM

## 2022-09-26 RX ORDER — CYCLOSPORINE 100 MG/1
CAPSULE, LIQUID FILLED ORAL
Qty: 180 CAPSULE | Refills: 3 | OUTPATIENT
Start: 2022-09-26

## 2022-09-26 RX ORDER — CYCLOSPORINE 25 MG/1
CAPSULE, LIQUID FILLED ORAL
Qty: 180 CAPSULE | Refills: 3 | OUTPATIENT
Start: 2022-09-26

## 2022-09-26 NOTE — TELEPHONE ENCOUNTER
Duplicate -   Neoral 100 mg - filled 9/20/2022 for 90 days  Neoral 25 mg - filled 9/20/2022 for 90 days  [facility]  - Receipt confirmed by pharmacy (9/20/2022  3:08 PM CDT)

## 2022-10-31 ENCOUNTER — VIRTUAL VISIT (OUTPATIENT)
Dept: FAMILY MEDICINE | Facility: CLINIC | Age: 52
End: 2022-10-31
Payer: COMMERCIAL

## 2022-10-31 ENCOUNTER — TELEPHONE (OUTPATIENT)
Dept: TRANSPLANT | Facility: CLINIC | Age: 52
End: 2022-10-31

## 2022-10-31 ENCOUNTER — TELEPHONE (OUTPATIENT)
Dept: FAMILY MEDICINE | Facility: CLINIC | Age: 52
End: 2022-10-31

## 2022-10-31 DIAGNOSIS — N18.32 CHRONIC KIDNEY DISEASE, STAGE 3B (H): ICD-10-CM

## 2022-10-31 DIAGNOSIS — Z53.9 ERRONEOUS ENCOUNTER--DISREGARD: Primary | ICD-10-CM

## 2022-10-31 DIAGNOSIS — Z94.0 KIDNEY REPLACED BY TRANSPLANT: ICD-10-CM

## 2022-10-31 DIAGNOSIS — U07.1 INFECTION DUE TO 2019 NOVEL CORONAVIRUS: Primary | ICD-10-CM

## 2022-10-31 PROCEDURE — 99214 OFFICE O/P EST MOD 30 MIN: CPT | Mod: CS | Performed by: NURSE PRACTITIONER

## 2022-10-31 RX ORDER — CLONIDINE HYDROCHLORIDE 0.1 MG/1
1 TABLET ORAL 2 TIMES DAILY
COMMUNITY
Start: 2022-08-11 | End: 2024-02-28

## 2022-10-31 RX ORDER — PREDNISONE 10 MG/1
40 TABLET ORAL PRN
COMMUNITY

## 2022-10-31 RX ORDER — ALLOPURINOL 300 MG/1
1 TABLET ORAL 2 TIMES DAILY
COMMUNITY
Start: 2022-10-13 | End: 2023-03-30

## 2022-10-31 NOTE — TELEPHONE ENCOUNTER
Transplant followed elsewhere.  Per Hakeem he follows with Dr. Eric Lei.    COVID-19 Vaccination completed:Yes (3)    DSA: Yes Last resulted: 11/7/19    Reports on:    Neoral 125 mg BID    Cellcept 1000 mg BID    Last seen by Dr. Hurtado in 2019.  Reports Dr. Lei manages and prescribes IS meds.  RNCC recommended he call his local Provider that follows his Transplant.  Hakeem reports Dr. Lei is on vacation.  RNCC called [phone] and spoke with Neph Coordinator.   RNCC discussed we usually decrease MMF and reassess in a week.   And that we also recommend monoAb.  Neph Coordinator recommended patient call and ask for Nurse / nurse line so he can be triaged.    Called back Hakeem Romero and discuss calling and asking for nurse line.  Verbalized understanding and agreement to plan.  States he has a 4PM visit with general Provider.  RNCC dsicussed always make Provider what immunosuppression medication he is on so they know if they start him on one that interacts.

## 2022-10-31 NOTE — TELEPHONE ENCOUNTER
Symptoms    Describe your symptoms: fever, chest congest, sore throat, sinus congestion, headache    Any pain: No    How long have you been having symptoms: 2  days    Have you been seen for this:  No    Appointment offered?: Yes: pt has Virtual appt for Covid Tx    Triage offered?: No    Home remedies tried: N/A      Patient took at home test on Sat which was neg; went to [facility] on Sunday and that one came back positive.  Was informed by Transplant Coordinator to contact Nephrologist.  Would like input/advice if something more specific is needed for treatment.      Okay to leave a detailed message?: Yes at Cell number on file:    Telephone Information:   Mobile [phone]

## 2022-10-31 NOTE — PROGRESS NOTES
Hakeem is a 52 year old who is being evaluated via a billable video visit.      How would you like to obtain your AVS? MyChart  If the video visit is dropped, the invitation should be resent by: Text to cell phone: [phone]  Will anyone else be joining your video visit? No    Assessment & Plan     Infection due to 2019 novel coronavirus  Labs checked, GFR 44, may use decreased renal dosing of Paxlovid.  - Basic metabolic panel  (Ca, Cl, CO2, Creat, Gluc, K, Na, BUN)  - CBC with platelets  - nirmatrelvir and ritonavir (PAXLOVID) therapy pack  Dispense: 30 each; Refill: 0    Kidney replaced by transplant  - Basic metabolic panel  (Ca, Cl, CO2, Creat, Gluc, K, Na, BUN)  - CBC with platelets    Chronic kidney disease, stage 3b (H)  Monitor labs.      No follow-ups on file.    Belem Meléndez [facility]   Hakeem is a 52 year old, presenting for the following health issues:  Covid Concern    HPI     COVID-19 Symptom Review - 10/30/2022  How many days ago did these symptoms start? 10/29/2022    Are any of the following symptoms significant for you?    New or worsening difficulty breathing? No    Worsening cough? Yes, I am coughing up mucus.    Fever or chills? Yes, I felt feverish or had chills.    Headache: YES    Sore throat: YES    Chest pain: YES- Not Pain, Chest Congestion    Diarrhea: YES    Body aches? YES    What treatments has patient tried? Acetaminophen   Does patient live in a nursing home, group home, or shelter? No  Does patient have a way to get food/medications during quarantined? Yes, I have a friend or family member who can help me.        Review of Systems   Constitutional, HEENT, cardiovascular, pulmonary, GI, , musculoskeletal, neuro, skin, endocrine and psych systems are negative, except as otherwise noted.\      Objective       Vitals:  No vitals were obtained today due to virtual visit.    Physical Exam   GENERAL: Healthy, alert and no distress  EYES: Eyes  grossly normal to inspection.  No discharge or erythema, or obvious scleral/conjunctival abnormalities.  RESP: No visible cyanosis.  No visible retractions or increased work of breathing.    SKIN: Visible skin clear. No significant rash, abnormal pigmentation or lesions.  NEURO: Cranial nerves grossly intact.  Mentation and speech appropriate for age.  PSYCH: Mentation appears normal, affect normal/bright, judgement and insight intact, normal speech and appearance well-groomed.    No results found for any visits on 10/31/22.            Video-Visit Details    Video Start Time: 515    Type of service:  Video Visit    Video End Time:5:38 PM    Originating Location (pt. Location): Home    Distant Location (provider location):  On-site    Platform used for Video Visit: ZulemaWell

## 2022-10-31 NOTE — TELEPHONE ENCOUNTER
Stat labs ordered to be done first thing in the am. Will wait for results and order antiviral as appropriate.     Belem Meléndez, CNP

## 2022-10-31 NOTE — TELEPHONE ENCOUNTER
General  Route to LPN    Reason for call: Hakeem tested positive for COVID (home test was negative but either [facility]) came back positive.     He is experiencing fever, chest congestion, stuffy nose , chills, and head aches at times.     Call back needed? Yes  Return Call Needed  Same as documented in contacts section  When to return call?: Same day: Route High Priority

## 2022-11-01 ENCOUNTER — LAB (OUTPATIENT)
Dept: LAB | Facility: CLINIC | Age: 52
End: 2022-11-01
Payer: COMMERCIAL

## 2022-11-01 DIAGNOSIS — Z94.0 KIDNEY REPLACED BY TRANSPLANT: ICD-10-CM

## 2022-11-01 DIAGNOSIS — U07.1 INFECTION DUE TO 2019 NOVEL CORONAVIRUS: Primary | ICD-10-CM

## 2022-11-01 PROBLEM — N18.32 CHRONIC KIDNEY DISEASE, STAGE 3B (H): Status: ACTIVE | Noted: 2022-11-01

## 2022-11-01 LAB
ANION GAP SERPL CALCULATED.3IONS-SCNC: 10 MMOL/L (ref 7–15)
BUN SERPL-MCNC: 32.6 MG/DL (ref 6–20)
CALCIUM SERPL-MCNC: 9.2 MG/DL (ref 8.6–10)
CHLORIDE SERPL-SCNC: 102 MMOL/L (ref 98–107)
CREAT SERPL-MCNC: 1.83 MG/DL (ref 0.67–1.17)
DEPRECATED HCO3 PLAS-SCNC: 24 MMOL/L (ref 22–29)
ERYTHROCYTE [DISTWIDTH] IN BLOOD BY AUTOMATED COUNT: 12.8 % (ref 10–15)
GFR SERPL CREATININE-BSD FRML MDRD: 44 ML/MIN/1.73M2
GLUCOSE SERPL-MCNC: 118 MG/DL (ref 70–99)
HCT VFR BLD AUTO: 41.6 % (ref 40–53)
HGB BLD-MCNC: 14.4 G/DL (ref 13.3–17.7)
MCH RBC QN AUTO: 29.9 PG (ref 26.5–33)
MCHC RBC AUTO-ENTMCNC: 34.6 G/DL (ref 31.5–36.5)
MCV RBC AUTO: 87 FL (ref 78–100)
PLATELET # BLD AUTO: 160 10E3/UL (ref 150–450)
POTASSIUM SERPL-SCNC: 4.4 MMOL/L (ref 3.4–5.3)
RBC # BLD AUTO: 4.81 10E6/UL (ref 4.4–5.9)
SODIUM SERPL-SCNC: 136 MMOL/L (ref 136–145)
WBC # BLD AUTO: 9.7 10E3/UL (ref 4–11)

## 2022-11-01 PROCEDURE — 85027 COMPLETE CBC AUTOMATED: CPT

## 2022-11-01 PROCEDURE — 80048 BASIC METABOLIC PNL TOTAL CA: CPT

## 2022-11-01 PROCEDURE — 36415 COLL VENOUS BLD VENIPUNCTURE: CPT

## 2022-11-01 NOTE — PROGRESS NOTES
Called # [phone]     Has not picked up paxlovid. Advised not to . To take molnupiravir instead.   Patient stated an understanding and agreed with plan.     TOÑO GARCIA RN on 11/1/2022 at 1:05 PM   [facility]

## 2022-11-01 NOTE — PATIENT INSTRUCTIONS
COVID-19 Outpatient Treatments  Your care team can help you find the best treatments for COVID-19. Talk to a health care provider or refer to the FDA medicine fact sheets below.    Important: You CAN'T have molnupiravir or Paxlovid if you are starting the medicine more than 5 days after your symptoms have started.  Paxlovid: https://www.fda.gov/media/685281/download  Molnupiravir: https://www.fda.gov/media/458485/download  Monoclonal antibodies: https://combatcovid.hhs.gov/what-are-monoclonal-antibodies  Paxlovid (nimatrelvir and ritonavir)  How it works  Two medicines (nirmatrelvir and ritonavir) are taken together. They stop the virus from growing. Less amount of virus is easier for your body to fight.  Benefits  Lowers risk of a hospital stay or death from COVID-19.  How to take    Medicine comes in a daily container with both medicine tablets. Take by mouth twice daily (once in the morning, once at night) for 5 days.    The number of tablets to take varies by patient.    Don't chew or break capsules. Swallow whole.  When to take  Take as soon as possible after positive COVID-19 test result, and within 5 days of your first symptoms.  Who can take it  Patients must be 12 years or older, weigh at least 88 pounds, and have tested positive for COVID-19. This is the preferred treatment for pregnant patients.  Possible side effects  Can cause altered sense of taste, diarrhea (loose, watery stools), high blood pressure, muscle aches.  Medicine conflicts    Some medicines may conflict with Paxlovid and may cause serious side effects.    Tell your care team about all the medicines you take, including prescription and over-the-counter medicines, vitamins and herbal supplements.    Your provider will review your medicines to make sure that you can safely take Paxlovid.  Cautions    Paxlovid is not advised for patients with severe kidney or liver disease. If you have kidney or liver problems, the dose may need to be  changed.    If you are pregnant or breastfeeding, talk to your care team about your options.    If you are sexually active, use trusted birth control while taking Paxlovid.  Molnupiravir  How it works  Stops the virus from growing. Less amount of virus is easier for your body to fight.  Benefits  Lowers risk of a hospital stay or death from COVID-19.  How to take  Take 4 capsules by mouth every 12 hours (4 in the morning and 4 at night) for 5 days. Don't chew or break capsules. Swallow whole.  When to take  Take as soon as possible after positive COVID-19 test result, and within 5 days of your first symptoms.  Who can take it  Patients must be 18 years or older and have tested positive for COVID-19.  Possible side effects  Diarrhea (loose, watery stools), nausea (feeling sick to your stomach), dizziness, headaches.  Medicine conflicts  Right now, there are no known conflicts with other drugs. But tell your care team all medicines you take.  Cautions    This is not advised for patients who are pregnant.    Patients who could become pregnant should use trusted birth control until 4 days after their last dose.    Sexually active people of any gender should use trusted birth control for 3 months after their last dose.  Monoclonal antibodies  How it works  Monoclonal antibodies can detect pieces of the COVID virus and stop it from infecting your cells.  Benefits  Lowers risk of a hospital stay or death from COVID-19. Monoclonal antibodies are known to work well against the omicron variant.  How it is given to you  You will receive the treatment either by an infusion through your vein (IV) or shots.  When to take  Get as soon as possible after you test positive for COVID-19, and within 7 days of your first symptoms.  Who can take it  Patients must be 12 years or older, weigh at least 88 pounds and have tested positive for COVID-19.  Possible side effects  Fever, chills, diarrhea (loose, watery stools), dizziness,  itchiness and rash.  More serious side effects include: fever, difficulty breathing, low oxygen level in your blood, chills, tiredness, fast or slow heart rate, chest discomfort or pain, weakness, confusion, nausea, headache, shortness of breath, low or high blood pressure, wheezing, swelling of your lips, face, or throat, rash including hives, itching, muscle aches, dizziness, feeling faint and sweating.  If you receive an IV, you may have brief pain, bleeding and bruising of the skin, soreness, swelling and possible infection at the place where you get the IV needle.  Medicine conflicts  Please tell you care team other medicines you take so they can assess if there are any conflicts.  Cautions  Your doctor will talk with you about risks and benefits of this treatment and will help choose the best option for you.  For informational purposes only. Not to replace the advice of your health care provider.  Copyright   2022 [facility]. All rights reserved. Clinically reviewed by Anne Mcguire. [employer] [id number] - 08/22.    Instructions for Patients      What are the symptoms of COVID-19?  Symptoms can include fever, cough, shortness of breath, chills, headache, muscle pain sore throat, fatigue, runny or stuffy nose, and loss of taste and smell. Other less common symptoms include nausea, vomiting, or diarrhea (watery stools).    Know when to call 911. Emergency warning signs include:    Trouble breathing or shortness of breath    Pain or pressure in the chest that doesn't go away    Feeling confused like you haven't felt before, or not being able to wake up    Bluish-colored lips or face    How can I take care of myself?  1. Get lots of rest. Drink extra fluids (unless a doctor has told you not to).  2. Take Tylenol (acetaminophen) for fever or pain. If you have liver or kidney problems, ask your family doctor if it's okay to take Tylenol   Adults:   650 mg (two 325 mg pills or tablets) every 4 to 6 hours,  or...   1,000 mg (two 500 mg pills or tablets) every 8 hours as needed.  Note: Don't take more than 3,000 mg in one day. Acetaminophen is found in many medicines (both prescribed and over the counter). Read all labels to be sure you don't take too much.  For children, check the Tylenol bottle for the right dose. The dose is based on the child's age or weight.  3. Take over the counter medicines for your symptoms as needed. Talk to your pharmacist.  4. If you have other health problems (like cancer, heart failure, an organ transplant, or severe kidney disease): Call your specialty clinic if you don't feel better in the next 2 days.    Where can I get more information?     [facility] COVID-19 Resource Hub: www.dELiAs.org/covid19/     CDC Quarantine & Isolation: https://www.cdc.gov/coronavirus/2019-ncov/your-health/quarantine-isolation.html     CDC - What to Do If You're Sick: https://www.cdc.gov/coronavirus/2019-ncov/if-you-are-sick/index.html    [facility] clinical trials (COVID-19 research studies): clinicalaffairs.[facility]/umn-clinical-trials    [place] Department of Health COVID-19 Public Hotline: 1-623.661.4569  Coping with Life After COVID-19  Being in the hospital because of COVID-19 is scary. Going home can be scary, too. You may face changes to your life, the way you work or what you can eat. It s hard to adjust to change, and it s normal to feel afraid, frustrated or even angry. These feelings usually go away over time. If your feelings don t start to get better, it s called  adjustment disorder.      What signs should I look out for?  Adjustment disorder can happen to anyone in a time of stress. It makes it hard to cope with daily life. You may feel lonely or fight with loved ones, even if you re glad to be home. Watch for these signs:  Fear or worry  Hard time focusing  Sadness or anger  Trouble talking to family or friends  Feeling like you don t fit in or isolating  yourself  Problems with sleep   Drinking alcohol or taking drugs to cope    What can I do?  You can help yourself get better. Feeling you have control helps you move forward. You may wonder if you ll be able to do things you did before. Be patient. Do your best to make the most of every day. Try to build relationships, be as active as you can, eat right and keep a sense of humor. Avoid smoking and drinking too much alcohol. Call your family doctor or clinic if you re not sure what to do. They can guide you to care or other services.    When should I get help?  Think about getting counseling if your sadness or frustration gets worse. Together with a trained counselor, you can talk about your problems adjusting to life after your hospital stay. You can come up with new ways to handle changes that give you more control. Your family doctor or care team can help you find a counselor.     Get help if you re thinking about hurting yourself. If you need help right away, call 911 or go to the nearest emergency room. You can also try the Crisis Text Line.    Crisis Text Line: 575-615 (http://www.crisistextline.org)  The Crisis Text Line serves anyone, in any crisis. It gives free, 24/7 support. Here's how it works:  Text HOME to 739725 from anywhere in the [place], anytime, about any type of crisis.  A live, trained Crisis Counselor will text you back quickly.  The volunteer Crisis Counselor can help you move from a  hot moment  to a  cool moment.  They can also help you work out a safety plan.

## 2022-11-10 NOTE — TELEPHONE ENCOUNTER
See lab result note from 11/1 and virtual visit notes.     TOÑO GARCIA RN on 11/10/2022 at 12:25 PM   [facility]

## 2022-11-22 ENCOUNTER — TELEPHONE (OUTPATIENT)
Dept: FAMILY MEDICINE | Facility: CLINIC | Age: 52
End: 2022-11-22

## 2022-11-23 NOTE — TELEPHONE ENCOUNTER
Prior Authorization Not Needed per Insurance    Medication: NEORAL (BRAND) 100 MG capsule--NO PA NEEDED  Insurance Company: [employer] ([facility]) - Phone [phone] Fax [phone]  Expected CoPay:      Pharmacy Filling the Rx: [facility] - [place], [place] - [address]  Pharmacy Notified: Yes  Patient Notified: Yes **Instructed pharmacy to notify patient when script is ready to /ship.**

## 2022-11-23 NOTE — TELEPHONE ENCOUNTER
PA Initiation    Medication: NEORAL (BRAND) 100 MG capsule   Insurance Company: [employer] ([facility]) - Phone [phone] Fax [phone]  Pharmacy Filling the Rx: [facility] - [place], [place] - [address] [facility]  Filling Pharmacy Phone: [phone]  Filling Pharmacy Fax: [phone]  Start Date: 11/23/2022

## 2022-12-13 DIAGNOSIS — M10.9 GOUT, UNSPECIFIED CAUSE, UNSPECIFIED CHRONICITY, UNSPECIFIED SITE: Primary | ICD-10-CM

## 2022-12-14 RX ORDER — ALLOPURINOL 300 MG/1
300 TABLET ORAL 2 TIMES DAILY
Qty: 180 TABLET | Refills: 0 | Status: SHIPPED | OUTPATIENT
Start: 2022-12-14 | End: 2023-03-01

## 2022-12-14 NOTE — TELEPHONE ENCOUNTER
Routing refill request to provider for review/approval because:  LOV 1/06/2022    Gout Agents Protocol Failed     ALT on file in past 12 months    Has Uric Acid on file in past 12 months and value is less than 6    Normal serum creatinine on file in the past 12 months        TRACI Lopez  [facility]

## 2022-12-16 DIAGNOSIS — E78.5 DYSLIPIDEMIA: Primary | ICD-10-CM

## 2022-12-16 RX ORDER — PRAVASTATIN SODIUM 40 MG
40 TABLET ORAL DAILY
Qty: 90 TABLET | Refills: 0 | Status: SHIPPED | OUTPATIENT
Start: 2022-12-16 | End: 2023-03-12

## 2022-12-20 DIAGNOSIS — Z94.0 KIDNEY REPLACED BY TRANSPLANT: ICD-10-CM

## 2022-12-20 DIAGNOSIS — D75.1 POST-TRANSPLANT ERYTHROCYTOSIS: Primary | ICD-10-CM

## 2022-12-20 RX ORDER — MYCOPHENOLATE MOFETIL 250 MG/1
1000 CAPSULE ORAL 2 TIMES DAILY
Qty: 720 CAPSULE | Refills: 0 | Status: SHIPPED | OUTPATIENT
Start: 2022-12-20 | End: 2023-03-08

## 2022-12-20 NOTE — TELEPHONE ENCOUNTER
Routing to provider as refill request for review/approval because:  Drug not on the [facility] refill protocol   Last office visit: 1/6/22    Adri Guerrero RN  [facility]

## 2022-12-28 ENCOUNTER — TELEPHONE (OUTPATIENT)
Dept: FAMILY MEDICINE | Facility: CLINIC | Age: 52
End: 2022-12-28

## 2022-12-31 DIAGNOSIS — Z94.0 KIDNEY REPLACED BY TRANSPLANT: ICD-10-CM

## 2023-01-03 DIAGNOSIS — I15.1 HTN, KIDNEY TRANSPLANT RELATED: Primary | ICD-10-CM

## 2023-01-03 DIAGNOSIS — Z94.0 HTN, KIDNEY TRANSPLANT RELATED: Primary | ICD-10-CM

## 2023-01-03 RX ORDER — CYCLOSPORINE 100 MG/1
CAPSULE, LIQUID FILLED ORAL
Qty: 180 CAPSULE | Refills: 3 | Status: SHIPPED | OUTPATIENT
Start: 2023-01-03 | End: 2024-02-08

## 2023-01-03 NOTE — TELEPHONE ENCOUNTER
Last office visit: 1/6/22 1/29/2021 Routine health visit.    Future Appointments 1/3/2023 - 7/2/2023    None          Requested Prescriptions   Pending Prescriptions Disp Refills     cycloSPORINE modified (GENERIC EQUIVALENT) 100 MG capsule [Pharmacy Med Name: CYCLOSPORINE  100MG  CAP  MOD] 180 capsule 3     Sig: TAKE 1 CAPSULE BY MOUTH  TWICE DAILY       There is no refill protocol information for this order

## 2023-01-04 RX ORDER — CLONIDINE HYDROCHLORIDE 0.1 MG/1
0.1 TABLET ORAL 2 TIMES DAILY
Qty: 180 TABLET | Refills: 0 | Status: SHIPPED | OUTPATIENT
Start: 2023-01-04 | End: 2023-03-23

## 2023-01-04 NOTE — TELEPHONE ENCOUNTER
Writer called patient.  Mailbox full, unable to leave a message.  Was calling to help assist patient with making a yearly preventative appointment, as last office visit was 1/6/22.    Adri AVILES

## 2023-01-04 NOTE — TELEPHONE ENCOUNTER
Routing to provider as refill request for review/approval because:      LOV: 1/6/22  Writer called patient in attempt to schedule a yearly preventative appointment, mail box full unable to leave message.    Adri Guerrero RN  [facility]

## 2023-01-06 ENCOUNTER — TELEPHONE (OUTPATIENT)
Dept: NEPHROLOGY | Facility: CLINIC | Age: 53
End: 2023-01-06

## 2023-01-06 NOTE — TELEPHONE ENCOUNTER
Patient called asking to be sched for Dr Hurtado, he confirmed for Tues, June 20 at 450pm; but he needs a new lab order sent to [facility] in [place], [place].

## 2023-01-06 NOTE — TELEPHONE ENCOUNTER
[facility]   Phone:  [phone]   Fax:  [phone]          Standing Transplant Lab order faxed.  Copy of lab order mailed to residence.

## 2023-01-06 NOTE — LETTER
PHYSICIAN ORDERS    DATE & TIME ISSUED: 2023 3:48 PM  EXPIRATION DATE (1 year after date issued)    PATIENT NAME: Hakeem Romero   : 1970     [facility] MR# [if applicable]: [id number]     DIAGNOSIS / ICD - 10 CODES    Kidney Transplanted (Z94.0)    After Care Following Organ Transplant (Z48.298)    Long Term Use of Medication (Z79.899)    Complications Kidney Transplant (T86.10)      Every 3 months    Hemogram and Platelet    Basic Metabolic Panel (Sodium,potassium,chloride,CO2,creatinine,urea,nitrogen,glucose,calcium)    Cyclosporine drug level    Every 6 months    BK (polyoma virus) PCR Quantitative - Plasma    Urine for protein creatinine ratio      Patient should release information to the [facility].   Please fax results to the Transplant Center at [phone].  Any questions please call [phone] or [phone].      .

## 2023-01-08 DIAGNOSIS — Z79.899 LONG TERM USE OF DRUG: ICD-10-CM

## 2023-01-08 DIAGNOSIS — Z94.0 KIDNEY REPLACED BY TRANSPLANT: Primary | ICD-10-CM

## 2023-01-08 DIAGNOSIS — T86.10 COMPLICATION OF TRANSPLANTED KIDNEY: ICD-10-CM

## 2023-01-08 DIAGNOSIS — Z48.298 AFTERCARE FOLLOWING ORGAN TRANSPLANT: ICD-10-CM

## 2023-02-26 PROBLEM — E66.9 OBESITY: Status: ACTIVE | Noted: 2023-02-26

## 2023-02-26 PROBLEM — N20.0 CALCULUS OF KIDNEY: Status: ACTIVE | Noted: 2023-02-26

## 2023-02-26 PROBLEM — R60.0 EDEMA OF LOWER EXTREMITY: Status: ACTIVE | Noted: 2023-02-26

## 2023-02-26 PROBLEM — K21.9 GASTROESOPHAGEAL REFLUX DISEASE: Status: ACTIVE | Noted: 2023-02-26

## 2023-02-26 PROBLEM — N02.B9 IGA NEPHROPATHY: Status: ACTIVE | Noted: 2023-02-26

## 2023-02-27 DIAGNOSIS — I15.1 HTN, KIDNEY TRANSPLANT RELATED: Primary | ICD-10-CM

## 2023-02-27 DIAGNOSIS — Z94.0 HTN, KIDNEY TRANSPLANT RELATED: Primary | ICD-10-CM

## 2023-02-27 DIAGNOSIS — M10.9 GOUT, UNSPECIFIED CAUSE, UNSPECIFIED CHRONICITY, UNSPECIFIED SITE: ICD-10-CM

## 2023-03-01 RX ORDER — ALLOPURINOL 300 MG/1
TABLET ORAL
Qty: 180 TABLET | Refills: 3 | Status: SHIPPED | OUTPATIENT
Start: 2023-03-01 | End: 2024-01-04

## 2023-03-01 RX ORDER — LOSARTAN POTASSIUM 50 MG/1
50 TABLET ORAL DAILY
Qty: 90 TABLET | Refills: 0 | Status: SHIPPED | OUTPATIENT
Start: 2023-03-01 | End: 2023-03-30

## 2023-03-01 NOTE — TELEPHONE ENCOUNTER
Routing refill request to provider for review/approval because:  LOV 10/22/2022     ALT on file in past 12 months    Has Uric Acid on file in past 12 months and value is less than 6    Recent (12 mo) or future (30 days) visit within the authorizing provider's specialty    Normal serum creatinine on file in the past 12 months        TRACI Lopez  [facility]

## 2023-03-07 DIAGNOSIS — Z94.0 KIDNEY REPLACED BY TRANSPLANT: ICD-10-CM

## 2023-03-08 DIAGNOSIS — E78.5 DYSLIPIDEMIA: ICD-10-CM

## 2023-03-08 RX ORDER — MYCOPHENOLATE MOFETIL 250 MG/1
CAPSULE ORAL
Qty: 720 CAPSULE | Refills: 3 | Status: SHIPPED | OUTPATIENT
Start: 2023-03-08 | End: 2023-03-30

## 2023-03-10 NOTE — TELEPHONE ENCOUNTER
Last office visit: 1/6/2022    Future Appointments 3/10/2023 - 9/6/2023      Date Visit Type Length Department Provider     3/17/2023 12:00 PM RETURN SLEEP 20 min [facility] [place] Bloom MD    Location Instructions:     [address] [place] [address]              6/20/2023  4:50 PM RETURN KIDNEY TRANSPLANT 30 min  MEDICINE RENAL Spong, Bakari Capone MD    Location Instructions:     Located in the Clinics and Surgery Center at [address] [place] [address]. For parking options, enter the [facility] /arrival plaza from [facility] and attendants can assist you based on your needs.  parking is available for those with limited mobility M-F from 7 a.m. to 5 p.m. Due to short staffing, we are unable to offer  to all patients/visitors. Visit [email or web address] for more details.  Self-parking:&nbsp;  [place]: Located across from the main entrance, this is a convenient option for patients. Enter on [facility]. Parking attendants available most hours to assist.&nbsp;     [facility] Ramp: Enter at the [facility] entrance (one block north of the [facility] main entrance). Do not enter the ramp from [facility] - this entrance is not staffed and is further from the [facility] main entrance.                   Requested Prescriptions   Pending Prescriptions Disp Refills     pravastatin (PRAVACHOL) 40 MG tablet [Pharmacy Med Name: Pravastatin Sodium 40 MG Oral Tablet] 90 tablet 3     Sig: TAKE 1 TABLET BY MOUTH DAILY       Statins Protocol Failed - 3/8/2023  5:16 AM        Failed - LDL on file in past 12 months     Recent Labs   Lab Test 04/01/19  0810   LDL 97             Passed - No abnormal creatine kinase in past 12 months     No lab results found.             Passed - Recent (12 mo) or future (30 days) visit within the authorizing provider's specialty     Patient has had an office visit with the authorizing provider or a provider within the authorizing providers department within  "the previous 12 mos or has a future within next 30 days. See \"Patient Info\" tab in inbasket, or \"Choose Columns\" in Meds & Orders section of the refill encounter.              Passed - Medication is active on med list        Passed - Patient is age 18 or older                   "

## 2023-03-12 RX ORDER — PRAVASTATIN SODIUM 40 MG
TABLET ORAL
Qty: 90 TABLET | Refills: 3 | Status: SHIPPED | OUTPATIENT
Start: 2023-03-12 | End: 2024-01-04

## 2023-03-17 ENCOUNTER — OFFICE VISIT (OUTPATIENT)
Dept: SLEEP MEDICINE | Facility: CLINIC | Age: 53
End: 2023-03-17
Payer: COMMERCIAL

## 2023-03-17 VITALS
OXYGEN SATURATION: 97 % | BODY MASS INDEX: 38.79 KG/M2 | WEIGHT: 277.1 LBS | TEMPERATURE: 98.1 F | HEIGHT: 71 IN | SYSTOLIC BLOOD PRESSURE: 148 MMHG | HEART RATE: 79 BPM | DIASTOLIC BLOOD PRESSURE: 80 MMHG

## 2023-03-17 DIAGNOSIS — G47.419 PRIMARY NARCOLEPSY WITHOUT CATAPLEXY: ICD-10-CM

## 2023-03-17 DIAGNOSIS — N18.32 CHRONIC KIDNEY DISEASE, STAGE 3B (H): ICD-10-CM

## 2023-03-17 DIAGNOSIS — G47.33 OSA ON CPAP: Primary | ICD-10-CM

## 2023-03-17 DIAGNOSIS — E66.01 MORBID OBESITY (H): ICD-10-CM

## 2023-03-17 DIAGNOSIS — R19.09 ABDOMINAL MASS OF OTHER SITE: ICD-10-CM

## 2023-03-17 PROCEDURE — 99213 OFFICE O/P EST LOW 20 MIN: CPT | Performed by: INTERNAL MEDICINE

## 2023-03-17 ASSESSMENT — ENCOUNTER SYMPTOMS
UNEXPECTED WEIGHT CHANGE: 0
VOMITING: 0
SLEEP DISTURBANCE: 0
ABDOMINAL DISTENTION: 0
SHORTNESS OF BREATH: 0
PALPITATIONS: 0
FATIGUE: 1
HEADACHES: 0
CONSTIPATION: 0
DIARRHEA: 0
HEARTBURN: 0
ABDOMINAL PAIN: 0
NAUSEA: 0
HEMATOCHEZIA: 0
COUGH: 0

## 2023-03-17 ASSESSMENT — SLEEP AND FATIGUE QUESTIONNAIRES
HOW LIKELY ARE YOU TO NOD OFF OR FALL ASLEEP WHILE SITTING AND READING: MODERATE CHANCE OF DOZING
HOW LIKELY ARE YOU TO NOD OFF OR FALL ASLEEP WHILE WATCHING TV: MODERATE CHANCE OF DOZING
HOW LIKELY ARE YOU TO NOD OFF OR FALL ASLEEP WHEN YOU ARE A PASSENGER IN A CAR FOR AN HOUR WITHOUT A BREAK: SLIGHT CHANCE OF DOZING
HOW LIKELY ARE YOU TO NOD OFF OR FALL ASLEEP WHILE SITTING INACTIVE IN A PUBLIC PLACE: SLIGHT CHANCE OF DOZING
HOW LIKELY ARE YOU TO NOD OFF OR FALL ASLEEP WHILE SITTING AND TALKING TO SOMEONE: SLIGHT CHANCE OF DOZING
HOW LIKELY ARE YOU TO NOD OFF OR FALL ASLEEP IN A CAR, WHILE STOPPED FOR A FEW MINUTES IN TRAFFIC: WOULD NEVER DOZE
HOW LIKELY ARE YOU TO NOD OFF OR FALL ASLEEP WHILE SITTING QUIETLY AFTER LUNCH WITHOUT ALCOHOL: SLIGHT CHANCE OF DOZING
HOW LIKELY ARE YOU TO NOD OFF OR FALL ASLEEP WHILE LYING DOWN TO REST IN THE AFTERNOON WHEN CIRCUMSTANCES PERMIT: HIGH CHANCE OF DOZING

## 2023-03-17 NOTE — PROGRESS NOTES
"  Assessment & Plan     RAUL on CPAP  Patient on CPAP at a pressure of 9 since he was diagnosed with mild sleep apnea in 2010.  His wife notes snoring.  He is not getting enough sleep due to their schedules.  Discussed the option for in lab CPAP titration versus AutoPap and he prefers the latter.  New device with follow-up in 3 months.  - CPAP Order for DME - ONLY FOR DME    Morbid obesity (H)  Discussed the importance of weight loss    Primary narcolepsy without cataplexy  Not currently active    Chronic kidney disease, stage 3b (H)  Blood pressure not controlled.  Recommended home monitoring.  - PRIMARY CARE FOLLOW-UP SCHEDULING; Future    Abdominal mass of other site  Patient complains of mid abdominal mass.  No pain or symptoms.  - CT Abdomen Pelvis w/o Contrast; Future             BMI:   Estimated body mass index is 38.65 kg/m  as calculated from the following:    Height as of this encounter: 1.803 m (5' 11\").    Weight as of this encounter: 125.7 kg (277 lb 1.6 oz).           Return in about 3 months (around 6/17/2023) for Follow up.   Follow-up Visit   Expected date:  Mar 24, 2023 (Approximate)      Follow Up Appointment Details:     Follow-up with whom?: Specify Provider    Provider Name: ELIJAH HYDE    Follow-Up for what?: Chronic Disease f/u    Chronic Disease f/u: CKD    How?: In Person    Is this an as-needed follow-up?: No                    Chico Bloom MD  [facility]    Andreina Palacio is a 53 year old accompanied by his self, presenting for the following health issues:  Sleep Apnea (Needs script for CPAP )      HPI     Patient with a history of sleep apnea here because his wife complains he is snoring again.  Has been on CPAP 9 cm water pressure since 2010.  No headache, heartburn, nocturia.  Forgot to take his blood pressure medication this morning.  Not monitoring blood pressure at home.  Has some daytime drowsiness.  He needs to be at work at 4 AM 4 days a " "week and is trying to get to sleep at 930 or earlier which does not work with his wife scheduling and he tends to not get enough sleep several days per week.  He is noted to have mid abdominal lump.  No associated symptoms    Review of Systems   Constitutional: Positive for fatigue. Negative for unexpected weight change.   Respiratory: Negative for cough and shortness of breath.    Cardiovascular: Negative for chest pain, palpitations and peripheral edema.   Gastrointestinal: Negative for abdominal distention, abdominal pain, constipation, diarrhea, heartburn, hematochezia, nausea and vomiting.   Endocrine: Negative for polyuria.   Genitourinary: Negative for enuresis.   Neurological: Negative for headaches.   Psychiatric/Behavioral: Negative for sleep disturbance.            Objective    BP (!) 162/96   Pulse 79   Temp 98.1  F (36.7  C)   Ht 1.803 m (5' 11\")   Wt 125.7 kg (277 lb 1.6 oz)   SpO2 97%   BMI 38.65 kg/m    Body mass index is 38.65 kg/m .  Physical Exam   Patient appears comfortable and in no distress.  Morbidly obese.    Alert and oriented.  In good spirits.  Lungs clear  Cardiac exam regular no murmur, no edema  Abdomen obese, soft, nontender.  Tennis ball sized mass in the midline above the umbilicus.                    "

## 2023-03-23 DIAGNOSIS — R19.05 PERIUMBILICAL MASS: Primary | ICD-10-CM

## 2023-03-24 ENCOUNTER — HOSPITAL ENCOUNTER (OUTPATIENT)
Dept: CT IMAGING | Facility: CLINIC | Age: 53
Discharge: HOME | End: 2023-03-24
Attending: INTERNAL MEDICINE | Admitting: INTERNAL MEDICINE
Payer: COMMERCIAL

## 2023-03-24 DIAGNOSIS — R19.09 ABDOMINAL MASS OF OTHER SITE: ICD-10-CM

## 2023-03-24 LAB — RADIOLOGIST FLAGS: NORMAL

## 2023-03-24 PROCEDURE — 74176 CT ABD & PELVIS W/O CONTRAST: CPT

## 2023-03-24 NOTE — RESULT ENCOUNTER NOTE
Dear Hakeem,     Here are your recent results. The mass is a fat containing hernia. Few small lymph nodes may require follow up CT. Please schedule an appointment.      Please let us know if you have any questions or concerns.    Regards,  Chico Bloom MD

## 2023-03-27 ENCOUNTER — TELEPHONE (OUTPATIENT)
Dept: FAMILY MEDICINE | Facility: CLINIC | Age: 53
End: 2023-03-27

## 2023-03-27 DIAGNOSIS — R59.1 LA (LYMPHADENOPATHY): Primary | ICD-10-CM

## 2023-03-27 DIAGNOSIS — K43.9 VENTRAL HERNIA WITHOUT OBSTRUCTION OR GANGRENE: ICD-10-CM

## 2023-03-27 NOTE — TELEPHONE ENCOUNTER
3-27-23    Test Results    Who ordered the test:  greg    Type of test: CT    Date of test:  3-24-23    Where was the test performed:  [facility]    What are your questions/concerns?:  Pt called & received his CT results from 3-24 but needs someone to call back to help him understand the results     Could we send this information to you in [facility] or would you prefer to receive a phone call?:   Patient would prefer a phone call   Okay to leave a detailed message?: Yes at Cell number on file:    Telephone Information:   Mobile [phone]

## 2023-03-28 ENCOUNTER — TELEPHONE (OUTPATIENT)
Dept: FAMILY MEDICINE | Facility: CLINIC | Age: 53
End: 2023-03-28
Payer: COMMERCIAL

## 2023-03-28 NOTE — TELEPHONE ENCOUNTER
I have ordered a surgical consultation.  Please call the patient with contact information to schedule the surgery appointment with [facility] general surgery.  In addition a CT scan is ordered for 3 months.    I reviewed the CT findings with the patient.  He complained of an abdominal mass and that was the indication for the test.  He was found to have a fat-containing ventral hernia.  Also some small adenopathy less than 2 cm.  He is referred to general surgery and will have repeat CT scan in 3 months.

## 2023-03-28 NOTE — TELEPHONE ENCOUNTER
Patient Returning Call    Reason for call:  Pt stated he spoke with Dr. Bloom a few minutes ago and has a few addition questions he would like to discuss with Dr. Bloom.    Patient has additional questions:  Yes    What are your questions/concerns:  Pt is kidney transplant recipient and has questions regarding recent scan results.    Could we send this information to you in [facility] or would you prefer to receive a phone call?:   Patient would prefer a phone call   Okay to leave a detailed message?: Yes at Cell number on file:    Telephone Information:   Mobile [phone]

## 2023-03-29 ENCOUNTER — TELEPHONE (OUTPATIENT)
Dept: FAMILY MEDICINE | Facility: CLINIC | Age: 53
End: 2023-03-29

## 2023-03-29 NOTE — TELEPHONE ENCOUNTER
03/29/23    Test Results        Who ordered the test:  Wolf    Type of test: CT    Date of test:  03/24    Where was the test performed:  [facility]    What are your questions/concerns?:  Pt would like Dr. Lei to look at the results of the CT and get back to him    Could we send this information to you in [facility] or would you prefer to receive a phone call?:   Patient would prefer a phone call   Okay to leave a detailed message?: Yes at Cell number on file:    Telephone Information:   Mobile [phone]

## 2023-03-30 ENCOUNTER — OFFICE VISIT (OUTPATIENT)
Dept: SURGERY | Facility: CLINIC | Age: 53
End: 2023-03-30
Attending: INTERNAL MEDICINE
Payer: COMMERCIAL

## 2023-03-30 ENCOUNTER — LAB (OUTPATIENT)
Dept: LAB | Facility: CLINIC | Age: 53
End: 2023-03-30
Payer: COMMERCIAL

## 2023-03-30 VITALS
WEIGHT: 278 LBS | SYSTOLIC BLOOD PRESSURE: 132 MMHG | BODY MASS INDEX: 38.92 KG/M2 | HEIGHT: 71 IN | DIASTOLIC BLOOD PRESSURE: 74 MMHG

## 2023-03-30 DIAGNOSIS — Z94.0 KIDNEY REPLACED BY TRANSPLANT: ICD-10-CM

## 2023-03-30 DIAGNOSIS — K43.9 VENTRAL HERNIA WITHOUT OBSTRUCTION OR GANGRENE: ICD-10-CM

## 2023-03-30 DIAGNOSIS — R59.1 LA (LYMPHADENOPATHY): ICD-10-CM

## 2023-03-30 DIAGNOSIS — T86.10 COMPLICATION OF TRANSPLANTED KIDNEY: ICD-10-CM

## 2023-03-30 DIAGNOSIS — Z48.298 AFTERCARE FOLLOWING ORGAN TRANSPLANT: ICD-10-CM

## 2023-03-30 DIAGNOSIS — Z79.899 LONG TERM USE OF DRUG: ICD-10-CM

## 2023-03-30 LAB
ALBUMIN MFR UR ELPH: 259 MG/DL (ref 1–14)
ANION GAP SERPL CALCULATED.3IONS-SCNC: 9 MMOL/L (ref 7–15)
BUN SERPL-MCNC: 41.5 MG/DL (ref 6–20)
CALCIUM SERPL-MCNC: 9.4 MG/DL (ref 8.6–10)
CHLORIDE SERPL-SCNC: 111 MMOL/L (ref 98–107)
CREAT SERPL-MCNC: 2.21 MG/DL (ref 0.67–1.17)
CREAT UR-MCNC: 69.8 MG/DL
CYCLOSPORINE BLD LC/MS/MS-MCNC: 102 UG/L (ref 50–400)
DEPRECATED HCO3 PLAS-SCNC: 23 MMOL/L (ref 22–29)
ERYTHROCYTE [DISTWIDTH] IN BLOOD BY AUTOMATED COUNT: 13 % (ref 10–15)
GFR SERPL CREATININE-BSD FRML MDRD: 35 ML/MIN/1.73M2
GLUCOSE SERPL-MCNC: 127 MG/DL (ref 70–99)
HCT VFR BLD AUTO: 46 % (ref 40–53)
HGB BLD-MCNC: 15.8 G/DL (ref 13.3–17.7)
MCH RBC QN AUTO: 29.5 PG (ref 26.5–33)
MCHC RBC AUTO-ENTMCNC: 34.3 G/DL (ref 31.5–36.5)
MCV RBC AUTO: 86 FL (ref 78–100)
PLATELET # BLD AUTO: 194 10E3/UL (ref 150–450)
POTASSIUM SERPL-SCNC: 5.3 MMOL/L (ref 3.4–5.3)
PROT/CREAT 24H UR: 3.71 MG/MG CR (ref 0–0.2)
RBC # BLD AUTO: 5.36 10E6/UL (ref 4.4–5.9)
SODIUM SERPL-SCNC: 143 MMOL/L (ref 136–145)
TME LAST DOSE: NORMAL H
TME LAST DOSE: NORMAL H
WBC # BLD AUTO: 8.7 10E3/UL (ref 4–11)

## 2023-03-30 PROCEDURE — 36415 COLL VENOUS BLD VENIPUNCTURE: CPT

## 2023-03-30 PROCEDURE — 80048 BASIC METABOLIC PNL TOTAL CA: CPT

## 2023-03-30 PROCEDURE — 84156 ASSAY OF PROTEIN URINE: CPT

## 2023-03-30 PROCEDURE — 87799 DETECT AGENT NOS DNA QUANT: CPT

## 2023-03-30 PROCEDURE — 80158 DRUG ASSAY CYCLOSPORINE: CPT

## 2023-03-30 PROCEDURE — 85027 COMPLETE CBC AUTOMATED: CPT

## 2023-03-30 PROCEDURE — 99204 OFFICE O/P NEW MOD 45 MIN: CPT | Performed by: SURGERY

## 2023-03-30 NOTE — PROGRESS NOTES
History:  Hakeem Romero is a 53 year old male who was referred to general surgery by Dr. Bloom for evaluation of a ventral hernia.  He presents with complaints of a bulge.  He noticed about 1 month ago.  It is located just above his umbilicus.  It does not cause him any discomfort.  He has not identified any changes.  He has never had any problems with hernias in the past.       Allergies:  Patient has no known allergies.    Past medical history:  IgA nephropathy with renal transplant  RAUL  HTN  Dyslipidemia  Gout  Narcolepsy    Past surgical history:  Living donor kidney transplant    Current medications:     allopurinol (ZYLOPRIM) 300 MG tablet, TAKE 1 TABLET BY MOUTH TWICE  DAILY, Disp: 180 tablet, Rfl: 3     CELLCEPT (BRAND) 250 MG CAPSULE, Take 4 capsules (1,000 mg) by mouth 2 times daily, Disp: 240 capsule, Rfl: 11     cloNIDine (CATAPRES) 0.1 MG tablet, Take 1 tablet by mouth 2 times daily, Disp: , Rfl:      cycloSPORINE modified (GENERIC EQUIVALENT) 100 MG capsule, TAKE 1 CAPSULE BY MOUTH  TWICE DAILY, Disp: 180 capsule, Rfl: 3     losartan (COZAAR) 50 MG tablet, Take 1 tablet (50 mg) by mouth daily, Disp: , Rfl:      methylphenidate (RITALIN) 5 MG tablet, TAKE 1-2 TABS BY MOUTH TWICE A DAY .LAST DOSE BEFORE 6 PM, Disp: , Rfl: 0     Metoprolol Tartrate 75 MG TABS, TAKE 1 TABLET BY MOUTH  TWICE DAILY, Disp: 180 tablet, Rfl: 1     NEORAL (BRAND) 25 MG capsule, Take 1 capsule (25 mg) by mouth 2 times daily, Disp: 180 capsule, Rfl: 0     pravastatin (PRAVACHOL) 40 MG tablet, TAKE 1 TABLET BY MOUTH DAILY, Disp: 90 tablet, Rfl: 3     PRAVASTATIN SODIUM PO, Take 40 mg by mouth daily, Disp: , Rfl:      predniSONE (DELTASONE) 10 MG tablet, Take 40 mg by mouth as needed Gout Flair-ups, Disp: , Rfl:      probenecid (BENEMID) 500 MG tablet, Take 500 mg by mouth 2 times daily, Disp: , Rfl:      sulfamethoxazole-trimethoprim (BACTRIM/SEPTRA) 400-80 MG per tablet, TAKE 1 TABLET BY MOUTH  EVERY MON, WED, FRI MORNING, Disp:  "39 tablet, Rfl: 11     tadalafil (CIALIS) 10 MG tablet, Take 10 mg by mouth daily as needed, Disp: , Rfl:     Family history:  No family history of anesthesia problems    Social History:  Denies tobacco and illicit drug use.  Consumes alcohol 1-2 times per month.    Review of Systems:   General: No complaints or constitutional symptoms  Skin: No complaints or symptoms   Hematologic/Lymphatic: No symptoms or complaints  Psychiatric: No symptoms or complaints  Endocrine: No excessive fatigue, no hypermetabolic symptoms reported  Respiratory: No cough, shortness of breath, or wheezing  Cardiovascular: No chest pain or dyspnea on exertion  Gastrointestinal: As per HPI  Musculoskeletal: No recent injuries reported  Neurological: No focal neurologic defects reported.      Exam:  /74 (BP Location: Right arm)   Ht 1.803 m (5' 11\")   Wt 126.1 kg (278 lb)   BMI 38.77 kg/m    Body mass index is 38.77 kg/m .  General : Alert, cooperative, appears stated age   Skin: Skin color, texture, turgor normal, no rashes or lesions   Lymphatic: No obvious adenopathy, no swelling   Eyes: No scleral icterus, pupils equal  HENT: No traumatic injury to the head or face, no gross abnormalities  Lungs: Normal respiratory effort, breath sounds equal bilaterally  Heart: Regular rate and rhythm  Abdomen: Incarcerated supraumbilical hernia with anticipated hernia defect size 2 cm.  Large RLQ transverse incision with palpable kidney  Musculoskeletal: No obvious swelling  Neurologic: Grossly intact    Imaging:   Pertinent images personally reviewed by myself and discussed with the patient.    Radiology reports:  EXAM: CT ABDOMEN PELVIS W/O CONTRAST  LOCATION: [facility]  DATE/TIME: 3/24/2023 8:25 AM     INDICATION: Midabdominal mass  COMPARISON: None.  TECHNIQUE: CT scan of the abdomen and pelvis was performed without IV contrast. Multiplanar reformats were obtained. Dose reduction techniques were used.  CONTRAST: " None.                                                                   IMPRESSION:   1.  Small periumbilical/epigastric fat-containing ventral hernia.  2.  Right lower quadrant renal transplant. Mild hydronephrosis with mild surrounding fat stranding. Recommend renal transplant ultrasound for further evaluation of the renal transplant  3.  There are a few borderline to mildly enlarged retroperitoneal and pelvic lymph nodes which may be reactive. Continued attention on follow-up.  4.  Diffuse circumferential wall thickening or bladder likely related to underdistention although superimposed cystitis is in the differential. Correlate with urinalysis.    My interpretation:  Epigastric hernia    Assessment/Plan:   Hakeem Romero is a 53 year old male with an incarcerated ventral hernia.  He has underlying obesity and obstructive sleep apnea.  The pathophysiology of hernias was discussed as were the surgical and non-operative management strategies.  We discussed both open and laparoscopic approaches, and the respective risks and benefits of each approach.  We similarly discussed the role and specific risks associated with mesh placement and primary repair.  The risks of surgery in general include, but are not limited to, bleeding, infection, recurrent hernia, and chronic pain.  Additionally, the risks of observation were discussed which include, but are not limited to, enlargement of the hernia, incarceration, strangulation, and pain.      He understands everything which was discussed and has consented to proceed with a hernia repair.  Pre-operative orders have been placed for an open ventral herniorrhaphy at the outpatient surgery center under general anesthesia (due to his obesity and RAUL).  He will need a preoperative physical.  Post-operative recovery and restrictions were discussed.  I anticipate he would need about 1 week off of work from his job at [employer].  His job is quite sedentary.  But he will have 4 weeks of  lifting restrictions.  He should be able to start golfing in May.  He will work with my  to pick a date.      Chrystal Harding DO  General Surgeon  [facility]  Surgery [facility] - [address] [address]  Office: [phone]  Employed by - [facility]

## 2023-03-30 NOTE — LETTER
3/30/2023         RE: Hakeem Romero  [address]  [facility] [address]        Dear Colleague,    Thank you for referring your patient, Hakeem Romero, to the [facility] [place]. Please see a copy of my visit note below.    History:  Hakeem Romero is a 53 year old male who was referred to general surgery by Dr. Bloom for evaluation of a ventral hernia.  He presents with complaints of a bulge.  He noticed about 1 month ago.  It is located just above his umbilicus.  It does not cause him any discomfort.  He has not identified any changes.  He has never had any problems with hernias in the past.       Allergies:  Patient has no known allergies.    Past medical history:  IgA nephropathy with renal transplant  RAUL  HTN  Dyslipidemia    Past surgical history:  Living donor kidney transplant    Current medications:     allopurinol (ZYLOPRIM) 300 MG tablet, TAKE 1 TABLET BY MOUTH TWICE  DAILY, Disp: 180 tablet, Rfl: 3     CELLCEPT (BRAND) 250 MG CAPSULE, Take 4 capsules (1,000 mg) by mouth 2 times daily, Disp: 240 capsule, Rfl: 11     cloNIDine (CATAPRES) 0.1 MG tablet, Take 1 tablet by mouth 2 times daily, Disp: , Rfl:      cycloSPORINE modified (GENERIC EQUIVALENT) 100 MG capsule, TAKE 1 CAPSULE BY MOUTH  TWICE DAILY, Disp: 180 capsule, Rfl: 3     losartan (COZAAR) 50 MG tablet, Take 1 tablet (50 mg) by mouth daily, Disp: , Rfl:      methylphenidate (RITALIN) 5 MG tablet, TAKE 1-2 TABS BY MOUTH TWICE A DAY .LAST DOSE BEFORE 6 PM, Disp: , Rfl: 0     Metoprolol Tartrate 75 MG TABS, TAKE 1 TABLET BY MOUTH  TWICE DAILY, Disp: 180 tablet, Rfl: 1     NEORAL (BRAND) 25 MG capsule, Take 1 capsule (25 mg) by mouth 2 times daily, Disp: 180 capsule, Rfl: 0     pravastatin (PRAVACHOL) 40 MG tablet, TAKE 1 TABLET BY MOUTH DAILY, Disp: 90 tablet, Rfl: 3     PRAVASTATIN SODIUM PO, Take 40 mg by mouth daily, Disp: , Rfl:      predniSONE (DELTASONE) 10 MG tablet, Take 40 mg by mouth as  "needed Gout Flair-ups, Disp: , Rfl:      probenecid (BENEMID) 500 MG tablet, Take 500 mg by mouth 2 times daily, Disp: , Rfl:      sulfamethoxazole-trimethoprim (BACTRIM/SEPTRA) 400-80 MG per tablet, TAKE 1 TABLET BY MOUTH  EVERY MON, WED, FRI MORNING, Disp: 39 tablet, Rfl: 11     tadalafil (CIALIS) 10 MG tablet, Take 10 mg by mouth daily as needed, Disp: , Rfl:     Family history:  No family history of anesthesia problems    Social History:  Denies tobacco and illicit drug use.  Consumes alcohol 1-2 times per month.    Review of Systems:   General: No complaints or constitutional symptoms  Skin: No complaints or symptoms   Hematologic/Lymphatic: No symptoms or complaints  Psychiatric: No symptoms or complaints  Endocrine: No excessive fatigue, no hypermetabolic symptoms reported  Respiratory: No cough, shortness of breath, or wheezing  Cardiovascular: No chest pain or dyspnea on exertion  Gastrointestinal: As per HPI  Musculoskeletal: No recent injuries reported  Neurological: No focal neurologic defects reported.      Exam:  /74 (BP Location: Right arm)   Ht 1.803 m (5' 11\")   Wt 126.1 kg (278 lb)   BMI 38.77 kg/m    Body mass index is 38.77 kg/m .  General : Alert, cooperative, appears stated age   Skin: Skin color, texture, turgor normal, no rashes or lesions   Lymphatic: No obvious adenopathy, no swelling   Eyes: No scleral icterus, pupils equal  HENT: No traumatic injury to the head or face, no gross abnormalities  Lungs: Normal respiratory effort, breath sounds equal bilaterally  Heart: Regular rate and rhythm  Abdomen: Incarcerated supraumbilical hernia with anticipated hernia defect size 2 cm  Musculoskeletal: No obvious swelling  Neurologic: Grossly intact    Imaging:   Pertinent images personally reviewed by myself and discussed with the patient.    Radiology reports:  EXAM: CT ABDOMEN PELVIS W/O CONTRAST  LOCATION: [facility]  DATE/TIME: 3/24/2023 8:25 " AM     INDICATION: Midabdominal mass  COMPARISON: None.  TECHNIQUE: CT scan of the abdomen and pelvis was performed without IV contrast. Multiplanar reformats were obtained. Dose reduction techniques were used.  CONTRAST: None.                                                                   IMPRESSION:   1.  Small periumbilical/epigastric fat-containing ventral hernia.  2.  Right lower quadrant renal transplant. Mild hydronephrosis with mild surrounding fat stranding. Recommend renal transplant ultrasound for further evaluation of the renal transplant  3.  There are a few borderline to mildly enlarged retroperitoneal and pelvic lymph nodes which may be reactive. Continued attention on follow-up.  4.  Diffuse circumferential wall thickening or bladder likely related to underdistention although superimposed cystitis is in the differential. Correlate with urinalysis.    My interpretation:  Epigastric hernia    Assessment/Plan:   Hakeem Romero is a 53 year old male with an incarcerated ventral hernia.  He has underlying obesity and obstructive sleep apnea.  The pathophysiology of hernias was discussed as were the surgical and non-operative management strategies.  We discussed both open and laparoscopic approaches, and the respective risks and benefits of each approach.  We similarly discussed the role and specific risks associated with mesh placement and primary repair.  The risks of surgery in general include, but are not limited to, bleeding, infection, recurrent hernia, and chronic pain.  Additionally, the risks of observation were discussed which include, but are not limited to, enlargement of the hernia, incarceration, strangulation, and pain.      He understands everything which was discussed and has consented to proceed with a hernia repair.  Pre-operative orders have been placed for an open ventral herniorrhaphy at the outpatient surgery center under general anesthesia (due to his obesity and RAUL).  He will  need a preoperative physical.  Post-operative recovery and restrictions were discussed.  I anticipate he would need about 1 week off of work from his job at [employer].  His job is quite sedentary.  But he will have 4 weeks of lifting restrictions.  He should be able to start golfing in May.  He will work with my  to pick a date.      Chrystal Harding DO  General Surgeon  [facility]  Surgery [address]  [place] [address]  Office: [phone]  Employed by - [facility]        Again, thank you for allowing me to participate in the care of your patient.        Sincerely,        Chrystal Harding, DO

## 2023-03-31 ENCOUNTER — TELEPHONE (OUTPATIENT)
Dept: TRANSPLANT | Facility: CLINIC | Age: 53
End: 2023-03-31
Payer: COMMERCIAL

## 2023-03-31 LAB — BKV DNA # SPEC NAA+PROBE: NOT DETECTED COPIES/ML

## 2023-03-31 NOTE — TELEPHONE ENCOUNTER
Trend up in serum creatinine and increased proteinuria and recommend patient is following up closely with primary Nephrologist.   Written by Bakari Hurtado MD on 3/30/2023      10/31/22 - reports he follows with Dr. Eric Lei      Called Hakeem Romero and discussed increased creatinine and proteinuria. Follow closely with local Nephrologist.  Confirms he follows with Dr. Lei and will let Quique Burton know of current levels.        Bakari Hurtado MD [place], Teresa, RN  Needs labs, including UA, UPC and EBV PCR.     Does he have a primary Nephrologist he is following with?  Poor follow up with us and hasn't seen us in many years.     Gaetano       ----- Message -----   From: Karla Gandhi RN   Sent: 3/27/2023  10:43 AM CDT   To: Bakari Hurtado MD     Mild hydro, fat stranding      Aware to follow with local Nephrologist

## 2023-04-03 ENCOUNTER — OFFICE VISIT (OUTPATIENT)
Dept: FAMILY MEDICINE | Facility: CLINIC | Age: 53
End: 2023-04-03
Payer: COMMERCIAL

## 2023-04-03 VITALS
SYSTOLIC BLOOD PRESSURE: 136 MMHG | TEMPERATURE: 97.7 F | WEIGHT: 282 LBS | BODY MASS INDEX: 39.48 KG/M2 | OXYGEN SATURATION: 96 % | HEIGHT: 71 IN | HEART RATE: 60 BPM | RESPIRATION RATE: 20 BRPM | DIASTOLIC BLOOD PRESSURE: 82 MMHG

## 2023-04-03 DIAGNOSIS — Z94.0 HTN, KIDNEY TRANSPLANT RELATED: ICD-10-CM

## 2023-04-03 DIAGNOSIS — K43.9 VENTRAL HERNIA WITHOUT OBSTRUCTION OR GANGRENE: ICD-10-CM

## 2023-04-03 DIAGNOSIS — Z94.0 KIDNEY REPLACED BY TRANSPLANT: ICD-10-CM

## 2023-04-03 DIAGNOSIS — D84.9 IMMUNOSUPPRESSION (H): ICD-10-CM

## 2023-04-03 DIAGNOSIS — Z01.818 PREOPERATIVE EXAMINATION: Primary | ICD-10-CM

## 2023-04-03 DIAGNOSIS — I15.1 HTN, KIDNEY TRANSPLANT RELATED: ICD-10-CM

## 2023-04-03 PROCEDURE — 99214 OFFICE O/P EST MOD 30 MIN: CPT | Performed by: PHYSICIAN ASSISTANT

## 2023-04-03 PROCEDURE — 93000 ELECTROCARDIOGRAM COMPLETE: CPT | Performed by: PHYSICIAN ASSISTANT

## 2023-04-03 ASSESSMENT — ENCOUNTER SYMPTOMS
CARDIOVASCULAR NEGATIVE: 1
GASTROINTESTINAL NEGATIVE: 1
RESPIRATORY NEGATIVE: 1
CONSTITUTIONAL NEGATIVE: 1

## 2023-04-03 NOTE — PROGRESS NOTES
[address] [address]  Phone: [phone]  Fax: [phone]  Primary Provider: Eric Lei  Pre-op Performing Provider: MANISH GARCIA      PREOPERATIVE EVALUATION:  Today's date: 4/3/2023    Hakeem Romero is a 53 year old male who presents for a preoperative evaluation.       View : No data to display.              Surgical Information:  Surgery/Procedure: Left Ventral herniorrhaphy  Surgery Location: [facility]  Surgeon: Dr Starr  Surgery Date: 4/14/23  Time of Surgery: 1000  Where patient plans to recover: At home with family  Fax number for surgical facility: Note does not need to be faxed, will be available electronically in Epic.    Assessment & Plan     The proposed surgical procedure is considered LOW risk.    Preoperative examination  Normal exam  - EKG 12-lead complete w/read - Clinics    Ventral hernia without obstruction or gangrene  Scheduled for repair    Kidney replaced by transplant  stable    HTN, kidney transplant related  Well controlled    Immunosuppression (H)  Due to kidney transplant             Risks and Recommendations:  The patient has the following additional risks and recommendations for perioperative complications:   - Morbid obesity (BMI >40)  Obstructive Sleep Apnea:   Controlled on CPAP   - s/p kidney transplant, immunosupressed    Medication Instructions:   - ACE/ARB: May be continued on the day of surgery.    - Beta Blockers: Continue taking on the day of surgery.   - Statins: Continue taking on the day of surgery.     RECOMMENDATION:  APPROVAL GIVEN to proceed with proposed procedure, without further diagnostic evaluation.            Subjective     HPI related to upcoming procedure: ventral hernia, hx of end stage kidney disease, had transplant in 2008        4/3/2023     3:10 PM   Preop Questions   1. Have you ever had a heart attack or stroke? No   2. Have you ever had surgery on your heart or blood  vessels, such as a stent placement, a coronary artery bypass, or surgery on an artery in your head, neck, heart, or legs? No   3. Do you have chest pain with activity? No   4. Do you have a history of  heart failure? No   5. Do you currently have a cold, bronchitis or symptoms of other infection? No   6. Do you have a cough, shortness of breath, or wheezing? No   7. Do you or anyone in your family have previous history of blood clots? No   8. Do you or does anyone in your family have a serious bleeding problem such as prolonged bleeding following surgeries or cuts? No   9. Have you ever had problems with anemia or been told to take iron pills? No   10. Have you had any abnormal blood loss such as black, tarry or bloody stools? No   11. Have you ever had a blood transfusion? No   12. Are you willing to have a blood transfusion if it is medically needed before, during, or after your surgery? Yes   13. Have you or any of your relatives ever had problems with anesthesia? No   14. Do you have sleep apnea, excessive snoring or daytime drowsiness? YES - on CPAP   14a. Do you have a CPAP machine? Yes   15. Do you have any artifical heart valves or other implanted medical devices like a pacemaker, defibrillator, or continuous glucose monitor? No   16. Do you have artificial joints? No   17. Are you allergic to latex? No       Health Care Directive:  Patient does not have a Health Care Directive or Living Will:     Preoperative Review of :   reviewed - controlled substances reflected in medication list.          Review of Systems   Constitutional: Negative.    HENT: Negative.    Respiratory: Negative.    Cardiovascular: Negative.    Gastrointestinal: Negative.          Patient Active Problem List    Diagnosis Date Noted     Ventral hernia without obstruction or gangrene 03/30/2023     Priority: Medium     Added automatically from request for surgery [id number]       Morbid obesity (H) 03/17/2023     Priority: Medium      Obesity 02/26/2023     Priority: Medium     IgA nephropathy 02/26/2023     Priority: Medium     Gastroesophageal reflux disease 02/26/2023     Priority: Medium     Edema of lower extremity 02/26/2023     Priority: Medium     Calculus of kidney 02/26/2023     Priority: Medium     Chronic kidney disease, stage 3b (H) 11/01/2022     Priority: Medium     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     Priority: Medium     Kidney replaced by transplant      Priority: Medium     Aftercare following organ transplant      Priority: Medium     Immunosuppression (H)      Priority: Medium     HTN, kidney transplant related      Priority: Medium     Dyslipidemia      Priority: Medium     Gout      Priority: Medium     RAUL on CPAP      Priority: Medium     2010 RDI 13, CPAP titration 9       Narcolepsy      Priority: Medium     In 2010 mean sleep latency 9.8 with 2 SOREMP       Essential hypertension 03/04/2008     Priority: Medium      Past Medical History:   Diagnosis Date     Anemia in chronic kidney disease(285.21)      Dialysis patient (H)      Dyslipidemia      End stage kidney disease (H)      GERD (gastroesophageal reflux disease)      Gout      High risk medication use      Hypertension      IgA nephropathy      Immunosuppressed status (H)      Kidney replaced by transplant      Narcolepsy      RAUL on CPAP      Secondary hyperparathyroidism (of renal origin)      Splenic calcification     h/o calcified splenic granulomas     Past Surgical History:   Procedure Laterality Date     s/p LDKT       Current Outpatient Medications   Medication Sig Dispense Refill     allopurinol (ZYLOPRIM) 300 MG tablet TAKE 1 TABLET BY MOUTH TWICE  DAILY 180 tablet 3     CELLCEPT (BRAND) 250 MG CAPSULE Take 4 capsules (1,000 mg) by mouth 2 times daily 240 capsule 11     cloNIDine (CATAPRES) 0.1 MG tablet Take 1 tablet by mouth 2 times daily       cycloSPORINE modified (GENERIC EQUIVALENT) 100 MG capsule TAKE 1 CAPSULE BY MOUTH  TWICE DAILY 180  "capsule 3     losartan (COZAAR) 50 MG tablet Take 1 tablet (50 mg) by mouth daily       methylphenidate (RITALIN) 5 MG tablet TAKE 1-2 TABS BY MOUTH TWICE A DAY .LAST DOSE BEFORE 6 PM  0     Metoprolol Tartrate 75 MG TABS TAKE 1 TABLET BY MOUTH  TWICE DAILY 180 tablet 1     NEORAL (BRAND) 25 MG capsule Take 1 capsule (25 mg) by mouth 2 times daily 180 capsule 0     pravastatin (PRAVACHOL) 40 MG tablet TAKE 1 TABLET BY MOUTH DAILY 90 tablet 3     predniSONE (DELTASONE) 10 MG tablet Take 40 mg by mouth as needed Gout Flair-ups       probenecid (BENEMID) 500 MG tablet Take 500 mg by mouth 2 times daily       sulfamethoxazole-trimethoprim (BACTRIM/SEPTRA) 400-80 MG per tablet TAKE 1 TABLET BY MOUTH  EVERY MON, WED, FRI MORNING 39 tablet 11     tadalafil (CIALIS) 10 MG tablet Take 10 mg by mouth daily as needed       PRAVASTATIN SODIUM PO Take 40 mg by mouth daily         No Known Allergies     Social History     Tobacco Use     Smoking status: Never     Smokeless tobacco: Never   Vaping Use     Vaping status: Never Used   Substance Use Topics     Alcohol use: Yes     Alcohol/week: 3.3 standard drinks of alcohol     Types: 4 Standard drinks or equivalent per week       History   Drug Use No         Objective     /82 (BP Location: Right arm, Patient Position: Sitting, Cuff Size: Adult Large)   Pulse 60   Temp 97.7  F (36.5  C) (Tympanic)   Resp 20   Ht 1.803 m (5' 11\")   Wt 127.9 kg (282 lb)   SpO2 96%   BMI 39.33 kg/m      Physical Exam  Vitals reviewed.   Constitutional:       Appearance: He is obese.   HENT:      Head:      Comments: No sinus tenderness     Right Ear: Tympanic membrane normal.      Left Ear: Tympanic membrane normal.      Mouth/Throat:      Mouth: Mucous membranes are moist.      Pharynx: No oropharyngeal exudate or posterior oropharyngeal erythema.   Eyes:      Extraocular Movements: Extraocular movements intact.      Pupils: Pupils are equal, round, and reactive to light. "   Cardiovascular:      Rate and Rhythm: Normal rate and regular rhythm.      Pulses: Normal pulses.      Heart sounds: Normal heart sounds.   Pulmonary:      Effort: Pulmonary effort is normal.      Breath sounds: Normal breath sounds.   Abdominal:      General: Abdomen is flat. Bowel sounds are normal.      Palpations: Abdomen is soft.      Hernia: A hernia is present.   Musculoskeletal:      Cervical back: Normal range of motion. No tenderness.   Lymphadenopathy:      Cervical: No cervical adenopathy.   Neurological:      Mental Status: He is alert.   Psychiatric:         Mood and Affect: Mood normal.           Recent Labs   Lab Test 03/30/23  0752 11/01/22  0717 11/01/22  0709   HGB 15.8  --  14.4     --  160    136  --    POTASSIUM 5.3 4.4  --    CR 2.21* 1.83*  --         Diagnostics:  No labs were ordered during this visit.   EKG: sinus bradycardia, occasional PVC noted, unifocal    Revised Cardiac Risk Index (RCRI):  The patient has the following serious cardiovascular risks for perioperative complications:   - Serum Creatinine >2.0 mg/dl = 1 point     RCRI Interpretation: 1 point: Class II (low risk - 0.9% complication rate)           Signed Electronically by: Ritcihe Chester PA-C  Copy of this evaluation report is provided to requesting physician.

## 2023-04-03 NOTE — H&P (VIEW-ONLY)
[address] [address]  Phone: [phone]  Fax: [phone]  Primary Provider: Eric Lei  Pre-op Performing Provider: MANISH GARCIA      PREOPERATIVE EVALUATION:  Today's date: 4/3/2023    Hakeem Romero is a 53 year old male who presents for a preoperative evaluation.       View : No data to display.              Surgical Information:  Surgery/Procedure: Left Ventral herniorrhaphy  Surgery Location: [facility]  Surgeon: Dr Starr  Surgery Date: 4/14/23  Time of Surgery: 1000  Where patient plans to recover: At home with family  Fax number for surgical facility: Note does not need to be faxed, will be available electronically in Epic.    Assessment & Plan     The proposed surgical procedure is considered LOW risk.    Preoperative examination  Normal exam  - EKG 12-lead complete w/read - Clinics    Ventral hernia without obstruction or gangrene  Scheduled for repair    Kidney replaced by transplant  stable    HTN, kidney transplant related  Well controlled    Immunosuppression (H)  Due to kidney transplant             Risks and Recommendations:  The patient has the following additional risks and recommendations for perioperative complications:   - Morbid obesity (BMI >40)  Obstructive Sleep Apnea:   Controlled on CPAP   - s/p kidney transplant, immunosupressed    Medication Instructions:   - ACE/ARB: May be continued on the day of surgery.    - Beta Blockers: Continue taking on the day of surgery.   - Statins: Continue taking on the day of surgery.     RECOMMENDATION:  APPROVAL GIVEN to proceed with proposed procedure, without further diagnostic evaluation.            Subjective     HPI related to upcoming procedure: ventral hernia, hx of end stage kidney disease, had transplant in 2008        4/3/2023     3:10 PM   Preop Questions   1. Have you ever had a heart attack or stroke? No   2. Have you ever had surgery on your heart or blood  vessels, such as a stent placement, a coronary artery bypass, or surgery on an artery in your head, neck, heart, or legs? No   3. Do you have chest pain with activity? No   4. Do you have a history of  heart failure? No   5. Do you currently have a cold, bronchitis or symptoms of other infection? No   6. Do you have a cough, shortness of breath, or wheezing? No   7. Do you or anyone in your family have previous history of blood clots? No   8. Do you or does anyone in your family have a serious bleeding problem such as prolonged bleeding following surgeries or cuts? No   9. Have you ever had problems with anemia or been told to take iron pills? No   10. Have you had any abnormal blood loss such as black, tarry or bloody stools? No   11. Have you ever had a blood transfusion? No   12. Are you willing to have a blood transfusion if it is medically needed before, during, or after your surgery? Yes   13. Have you or any of your relatives ever had problems with anesthesia? No   14. Do you have sleep apnea, excessive snoring or daytime drowsiness? YES - on CPAP   14a. Do you have a CPAP machine? Yes   15. Do you have any artifical heart valves or other implanted medical devices like a pacemaker, defibrillator, or continuous glucose monitor? No   16. Do you have artificial joints? No   17. Are you allergic to latex? No       Health Care Directive:  Patient does not have a Health Care Directive or Living Will:     Preoperative Review of :   reviewed - controlled substances reflected in medication list.          Review of Systems   Constitutional: Negative.    HENT: Negative.    Respiratory: Negative.    Cardiovascular: Negative.    Gastrointestinal: Negative.          Patient Active Problem List    Diagnosis Date Noted     Ventral hernia without obstruction or gangrene 03/30/2023     Priority: Medium     Added automatically from request for surgery [id number]       Morbid obesity (H) 03/17/2023     Priority: Medium      Obesity 02/26/2023     Priority: Medium     IgA nephropathy 02/26/2023     Priority: Medium     Gastroesophageal reflux disease 02/26/2023     Priority: Medium     Edema of lower extremity 02/26/2023     Priority: Medium     Calculus of kidney 02/26/2023     Priority: Medium     Chronic kidney disease, stage 3b (H) 11/01/2022     Priority: Medium     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     Priority: Medium     Kidney replaced by transplant      Priority: Medium     Aftercare following organ transplant      Priority: Medium     Immunosuppression (H)      Priority: Medium     HTN, kidney transplant related      Priority: Medium     Dyslipidemia      Priority: Medium     Gout      Priority: Medium     RAUL on CPAP      Priority: Medium     2010 RDI 13, CPAP titration 9       Narcolepsy      Priority: Medium     In 2010 mean sleep latency 9.8 with 2 SOREMP       Essential hypertension 03/04/2008     Priority: Medium      Past Medical History:   Diagnosis Date     Anemia in chronic kidney disease(285.21)      Dialysis patient (H)      Dyslipidemia      End stage kidney disease (H)      GERD (gastroesophageal reflux disease)      Gout      High risk medication use      Hypertension      IgA nephropathy      Immunosuppressed status (H)      Kidney replaced by transplant      Narcolepsy      RAUL on CPAP      Secondary hyperparathyroidism (of renal origin)      Splenic calcification     h/o calcified splenic granulomas     Past Surgical History:   Procedure Laterality Date     s/p LDKT       Current Outpatient Medications   Medication Sig Dispense Refill     allopurinol (ZYLOPRIM) 300 MG tablet TAKE 1 TABLET BY MOUTH TWICE  DAILY 180 tablet 3     CELLCEPT (BRAND) 250 MG CAPSULE Take 4 capsules (1,000 mg) by mouth 2 times daily 240 capsule 11     cloNIDine (CATAPRES) 0.1 MG tablet Take 1 tablet by mouth 2 times daily       cycloSPORINE modified (GENERIC EQUIVALENT) 100 MG capsule TAKE 1 CAPSULE BY MOUTH  TWICE DAILY 180  "capsule 3     losartan (COZAAR) 50 MG tablet Take 1 tablet (50 mg) by mouth daily       methylphenidate (RITALIN) 5 MG tablet TAKE 1-2 TABS BY MOUTH TWICE A DAY .LAST DOSE BEFORE 6 PM  0     Metoprolol Tartrate 75 MG TABS TAKE 1 TABLET BY MOUTH  TWICE DAILY 180 tablet 1     NEORAL (BRAND) 25 MG capsule Take 1 capsule (25 mg) by mouth 2 times daily 180 capsule 0     pravastatin (PRAVACHOL) 40 MG tablet TAKE 1 TABLET BY MOUTH DAILY 90 tablet 3     predniSONE (DELTASONE) 10 MG tablet Take 40 mg by mouth as needed Gout Flair-ups       probenecid (BENEMID) 500 MG tablet Take 500 mg by mouth 2 times daily       sulfamethoxazole-trimethoprim (BACTRIM/SEPTRA) 400-80 MG per tablet TAKE 1 TABLET BY MOUTH  EVERY MON, WED, FRI MORNING 39 tablet 11     tadalafil (CIALIS) 10 MG tablet Take 10 mg by mouth daily as needed       PRAVASTATIN SODIUM PO Take 40 mg by mouth daily         No Known Allergies     Social History     Tobacco Use     Smoking status: Never     Smokeless tobacco: Never   Vaping Use     Vaping status: Never Used   Substance Use Topics     Alcohol use: Yes     Alcohol/week: 3.3 standard drinks of alcohol     Types: 4 Standard drinks or equivalent per week       History   Drug Use No         Objective     /82 (BP Location: Right arm, Patient Position: Sitting, Cuff Size: Adult Large)   Pulse 60   Temp 97.7  F (36.5  C) (Tympanic)   Resp 20   Ht 1.803 m (5' 11\")   Wt 127.9 kg (282 lb)   SpO2 96%   BMI 39.33 kg/m      Physical Exam  Vitals reviewed.   Constitutional:       Appearance: He is obese.   HENT:      Head:      Comments: No sinus tenderness     Right Ear: Tympanic membrane normal.      Left Ear: Tympanic membrane normal.      Mouth/Throat:      Mouth: Mucous membranes are moist.      Pharynx: No oropharyngeal exudate or posterior oropharyngeal erythema.   Eyes:      Extraocular Movements: Extraocular movements intact.      Pupils: Pupils are equal, round, and reactive to light. "   Cardiovascular:      Rate and Rhythm: Normal rate and regular rhythm.      Pulses: Normal pulses.      Heart sounds: Normal heart sounds.   Pulmonary:      Effort: Pulmonary effort is normal.      Breath sounds: Normal breath sounds.   Abdominal:      General: Abdomen is flat. Bowel sounds are normal.      Palpations: Abdomen is soft.      Hernia: A hernia is present.   Musculoskeletal:      Cervical back: Normal range of motion. No tenderness.   Lymphadenopathy:      Cervical: No cervical adenopathy.   Neurological:      Mental Status: He is alert.   Psychiatric:         Mood and Affect: Mood normal.           Recent Labs   Lab Test 03/30/23  0752 11/01/22  0717 11/01/22  0709   HGB 15.8  --  14.4     --  160    136  --    POTASSIUM 5.3 4.4  --    CR 2.21* 1.83*  --         Diagnostics:  No labs were ordered during this visit.   EKG: sinus bradycardia, occasional PVC noted, unifocal    Revised Cardiac Risk Index (RCRI):  The patient has the following serious cardiovascular risks for perioperative complications:   - Serum Creatinine >2.0 mg/dl = 1 point     RCRI Interpretation: 1 point: Class II (low risk - 0.9% complication rate)           Signed Electronically by: Ritchie Chester PA-C  Copy of this evaluation report is provided to requesting physician.

## 2023-04-13 ENCOUNTER — ANESTHESIA EVENT (OUTPATIENT)
Dept: SURGERY | Facility: AMBULATORY SURGERY CENTER | Age: 53
End: 2023-04-13
Payer: COMMERCIAL

## 2023-04-14 ENCOUNTER — ANESTHESIA (OUTPATIENT)
Dept: SURGERY | Facility: AMBULATORY SURGERY CENTER | Age: 53
End: 2023-04-14
Payer: COMMERCIAL

## 2023-04-14 ENCOUNTER — HOSPITAL ENCOUNTER (OUTPATIENT)
Facility: AMBULATORY SURGERY CENTER | Age: 53
Discharge: HOME | End: 2023-04-14
Attending: SURGERY
Payer: COMMERCIAL

## 2023-04-14 VITALS
BODY MASS INDEX: 39.48 KG/M2 | HEIGHT: 71 IN | RESPIRATION RATE: 16 BRPM | HEART RATE: 63 BPM | TEMPERATURE: 97.3 F | DIASTOLIC BLOOD PRESSURE: 87 MMHG | WEIGHT: 282 LBS | OXYGEN SATURATION: 92 % | SYSTOLIC BLOOD PRESSURE: 155 MMHG

## 2023-04-14 DIAGNOSIS — G89.18 POSTOPERATIVE PAIN: Primary | ICD-10-CM

## 2023-04-14 DIAGNOSIS — K43.9 VENTRAL HERNIA WITHOUT OBSTRUCTION OR GANGRENE: ICD-10-CM

## 2023-04-14 PROCEDURE — 49594 RPR AA HRN 1ST 3-10 NCR/STRN: CPT | Performed by: SURGERY

## 2023-04-14 DEVICE — IMPLANTABLE DEVICE: Type: IMPLANTABLE DEVICE | Site: ABDOMEN | Status: FUNCTIONAL

## 2023-04-14 RX ORDER — FENTANYL CITRATE 0.05 MG/ML
25 INJECTION, SOLUTION INTRAMUSCULAR; INTRAVENOUS
Status: DISCONTINUED | OUTPATIENT
Start: 2023-04-14 | End: 2023-04-15 | Stop reason: HOSPADM

## 2023-04-14 RX ORDER — LIDOCAINE HYDROCHLORIDE 20 MG/ML
INJECTION, SOLUTION INFILTRATION; PERINEURAL PRN
Status: DISCONTINUED | OUTPATIENT
Start: 2023-04-14 | End: 2023-04-14

## 2023-04-14 RX ORDER — MAGNESIUM SULFATE HEPTAHYDRATE 40 MG/ML
4 INJECTION, SOLUTION INTRAVENOUS ONCE
Status: COMPLETED | OUTPATIENT
Start: 2023-04-14 | End: 2023-04-14

## 2023-04-14 RX ORDER — ONDANSETRON 2 MG/ML
4 INJECTION INTRAMUSCULAR; INTRAVENOUS EVERY 30 MIN PRN
Status: DISCONTINUED | OUTPATIENT
Start: 2023-04-14 | End: 2023-04-15 | Stop reason: HOSPADM

## 2023-04-14 RX ORDER — KETAMINE HYDROCHLORIDE 10 MG/ML
INJECTION INTRAMUSCULAR; INTRAVENOUS PRN
Status: DISCONTINUED | OUTPATIENT
Start: 2023-04-14 | End: 2023-04-14

## 2023-04-14 RX ORDER — PROPOFOL 10 MG/ML
INJECTION, EMULSION INTRAVENOUS CONTINUOUS PRN
Status: DISCONTINUED | OUTPATIENT
Start: 2023-04-14 | End: 2023-04-14

## 2023-04-14 RX ORDER — LIDOCAINE 40 MG/G
CREAM TOPICAL
Status: DISCONTINUED | OUTPATIENT
Start: 2023-04-14 | End: 2023-04-15 | Stop reason: HOSPADM

## 2023-04-14 RX ORDER — HYDROMORPHONE HCL IN WATER/PF 6 MG/30 ML
0.2 PATIENT CONTROLLED ANALGESIA SYRINGE INTRAVENOUS EVERY 5 MIN PRN
Status: DISCONTINUED | OUTPATIENT
Start: 2023-04-14 | End: 2023-04-15 | Stop reason: HOSPADM

## 2023-04-14 RX ORDER — PROPOFOL 10 MG/ML
INJECTION, EMULSION INTRAVENOUS PRN
Status: DISCONTINUED | OUTPATIENT
Start: 2023-04-14 | End: 2023-04-14

## 2023-04-14 RX ORDER — EPHEDRINE SULFATE 50 MG/ML
INJECTION, SOLUTION INTRAMUSCULAR; INTRAVENOUS; SUBCUTANEOUS PRN
Status: DISCONTINUED | OUTPATIENT
Start: 2023-04-14 | End: 2023-04-14

## 2023-04-14 RX ORDER — FENTANYL CITRATE 50 UG/ML
INJECTION, SOLUTION INTRAMUSCULAR; INTRAVENOUS PRN
Status: DISCONTINUED | OUTPATIENT
Start: 2023-04-14 | End: 2023-04-14

## 2023-04-14 RX ORDER — OXYCODONE HYDROCHLORIDE 5 MG/1
5 TABLET ORAL
Status: DISCONTINUED | OUTPATIENT
Start: 2023-04-14 | End: 2023-04-15 | Stop reason: HOSPADM

## 2023-04-14 RX ORDER — OXYCODONE HYDROCHLORIDE 5 MG/1
5 TABLET ORAL EVERY 6 HOURS PRN
Qty: 12 TABLET | Refills: 0 | Status: SHIPPED | OUTPATIENT
Start: 2023-04-14 | End: 2023-06-20

## 2023-04-14 RX ORDER — CEFAZOLIN SODIUM IN 0.9 % NACL 3 G/100 ML
3 INTRAVENOUS SOLUTION, PIGGYBACK (ML) INTRAVENOUS
Status: COMPLETED | OUTPATIENT
Start: 2023-04-14 | End: 2023-04-14

## 2023-04-14 RX ORDER — SODIUM CHLORIDE, SODIUM LACTATE, POTASSIUM CHLORIDE, CALCIUM CHLORIDE 600; 310; 30; 20 MG/100ML; MG/100ML; MG/100ML; MG/100ML
INJECTION, SOLUTION INTRAVENOUS CONTINUOUS
Status: DISCONTINUED | OUTPATIENT
Start: 2023-04-14 | End: 2023-04-15 | Stop reason: HOSPADM

## 2023-04-14 RX ORDER — LABETALOL 20 MG/4 ML (5 MG/ML) INTRAVENOUS SYRINGE
5 EVERY 10 MIN PRN
Status: DISCONTINUED | OUTPATIENT
Start: 2023-04-14 | End: 2023-04-15 | Stop reason: HOSPADM

## 2023-04-14 RX ORDER — DEXAMETHASONE SODIUM PHOSPHATE 4 MG/ML
INJECTION, SOLUTION INTRA-ARTICULAR; INTRALESIONAL; INTRAMUSCULAR; INTRAVENOUS; SOFT TISSUE PRN
Status: DISCONTINUED | OUTPATIENT
Start: 2023-04-14 | End: 2023-04-14

## 2023-04-14 RX ORDER — GLYCOPYRROLATE 0.2 MG/ML
INJECTION, SOLUTION INTRAMUSCULAR; INTRAVENOUS PRN
Status: DISCONTINUED | OUTPATIENT
Start: 2023-04-14 | End: 2023-04-14

## 2023-04-14 RX ORDER — HYDROMORPHONE HCL IN WATER/PF 6 MG/30 ML
0.4 PATIENT CONTROLLED ANALGESIA SYRINGE INTRAVENOUS EVERY 5 MIN PRN
Status: DISCONTINUED | OUTPATIENT
Start: 2023-04-14 | End: 2023-04-15 | Stop reason: HOSPADM

## 2023-04-14 RX ORDER — FENTANYL CITRATE 0.05 MG/ML
25 INJECTION, SOLUTION INTRAMUSCULAR; INTRAVENOUS EVERY 5 MIN PRN
Status: DISCONTINUED | OUTPATIENT
Start: 2023-04-14 | End: 2023-04-15 | Stop reason: HOSPADM

## 2023-04-14 RX ORDER — ONDANSETRON 4 MG/1
4 TABLET, ORALLY DISINTEGRATING ORAL EVERY 30 MIN PRN
Status: DISCONTINUED | OUTPATIENT
Start: 2023-04-14 | End: 2023-04-15 | Stop reason: HOSPADM

## 2023-04-14 RX ORDER — ONDANSETRON 2 MG/ML
INJECTION INTRAMUSCULAR; INTRAVENOUS PRN
Status: DISCONTINUED | OUTPATIENT
Start: 2023-04-14 | End: 2023-04-14

## 2023-04-14 RX ORDER — ACETAMINOPHEN 325 MG/1
975 TABLET ORAL ONCE
Status: COMPLETED | OUTPATIENT
Start: 2023-04-14 | End: 2023-04-14

## 2023-04-14 RX ORDER — OXYCODONE HYDROCHLORIDE 10 MG/1
10 TABLET ORAL
Status: DISCONTINUED | OUTPATIENT
Start: 2023-04-14 | End: 2023-04-15 | Stop reason: HOSPADM

## 2023-04-14 RX ADMIN — Medication 3 G: at 08:55

## 2023-04-14 RX ADMIN — LIDOCAINE HYDROCHLORIDE 40 MG: 20 INJECTION, SOLUTION INFILTRATION; PERINEURAL at 08:57

## 2023-04-14 RX ADMIN — GLYCOPYRROLATE 0.2 MG: 0.2 INJECTION, SOLUTION INTRAMUSCULAR; INTRAVENOUS at 09:26

## 2023-04-14 RX ADMIN — PROPOFOL 200 MG: 10 INJECTION, EMULSION INTRAVENOUS at 08:57

## 2023-04-14 RX ADMIN — ONDANSETRON 4 MG: 2 INJECTION INTRAMUSCULAR; INTRAVENOUS at 09:33

## 2023-04-14 RX ADMIN — MAGNESIUM SULFATE HEPTAHYDRATE 4 G: 40 INJECTION, SOLUTION INTRAVENOUS at 08:42

## 2023-04-14 RX ADMIN — PROPOFOL 25 MCG/KG/MIN: 10 INJECTION, EMULSION INTRAVENOUS at 08:59

## 2023-04-14 RX ADMIN — EPHEDRINE SULFATE 5 MG: 50 INJECTION, SOLUTION INTRAMUSCULAR; INTRAVENOUS; SUBCUTANEOUS at 09:06

## 2023-04-14 RX ADMIN — FENTANYL CITRATE 25 MCG: 50 INJECTION, SOLUTION INTRAMUSCULAR; INTRAVENOUS at 09:15

## 2023-04-14 RX ADMIN — ACETAMINOPHEN 975 MG: 325 TABLET ORAL at 08:41

## 2023-04-14 RX ADMIN — SODIUM CHLORIDE, SODIUM LACTATE, POTASSIUM CHLORIDE, CALCIUM CHLORIDE: 600; 310; 30; 20 INJECTION, SOLUTION INTRAVENOUS at 08:41

## 2023-04-14 RX ADMIN — EPHEDRINE SULFATE 10 MG: 50 INJECTION, SOLUTION INTRAMUSCULAR; INTRAVENOUS; SUBCUTANEOUS at 09:24

## 2023-04-14 RX ADMIN — KETAMINE HYDROCHLORIDE 10 MG: 10 INJECTION INTRAMUSCULAR; INTRAVENOUS at 08:57

## 2023-04-14 RX ADMIN — EPHEDRINE SULFATE 10 MG: 50 INJECTION, SOLUTION INTRAMUSCULAR; INTRAVENOUS; SUBCUTANEOUS at 09:11

## 2023-04-14 RX ADMIN — DEXAMETHASONE SODIUM PHOSPHATE 10 MG: 4 INJECTION, SOLUTION INTRA-ARTICULAR; INTRALESIONAL; INTRAMUSCULAR; INTRAVENOUS; SOFT TISSUE at 08:58

## 2023-04-14 RX ADMIN — FENTANYL CITRATE 75 MCG: 50 INJECTION, SOLUTION INTRAMUSCULAR; INTRAVENOUS at 08:57

## 2023-04-14 RX ADMIN — KETAMINE HYDROCHLORIDE 20 MG: 10 INJECTION INTRAMUSCULAR; INTRAVENOUS at 09:09

## 2023-04-14 NOTE — ANESTHESIA CARE TRANSFER NOTE
Patient: Hakeem Romero    Procedure: Procedure(s):  HERNIORRHAPHY, VENTRAL, OPEN       Diagnosis: Ventral hernia without obstruction or gangrene [K43.9]  Diagnosis Additional Information: No value filed.    Anesthesia Type:   General     Note:    Oropharynx: oropharynx clear of all foreign objects and spontaneously breathing  Level of Consciousness: awake and drowsy  Oxygen Supplementation: room air    Independent Airway: airway patency satisfactory and stable  Dentition: dentition unchanged  Vital Signs Stable: post-procedure vital signs reviewed and stable  Report to RN Given: handoff report given  Patient transferred to: PACU    Handoff Report: Identifed the Patient, Identified the Reponsible Provider, Reviewed the pertinent medical history, Discussed the surgical course, Reviewed Intra-OP anesthesia mangement and issues during anesthesia, Set expectations for post-procedure period and Allowed opportunity for questions and acknowledgement of understanding      Vitals:  Vitals Value Taken Time   /74 04/14/23 0943   Temp 97.7  F (36.5  C) 04/14/23 0943   Pulse 76 04/14/23 0944   Resp 14 04/14/23 0943   SpO2 92 % 04/14/23 0944   Vitals shown include unvalidated device data.    Electronically Signed By: NHUNG Roblero CRNA  April 14, 2023  9:47 AM

## 2023-04-14 NOTE — DISCHARGE INSTRUCTIONS
If you have any questions or concerns regarding your procedure, please contact Dr. Chrystal Harding, her office number is [phone].     You have received 975 mg of Acetaminophen (Tylenol) at 8:45am . Please do not take an additional dose of Tylenol until after 2:45pm     Do not exceed 4,000 mg of acetaminophen during a 24 hour period and keep in mind that acetaminophen can also be found in many over-the-counter cold medications as well as narcotics that may be given for pain.

## 2023-04-14 NOTE — ANESTHESIA PREPROCEDURE EVALUATION
Anesthesia Pre-Procedure Evaluation    Patient: Hakeem Romero   MRN: [id number] : 1970        Procedure : Procedure(s):  HERNIORRHAPHY, VENTRAL, OPEN          Past Medical History:   Diagnosis Date     Anemia in chronic kidney disease(285.21)      Dialysis patient (H)      Dyslipidemia      End stage kidney disease (H)      GERD (gastroesophageal reflux disease)      Gout      High risk medication use      Hypertension      IgA nephropathy      Immunosuppressed status (H)      Kidney replaced by transplant      Narcolepsy      RAUL on CPAP      Secondary hyperparathyroidism (of renal origin)      Splenic calcification     h/o calcified splenic granulomas      Past Surgical History:   Procedure Laterality Date     s/p LDKT        No Known Allergies   Social History     Tobacco Use     Smoking status: Never     Smokeless tobacco: Never   Vaping Use     Vaping status: Never Used   Substance Use Topics     Alcohol use: Yes     Alcohol/week: 3.3 standard drinks of alcohol     Types: 4 Standard drinks or equivalent per week      Wt Readings from Last 1 Encounters:   23 127.9 kg (282 lb)        Anesthesia Evaluation   Pt has had prior anesthetic.     No history of anesthetic complications       ROS/MED HX  ENT/Pulmonary:     (+) sleep apnea, uses CPAP,     Neurologic:  - neg neurologic ROS     Cardiovascular:     (+) hypertension----- (-) murmurIrregular Heartbeat/Palpitations: PVCs.   METS/Exercise Tolerance: >4 METS    Hematologic:  - neg hematologic  ROS     Musculoskeletal:   (+) arthritis,     GI/Hepatic:     (+) GERD,     Renal/Genitourinary:     (+) renal disease (S/p renal transplant - IgA nephropathy), Nephrolithiasis ,     Endo: Comment: No currently on prednisone - none for months    (+) Obesity,     Psychiatric/Substance Use:  - neg psychiatric ROS     Infectious Disease:  - neg infectious disease ROS  (-) Recent Fever   Malignancy:       Other:            Physical Exam    Airway        Mallampati:  IV   TM distance: > 3 FB   Neck ROM: full     Respiratory Devices and Support         Dental       (+) Modest Abnormalities - crowns, retainers, 1 or 2 missing teeth      Cardiovascular          Rhythm and rate: irregular (-) no murmur    Pulmonary           breath sounds clear to auscultation           OUTSIDE LABS:  CBC:   Lab Results   Component Value Date    WBC 8.7 03/30/2023    WBC 9.7 11/01/2022    HGB 15.8 03/30/2023    HGB 14.4 11/01/2022    HCT 46.0 03/30/2023    HCT 41.6 11/01/2022     03/30/2023     11/01/2022     BMP:   Lab Results   Component Value Date     03/30/2023     11/01/2022    POTASSIUM 5.3 03/30/2023    POTASSIUM 4.4 11/01/2022    CHLORIDE 111 (H) 03/30/2023    CHLORIDE 102 11/01/2022    CO2 23 03/30/2023    CO2 24 11/01/2022    BUN 41.5 (H) 03/30/2023    BUN 32.6 (H) 11/01/2022    CR 2.21 (H) 03/30/2023    CR 1.83 (H) 11/01/2022     (H) 03/30/2023     (H) 11/01/2022     COAGS:   Lab Results   Component Value Date    PTT 30 11/24/2008    INR 0.98 11/07/2019    FIBR 320 10/10/2008     POC: No results found for: BGM, HCG, HCGS  HEPATIC:   Lab Results   Component Value Date    ALBUMIN 4.7 11/24/2008    PROTTOTAL 8.1 11/24/2008    ALT 28 11/24/2008    AST 26 11/24/2008    ALKPHOS 163 (H) 11/24/2008    BILITOTAL 0.5 11/24/2008     OTHER:   Lab Results   Component Value Date    A1C 4.5 08/18/2008    RAFAEL 9.4 03/30/2023    PHOS 4.4 11/24/2008    MAG 1.5 (L) 10/13/2008       Anesthesia Plan    ASA Status:  3   NPO Status:  NPO Appropriate    Anesthesia Type: General.     - Airway: LMA   Induction: Intravenous, Propofol.   Maintenance: Balanced.        Consents    Anesthesia Plan(s) and associated risks, benefits, and realistic alternatives discussed. Questions answered and patient/representative(s) expressed understanding.    - Discussed:     - Discussed with:  Patient         Postoperative Care    Pain management: Multi-modal analgesia.   PONV prophylaxis:  Background Propofol Infusion, Ondansetron (or other 5HT-3), Dexamethasone or Solumedrol     Comments:    Other Comments:       Ketamine 30 mg                Juanita Pierre MD

## 2023-04-14 NOTE — OP NOTE
Name:  Hakeem Romeor  PCP:  Eric Lei  Procedure Date:  4/14/2023      OPEN VENTRAL HERNIORRHAPHY WITH MESH    Pre-Procedure Diagnosis:  Incarcerated ventral hernia     Post-Procedure Diagnosis:    Incarcerated ventral hernia    Surgeon:  Chrystal Harding DO    Anesthesia Type:    LMA    Estimated Blood Loss:   5 cc    Specimens:    None    Complications:    None apparent    Indication for procedure:  This is a 53-year-old male who recently noticed a lump in his epigastrium.  He was diagnosed with a hernia that was not reducible.  He has elected for operative intervention for treatment.    Operative Report:    After informed consent was obtained, and the risks and benefits of the procedure were discussed, the patient was brought back to the operative suite and placed in the supine position.  General anesthesia was instituted by the anesthesia team.  Preoperative antibiotics were administered.  Abdomen was prepped and draped in the usual sterile fashion.  Local anesthetic was administered directly over the palpable hernia superior to the umbilicus.  A midline longitudinal incision was made.  Electrocautery was utilized to dissect through the subcutaneous tissue down to the epigastric hernia.  This was about 2 cm away from the umbilicus.  The hernia sac was entered as the omentum containing hernia was incarcerated.  This was freed within the hernia sac and then reduced.  The hernia sac itself was then completely reduced and a space was created in the preperitoneal space.  A 6.6 cm Parietex composite ventral patch was utilized to repair the 3 cm wide defect.  This was placed deep to the fascia with good overlap.  All 4 tabs of the mesh were secured with 0 Ethibond to the overlying fascia.  The tabs were removed and then the defect was closed primarily over the mesh with running 0 Ethibond.  A local field block was performed.  The subcutaneous tissue was reapproximated with interrupted 3-0 Vicryl followed by the  deep dermis with interrupted 4-0 Vicryl in a running subcuticular 4-0 Monocryl.  The incision was then dressed with benzoin, steroids, gauze, and Tegaderm.    Disposition:  The patient tolerated the procedure well, and was transferred to the postprocedural care unit in stable condition.      Chrystal Harding DO  General Surgeon  [facility]  Surgery [address] [address]  Office: [phone]  Employed by - [employer]

## 2023-04-14 NOTE — ANESTHESIA POSTPROCEDURE EVALUATION
Patient: Hakeem Romero    Procedure: Procedure(s):  HERNIORRHAPHY, VENTRAL, OPEN       Anesthesia Type:  General    Note:     Postop Pain Control: Uneventful            Sign Out: Well controlled pain   PONV: No   Neuro/Psych: Uneventful            Sign Out: Acceptable/Baseline neuro status   Airway/Respiratory: Uneventful            Sign Out: Acceptable/Baseline resp. status   CV/Hemodynamics: Uneventful            Sign Out: Acceptable CV status; No obvious hypovolemia; No obvious fluid overload   Other NRE: NONE   DID A NON-ROUTINE EVENT OCCUR? No           Last vitals:  Vitals Value Taken Time   /96 04/14/23 1031   Temp 97.3  F (36.3  C) 04/14/23 1009   Pulse 59 04/14/23 1040   Resp 16 04/14/23 1009   SpO2 98 % 04/14/23 1040   Vitals shown include unvalidated device data.    Electronically Signed By: Juanita Pierre MD  April 14, 2023  2:01 PM

## 2023-04-14 NOTE — INTERVAL H&P NOTE
Patient seen in preop with his wife, Libertad.  Denies new medical history since he was last seen.  All questions answered regarding procedure.  Epigastric hernia marked.  Consent obtained.  To the OR for open ventral herniorrhaphy.    Chrystal Harding DO  General Surgeon  [facility]  Surgery Clinic - [address]  [place] [address]?  Office: [phone]  Employed by - [facility]

## 2023-05-01 ENCOUNTER — VIRTUAL VISIT (OUTPATIENT)
Dept: SURGERY | Facility: CLINIC | Age: 53
End: 2023-05-01
Payer: COMMERCIAL

## 2023-05-01 DIAGNOSIS — Z48.89 ENCOUNTER FOR POSTOPERATIVE CARE: Primary | ICD-10-CM

## 2023-05-01 PROCEDURE — 99024 POSTOP FOLLOW-UP VISIT: CPT | Performed by: PHYSICIAN ASSISTANT

## 2023-05-01 NOTE — LETTER
5/1/2023         RE: Hakeem Romero  [address]  [facility] [address]        Dear Colleague,    Thank you for referring your patient, Hakeem Romero, to the [facility] SURGERY CLINIC AND BARIATRICS CARE [place]. Please see a copy of my visit note below.    Telemedicine Visit: The patient's condition can be safely assessed and treated via telephone telemedicine encounter.      Reason for Telemedicine Visit: Patient has requested telehealth visit    Originating Site (Patient Location): Patient's home    Distant Site (Provider Location): [facility]    Consent:  The patient/guardian has verbally consented to: the potential risks and benefits of telemedicine (video visit) versus in person care; bill my insurance or make self-payment for services provided; and responsibility for payment of non-covered services.     Mode of Communication:  telephone    As the provider I attest to compliance with applicable laws and regulations related to telemedicine.       HPI: Pt is s/p Ventral hernia repair with Dr Harding 4/14/2   he is doing well. No pain. No difficulties with the surgical wound/wounds, .  he is eating well and denies fever and chills. Bowel function has returned to normal.      Assessment/Plan: Doing well after surgery and should follow up as needed.    Min KENNEDY  [facility] General Surgery & Bariatric Care  [address] [place] [address]  Phone- [phone]  Fax- [phone]                Again, thank you for allowing me to participate in the care of your patient.        Sincerely,        Min Crowder PA-C

## 2023-05-01 NOTE — PROGRESS NOTES
Telemedicine Visit: The patient's condition can be safely assessed and treated via telephone telemedicine encounter.      Reason for Telemedicine Visit: Patient has requested telehealth visit    Originating Site (Patient Location): Patient's home    Distant Site (Provider Location): [facility]    Consent:  The patient/guardian has verbally consented to: the potential risks and benefits of telemedicine (video visit) versus in person care; bill my insurance or make self-payment for services provided; and responsibility for payment of non-covered services.     Mode of Communication:  telephone    As the provider I attest to compliance with applicable laws and regulations related to telemedicine.       HPI: Pt is s/p Ventral hernia repair with Dr Harding 4/14/2   he is doing well. No pain. No difficulties with the surgical wound/wounds, .  he is eating well and denies fever and chills. Bowel function has returned to normal.      Assessment/Plan: Doing well after surgery and should follow up as needed.    Min KENNEDY  [facility] General Surgery & Bariatric Care  [address] [place] [address]  Phone- [phone]  Fax- [phone]

## 2023-05-20 ENCOUNTER — HEALTH MAINTENANCE LETTER (OUTPATIENT)
Age: 53
End: 2023-05-20

## 2023-05-25 DIAGNOSIS — Z94.0 HTN, KIDNEY TRANSPLANT RELATED: ICD-10-CM

## 2023-05-25 DIAGNOSIS — I15.1 HTN, KIDNEY TRANSPLANT RELATED: ICD-10-CM

## 2023-05-26 RX ORDER — METOPROLOL TARTRATE 75 MG/1
TABLET, FILM COATED ORAL
Qty: 180 TABLET | Refills: 3 | Status: SHIPPED | OUTPATIENT
Start: 2023-05-26 | End: 2024-01-04

## 2023-05-26 NOTE — TELEPHONE ENCOUNTER
"Routing refill request to provider for review/approval because:  -A break in medication, ( Greater than 3mo) : Last Rx sent 7/28/22 for Quantity 180 with 1 refill.  -Failed Blood pressure under 140/90 on 4/14/2023 with OP Visit Surgery    Last Written Prescription Date:  7/28/2022  Last Fill Quantity: 180,  # refills: 1  Last office visit provider: 4/3/2023    Requested Prescriptions   Pending Prescriptions Disp Refills     Metoprolol Tartrate 75 MG TABS [Pharmacy Med Name: METOPROLOL TART TAB 75MG] 180 tablet 3     Sig: TAKE 1 TABLET BY MOUTH  TWICE DAILY       Beta-Blockers Protocol Failed - 5/25/2023  9:55 PM        Failed - Blood pressure under 140/90 in past 12 months     BP Readings from Last 3 Encounters:   04/14/23 (!) 155/87   04/03/23 136/82   03/30/23 132/74                 Passed - Patient is age 6 or older        Passed - Recent (12 mo) or future (30 days) visit within the authorizing provider's specialty     Patient has had an office visit with the authorizing provider or a provider within the authorizing providers department within the previous 12 mos or has a future within next 30 days. See \"Patient Info\" tab in inbasket, or \"Choose Columns\" in Meds & Orders section of the refill encounter.              Passed - Medication is active on med list             Angela Robison RN 05/26/23 8:50 AM  "

## 2023-06-20 ENCOUNTER — OFFICE VISIT (OUTPATIENT)
Dept: TRANSPLANT | Facility: CLINIC | Age: 53
End: 2023-06-20
Attending: INTERNAL MEDICINE
Payer: COMMERCIAL

## 2023-06-20 VITALS
WEIGHT: 282.5 LBS | OXYGEN SATURATION: 93 % | SYSTOLIC BLOOD PRESSURE: 162 MMHG | TEMPERATURE: 98.5 F | DIASTOLIC BLOOD PRESSURE: 96 MMHG | BODY MASS INDEX: 39.4 KG/M2 | HEART RATE: 58 BPM

## 2023-06-20 DIAGNOSIS — I15.1 HTN, KIDNEY TRANSPLANT RELATED: ICD-10-CM

## 2023-06-20 DIAGNOSIS — N18.32 CHRONIC KIDNEY DISEASE, STAGE 3B (H): ICD-10-CM

## 2023-06-20 DIAGNOSIS — Z94.0 KIDNEY REPLACED BY TRANSPLANT: Primary | ICD-10-CM

## 2023-06-20 DIAGNOSIS — Z94.0 HTN, KIDNEY TRANSPLANT RELATED: ICD-10-CM

## 2023-06-20 DIAGNOSIS — Z48.298 AFTERCARE FOLLOWING ORGAN TRANSPLANT: ICD-10-CM

## 2023-06-20 DIAGNOSIS — Z29.89 NEED FOR PNEUMOCYSTIS PROPHYLAXIS: ICD-10-CM

## 2023-06-20 DIAGNOSIS — D75.1 POST-TRANSPLANT ERYTHROCYTOSIS: ICD-10-CM

## 2023-06-20 DIAGNOSIS — D84.9 IMMUNOSUPPRESSED STATUS (H): ICD-10-CM

## 2023-06-20 PROCEDURE — 99205 OFFICE O/P NEW HI 60 MIN: CPT | Performed by: INTERNAL MEDICINE

## 2023-06-20 PROCEDURE — G0463 HOSPITAL OUTPT CLINIC VISIT: HCPCS | Performed by: INTERNAL MEDICINE

## 2023-06-20 ASSESSMENT — PAIN SCALES - GENERAL: PAINLEVEL: NO PAIN (0)

## 2023-06-20 NOTE — LETTER
6/20/2023       RE: Hakeem Romero  [address]  [place] [address]     Dear Colleague,    Thank you for referring your patient, Hakeem Romero, to the [facility] TRANSPLANT CLINIC at [facility]. Please see a copy of my visit note below.    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  Also has nephrotic range proteinuria on last check.  Will need to repeat labs and work up proteinuria.  If labs are still abnormal, would consider a kidney transplant biopsy.   - Baseline Creatinine:  ~ 1.6-1.8   - Proteinuria: Nephrotic range (>3 grams)   - Date DSA Last Checked: Nov/2019      Latest DSA: Moderate DSA ([id number] mfi) to to several antibodies.  cPRA: 69%   - BK Viremia: No   - Kidney Tx Biopsy: Nov 24, 2008; Result: No diagnostic evidence of acute rejection.  Acute tubular injury.  Minimal interstitial fibrosis and tubular atrophy.             Sep 15, 2008; Result:  No diagnostic evidence of acute rejection.    # Immunosuppression: Cyclosporine (goal ), Mycophenolate mofetil (dose 1000 mg every 12 hours) and Prednisone (dose 5 mg daily)   - Continue with intensive monitoring of immunosuppression for efficacy and toxicity.   - Changes: Not at this time    # Infection Prophylaxis:   - PJP: Sulfa/TMP (Bactrim)    # Hypertension: Borderline control;  Goal BP: < 130/80   - Changes: Not at this time; Management per primary Nephrologist.   - With some edema, patient may benefit from starting thiazide diuretic, such as chlorthalidone.    # Elevated Blood Glucose: Glucose generally running ~ 110-120s   - Management as per primary care.    # Post-Transplant Erythrocytosis: Hgb: Stable;  On ACEI/ARB: Yes   Imaging: Yes - Unremarkable CT 3/2023.    # Mineral Bone Disorder:   - Vitamin D; level: Not checked recently        On supplement: No  -  Calcium; level: Normal        On supplement: No    # Electrolytes:   - Potassium; level: High normal        On supplement: No  - Bicarbonate; level: Normal        On supplement: No    # Retroperitoneal Lymph Nodes/Perinepric Fat Stranding: Unclear etiology, but incidental finding on CT of borderline to mildly enlarged retroperitoneal and pelvic lymph nodes.  Also some perinephric fat stranding around kidney transplant with a small lesion, likely cyst in the kidney.  Patient to have repeat CT.   - Will have Transplant Surgery review CT.   - Will obtain EBV PCR and LDH.   - Will also check UA/UCx.    # Obesity, Class II (BMI = 39.4): Weight has increased some per patient.   - Recommend weight loss for overall health by increasing exercise and watching caloric intake.    # RAUL on CPAP: Patient is compliant.    # Gout: No recent flares, but reportedly will get a couple a year.  Remains on allopurinol.    # Narcolepsy: Stable to improved symptoms.  Only takes medications as needed.    # Skin Cancer Risk:    - Discussed sun protection and recommend regular follow up with Dermatology.    # Medical Compliance: No.  Evidence of labs less than recommended and infrequent transplant follow-up.  - Discussed importance of checking labs regularly as recommended, taking medications as prescribed and attending scheduled medical appointments.    # Health Maintenance and Vaccination Review: Not Reviewed    # Transplant History:  Etiology of Kidney Failure: IgA nephropathy  Tx: LDKT  Transplant: 8/19/2008 (Kidney)  Significant changes in immunosuppression: None  Significant transplant-related complications: None    Transplant Office Phone Number: [phone]    Assessment and plan was discussed with the patient and he voiced his understanding and agreement.    Return visit: Return in about 1 year (around 6/20/2024).    Bakari Hurtado MD     REASON FOR CONSULT  Consult requested by Dr. Lei for kidney transplant and  immunosuppression management.    Chief Complaint   Mr. Romero is a 53 year old here for kidney transplant and immunosuppression management.    History of Present Illness    Mr. Romero reports feeling good overall with some medical complaints.  Since last clinic visit, patient reports no hospitalizations, but onenew medical complaint, although has been doing well overall.  Patient had ventral hernia surgery in April.  That went well and he has started working out again.  However, he reports that on a CT scan prior to the surgery, he was found to have some borderline to mild enlarged retroperitoneal and pelvic lymph nodes.  He also had diffuse thickening of his bladder.  Patient is supposed to get a follow up CT in a couple of days.    His energy level is good and remains normal.  He is active and gets a little exercise, but plans to do more soon.  Denies any chest pain or shortness of breath with exertion.  Some leg swelling by the end of the day.    Appetite is good and his weight is up.  No nausea, vomiting or diarrhea.  No fever, sweats or chills.  No night sweats.  His narcolepsy has been better with episodes only about once a week.  No problems emptying his bladder.    Home BP: 140-150/70-80s    Problem List   Patient Active Problem List   Diagnosis     Kidney replaced by transplant     Aftercare following organ transplant     Immunosuppressed status (H)     HTN, kidney transplant related     Dyslipidemia     Gout     RAUL on CPAP     Narcolepsy     Post-transplant erythrocytosis     Chronic kidney disease, stage 3b (H)     Obesity     IgA nephropathy     Gastroesophageal reflux disease     Edema of lower extremity     Calculus of kidney     Morbid obesity (H)     Ventral hernia without obstruction or gangrene     Need for pneumocystis prophylaxis       Past Medical History    I have reviewed this patient's medical history and updated it with pertinent information if needed.   Past Medical History:   Diagnosis  Date     Anemia in chronic kidney disease(285.21)      Dialysis patient (H)      Dyslipidemia      End stage kidney disease (H)      GERD (gastroesophageal reflux disease)      Gout      High risk medication use      Hypertension      IgA nephropathy      Immunosuppressed status (H)      Kidney replaced by transplant      Narcolepsy      RAUL on CPAP      Secondary hyperparathyroidism (of renal origin)      Splenic calcification     h/o calcified splenic granulomas       Past Surgical History   I have reviewed this patient's surgical history and updated it with pertinent information if needed.  Past Surgical History:   Procedure Laterality Date     HERNIORRHAPHY VENTRAL N/A 4/14/2023    Procedure: HERNIORRHAPHY, VENTRAL, OPEN;  Surgeon: Chrystal Harding DO;  Location: [facility] OR     s/p LDKT         Social History   I have reviewed this patient's social history and updated it with pertinent information if needed. Hakeem Romero  reports that he has never smoked. He has never used smokeless tobacco. He reports current alcohol use of about 3.3 standard drinks of alcohol per week. He reports that he does not use drugs.    Family History   I have reviewed this patient's family history and updated it with pertinent information if needed.   Family History   Problem Relation Age of Onset     C.A.D. Father      Hypertension Father      Diabetes Father      C.A.D. Paternal Grandfather         Allergies   No Known Allergies    Medications   Current Outpatient Medications   Medication Sig     allopurinol (ZYLOPRIM) 300 MG tablet TAKE 1 TABLET BY MOUTH TWICE  DAILY     CELLCEPT (BRAND) 250 MG CAPSULE Take 4 capsules (1,000 mg) by mouth 2 times daily     cloNIDine (CATAPRES) 0.1 MG tablet Take 1 tablet by mouth 2 times daily     cycloSPORINE modified (GENERIC EQUIVALENT) 100 MG capsule TAKE 1 CAPSULE BY MOUTH  TWICE DAILY     losartan (COZAAR) 50 MG tablet Take 1 tablet (50 mg) by mouth daily     methylphenidate (RITALIN) 5  MG tablet TAKE 1-2 TABS BY MOUTH TWICE A DAY .LAST DOSE BEFORE 6 PM     Metoprolol Tartrate 75 MG TABS TAKE 1 TABLET BY MOUTH  TWICE DAILY     NEORAL (BRAND) 25 MG capsule Take 1 capsule (25 mg) by mouth 2 times daily     pravastatin (PRAVACHOL) 40 MG tablet TAKE 1 TABLET BY MOUTH DAILY     predniSONE (DELTASONE) 10 MG tablet Take 40 mg by mouth as needed Gout Flair-ups     probenecid (BENEMID) 500 MG tablet Take 500 mg by mouth 2 times daily     sulfamethoxazole-trimethoprim (BACTRIM/SEPTRA) 400-80 MG per tablet TAKE 1 TABLET BY MOUTH  EVERY MON, WED, FRI MORNING     tadalafil (CIALIS) 10 MG tablet Take 10 mg by mouth daily as needed     No current facility-administered medications for this visit.     Medications Discontinued During This Encounter   Medication Reason     oxyCODONE (ROXICODONE) 5 MG tablet Med Rec(No AVS / No eCancel)       Physical Exam   Vital Signs: BP (!) 162/96   Pulse 58   Temp 98.5  F (36.9  C)   Wt 128.1 kg (282 lb 8 oz)   SpO2 93%   BMI 39.40 kg/m      GENERAL APPEARANCE: alert and no distress  HENT: mouth without ulcers or lesions  LYMPHATICS: no cervical or supraclavicular nodes  RESP: lungs clear to auscultation - no rales, rhonchi or wheezes  CV: regular rhythm, normal rate, no rub, no murmur  EDEMA: 1+ LE edema bilaterally  ABDOMEN: soft, nondistended, nontender, bowel sounds normal, obese  MS: extremities normal - no gross deformities noted, no evidence of inflammation in joints, no muscle tenderness  SKIN: no rash  TX KIDNEY: normal  DIALYSIS ACCESS: none    Data         Latest Ref Rng & Units 3/30/2023     7:52 AM 11/1/2022     7:17 AM 5/22/2020    10:40 AM   Renal   Sodium 136 - 145 mmol/L 143  136  142    Na (external) 135 - 145 mmol/L   142       K 3.4 - 5.3 mmol/L 5.3  4.4  5.0    K (external) 3.5 - 5.0 mmol/L   5.0       Cl 98 - 107 mmol/L 111  102  111        111    Cl (external) 98 - 107 mmol/L 111  102  111        111    CO2 22 - 29 mmol/L 23  24  25    CO2 (external)  21 - 31 mmol/L   25       Urea Nitrogen 8 - 25 mg/dL   30    Urea Nitrogen 6.0 - 20.0 mg/dL 41.5  32.6     BUN (external) 8 - 25 mg/dL   30       Creatinine 0.67 - 1.17 mg/dL 2.21  1.83  1.65    Cr (external) 0.72 - 1.25 mg/dL   1.65       Glucose 70 - 99 mg/dL 127  118  100    Glucose (external) 65 - 100 mg/dL   100       Calcium 8.6 - 10.0 mg/dL 9.4  9.2  9.5    Ca (external) 8.5 - 10.5 mg/dL   9.5           This result is from an external source.    Multiple values from one day are sorted in reverse-chronological order         Latest Ref Rng & Units 11/24/2008     7:27 AM 10/13/2008     5:36 AM 10/12/2008     6:20 AM   Bone Health   Phosphorus 2.5 - 4.5 mg/dL 4.4  2.9  2.9    Parathyroid Hormone Intact 12 - 72 pg/mL   67          Latest Ref Rng & Units 3/30/2023     7:52 AM 11/1/2022     7:09 AM 5/22/2020    10:40 AM   Heme   WBC 4.0 - 11.0 10e3/uL 8.7  9.7  8.2    WBC (external) 4.5 - 11.0 thou/cu mm   8.2       Hgb 13.3 - 17.7 g/dL 15.8  14.4  15.7    Hgb (external) 13.5 - 17.5 g/dL   15.7       Plt 150 - 450 10e3/uL 194  160  183    Plt (external) 140 - 440 thou/cu mm   183       ABSOLUTE NEUTROPHIL 1.7 - 7.0   5.6    ABSOLUTE NEUTROPHILS (EXTERNAL) 1.7 - 7.0 thou/cu mm   5.6       ABSOLUTE LYMPHOCYTES 0.9 - 2.9   1.7    ABSOLUTE LYMPHOCYTES (EXTERNAL) 0.9 - 2.3 thou/cu mm   1.7       ABSOLUTE MONOCYTES <0.9   0.8    ABSOLUTE MONOCYTES (EXTERNAL) <0.9 thou/cu mm   0.8       ABSOLUTE EOSINOPHILS <0.5   0.2    ABSOLUTE EOSINOPHILS (EXTERNAL) <0.5 thou/cu mm   0.2       ABSOLUTE BASOPHILS (EXTERNAL) <0.3 thou/cu mm   0.0           This result is from an external source.         Latest Ref Rng & Units 11/24/2008     7:27 AM 9/15/2008     7:44 AM 8/18/2008     8:10 AM   Liver   AP 40 - 150 U/L 163  128  76    TBili 0.2 - 1.3 mg/dL 0.5  0.5  0.3    ALT 0 - 70 U/L 28  22  15    AST 0 - 55 U/L 26  27  22    Tot Protein 6.8 - 8.8 g/dL 8.1  7.5  7.5    Albumin 3.9 - 5.1 g/dL 4.7  4.3  4.4          Latest Ref Rng & Units  8/18/2008     8:10 AM   Pancreas   A1C 4.3 - 6.0 % 4.5          Latest Ref Rng & Units 10/12/2008     6:20 AM 8/21/2008    10:30 AM   Iron studies   Iron 35 - 180 ug/dL 35  36    Ferritin 20 - 300 ng/mL  727          Latest Ref Rng & Units 11/7/2019     6:16 AM 12/4/2008     3:01 PM 11/24/2008     7:27 AM   [facility] Txp Virology   CVM DNA Quant   Whole Blood  Whole blood, EDTA anticoagulant    CMV Quant <100 Copies/mL  <100  <100    CMV QT Log <2.0 Log copies/mL  <2.0  <2.0    BK Spec  Plasma  Plasma, EDTA anticoagulant  C Plasma, EDTA anticoagulant    BK Res BKNEG^BK Virus DNA Not Detected copies/mL BK Virus DNA Not Detected  <1000  <1000    BK Log <2.7 Log copies/mL Not Calculated  <3.0  <3.0    EBV DNA COPIES/ML <1000 Copies/mL  <1000     EBV DNA LOG OF COPIES <3.0 Log copies/mL  <3.0        C Corrected result                      Again, thank you for allowing me to participate in the care of your patient.      Sincerely,    Bakari Hurtado MD

## 2023-06-20 NOTE — NURSING NOTE
Chief Complaint   Patient presents with     RECHECK     Return visit.     Blood pressure (!) 162/96, pulse 58, temperature 98.5  F (36.9  C), weight 128.1 kg (282 lb 8 oz), SpO2 93 %.    ALBAN KRISHNA

## 2023-06-20 NOTE — LETTER
6/20/2023         RE: Hakeem Romero  [address]  [place] [address]        Dear Colleague,    Thank you for referring your patient, Hakeem Romero, to the [facility]. Please see a copy of my visit note below.    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  Also has nephrotic range proteinuria on last check.  Will need to repeat labs and work up proteinuria.  If labs are still abnormal, would consider a kidney transplant biopsy.   - Baseline Creatinine:  ~ 1.6-1.8   - Proteinuria: Nephrotic range (>3 grams)   - Date DSA Last Checked: Nov/2019      Latest DSA: Moderate DSA ([id number] mfi) to to several antibodies.  cPRA: 69%   - BK Viremia: No   - Kidney Tx Biopsy: Nov 24, 2008; Result: No diagnostic evidence of acute rejection.  Acute tubular injury.  Minimal interstitial fibrosis and tubular atrophy.             Sep 15, 2008; Result:  No diagnostic evidence of acute rejection.    # Immunosuppression: Cyclosporine (goal ), Mycophenolate mofetil (dose 1000 mg every 12 hours) and Prednisone (dose 5 mg daily)   - Continue with intensive monitoring of immunosuppression for efficacy and toxicity.   - Changes: Not at this time    # Infection Prophylaxis:   - PJP: Sulfa/TMP (Bactrim)    # Hypertension: Borderline control;  Goal BP: < 130/80   - Changes: Not at this time; Management per primary Nephrologist.   - With some edema, patient may benefit from starting thiazide diuretic, such as chlorthalidone.    # Elevated Blood Glucose: Glucose generally running ~ 110-120s   - Management as per primary care.    # Post-Transplant Erythrocytosis: Hgb: Stable;  On ACEI/ARB: Yes   Imaging: Yes - Unremarkable CT 3/2023.    # Mineral Bone Disorder:   - Vitamin D; level: Not checked recently        On supplement: No  - Calcium; level: Normal        On supplement:  No    # Electrolytes:   - Potassium; level: High normal        On supplement: No  - Bicarbonate; level: Normal        On supplement: No    # Retroperitoneal Lymph Nodes/Perinepric Fat Stranding: Unclear etiology, but incidental finding on CT of borderline to mildly enlarged retroperitoneal and pelvic lymph nodes.  Also some perinephric fat stranding around kidney transplant with a small lesion, likely cyst in the kidney.  Patient to have repeat CT.   - Will have Transplant Surgery review CT.   - Will obtain EBV PCR and LDH.   - Will also check UA/UCx.    # Obesity, Class II (BMI = 39.4): Weight has increased some per patient.   - Recommend weight loss for overall health by increasing exercise and watching caloric intake.    # RAUL on CPAP: Patient is compliant.    # Gout: No recent flares, but reportedly will get a couple a year.  Remains on allopurinol.    # Narcolepsy: Stable to improved symptoms.  Only takes medications as needed.    # Skin Cancer Risk:    - Discussed sun protection and recommend regular follow up with Dermatology.    # Medical Compliance: No.  Evidence of labs less than recommended and infrequent transplant follow-up.  - Discussed importance of checking labs regularly as recommended, taking medications as prescribed and attending scheduled medical appointments.    # Health Maintenance and Vaccination Review: Not Reviewed    # Transplant History:  Etiology of Kidney Failure: IgA nephropathy  Tx: LDKT  Transplant: 8/19/2008 (Kidney)  Significant changes in immunosuppression: None  Significant transplant-related complications: None    Transplant Office Phone Number: [phone]    Assessment and plan was discussed with the patient and he voiced his understanding and agreement.    Return visit: Return in about 1 year (around 6/20/2024).    Bakari Hurtado MD     REASON FOR CONSULT  Consult requested by Dr. Lei for kidney transplant and immunosuppression management.    Chief Complaint     Nick is a 53 year old here for kidney transplant and immunosuppression management.    History of Present Illness    Mr. Romero reports feeling good overall with some medical complaints.  Since last clinic visit, patient reports no hospitalizations, but onenew medical complaint, although has been doing well overall.  Patient had ventral hernia surgery in April.  That went well and he has started working out again.  However, he reports that on a CT scan prior to the surgery, he was found to have some borderline to mild enlarged retroperitoneal and pelvic lymph nodes.  He also had diffuse thickening of his bladder.  Patient is supposed to get a follow up CT in a couple of days.    His energy level is good and remains normal.  He is active and gets a little exercise, but plans to do more soon.  Denies any chest pain or shortness of breath with exertion.  Some leg swelling by the end of the day.    Appetite is good and his weight is up.  No nausea, vomiting or diarrhea.  No fever, sweats or chills.  No night sweats.  His narcolepsy has been better with episodes only about once a week.  No problems emptying his bladder.    Home BP: 140-150/70-80s    Problem List   Patient Active Problem List   Diagnosis    Kidney replaced by transplant    Aftercare following organ transplant    Immunosuppressed status (H)    HTN, kidney transplant related    Dyslipidemia    Gout    RAUL on CPAP    Narcolepsy    Post-transplant erythrocytosis    Chronic kidney disease, stage 3b (H)    Obesity    IgA nephropathy    Gastroesophageal reflux disease    Edema of lower extremity    Calculus of kidney    Morbid obesity (H)    Ventral hernia without obstruction or gangrene    Need for pneumocystis prophylaxis       Past Medical History    I have reviewed this patient's medical history and updated it with pertinent information if needed.   Past Medical History:   Diagnosis Date    Anemia in chronic kidney disease(285.21)     Dialysis patient (H)      Dyslipidemia     End stage kidney disease (H)     GERD (gastroesophageal reflux disease)     Gout     High risk medication use     Hypertension     IgA nephropathy     Immunosuppressed status (H)     Kidney replaced by transplant     Narcolepsy     RAUL on CPAP     Secondary hyperparathyroidism (of renal origin)     Splenic calcification     h/o calcified splenic granulomas       Past Surgical History   I have reviewed this patient's surgical history and updated it with pertinent information if needed.  Past Surgical History:   Procedure Laterality Date    HERNIORRHAPHY VENTRAL N/A 4/14/2023    Procedure: HERNIORRHAPHY, VENTRAL, OPEN;  Surgeon: Chrystal Harding DO;  Location: [facility] OR    s/p [facility]         Social History   I have reviewed this patient's social history and updated it with pertinent information if needed. Hakeem Romero  reports that he has never smoked. He has never used smokeless tobacco. He reports current alcohol use of about 3.3 standard drinks of alcohol per week. He reports that he does not use drugs.    Family History   I have reviewed this patient's family history and updated it with pertinent information if needed.   Family History   Problem Relation Age of Onset    C.A.D. Father     Hypertension Father     Diabetes Father     C.A.D. Paternal Grandfather         Allergies   No Known Allergies    Medications   Current Outpatient Medications   Medication Sig    allopurinol (ZYLOPRIM) 300 MG tablet TAKE 1 TABLET BY MOUTH TWICE  DAILY    CELLCEPT (BRAND) 250 MG CAPSULE Take 4 capsules (1,000 mg) by mouth 2 times daily    cloNIDine (CATAPRES) 0.1 MG tablet Take 1 tablet by mouth 2 times daily    cycloSPORINE modified (GENERIC EQUIVALENT) 100 MG capsule TAKE 1 CAPSULE BY MOUTH  TWICE DAILY    losartan (COZAAR) 50 MG tablet Take 1 tablet (50 mg) by mouth daily    methylphenidate (RITALIN) 5 MG tablet TAKE 1-2 TABS BY MOUTH TWICE A DAY .LAST DOSE BEFORE 6 PM    Metoprolol Tartrate 75 MG TABS  TAKE 1 TABLET BY MOUTH  TWICE DAILY    NEORAL (BRAND) 25 MG capsule Take 1 capsule (25 mg) by mouth 2 times daily    pravastatin (PRAVACHOL) 40 MG tablet TAKE 1 TABLET BY MOUTH DAILY    predniSONE (DELTASONE) 10 MG tablet Take 40 mg by mouth as needed Gout Flair-ups    probenecid (BENEMID) 500 MG tablet Take 500 mg by mouth 2 times daily    sulfamethoxazole-trimethoprim (BACTRIM/SEPTRA) 400-80 MG per tablet TAKE 1 TABLET BY MOUTH  EVERY MON, WED, FRI MORNING    tadalafil (CIALIS) 10 MG tablet Take 10 mg by mouth daily as needed     No current facility-administered medications for this visit.     Medications Discontinued During This Encounter   Medication Reason    oxyCODONE (ROXICODONE) 5 MG tablet Med Rec(No AVS / No eCancel)       Physical Exam   Vital Signs: BP (!) 162/96   Pulse 58   Temp 98.5  F (36.9  C)   Wt 128.1 kg (282 lb 8 oz)   SpO2 93%   BMI 39.40 kg/m      GENERAL APPEARANCE: alert and no distress  HENT: mouth without ulcers or lesions  LYMPHATICS: no cervical or supraclavicular nodes  RESP: lungs clear to auscultation - no rales, rhonchi or wheezes  CV: regular rhythm, normal rate, no rub, no murmur  EDEMA: 1+ LE edema bilaterally  ABDOMEN: soft, nondistended, nontender, bowel sounds normal, obese  MS: extremities normal - no gross deformities noted, no evidence of inflammation in joints, no muscle tenderness  SKIN: no rash  TX KIDNEY: normal  DIALYSIS ACCESS: none    Data         Latest Ref Rng & Units 3/30/2023     7:52 AM 11/1/2022     7:17 AM 5/22/2020    10:40 AM   Renal   Sodium 136 - 145 mmol/L 143  136  142    Na (external) 135 - 145 mmol/L   142       K 3.4 - 5.3 mmol/L 5.3  4.4  5.0    K (external) 3.5 - 5.0 mmol/L   5.0       Cl 98 - 107 mmol/L 111  102  111        111    Cl (external) 98 - 107 mmol/L 111  102  111        111    CO2 22 - 29 mmol/L 23  24  25    CO2 (external) 21 - 31 mmol/L   25       Urea Nitrogen 8 - 25 mg/dL   30    Urea Nitrogen 6.0 - 20.0 mg/dL 41.5  32.6     BUN  (external) 8 - 25 mg/dL   30       Creatinine 0.67 - 1.17 mg/dL 2.21  1.83  1.65    Cr (external) 0.72 - 1.25 mg/dL   1.65       Glucose 70 - 99 mg/dL 127  118  100    Glucose (external) 65 - 100 mg/dL   100       Calcium 8.6 - 10.0 mg/dL 9.4  9.2  9.5    Ca (external) 8.5 - 10.5 mg/dL   9.5           This result is from an external source.    Multiple values from one day are sorted in reverse-chronological order         Latest Ref Rng & Units 11/24/2008     7:27 AM 10/13/2008     5:36 AM 10/12/2008     6:20 AM   Bone Health   Phosphorus 2.5 - 4.5 mg/dL 4.4  2.9  2.9    Parathyroid Hormone Intact 12 - 72 pg/mL   67          Latest Ref Rng & Units 3/30/2023     7:52 AM 11/1/2022     7:09 AM 5/22/2020    10:40 AM   Heme   WBC 4.0 - 11.0 10e3/uL 8.7  9.7  8.2    WBC (external) 4.5 - 11.0 thou/cu mm   8.2       Hgb 13.3 - 17.7 g/dL 15.8  14.4  15.7    Hgb (external) 13.5 - 17.5 g/dL   15.7       Plt 150 - 450 10e3/uL 194  160  183    Plt (external) 140 - 440 thou/cu mm   183       ABSOLUTE NEUTROPHIL 1.7 - 7.0   5.6    ABSOLUTE NEUTROPHILS (EXTERNAL) 1.7 - 7.0 thou/cu mm   5.6       ABSOLUTE LYMPHOCYTES 0.9 - 2.9   1.7    ABSOLUTE LYMPHOCYTES (EXTERNAL) 0.9 - 2.3 thou/cu mm   1.7       ABSOLUTE MONOCYTES <0.9   0.8    ABSOLUTE MONOCYTES (EXTERNAL) <0.9 thou/cu mm   0.8       ABSOLUTE EOSINOPHILS <0.5   0.2    ABSOLUTE EOSINOPHILS (EXTERNAL) <0.5 thou/cu mm   0.2       ABSOLUTE BASOPHILS (EXTERNAL) <0.3 thou/cu mm   0.0           This result is from an external source.         Latest Ref Rng & Units 11/24/2008     7:27 AM 9/15/2008     7:44 AM 8/18/2008     8:10 AM   Liver   AP 40 - 150 U/L 163  128  76    TBili 0.2 - 1.3 mg/dL 0.5  0.5  0.3    ALT 0 - 70 U/L 28  22  15    AST 0 - 55 U/L 26  27  22    Tot Protein 6.8 - 8.8 g/dL 8.1  7.5  7.5    Albumin 3.9 - 5.1 g/dL 4.7  4.3  4.4          Latest Ref Rng & Units 8/18/2008     8:10 AM   Pancreas   A1C 4.3 - 6.0 % 4.5          Latest Ref Rng & Units 10/12/2008     6:20 AM  8/21/2008    10:30 AM   Iron studies   Iron 35 - 180 ug/dL 35  36    Ferritin 20 - 300 ng/mL  727          Latest Ref Rng & Units 11/7/2019     6:16 AM 12/4/2008     3:01 PM 11/24/2008     7:27 AM   [facility] Txp Virology   CVM DNA Quant   Whole Blood  Whole blood, EDTA anticoagulant    CMV Quant <100 Copies/mL  <100  <100    CMV QT Log <2.0 Log copies/mL  <2.0  <2.0    BK Spec  Plasma  Plasma, EDTA anticoagulant  C Plasma, EDTA anticoagulant    BK Res BKNEG^BK Virus DNA Not Detected copies/mL BK Virus DNA Not Detected  <1000  <1000    BK Log <2.7 Log copies/mL Not Calculated  <3.0  <3.0    EBV DNA COPIES/ML <1000 Copies/mL  <1000     EBV DNA LOG OF COPIES <3.0 Log copies/mL  <3.0        C Corrected result        Sincerely,        Bakari Hurtado MD

## 2023-06-22 ENCOUNTER — HOSPITAL ENCOUNTER (OUTPATIENT)
Dept: CT IMAGING | Facility: CLINIC | Age: 53
Discharge: HOME | End: 2023-06-22
Attending: INTERNAL MEDICINE | Admitting: INTERNAL MEDICINE
Payer: COMMERCIAL

## 2023-06-22 DIAGNOSIS — R59.1 LA (LYMPHADENOPATHY): ICD-10-CM

## 2023-06-22 PROCEDURE — 74176 CT ABD & PELVIS W/O CONTRAST: CPT

## 2023-06-22 NOTE — RESULT ENCOUNTER NOTE
Hello -    Here are my comments about the recent results.  CT scan is unchanged, which is reassuring. I would recommend a visit with your nephrologist to discuss the abnormalities present.    Please let us know if you have any questions or concerns.    Regards,  Chico Bloom MD

## 2023-06-27 PROBLEM — Z29.89 NEED FOR PNEUMOCYSTIS PROPHYLAXIS: Status: ACTIVE | Noted: 2023-06-27

## 2023-06-27 NOTE — PATIENT INSTRUCTIONS
Patient Recommendations:  - No new recommendations at this time.    Transplant Patient Information  Your Post Transplant Coordinator is: Yamil Gandhi  For non urgent items, we encourage you to contact your coordinator/care team online via [employer]  You and your care team can also contact your transplant coordinator Monday - Friday, 8am - 5pm at [phone] (Option 2 to reach the coordinator or Option 4 to schedule an appointment).  After hours for urgent matters, please call [facility] at [phone].

## 2023-06-27 NOTE — PROGRESS NOTES
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  Also has nephrotic range proteinuria on last check.  Will need to repeat labs and work up proteinuria.  If labs are still abnormal, would consider a kidney transplant biopsy.   - Baseline Creatinine:  ~ 1.6-1.8   - Proteinuria: Nephrotic range (>3 grams)   - Date DSA Last Checked: Nov/2019      Latest DSA: Moderate DSA ([id number] mfi) to to several antibodies.  cPRA: 69%   - BK Viremia: No   - Kidney Tx Biopsy: Nov 24, 2008; Result: No diagnostic evidence of acute rejection.  Acute tubular injury.  Minimal interstitial fibrosis and tubular atrophy.             Sep 15, 2008; Result:  No diagnostic evidence of acute rejection.    # Immunosuppression: Cyclosporine (goal ), Mycophenolate mofetil (dose 1000 mg every 12 hours) and Prednisone (dose 5 mg daily)   - Continue with intensive monitoring of immunosuppression for efficacy and toxicity.   - Changes: Not at this time    # Infection Prophylaxis:   - PJP: Sulfa/TMP (Bactrim)    # Hypertension: Borderline control;  Goal BP: < 130/80   - Changes: Not at this time; Management per primary Nephrologist.   - With some edema, patient may benefit from starting thiazide diuretic, such as chlorthalidone.    # Elevated Blood Glucose: Glucose generally running ~ 110-120s   - Management as per primary care.    # Post-Transplant Erythrocytosis: Hgb: Stable;  On ACEI/ARB: Yes   Imaging: Yes - Unremarkable CT 3/2023.    # Mineral Bone Disorder:   - Vitamin D; level: Not checked recently        On supplement: No  - Calcium; level: Normal        On supplement: No    # Electrolytes:   - Potassium; level: High normal        On supplement: No  - Bicarbonate; level: Normal        On supplement: No    # Retroperitoneal Lymph Nodes/Perinepric Fat Stranding: Unclear etiology, but incidental finding on CT of  borderline to mildly enlarged retroperitoneal and pelvic lymph nodes.  Also some perinephric fat stranding around kidney transplant with a small lesion, likely cyst in the kidney.  Patient to have repeat CT.   - Will have Transplant Surgery review CT.   - Will obtain EBV PCR and LDH.   - Will also check UA/UCx.    # Obesity, Class II (BMI = 39.4): Weight has increased some per patient.   - Recommend weight loss for overall health by increasing exercise and watching caloric intake.    # RAUL on CPAP: Patient is compliant.    # Gout: No recent flares, but reportedly will get a couple a year.  Remains on allopurinol.    # Narcolepsy: Stable to improved symptoms.  Only takes medications as needed.    # Skin Cancer Risk:    - Discussed sun protection and recommend regular follow up with Dermatology.    # Medical Compliance: No.  Evidence of labs less than recommended and infrequent transplant follow-up.  - Discussed importance of checking labs regularly as recommended, taking medications as prescribed and attending scheduled medical appointments.    # Health Maintenance and Vaccination Review: Not Reviewed    # Transplant History:  Etiology of Kidney Failure: IgA nephropathy  Tx: LDKT  Transplant: 8/19/2008 (Kidney)  Significant changes in immunosuppression: None  Significant transplant-related complications: None    Transplant Office Phone Number: [phone]    Assessment and plan was discussed with the patient and he voiced his understanding and agreement.    Return visit: Return in about 1 year (around 6/20/2024).    Bakari Hurtado MD     REASON FOR CONSULT  Consult requested by Dr. Lei for kidney transplant and immunosuppression management.    Chief Complaint   Mr. Romero is a 53 year old here for kidney transplant and immunosuppression management.    History of Present Illness    Mr. Romero reports feeling good overall with some medical complaints.  Since last clinic visit, patient reports no  hospitalizations, but onenew medical complaint, although has been doing well overall.  Patient had ventral hernia surgery in April.  That went well and he has started working out again.  However, he reports that on a CT scan prior to the surgery, he was found to have some borderline to mild enlarged retroperitoneal and pelvic lymph nodes.  He also had diffuse thickening of his bladder.  Patient is supposed to get a follow up CT in a couple of days.    His energy level is good and remains normal.  He is active and gets a little exercise, but plans to do more soon.  Denies any chest pain or shortness of breath with exertion.  Some leg swelling by the end of the day.    Appetite is good and his weight is up.  No nausea, vomiting or diarrhea.  No fever, sweats or chills.  No night sweats.  His narcolepsy has been better with episodes only about once a week.  No problems emptying his bladder.    Home BP: 140-150/70-80s    Problem List   Patient Active Problem List   Diagnosis     Kidney replaced by transplant     Aftercare following organ transplant     Immunosuppressed status (H)     HTN, kidney transplant related     Dyslipidemia     Gout     RAUL on CPAP     Narcolepsy     Post-transplant erythrocytosis     Chronic kidney disease, stage 3b (H)     Obesity     IgA nephropathy     Gastroesophageal reflux disease     Edema of lower extremity     Calculus of kidney     Morbid obesity (H)     Ventral hernia without obstruction or gangrene     Need for pneumocystis prophylaxis       Past Medical History    I have reviewed this patient's medical history and updated it with pertinent information if needed.   Past Medical History:   Diagnosis Date     Anemia in chronic kidney disease(285.21)      Dialysis patient (H)      Dyslipidemia      End stage kidney disease (H)      GERD (gastroesophageal reflux disease)      Gout      High risk medication use      Hypertension      IgA nephropathy      Immunosuppressed status (H)       Kidney replaced by transplant      Narcolepsy      RAUL on CPAP      Secondary hyperparathyroidism (of renal origin)      Splenic calcification     h/o calcified splenic granulomas       Past Surgical History   I have reviewed this patient's surgical history and updated it with pertinent information if needed.  Past Surgical History:   Procedure Laterality Date     HERNIORRHAPHY VENTRAL N/A 4/14/2023    Procedure: HERNIORRHAPHY, VENTRAL, OPEN;  Surgeon: Chrystal Harding DO;  Location: [facility] OR     s/p KT         Social History   I have reviewed this patient's social history and updated it with pertinent information if needed. Hakeem Romero  reports that he has never smoked. He has never used smokeless tobacco. He reports current alcohol use of about 3.3 standard drinks of alcohol per week. He reports that he does not use drugs.    Family History   I have reviewed this patient's family history and updated it with pertinent information if needed.   Family History   Problem Relation Age of Onset     C.A.D. Father      Hypertension Father      Diabetes Father      C.A.D. Paternal Grandfather         Allergies   No Known Allergies    Medications   Current Outpatient Medications   Medication Sig     allopurinol (ZYLOPRIM) 300 MG tablet TAKE 1 TABLET BY MOUTH TWICE  DAILY     CELLCEPT (BRAND) 250 MG CAPSULE Take 4 capsules (1,000 mg) by mouth 2 times daily     cloNIDine (CATAPRES) 0.1 MG tablet Take 1 tablet by mouth 2 times daily     cycloSPORINE modified (GENERIC EQUIVALENT) 100 MG capsule TAKE 1 CAPSULE BY MOUTH  TWICE DAILY     losartan (COZAAR) 50 MG tablet Take 1 tablet (50 mg) by mouth daily     methylphenidate (RITALIN) 5 MG tablet TAKE 1-2 TABS BY MOUTH TWICE A DAY .LAST DOSE BEFORE 6 PM     Metoprolol Tartrate 75 MG TABS TAKE 1 TABLET BY MOUTH  TWICE DAILY     NEORAL (BRAND) 25 MG capsule Take 1 capsule (25 mg) by mouth 2 times daily     pravastatin (PRAVACHOL) 40 MG tablet TAKE 1 TABLET BY MOUTH DAILY      predniSONE (DELTASONE) 10 MG tablet Take 40 mg by mouth as needed Gout Flair-ups     probenecid (BENEMID) 500 MG tablet Take 500 mg by mouth 2 times daily     sulfamethoxazole-trimethoprim (BACTRIM/SEPTRA) 400-80 MG per tablet TAKE 1 TABLET BY MOUTH  EVERY MON, WED, FRI MORNING     tadalafil (CIALIS) 10 MG tablet Take 10 mg by mouth daily as needed     No current facility-administered medications for this visit.     Medications Discontinued During This Encounter   Medication Reason     oxyCODONE (ROXICODONE) 5 MG tablet Med Rec(No AVS / No eCancel)       Physical Exam   Vital Signs: BP (!) 162/96   Pulse 58   Temp 98.5  F (36.9  C)   Wt 128.1 kg (282 lb 8 oz)   SpO2 93%   BMI 39.40 kg/m      GENERAL APPEARANCE: alert and no distress  HENT: mouth without ulcers or lesions  LYMPHATICS: no cervical or supraclavicular nodes  RESP: lungs clear to auscultation - no rales, rhonchi or wheezes  CV: regular rhythm, normal rate, no rub, no murmur  EDEMA: 1+ LE edema bilaterally  ABDOMEN: soft, nondistended, nontender, bowel sounds normal, obese  MS: extremities normal - no gross deformities noted, no evidence of inflammation in joints, no muscle tenderness  SKIN: no rash  TX KIDNEY: normal  DIALYSIS ACCESS: none    Data         Latest Ref Rng & Units 3/30/2023     7:52 AM 11/1/2022     7:17 AM 5/22/2020    10:40 AM   Renal   Sodium 136 - 145 mmol/L 143  136  142    Na (external) 135 - 145 mmol/L   142       K 3.4 - 5.3 mmol/L 5.3  4.4  5.0    K (external) 3.5 - 5.0 mmol/L   5.0       Cl 98 - 107 mmol/L 111  102  111        111    Cl (external) 98 - 107 mmol/L 111  102  111        111    CO2 22 - 29 mmol/L 23  24  25    CO2 (external) 21 - 31 mmol/L   25       Urea Nitrogen 8 - 25 mg/dL   30    Urea Nitrogen 6.0 - 20.0 mg/dL 41.5  32.6     BUN (external) 8 - 25 mg/dL   30       Creatinine 0.67 - 1.17 mg/dL 2.21  1.83  1.65    Cr (external) 0.72 - 1.25 mg/dL   1.65       Glucose 70 - 99 mg/dL 127  118  100    Glucose  (external) 65 - 100 mg/dL   100       Calcium 8.6 - 10.0 mg/dL 9.4  9.2  9.5    Ca (external) 8.5 - 10.5 mg/dL   9.5           This result is from an external source.    Multiple values from one day are sorted in reverse-chronological order         Latest Ref Rng & Units 11/24/2008     7:27 AM 10/13/2008     5:36 AM 10/12/2008     6:20 AM   [facility]   Phosphorus 2.5 - 4.5 mg/dL 4.4  2.9  2.9    Parathyroid Hormone Intact 12 - 72 pg/mL   67          Latest Ref Rng & Units 3/30/2023     7:52 AM 11/1/2022     7:09 AM 5/22/2020    10:40 AM   Heme   WBC 4.0 - 11.0 10e3/uL 8.7  9.7  8.2    WBC (external) 4.5 - 11.0 thou/cu mm   8.2       Hgb 13.3 - 17.7 g/dL 15.8  14.4  15.7    Hgb (external) 13.5 - 17.5 g/dL   15.7       Plt 150 - 450 10e3/uL 194  160  183    Plt (external) 140 - 440 thou/cu mm   183       ABSOLUTE NEUTROPHIL 1.7 - 7.0   5.6    ABSOLUTE NEUTROPHILS (EXTERNAL) 1.7 - 7.0 thou/cu mm   5.6       ABSOLUTE LYMPHOCYTES 0.9 - 2.9   1.7    ABSOLUTE LYMPHOCYTES (EXTERNAL) 0.9 - 2.3 thou/cu mm   1.7       ABSOLUTE MONOCYTES <0.9   0.8    ABSOLUTE MONOCYTES (EXTERNAL) <0.9 thou/cu mm   0.8       ABSOLUTE EOSINOPHILS <0.5   0.2    ABSOLUTE EOSINOPHILS (EXTERNAL) <0.5 thou/cu mm   0.2       ABSOLUTE BASOPHILS (EXTERNAL) <0.3 thou/cu mm   0.0           This result is from an external source.         Latest Ref Rng & Units 11/24/2008     7:27 AM 9/15/2008     7:44 AM 8/18/2008     8:10 AM   Liver   AP 40 - 150 U/L 163  128  76    TBili 0.2 - 1.3 mg/dL 0.5  0.5  0.3    ALT 0 - 70 U/L 28  22  15    AST 0 - 55 U/L 26  27  22    Tot Protein 6.8 - 8.8 g/dL 8.1  7.5  7.5    Albumin 3.9 - 5.1 g/dL 4.7  4.3  4.4          Latest Ref Rng & Units 8/18/2008     8:10 AM   Pancreas   A1C 4.3 - 6.0 % 4.5          Latest Ref Rng & Units 10/12/2008     6:20 AM 8/21/2008    10:30 AM   Iron studies   Iron 35 - 180 ug/dL 35  36    Ferritin 20 - 300 ng/mL  727          Latest Ref Rng & Units 11/7/2019     6:16 AM 12/4/2008     3:01 PM  11/24/2008     7:27 AM   [facility] Virology   CVM DNA Quant   Whole Blood  Whole blood, EDTA anticoagulant    CMV Quant <100 Copies/mL  <100  <100    CMV QT Log <2.0 Log copies/mL  <2.0  <2.0    BK Spec  Plasma  Plasma, EDTA anticoagulant  C Plasma, EDTA anticoagulant    BK Res BKNEG^BK Virus DNA Not Detected copies/mL BK Virus DNA Not Detected  <1000  <1000    BK Log <2.7 Log copies/mL Not Calculated  <3.0  <3.0    EBV DNA COPIES/ML <1000 Copies/mL  <1000     EBV DNA LOG OF COPIES <3.0 Log copies/mL  <3.0        C Corrected result

## 2023-06-28 ENCOUNTER — TELEPHONE (OUTPATIENT)
Dept: TRANSPLANT | Facility: CLINIC | Age: 53
End: 2023-06-28
Payer: COMMERCIAL

## 2023-06-28 DIAGNOSIS — Z94.0 KIDNEY TRANSPLANTED: Primary | ICD-10-CM

## 2023-06-28 DIAGNOSIS — Z48.298 AFTERCARE FOLLOWING ORGAN TRANSPLANT: ICD-10-CM

## 2023-06-28 DIAGNOSIS — Z79.899 ENCOUNTER FOR LONG-TERM (CURRENT) USE OF HIGH-RISK MEDICATION: ICD-10-CM

## 2023-06-28 DIAGNOSIS — R80.9 PROTEINURIA: ICD-10-CM

## 2023-06-28 DIAGNOSIS — T86.10 COMPLICATIONS, KIDNEY TRANSPLANT: ICD-10-CM

## 2023-06-28 NOTE — TELEPHONE ENCOUNTER
Bakari Hurtado MD Schuller, Laura M, NP; Karla Gandhi, TRACI Rodriguez,     Patient is s/p LDKT 2008 by Dr. Gamez.  He recently underwent a ventral hernia repair in April.  He had a CT prior to hernia that showed an abnormality in his transplanted kidney and some retroperitoneal lymph nodes that were borderline to mildly enlarged.  Also bladder thickening.     Repeat CT last week still showed the kidney lesion, maybe a cyst, but not clear, as well as stable node by the kidney.  No mention of a bladder issue.     Can you have RK look at the CT and see if he thinks any should be done on follow up please.     Yamil, can you send EBV PCR and LDH.  Would also send UA/UCx with the fat stranding around the kidney.     Bakari Gaines MD Ututalum, Teresa, RN  Patient needs repeat labs as he had increased creatinine and nephrotic proteinuria with last check.     Would check BMP, UA, UPC, microalbuminuria, serum kappa/lambda light chain, SPEP, CBC, and HBA1c.     He may need a biopsy.     Not sure if he has had other work up locally that we don't have access to.     Gaetano           Labs ordered:    CBC    Hgb A1c    BMP    EBV PCR Quant    LDH    UA/UC    UPCr    Urine microalbumin    K/L light chain    [facility]   Phone:  [phone]   Fax:  [phone]            Called Hakeem Romero, no answer.  Unable to leave a message, mailbox full.  HYGIEIAhart message sent.  Called wife Nataly, she will let him know to complete labs.  [facility] is now [facility] [place].

## 2023-07-11 ENCOUNTER — TELEPHONE (OUTPATIENT)
Dept: TRANSPLANT | Facility: CLINIC | Age: 53
End: 2023-07-11
Payer: COMMERCIAL

## 2023-07-11 NOTE — TELEPHONE ENCOUNTER
"Called back Hakeem Romero  States he started having chills at work with \"teeth chattering\"  RNCC recommended checking temperature as well as completing a COVID-19 test.  He will check once he gets home and update RNCC.  Denies any missed medications.    "

## 2023-07-11 NOTE — TELEPHONE ENCOUNTER
Patient Call: General  Route to LPN    Reason for call: Pt having gilberto symptoms that started today  Having chills  Was in air conditioned office  And chills started there now driving home  Thought the chills would go away but has chills and goosebumps while driving in the car      Call back needed? Yes    Return Call Needed  Same as documented in contacts section  When to return call?: Same day: Route High Priority

## 2023-07-14 ENCOUNTER — LAB (OUTPATIENT)
Dept: LAB | Facility: CLINIC | Age: 53
End: 2023-07-14
Payer: COMMERCIAL

## 2023-07-14 ENCOUNTER — APPOINTMENT (OUTPATIENT)
Dept: LAB | Facility: CLINIC | Age: 53
End: 2023-07-14
Payer: COMMERCIAL

## 2023-07-14 ENCOUNTER — TELEPHONE (OUTPATIENT)
Dept: TRANSPLANT | Facility: CLINIC | Age: 53
End: 2023-07-14

## 2023-07-14 DIAGNOSIS — Z94.0 KIDNEY REPLACED BY TRANSPLANT: ICD-10-CM

## 2023-07-14 DIAGNOSIS — Z94.0 KIDNEY TRANSPLANTED: ICD-10-CM

## 2023-07-14 DIAGNOSIS — Z79.899 ENCOUNTER FOR LONG-TERM (CURRENT) USE OF HIGH-RISK MEDICATION: ICD-10-CM

## 2023-07-14 DIAGNOSIS — D84.9 IMMUNOSUPPRESSED STATUS (H): ICD-10-CM

## 2023-07-14 DIAGNOSIS — T86.10 COMPLICATION OF TRANSPLANTED KIDNEY: ICD-10-CM

## 2023-07-14 DIAGNOSIS — T86.10 COMPLICATIONS, KIDNEY TRANSPLANT: ICD-10-CM

## 2023-07-14 DIAGNOSIS — Z79.899 LONG TERM USE OF DRUG: ICD-10-CM

## 2023-07-14 DIAGNOSIS — R68.83 CHILLS: Primary | ICD-10-CM

## 2023-07-14 DIAGNOSIS — R80.9 PROTEINURIA: ICD-10-CM

## 2023-07-14 DIAGNOSIS — Z48.298 AFTERCARE FOLLOWING ORGAN TRANSPLANT: ICD-10-CM

## 2023-07-14 LAB
ALBUMIN MFR UR ELPH: 220 MG/DL
ALBUMIN UR-MCNC: >=300 MG/DL
ANION GAP SERPL CALCULATED.3IONS-SCNC: 12 MMOL/L (ref 7–15)
APPEARANCE UR: CLEAR
BACTERIA #/AREA URNS HPF: ABNORMAL /HPF
BILIRUB UR QL STRIP: NEGATIVE
BUN SERPL-MCNC: 39.8 MG/DL (ref 6–20)
CALCIUM SERPL-MCNC: 9.4 MG/DL (ref 8.6–10)
CHLORIDE SERPL-SCNC: 107 MMOL/L (ref 98–107)
COLOR UR AUTO: YELLOW
CREAT SERPL-MCNC: 2.4 MG/DL (ref 0.67–1.17)
CREAT UR-MCNC: 69.5 MG/DL
CREAT UR-MCNC: 69.5 MG/DL
CYCLOSPORINE BLD LC/MS/MS-MCNC: 100 UG/L (ref 50–400)
DEPRECATED HCO3 PLAS-SCNC: 22 MMOL/L (ref 22–29)
ERYTHROCYTE [DISTWIDTH] IN BLOOD BY AUTOMATED COUNT: 13.1 % (ref 10–15)
GFR SERPL CREATININE-BSD FRML MDRD: 31 ML/MIN/1.73M2
GLUCOSE SERPL-MCNC: 118 MG/DL (ref 70–99)
GLUCOSE UR STRIP-MCNC: NEGATIVE MG/DL
HBA1C MFR BLD: 5.7 % (ref 0–5.6)
HCT VFR BLD AUTO: 46.7 % (ref 40–53)
HGB BLD-MCNC: 16.1 G/DL (ref 13.3–17.7)
HGB UR QL STRIP: ABNORMAL
KETONES UR STRIP-MCNC: NEGATIVE MG/DL
LDH SERPL L TO P-CCNC: 235 U/L (ref 0–250)
LEUKOCYTE ESTERASE UR QL STRIP: NEGATIVE
MCH RBC QN AUTO: 29.8 PG (ref 26.5–33)
MCHC RBC AUTO-ENTMCNC: 34.5 G/DL (ref 31.5–36.5)
MCV RBC AUTO: 87 FL (ref 78–100)
MICROALBUMIN UR-MCNC: 1499 MG/L
MICROALBUMIN/CREAT UR: 2156.83 MG/G CR (ref 0–17)
NITRATE UR QL: NEGATIVE
PH UR STRIP: 5.5 [PH] (ref 5–7)
PLATELET # BLD AUTO: 153 10E3/UL (ref 150–450)
POTASSIUM SERPL-SCNC: 4.7 MMOL/L (ref 3.4–5.3)
PROT/CREAT 24H UR: 3.17 MG/MG CR (ref 0–0.2)
RBC # BLD AUTO: 5.4 10E6/UL (ref 4.4–5.9)
RBC #/AREA URNS AUTO: ABNORMAL /HPF
SODIUM SERPL-SCNC: 141 MMOL/L (ref 136–145)
SP GR UR STRIP: 1.02 (ref 1–1.03)
SQUAMOUS #/AREA URNS AUTO: ABNORMAL /LPF
TME LAST DOSE: NORMAL H
TME LAST DOSE: NORMAL H
TOTAL PROTEIN SERUM FOR ELP: 5.9 G/DL (ref 6.4–8.3)
UROBILINOGEN UR STRIP-ACNC: 0.2 E.U./DL
WBC # BLD AUTO: 7.5 10E3/UL (ref 4–11)
WBC #/AREA URNS AUTO: ABNORMAL /HPF

## 2023-07-14 PROCEDURE — 82043 UR ALBUMIN QUANTITATIVE: CPT

## 2023-07-14 PROCEDURE — 83615 LACTATE (LD) (LDH) ENZYME: CPT

## 2023-07-14 PROCEDURE — 80158 DRUG ASSAY CYCLOSPORINE: CPT

## 2023-07-14 PROCEDURE — 81001 URINALYSIS AUTO W/SCOPE: CPT

## 2023-07-14 PROCEDURE — 83521 IG LIGHT CHAINS FREE EACH: CPT

## 2023-07-14 PROCEDURE — 84156 ASSAY OF PROTEIN URINE: CPT

## 2023-07-14 PROCEDURE — 84165 PROTEIN E-PHORESIS SERUM: CPT

## 2023-07-14 PROCEDURE — 36415 COLL VENOUS BLD VENIPUNCTURE: CPT

## 2023-07-14 PROCEDURE — 87799 DETECT AGENT NOS DNA QUANT: CPT

## 2023-07-14 PROCEDURE — 80048 BASIC METABOLIC PNL TOTAL CA: CPT

## 2023-07-14 PROCEDURE — 85027 COMPLETE CBC AUTOMATED: CPT

## 2023-07-14 PROCEDURE — 83036 HEMOGLOBIN GLYCOSYLATED A1C: CPT

## 2023-07-14 PROCEDURE — 84155 ASSAY OF PROTEIN SERUM: CPT

## 2023-07-14 PROCEDURE — 82570 ASSAY OF URINE CREATININE: CPT

## 2023-07-14 NOTE — TELEPHONE ENCOUNTER
Bakari Hurtado MD Ututalum, Teresa, RN  Can do CMV and see PCP for further evaluation.         ----- Message -----   From: Karla Gandhi RN   Sent: 7/14/2023   9:19 AM CDT   To: Bakari Hurtado MD     C/o chills. Non suspicious of a UTI.   Sent to Dr. Hurtado for recs         CMV PCR ordered.   --- Called [facility] lab who confirmed they can add to today's lab draw.    PLAN:  Recommend reaching out to PCP for further evaluation of symptoms.  Did COVID-19 test come back negative?

## 2023-07-15 LAB — CMV DNA SPEC NAA+PROBE-ACNC: NOT DETECTED IU/ML

## 2023-07-17 LAB
ALBUMIN SERPL ELPH-MCNC: 3.6 G/DL (ref 3.7–5.1)
ALPHA1 GLOB SERPL ELPH-MCNC: 0.4 G/DL (ref 0.2–0.4)
ALPHA2 GLOB SERPL ELPH-MCNC: 0.7 G/DL (ref 0.5–0.9)
B-GLOBULIN SERPL ELPH-MCNC: 0.7 G/DL (ref 0.6–1)
BKV DNA # SPEC NAA+PROBE: NOT DETECTED COPIES/ML
EBV DNA # SPEC NAA+PROBE: NOT DETECTED COPIES/ML
GAMMA GLOB SERPL ELPH-MCNC: 0.5 G/DL (ref 0.7–1.6)
KAPPA LC FREE SER-MCNC: 6.21 MG/DL (ref 0.33–1.94)
KAPPA LC FREE/LAMBDA FREE SER NEPH: 2.15 {RATIO} (ref 0.26–1.65)
LAMBDA LC FREE SERPL-MCNC: 2.89 MG/DL (ref 0.57–2.63)
M PROTEIN SERPL ELPH-MCNC: 0 G/DL
PROT PATTERN SERPL ELPH-IMP: ABNORMAL

## 2023-07-18 ENCOUNTER — TELEPHONE (OUTPATIENT)
Dept: TRANSPLANT | Facility: CLINIC | Age: 53
End: 2023-07-18
Payer: COMMERCIAL

## 2023-07-18 DIAGNOSIS — Z48.298 AFTERCARE FOLLOWING ORGAN TRANSPLANT: ICD-10-CM

## 2023-07-18 DIAGNOSIS — Z94.0 KIDNEY TRANSPLANTED: Primary | ICD-10-CM

## 2023-07-18 NOTE — TELEPHONE ENCOUNTER
----- Message -----   From: Bakari Hurtado MD   Sent: 7/15/2023   8:46 AM CDT   To: Sabina Casey RN     Yes, would obtain a biopsy.   ----- Message -----   From: Sabina Casey RN   Sent: 7/14/2023   4:07 PM CDT   To: MD Dr. Bryant Mcleod - please see repeat results with continued creatinine elevation, proteinuria.  CMV from today pending.  Prior note mentioned obtaining kidney biopsy, please advise if you would like to move forward/wait for CMV.  Thanks!        OUTCOME:  Called Hakeem Romero to discuss recommendations to complete a biopsy.  Biopsy orders placed.  Aware to expect a call from IR to schedule.  Verbalized understanding and agreement to plan.

## 2023-08-16 ENCOUNTER — HOSPITAL ENCOUNTER (OUTPATIENT)
Facility: CLINIC | Age: 53
End: 2023-08-16
Admitting: INTERNAL MEDICINE
Payer: COMMERCIAL

## 2023-08-18 ENCOUNTER — PATIENT OUTREACH (OUTPATIENT)
Dept: ONCOLOGY | Facility: CLINIC | Age: 53
End: 2023-08-18
Payer: COMMERCIAL

## 2023-08-18 ENCOUNTER — TELEPHONE (OUTPATIENT)
Dept: TRANSPLANT | Facility: CLINIC | Age: 53
End: 2023-08-18
Payer: COMMERCIAL

## 2023-08-18 DIAGNOSIS — T86.10 COMPLICATIONS, KIDNEY TRANSPLANT: ICD-10-CM

## 2023-08-18 DIAGNOSIS — Z94.0 KIDNEY TRANSPLANTED: ICD-10-CM

## 2023-08-18 DIAGNOSIS — Z48.298 AFTERCARE FOLLOWING ORGAN TRANSPLANT: Primary | ICD-10-CM

## 2023-08-18 NOTE — TELEPHONE ENCOUNTER
Latest Reference Range & Units 07/14/23 07:25   Kappa Lambda Ratio 0.26 - 1.65  2.15 (H)   (H): Data is abnormally high      Bakari Hurtado MD Ututalum, Teresa, RN  Yamil,    With elevated kappa/lambda light chain ratio, would refer patient to Hematology.    Gaetano        PLAN:  Call Hakeem Romero and discuss recommendation to see a Hematologist.    OUTCOME:  Verbalized understanding and agreement to plan.  Referral placed.  Message sent to referral specialist Team.

## 2023-08-18 NOTE — PROGRESS NOTES
New Patient Oncology Nurse Navigator Note     Referring provider: Bakari Hurtado MD      Referring Clinic/Organization:   [facility]  - [facility]     Referred to (specialty:) Hematology/Oncology      Requested provider (if applicable): NA     Date Referral Received: August 18, 2023     Evaluation for:  K/L light chain ratio elevated     Z48.298 (ICD-10-CM) - Aftercare following organ transplant   Z94.0 (ICD-10-CM) - Kidney transplanted   T86.10 (ICD-10-CM) - Complications, kidney transplant        Clinical History (per Nurse review of records provided):        # Transplant History:  Etiology of Kidney Failure: IgA nephropathy  Tx: LDKT  Transplant: 8/19/2008 (Kidney)     Latest Reference Range & Units 07/14/23 07:25   Sodium 136 - 145 mmol/L 141   Potassium 3.4 - 5.3 mmol/L 4.7   Chloride 98 - 107 mmol/L 107   Carbon Dioxide (CO2) 22 - 29 mmol/L 22   Urea Nitrogen 6.0 - 20.0 mg/dL 39.8 (H)   Creatinine 0.67 - 1.17 mg/dL 2.40 (H)   GFR Estimate >60 mL/min/1.73m2 31 (L)   Calcium 8.6 - 10.0 mg/dL 9.4   Anion Gap 7 - 15 mmol/L 12   Albumin Urine mg/g Cr 0.00 - 17.00 mg/g Cr 2,156.83 (H)   Albumin Urine mg/L mg/L 1,499.0   Creatinine Urine mg/dL  mg/dL 69.5  69.5   Glucose 70 - 99 mg/dL 118 (H)   Hemoglobin A1C 0.0 - 5.6 % 5.7 (H)   Lactate Dehydrogenase 0 - 250 U/L 235   WBC 4.0 - 11.0 10e3/uL 7.5   Hemoglobin 13.3 - 17.7 g/dL 16.1   Hematocrit 40.0 - 53.0 % 46.7   Platelet Count 150 - 450 10e3/uL 153   RBC Count 4.40 - 5.90 10e6/uL 5.40   MCV 78 - 100 fL 87   MCH 26.5 - 33.0 pg 29.8   MCHC 31.5 - 36.5 g/dL 34.5   RDW 10.0 - 15.0 % 13.1   Color Urine Colorless, Straw, Light Yellow, Yellow  Yellow   Appearance Urine Clear  Clear   Glucose Urine Negative mg/dL Negative   Bilirubin Urine Negative  Negative   Ketones Urine Negative mg/dL Negative   Specific Gravity Urine 1.003 - 1.035  1.020   pH Urine 5.0 - 7.0  5.5   Protein Albumin Urine Negative mg/dL >=300 !   Urobilinogen  Urine 0.2, 1.0 E.U./dL 0.2   Nitrite Urine Negative  Negative   Blood Urine Negative  Small !   Leukocyte Esterase Urine Negative  Negative   WBC Urine 0-5 /HPF /HPF 0-5   RBC Urine 0-2 /HPF /HPF 0-2   Bacteria Urine None Seen /HPF Few !   Squamous Epithelial /LPF Urine None Seen /LPF None Seen   BK VIRUS QUANTITATIVE, PCR  Rpt   BK Virus Result Not Detected copies/mL Not Detected   CMV Quant IU/mL Not Detected IU/mL Not Detected   CMV QUANTITATIVE, PCR  Rpt   EBV DNA Copies/mL Not Detected copies/mL Not Detected   (H): Data is abnormally high  (L): Data is abnormally low  !: Data is abnormal  Rpt: View report in Results Review for more information    Scheduled for kidney biopsy on 8/31/23.    Records Location: See Bookmarked material     Records Needed: NA     Additional testing needed prior to consult: NA    Payor: [employer] / Plan: [employer] / Product Type: PPO /     August 18, 2023    Called patient to introduced myself and role as nurse navigator with [facility] Hematology/Oncology department and to inform them that we have received the referral for a diagnosis of K/L light chain ratio elevated from Dr. Hurtado    Patient did not answer the phone so a detailed message was left for them including NPS number. Requested callback to speak to a  to set the consult date/time/location. Explained to patient in the message that they will receive a call from our new patient scheduling team (NPS number below, hours are Monday - Friday 8am - 4:30 pm) in the next 1-2 business days to schedule the consultation. Encouraged them to call back with any questions.       Jasmyne Melgar, RN, BSN  [facility] Hematology/Oncology Nurse Navigator  [phone]

## 2023-08-23 ENCOUNTER — MYC MEDICAL ADVICE (OUTPATIENT)
Dept: INTERVENTIONAL RADIOLOGY/VASCULAR | Facility: CLINIC | Age: 53
End: 2023-08-23
Payer: COMMERCIAL

## 2023-08-28 RX ORDER — LIDOCAINE 40 MG/G
CREAM TOPICAL
Status: CANCELLED | OUTPATIENT
Start: 2023-08-28

## 2023-08-28 RX ORDER — SODIUM CHLORIDE 9 MG/ML
INJECTION, SOLUTION INTRAVENOUS CONTINUOUS
Status: CANCELLED | OUTPATIENT
Start: 2023-08-28

## 2023-08-31 ENCOUNTER — TELEPHONE (OUTPATIENT)
Dept: TRANSPLANT | Facility: CLINIC | Age: 53
End: 2023-08-31
Payer: COMMERCIAL

## 2023-08-31 NOTE — TELEPHONE ENCOUNTER
Saundra Rosa CNMT Ututalum, Teresa, RN  This is what EPIC says from the 2A appt audit trail    Made On:    8/16/2023 11:18 AM  By: Katherine Bowens  Canceled: 8/28/2023 5:21 PM      By: SMILEY ELIZALDE    Cancel Reason: Cancelled via text      The patient didn't show up either.  Would you like for me to call the patient?      ----- Message -----  From: Karla Gandhi RN  Sent: 8/31/2023  10:26 AM CDT  To: KIKO Ledesma  Subject: RE: CT Renal Biopsy                              Who cancelled it?    ----- Message -----  From: Saundra Rosa CNMT  Sent: 8/31/2023   9:41 AM CDT  To: Karla Gandhi RN  Subject: CT Renal Biopsy                                  Timur Acosta,    I wanted to let you know that this patient didn't show up today for the biopsy.  Then when I looked into it, the 2A pre/post appt was cancelled via text through [facility].  I just wanted to follow up if this needs to be rescheduled or is it truly not being done.      Please advise how to go forward with this.      Thank you,  Ivet Rosa RT(R), ARRT, CIRCC  Lead Interventional Technologist  IR Intake Scheduling Team       OUTCOME:  Per Hakeem he did not have transportation so he cancelled.  States he will reschedule as soon as he gets his transportation figured out.

## 2023-09-05 ENCOUNTER — TELEPHONE (OUTPATIENT)
Dept: TRANSPLANT | Facility: CLINIC | Age: 53
End: 2023-09-05
Payer: COMMERCIAL

## 2023-09-05 NOTE — TELEPHONE ENCOUNTER
CT from 3/24/23 - incidental finding of retroperitoneal lymph nodes    Repeat CT from 6/22/23:  LYMPH NODES: Two nodes medial to the transplant kidney along the right iliac chain are unchanged. Largest one along the right common iliac vessel measures 1.8 x 1.1 cm. Other one further inferiorly is less than a centimeter (axial image 162). No new   adenopathy.        Discussed CT results at at angio review meeting  with Dr. Clifton.    Recommendation:  Repeat CT in 1 year.      OUTCOME:  Called Hakeem Romero and left a detailed VM.  Requesting call back to confirm:   where he wants CT orders sent  Current IS and doses

## 2023-09-20 ENCOUNTER — TELEPHONE (OUTPATIENT)
Dept: TRANSPLANT | Facility: CLINIC | Age: 53
End: 2023-09-20
Payer: COMMERCIAL

## 2023-09-20 NOTE — TELEPHONE ENCOUNTER
CT from 3/24/23 - incidental finding of retroperitoneal lymph nodes     Repeat CT from 6/22/23:  LYMPH NODES: Two nodes medial to the transplant kidney along the right iliac chain are unchanged. Largest one along the right common iliac vessel measures 1.8 x 1.1 cm. Other one further inferiorly is less than a centimeter (axial image 162). No new   adenopathy.    Discussed CT results at at angio review meeting  with Dr. Clifton.     Recommendation:  Repeat CT in 1 year.    Was left a detailed message last 9/5/23.    PLAN:  Call again to confirm:  where he wants CT orders sent  Current IS and doses

## 2023-09-20 NOTE — TELEPHONE ENCOUNTER
Encounter Date: 11/29/2019  Addendum         Per [facility] JOSEPH Powell, Hakeem Romero not taking Prednisone.     PLAN:  Call Hakeem Romero and discuss why he stopped taking his prednisone.  Did a Provider discontinue it? Was he having side effects? Issues getting refills?     OUTCOME: Left message to confirm current immunosuppressions.              Per Dr. Hurtado's note from 1/17/19  Immunosuppression: Cyclosporine (goal: , Mycophenolate mofetil (goal: 1-3.5) and   Prednisone (dose: 5mg daily)     Dr Eric Lei prescribes his Immunosuppression.    OUTCOME:  Message sent to Dr. Hurtado to confirm.

## 2023-09-22 ENCOUNTER — TELEPHONE (OUTPATIENT)
Dept: TRANSPLANT | Facility: CLINIC | Age: 53
End: 2023-09-22
Payer: COMMERCIAL

## 2023-09-22 NOTE — TELEPHONE ENCOUNTER
Not on Prednisone.  Was on Prednisone for gout flare ups.  Leave off Prednisone.        Bakari Hurtado MD Ututalum, Teresa, RN  If he is off it, then would just leave him off it at this point.    Gaetano     ----- Message -----  From: Karla Gandhi RN  Sent: 9/21/2023   4:19 PM CDT  To: Bakari Hurtado MD  Subject: RE: Pred                                        I dug in [facility] chart he was on it for gout flare up.  And Quique did a med review with him in the past and mentioned he is not taking Pred.    ----- Message -----  From: Bakari Hurtado MD  Sent: 9/21/2023  11:28 AM CDT  To: Karla Gandhi RN  Subject: RE: Pred                                        I thought he was on prednisone, is that not the case?  ----- Message -----  From: Karla Gandhi RN  Sent: 9/20/2023   2:23 PM CDT  To: Bakari Hurtado MD  Subject: Pred                                            Dr Hurtado,    Did you want patient to be on Pred as part of his immunosuppression?  I know he was on Pred PRN in the past for gout.  If he continues not to be on Pred, do you want him to start now?  Dr. Lei prescribes his IS.  Let me know.    Thanks,  Yamil

## 2023-09-29 ENCOUNTER — TELEPHONE (OUTPATIENT)
Dept: TRANSPLANT | Facility: CLINIC | Age: 53
End: 2023-09-29
Payer: COMMERCIAL

## 2023-09-29 NOTE — TELEPHONE ENCOUNTER
Left Voicemail (1st Attempt) for the patient to call back and schedule the following:    Appointment type: RKT  Provider: ALIYA  Return date: JUNE 2024  Specialty phone number: [phone]  Additional appointment(s) needed: NA  Additonal Notes: NA

## 2023-10-04 ENCOUNTER — TELEPHONE (OUTPATIENT)
Dept: TRANSPLANT | Facility: CLINIC | Age: 53
End: 2023-10-04
Payer: COMMERCIAL

## 2023-10-04 NOTE — TELEPHONE ENCOUNTER
Left Voicemail (2nd Attempt) for the patient to call back and schedule the following:    Appointment type: RKT  Provider: ALIYA  Return date: JUNE 2024  Specialty phone number: [phone]  Additional appointment(s) needed: NA  Additonal Notes: NA

## 2023-10-05 NOTE — TELEPHONE ENCOUNTER
Recommendation is to repeat CT in 1 year.    Call again to confirm:  where he wants CT orders sent  Current IS and doses      OUTCOME:  Left VM  Sent My[employer] message.

## 2023-10-12 DIAGNOSIS — Z94.0 KIDNEY REPLACED BY TRANSPLANT: ICD-10-CM

## 2023-10-12 RX ORDER — CYCLOSPORINE 25 MG/1
25 CAPSULE, LIQUID FILLED ORAL 2 TIMES DAILY
Qty: 180 CAPSULE | Refills: 3 | Status: SHIPPED | OUTPATIENT
Start: 2023-10-12

## 2023-10-20 ENCOUNTER — TELEPHONE (OUTPATIENT)
Dept: FAMILY MEDICINE | Facility: CLINIC | Age: 53
End: 2023-10-20

## 2023-10-20 NOTE — TELEPHONE ENCOUNTER
Left voicemail for patient to return call to clinic. When patient returns call, please give them below message.    Fax received from [facility] looking for clarification of cyclosporine capsules. Please clarify if patient is taking Cyclosporine 100 mg twice daily, Cyclosporine 25 mg twice daily, or both together to equal 125 mg twice daily?    Shameka Tapia, CMA

## 2023-11-01 ENCOUNTER — TELEPHONE (OUTPATIENT)
Dept: ONCOLOGY | Facility: CLINIC | Age: 53
End: 2023-11-01
Payer: COMMERCIAL

## 2023-11-01 NOTE — TELEPHONE ENCOUNTER
Attempted to call x 3 - went right to  but the box was full and was unable to leave a message. Waited to see if patient would join video as they had done the echeck-in but by 9:10 they had not joined. Please call to reschedule. Thank you.

## 2023-11-07 ENCOUNTER — TELEPHONE (OUTPATIENT)
Dept: TRANSPLANT | Facility: CLINIC | Age: 53
End: 2023-11-07
Payer: COMMERCIAL

## 2023-11-07 NOTE — TELEPHONE ENCOUNTER
Bryant, MD Tom Cortez Vicky, APRN CNP; P Ir Biopsy; Siobhan Cortez; Karla Gandhi RN  The indication was for elevated creatinine.    Yamil, can we have him repeat labs and if his creatinine is still elevated, we can move forward with the biopsy.    Gaetano     ----- Message -----  From: Flora Santos APRN CNP  Sent: 11/3/2023   1:03 PM CST  To: Bakari Hurtado MD; Siobhan Cortez; *  Subject: renal transplant bx                              Dr. Hurtado    IR has patient scheduled for a transplant kidney biopsy in August, patient cancelled. He is now calling to schedule.  Referral is from July. Are you still wanting this ? If so what is indication? I do not see a current creatinine.    Many thanks, Flora      PLAN:  Recommend rechecking transplant labs.  Make sure he is hydrated and Cyclosporine level is a 12 hour trough.  If creatinine remains elevated, we will push through with the biopsy.

## 2023-11-09 ENCOUNTER — TELEPHONE (OUTPATIENT)
Dept: TRANSPLANT | Facility: CLINIC | Age: 53
End: 2023-11-09
Payer: COMMERCIAL

## 2023-11-09 NOTE — LETTER
PHYSICIAN ORDERS      DATE & TIME ISSUED: 2023 10:36 AM  PATIENT NAME: Hakeem Romero   : 1970     [facility] MR# [if applicable]: [id number]     DIAGNOSIS / ICD - 10 CODES  Kidney Transplanted (Z94.0)  After Care Following Organ Transplant (Z48.298)  Long Term Use of High-risk Medication (Z79.899)  Complications Kidney Transplant (T86.10)  Creatinine Elevation (R79.89)        Please complete:  CBC  BMP  Cyclosporine level (pls indicate date and time of last dose)        Patient should release information to the [facility].   Please fax results to the Transplant Center at [phone].  Any questions please call [phone].      .

## 2023-11-09 NOTE — TELEPHONE ENCOUNTER
Hakeem called to schedule his biopsy.  Discussed recommendation to recheck labs since months have passed.  Discussed making sure he is hydrated and CSA level is a good 12 hour level.  If creatinine remains elevated, will proceed with a biopsy.  Verbalized understanding and agreement to plan.    Labs:  CBC  BMP  CSA    Orders sent to:  [facility]   Phone: [phone]   Fax: [phone]          Copy of lab order sent via [employer]

## 2023-11-10 ENCOUNTER — TELEPHONE (OUTPATIENT)
Dept: TRANSPLANT | Facility: CLINIC | Age: 53
End: 2023-11-10

## 2023-11-10 ENCOUNTER — DOCUMENTATION ONLY (OUTPATIENT)
Dept: TRANSPLANT | Facility: CLINIC | Age: 53
End: 2023-11-10

## 2023-11-10 ENCOUNTER — LAB (OUTPATIENT)
Dept: LAB | Facility: CLINIC | Age: 53
End: 2023-11-10
Payer: COMMERCIAL

## 2023-11-10 DIAGNOSIS — Z94.0 KIDNEY REPLACED BY TRANSPLANT: ICD-10-CM

## 2023-11-10 DIAGNOSIS — Z79.899 LONG TERM USE OF DRUG: ICD-10-CM

## 2023-11-10 DIAGNOSIS — Z48.298 AFTERCARE FOLLOWING ORGAN TRANSPLANT: ICD-10-CM

## 2023-11-10 DIAGNOSIS — T86.10 COMPLICATION OF TRANSPLANTED KIDNEY: ICD-10-CM

## 2023-11-10 LAB
ANION GAP SERPL CALCULATED.3IONS-SCNC: 11 MMOL/L (ref 7–15)
BUN SERPL-MCNC: 49.3 MG/DL (ref 6–20)
CALCIUM SERPL-MCNC: 9.4 MG/DL (ref 8.6–10)
CHLORIDE SERPL-SCNC: 108 MMOL/L (ref 98–107)
CREAT SERPL-MCNC: 2.55 MG/DL (ref 0.67–1.17)
CYCLOSPORINE BLD LC/MS/MS-MCNC: 38 UG/L (ref 50–400)
DEPRECATED HCO3 PLAS-SCNC: 22 MMOL/L (ref 22–29)
EGFRCR SERPLBLD CKD-EPI 2021: 29 ML/MIN/1.73M2
ERYTHROCYTE [DISTWIDTH] IN BLOOD BY AUTOMATED COUNT: 12.5 % (ref 10–15)
GLUCOSE SERPL-MCNC: 116 MG/DL (ref 70–99)
HCT VFR BLD AUTO: 51.1 % (ref 40–53)
HGB BLD-MCNC: 17.3 G/DL (ref 13.3–17.7)
MCH RBC QN AUTO: 29.2 PG (ref 26.5–33)
MCHC RBC AUTO-ENTMCNC: 33.9 G/DL (ref 31.5–36.5)
MCV RBC AUTO: 86 FL (ref 78–100)
PLATELET # BLD AUTO: 179 10E3/UL (ref 150–450)
POTASSIUM SERPL-SCNC: 5 MMOL/L (ref 3.4–5.3)
RBC # BLD AUTO: 5.92 10E6/UL (ref 4.4–5.9)
SODIUM SERPL-SCNC: 141 MMOL/L (ref 135–145)
TME LAST DOSE: ABNORMAL H
TME LAST DOSE: ABNORMAL H
WBC # BLD AUTO: 9.4 10E3/UL (ref 4–11)

## 2023-11-10 PROCEDURE — 36415 COLL VENOUS BLD VENIPUNCTURE: CPT

## 2023-11-10 PROCEDURE — 80048 BASIC METABOLIC PNL TOTAL CA: CPT

## 2023-11-10 PROCEDURE — 85027 COMPLETE CBC AUTOMATED: CPT

## 2023-11-10 PROCEDURE — 80158 DRUG ASSAY CYCLOSPORINE: CPT

## 2023-11-10 NOTE — LETTER
PHYSICIAN ORDERS      DATE & TIME ISSUED: 2023 5:47 PM  PATIENT NAME: Hakeem Romero   : 1970     [facility] MR# [if applicable]: [id number]     DIAGNOSIS:  Kidney transplant   ICD-10 CODE: Z94.0      Please repeat the following level in 2 weeks  Cyclosporine level (ensure 12 hours between last dose and blood draw)    Any questions please call: [phone] option 5    Please fax results to [phone].    .

## 2023-11-10 NOTE — PROGRESS NOTES
Creatinine = 2.55 (11/10/23)  Baseline 1.6-1.8    Flora Santos APRN CNP  P Ir Biopsy; Bakari Hurtado MD; Karla Gandhi, RN  Please let IR know when labs are completed.    Many thanks Flora      ----- Message -----  From: Flora Santos APRN CNP  Sent: 11/8/2023  12:35 PM CST  To: Ir Biopsy  Subject: pending creatinine                                ----- Message -----  From: Bakari Hurtado MD  Sent: 11/5/2023   5:52 AM CST  To: Siobhan Cortez; Karla Gandhi, RN; *  Subject: RE: renal transplant bx                          The indication was for elevated creatinine.    Yamil, can we have him repeat labs and if his creatinine is still elevated, we can move forward with the biopsy.    Gaetano        OUTCOME:  Message sent back re: Cr remains elevated. Proceed with biopsy.

## 2023-11-10 NOTE — TELEPHONE ENCOUNTER
Patient Call: General  Route to LPN    Reason for call: patient looking for update to schedule biopsy.    Call back needed? Yes    Return Call Needed  Same as documented in contacts section  When to return call?: Same day: Route High Priority

## 2023-11-13 NOTE — TELEPHONE ENCOUNTER
Creatinine = 2.55 (11/10/23)  Baseline 1.6 - 1.8    PLAN:  Call back and confirm since creatinine remains elevated will proceed with Biopsy.    OUTCOME:  Called back Hakeem and made aware RNCC sent a message to IR re: creatinine remains elevated, to proceed with biopsy.  IR scheduling number provided.

## 2023-11-13 NOTE — TELEPHONE ENCOUNTER
Call placed to patient. Patient confirms current CSA dose at 100 mg bid and accurate trough level. Denies any recent illness, diarrhea or medication changes. Patient v\u to continue current dose and repeat level in 2 weeks. Patient note that no one has contacted him r\t biopsy scheduling. Order sent

## 2023-11-13 NOTE — TELEPHONE ENCOUNTER
Cyclosporine = 38 (11/10/23)  Goal   Current CSA dose 125 mg BID or 25 mg BID?   --- Dr Eric Lei prescribing.    Last dose 8:15 PM  Lab draw 9:28 AM  --- 13.25 hour trough    Last level from 7/14 (100) was not a good trough.  Last dose 9:00 PM  Lab draw 7:25 AM  --- 10.25 hr trough      PLAN:   Call and confirm this was a good 12-hour trough.   Verify current dose.   Did Dr Lei decrease dose lately?  Confirm no new medications or illness (daniel. Diarrhea).  MISSED DOSES?   If good trough and correct dose above, recommend:  stay on the same dose.   Recheck level in  2 weeks and make sure it is a good trough to avoid additional lab draws.

## 2023-11-14 DIAGNOSIS — Z94.0 HTN, KIDNEY TRANSPLANT RELATED: Primary | ICD-10-CM

## 2023-11-14 DIAGNOSIS — Z94.0 KIDNEY REPLACED BY TRANSPLANT: ICD-10-CM

## 2023-11-14 DIAGNOSIS — Z94.0 TRANSPLANTED KIDNEY: Primary | ICD-10-CM

## 2023-11-14 DIAGNOSIS — I15.1 HTN, KIDNEY TRANSPLANT RELATED: Primary | ICD-10-CM

## 2023-11-28 ENCOUNTER — MYC MEDICAL ADVICE (OUTPATIENT)
Dept: INTERVENTIONAL RADIOLOGY/VASCULAR | Facility: CLINIC | Age: 53
End: 2023-11-28
Payer: COMMERCIAL

## 2023-12-11 ENCOUNTER — APPOINTMENT (OUTPATIENT)
Dept: INTERVENTIONAL RADIOLOGY/VASCULAR | Facility: CLINIC | Age: 53
End: 2023-12-11
Attending: INTERNAL MEDICINE
Payer: COMMERCIAL

## 2023-12-11 ENCOUNTER — APPOINTMENT (OUTPATIENT)
Dept: MEDSURG UNIT | Facility: CLINIC | Age: 53
End: 2023-12-11
Attending: INTERNAL MEDICINE
Payer: COMMERCIAL

## 2023-12-11 ENCOUNTER — HOSPITAL ENCOUNTER (OUTPATIENT)
Facility: CLINIC | Age: 53
Discharge: HOME | End: 2023-12-11
Attending: INTERNAL MEDICINE | Admitting: RADIOLOGY
Payer: COMMERCIAL

## 2023-12-11 VITALS
OXYGEN SATURATION: 94 % | SYSTOLIC BLOOD PRESSURE: 150 MMHG | DIASTOLIC BLOOD PRESSURE: 97 MMHG | TEMPERATURE: 98.1 F | BODY MASS INDEX: 38.16 KG/M2 | HEART RATE: 53 BPM | WEIGHT: 273.59 LBS | RESPIRATION RATE: 18 BRPM

## 2023-12-11 DIAGNOSIS — Z94.0 KIDNEY REPLACED BY TRANSPLANT: ICD-10-CM

## 2023-12-11 LAB
ANION GAP SERPL CALCULATED.3IONS-SCNC: 10 MMOL/L (ref 7–15)
BUN SERPL-MCNC: 54.4 MG/DL (ref 6–20)
CALCIUM SERPL-MCNC: 9.3 MG/DL (ref 8.6–10)
CHLORIDE SERPL-SCNC: 108 MMOL/L (ref 98–107)
CREAT SERPL-MCNC: 2.72 MG/DL (ref 0.67–1.17)
DEPRECATED HCO3 PLAS-SCNC: 21 MMOL/L (ref 22–29)
EGFRCR SERPLBLD CKD-EPI 2021: 27 ML/MIN/1.73M2
ERYTHROCYTE [DISTWIDTH] IN BLOOD BY AUTOMATED COUNT: 12.9 % (ref 10–15)
GLUCOSE SERPL-MCNC: 116 MG/DL (ref 70–99)
HCT VFR BLD AUTO: 46.9 % (ref 40–53)
HGB BLD-MCNC: 16.1 G/DL (ref 13.3–17.7)
INR PPP: 1.04 (ref 0.85–1.15)
MCH RBC QN AUTO: 29.2 PG (ref 26.5–33)
MCHC RBC AUTO-ENTMCNC: 34.3 G/DL (ref 31.5–36.5)
MCV RBC AUTO: 85 FL (ref 78–100)
PLATELET # BLD AUTO: 161 10E3/UL (ref 150–450)
POTASSIUM SERPL-SCNC: 5.7 MMOL/L (ref 3.4–5.3)
RBC # BLD AUTO: 5.52 10E6/UL (ref 4.4–5.9)
SODIUM SERPL-SCNC: 139 MMOL/L (ref 135–145)
WBC # BLD AUTO: 8.1 10E3/UL (ref 4–11)

## 2023-12-11 PROCEDURE — 36415 COLL VENOUS BLD VENIPUNCTURE: CPT | Performed by: NURSE PRACTITIONER

## 2023-12-11 PROCEDURE — 85610 PROTHROMBIN TIME: CPT | Performed by: NURSE PRACTITIONER

## 2023-12-11 PROCEDURE — 999N000142 HC STATISTIC PROCEDURE PREP ONLY

## 2023-12-11 PROCEDURE — 77012 CT SCAN FOR NEEDLE BIOPSY: CPT

## 2023-12-11 PROCEDURE — 250N000009 HC RX 250: Performed by: STUDENT IN AN ORGANIZED HEALTH CARE EDUCATION/TRAINING PROGRAM

## 2023-12-11 PROCEDURE — 272N000505 HC NEEDLE CR5

## 2023-12-11 PROCEDURE — 99153 MOD SED SAME PHYS/QHP EA: CPT

## 2023-12-11 PROCEDURE — 85027 COMPLETE CBC AUTOMATED: CPT | Performed by: NURSE PRACTITIONER

## 2023-12-11 PROCEDURE — 88350 IMFLUOR EA ADDL 1ANTB STN PX: CPT | Mod: 26 | Performed by: PATHOLOGY

## 2023-12-11 PROCEDURE — 88305 TISSUE EXAM BY PATHOLOGIST: CPT | Mod: TC | Performed by: INTERNAL MEDICINE

## 2023-12-11 PROCEDURE — 88346 IMFLUOR 1ST 1ANTB STAIN PX: CPT | Mod: 26 | Performed by: PATHOLOGY

## 2023-12-11 PROCEDURE — 999N000133 HC STATISTIC PP CARE STAGE 2

## 2023-12-11 PROCEDURE — 88305 TISSUE EXAM BY PATHOLOGIST: CPT | Mod: 26 | Performed by: PATHOLOGY

## 2023-12-11 PROCEDURE — 80048 BASIC METABOLIC PNL TOTAL CA: CPT | Performed by: NURSE PRACTITIONER

## 2023-12-11 PROCEDURE — 77012 CT SCAN FOR NEEDLE BIOPSY: CPT | Mod: 26 | Performed by: RADIOLOGY

## 2023-12-11 PROCEDURE — 99152 MOD SED SAME PHYS/QHP 5/>YRS: CPT

## 2023-12-11 PROCEDURE — 88313 SPECIAL STAINS GROUP 2: CPT | Mod: 26 | Performed by: PATHOLOGY

## 2023-12-11 PROCEDURE — 99152 MOD SED SAME PHYS/QHP 5/>YRS: CPT | Mod: GC | Performed by: RADIOLOGY

## 2023-12-11 PROCEDURE — 88350 IMFLUOR EA ADDL 1ANTB STN PX: CPT | Mod: TC | Performed by: INTERNAL MEDICINE

## 2023-12-11 PROCEDURE — 250N000011 HC RX IP 250 OP 636: Performed by: STUDENT IN AN ORGANIZED HEALTH CARE EDUCATION/TRAINING PROGRAM

## 2023-12-11 PROCEDURE — 258N000003 HC RX IP 258 OP 636: Performed by: NURSE PRACTITIONER

## 2023-12-11 PROCEDURE — 250N000011 HC RX IP 250 OP 636: Mod: JZ | Performed by: RADIOLOGY

## 2023-12-11 PROCEDURE — 50200 RENAL BIOPSY PERQ: CPT | Mod: GC | Performed by: RADIOLOGY

## 2023-12-11 RX ORDER — FLUMAZENIL 0.1 MG/ML
0.2 INJECTION, SOLUTION INTRAVENOUS
Status: DISCONTINUED | OUTPATIENT
Start: 2023-12-11 | End: 2023-12-11 | Stop reason: HOSPADM

## 2023-12-11 RX ORDER — NALOXONE HYDROCHLORIDE 0.4 MG/ML
0.2 INJECTION, SOLUTION INTRAMUSCULAR; INTRAVENOUS; SUBCUTANEOUS
Status: DISCONTINUED | OUTPATIENT
Start: 2023-12-11 | End: 2023-12-11 | Stop reason: HOSPADM

## 2023-12-11 RX ORDER — LIDOCAINE 40 MG/G
CREAM TOPICAL
Status: DISCONTINUED | OUTPATIENT
Start: 2023-12-11 | End: 2023-12-11 | Stop reason: HOSPADM

## 2023-12-11 RX ORDER — HYDRALAZINE HYDROCHLORIDE 20 MG/ML
10 INJECTION INTRAMUSCULAR; INTRAVENOUS ONCE
Status: COMPLETED | OUTPATIENT
Start: 2023-12-11 | End: 2023-12-11

## 2023-12-11 RX ORDER — NALOXONE HYDROCHLORIDE 0.4 MG/ML
0.4 INJECTION, SOLUTION INTRAMUSCULAR; INTRAVENOUS; SUBCUTANEOUS
Status: DISCONTINUED | OUTPATIENT
Start: 2023-12-11 | End: 2023-12-11 | Stop reason: HOSPADM

## 2023-12-11 RX ORDER — FENTANYL CITRATE 50 UG/ML
25-50 INJECTION, SOLUTION INTRAMUSCULAR; INTRAVENOUS EVERY 5 MIN PRN
Status: DISCONTINUED | OUTPATIENT
Start: 2023-12-11 | End: 2023-12-11 | Stop reason: HOSPADM

## 2023-12-11 RX ORDER — SODIUM CHLORIDE 9 MG/ML
INJECTION, SOLUTION INTRAVENOUS CONTINUOUS
Status: DISCONTINUED | OUTPATIENT
Start: 2023-12-11 | End: 2023-12-11 | Stop reason: HOSPADM

## 2023-12-11 RX ORDER — ACETAMINOPHEN 325 MG/1
650 TABLET ORAL EVERY 4 HOURS PRN
Status: DISCONTINUED | OUTPATIENT
Start: 2023-12-11 | End: 2023-12-11 | Stop reason: HOSPADM

## 2023-12-11 RX ADMIN — FENTANYL CITRATE 50 MCG: 50 INJECTION, SOLUTION INTRAMUSCULAR; INTRAVENOUS at 11:12

## 2023-12-11 RX ADMIN — FENTANYL CITRATE 25 MCG: 50 INJECTION, SOLUTION INTRAMUSCULAR; INTRAVENOUS at 11:39

## 2023-12-11 RX ADMIN — SODIUM CHLORIDE: 9 INJECTION, SOLUTION INTRAVENOUS at 10:08

## 2023-12-11 RX ADMIN — MIDAZOLAM 0.5 MG: 1 INJECTION INTRAMUSCULAR; INTRAVENOUS at 11:23

## 2023-12-11 RX ADMIN — MIDAZOLAM 1 MG: 1 INJECTION INTRAMUSCULAR; INTRAVENOUS at 11:12

## 2023-12-11 RX ADMIN — HYDRALAZINE HYDROCHLORIDE 10 MG: 20 INJECTION INTRAMUSCULAR; INTRAVENOUS at 11:21

## 2023-12-11 RX ADMIN — FENTANYL CITRATE 25 MCG: 50 INJECTION, SOLUTION INTRAMUSCULAR; INTRAVENOUS at 11:23

## 2023-12-11 RX ADMIN — MIDAZOLAM 0.5 MG: 1 INJECTION INTRAMUSCULAR; INTRAVENOUS at 11:38

## 2023-12-11 RX ADMIN — LIDOCAINE HYDROCHLORIDE 13 ML: 10 INJECTION, SOLUTION EPIDURAL; INFILTRATION; INTRACAUDAL; PERINEURAL at 11:36

## 2023-12-11 ASSESSMENT — ACTIVITIES OF DAILY LIVING (ADL)
ADLS_ACUITY_SCORE: 35

## 2023-12-11 NOTE — IR NOTE
Patient Name: Hakeem Romero  Medical Record Number: [id number]  Today's Date: 12/11/2023    Procedure: Image Guided Kidney Biopsy  Proceduralist: Dr. Heshmatzaday Behzadi & Dr. Gupta  Pathology present: Yes, renal path present for core sampeling    Procedure Start: 1116  Procedure end: 1144  Sedation medications administered: 2mg versed & 100 mcg fentanyl     10 mg of Hydralazine given for blood pressure management    Report given to: 2A RN  : N/A    Other Notes: Pt arrived to IR room CT 2 from . Consent reviewed. Pt denies any questions or concerns regarding procedure. Pt positioned supine and monitored per protocol. Pt tolerated procedure without any noted complications. Pt transferred back to .

## 2023-12-11 NOTE — PROGRESS NOTES
Pt completed bedrest; pt up walking, steady; RLQ abd site remains dry and intact; IV discontinued, catheter intact. Tolerated PO, food and drink. Voided; urine clear pale yellow, no blood or clots. Discharge teaching done by previous RN, pt stated understanding to writer, clarified pt has copy; denies questions.  1410--escorted to vehicle/family per wheelchair; pt reports has all belongings.

## 2023-12-11 NOTE — PROCEDURES
[facility]    Procedure: Right Lower Q transplanted kidney biopsy    Date/Time: 12/11/2023 11:53 AM    Performed by: Behzadi, Heshmatzadeh, MD  Authorized by: Gaurav Gupta MD  IR Fellow Physician: Ashkan Behzadi      UNIVERSAL PROTOCOL   Site Marked: NA  Prior Images Obtained and Reviewed:  Yes  Required items: Required blood products, implants, devices and special equipment available    Patient identity confirmed:  Verbally with patient, arm band, provided demographic data and hospital-assigned identification number  Patient was reevaluated immediately before administering moderate or deep sedation or anesthesia  Confirmation Checklist:  Patient's identity using two indicators, relevant allergies, procedure was appropriate and matched the consent or emergent situation and correct equipment/implants were available  Time out: Immediately prior to the procedure a time out was called    Universal Protocol: the Joint Commission Universal Protocol was followed    Preparation: Patient was prepped and draped in usual sterile fashion       ANESTHESIA    Anesthesia: Local infiltration  Local Anesthetic:  Lidocaine 1% without epinephrine      SEDATION  Patient Sedated: Yes    Sedation:  Fentanyl and midazolam  Vital signs: Vital signs monitored during sedation    See dictated procedure note for full details.  Findings: -    Specimens: none    Complications: None    Condition: Stable    Plan: Bedrest for 2 hours.       PROCEDURE  Describe Procedure: Successful CT guided RLQ transplanted kidney biopsy.     No immediate complication.   Patient Tolerance:  Patient tolerated the procedure well with no immediate complications  Length of time physician/provider present for 1:1 monitoring during sedation: 30

## 2023-12-11 NOTE — PROGRESS NOTES
Pt arrived on 2a post transplanted kidney biopsy. VSS Ra. Dressing c/d/I. No pain. Family at bedside. Pt eating and drinking.

## 2023-12-11 NOTE — PRE-PROCEDURE
GENERAL PRE-PROCEDURE:   Procedure:  Image guided right lower quadrant transplant kidney biopsy    Written consent obtained?: Yes    Risks and benefits: Risks, benefits and alternatives were discussed    Consent given by:  Patient  Patient states understanding of procedure being performed: Yes    Patient's understanding of procedure matches consent: Yes    Procedure consent matches procedure scheduled: Yes    Expected level of sedation:  Moderate  Appropriately NPO:  Yes  ASA Class:  1  Mallampati  :  Grade 1- soft palate, uvula, tonsillar pillars, and posterior pharyngeal wall visible  Lungs:  Lungs clear with good breath sounds bilaterally  Heart:  Normal heart sounds and rate  History & Physical reviewed:  History and physical reviewed and no updates needed  Statement of review:  I have reviewed the lab findings, diagnostic data, medications, and the plan for sedation

## 2023-12-11 NOTE — DISCHARGE INSTRUCTIONS
[facility]    Interventional Radiology  Patient Instructions Following Transplanted Kidney Biopsy    AFTER YOU GO HOME  If you were given sedation, for the first 24 hours: we recommend that an adult stay with you, DO NOT drive or operate machinery at home or at work, DO NOT smoke, DO NOT make any important or legal decisions.  DO NOT drink alcoholic beverages the day of your procedure  Drink plenty of fluids   Resume your regular diet, unless otherwise instructed by your Primary Physician  Relax and take it easy for 48 hours  DO NOT do any strenuous exercise or lifting (> 10 lbs) for at least 3 days following your procedure  Keep the dressing dry and in place for 24 hours. Replace with Band aid for 2 days.  Never leave a wet dressing in place.  Do not take a shower for at least 12 hours following your procedure. No tub bath, hot tub, or swimming for 5 days.  There should be minimum drainage from the biopsy site    CALL THE PHYSICIAN IF:  You start bleeding from the procedure site.  If you do start to bleed from that site,  hold pressure on the site for a minimum of 10 minutes.  Your physician will tell you if you need to return to the hospital  You develop nausea or vomiting  You have excessive swelling, redness, or tenderness at the site  You have drainage that looks like it is infected.  You experience severe pain  You develop hives or a rash or unexplained itching  You develop a temperature of 101 degrees F or greater  You develop bloody clots or red urine after you are discharged    ADDITIONAL INSTRUCTIONS: None    [facility] INTERVENTIONAL RADIOLOGY DEPARTMENT  Procedure Physician:     Dr. Gupta        Date of procedure: December 11, 2023  Telephone Numbers: [phone]      Monday-Friday 7:30 am to 4:00 pm  [phone] After 4:00 pm Monday-Friday, Weekends & Holidays.  Ask for the Interventional Radiologist on call.  Someone is on call 24 hrs/day  [facility] toll free  number: [phone] Monday-Friday 8:00 am to 4:30 pm  [facility] Emergency Dept: [phone]

## 2023-12-12 ENCOUNTER — TELEPHONE (OUTPATIENT)
Dept: TRANSPLANT | Facility: CLINIC | Age: 53
End: 2023-12-12
Payer: COMMERCIAL

## 2023-12-12 NOTE — LETTER
PHYSICIAN ORDERS      DATE & TIME ISSUED: 2023 1:41 PM  PATIENT NAME: Hakeem Romero   : 1970     [facility] MR# [if applicable]: [id number]     DIAGNOSIS:  Kidney Transplanted; Long term Use of Immunosuppressant Med  ICD-10 CODE: Z94.0; Z79.899     Please check the following labs within the next week:  - BMP  - CBC w/platelets  - Cyclosporine level (12 hour trough). Ensure 12 hours between last dose and blood draw for accurately dosing medication.       Any questions please call: [phone] option 5    Please fax each result same day as resulted/available [phone].  Critical lab results page [phone]      .

## 2023-12-12 NOTE — TELEPHONE ENCOUNTER
Hakeem was wondering if his kidney biopsy results were in?  Had biopsy  12/11/2023.  Would like to discuss results with care team.

## 2023-12-13 NOTE — TELEPHONE ENCOUNTER
Returned a call to Hakeem. Let him know his biopsy results are still in process. Reassured him that this is normal timing for pathology. Let him know his RNCC will reach out once results have been reviewed by provider.  No further questions.

## 2023-12-14 DIAGNOSIS — Z94.0 TRANSPLANTED KIDNEY: Primary | ICD-10-CM

## 2023-12-14 LAB
PATH REPORT.COMMENTS IMP SPEC: NORMAL
PATH REPORT.FINAL DX SPEC: NORMAL
PATH REPORT.GROSS SPEC: NORMAL
PATH REPORT.MICROSCOPIC SPEC OTHER STN: NORMAL
PATH REPORT.RELEVANT HX SPEC: NORMAL
PHOTO IMAGE: NORMAL

## 2023-12-14 NOTE — TELEPHONE ENCOUNTER
Biopsy  Received: Today  Bakari Hurtado MD Ututalum, Teresa, RN    Biopsy with mostly chronic changes and nothing I would treat.    Can you let the patient know please.    Gaetano    OUTCOME: RNCC spoke with Hakeem. Informed of biopsy results per Dr. Hurtado above. Encourgaged monitoring his BP for goal <130/80 and reporting to provider if on average readings run high. He asked about his cyclosporine drug level and why this was low or what could make it low. We discussed drug interactions, vomiting or missed doses/doses not taking on a 12 hour timing consistently. He states he may miss a dose here and there not knowing it. RNCC explained that it would be best to repeat level at this time as I can not adjust a level that is 4 weeks old. He had orders to repeat in 2 weeks sent on Nov 13th. States he works for [employer] and it is a busy time of year. He will go get labs tomorrow if there is an appt available. Orders sent.     Janey Hines RN, BSN  Solid Organ Transplant, Post Kidney and Pancreas  Transplant Care Coordinator  [phone]     Addendum: Hakeem returned call w/more questions from his wife. We discussed original kidney disease requiring transplant, IgA nephropathy and that once his GFR reaches 20 he can be re-referred for another transplant. He follows with local nephrologist. Ensuring blood pressure and blood sugars are controlled are key to longevity of kidney transplant but RNCC can not predict how quickly function will decline.

## 2023-12-15 ENCOUNTER — LAB (OUTPATIENT)
Dept: LAB | Facility: CLINIC | Age: 53
End: 2023-12-15
Payer: COMMERCIAL

## 2023-12-15 DIAGNOSIS — Z94.0 TRANSPLANTED KIDNEY: ICD-10-CM

## 2023-12-15 LAB
CYCLOSPORINE BLD LC/MS/MS-MCNC: 142 UG/L (ref 50–400)
TME LAST DOSE: NORMAL H
TME LAST DOSE: NORMAL H

## 2023-12-15 PROCEDURE — 80158 DRUG ASSAY CYCLOSPORINE: CPT

## 2023-12-15 PROCEDURE — 36415 COLL VENOUS BLD VENIPUNCTURE: CPT

## 2023-12-18 ENCOUNTER — TELEPHONE (OUTPATIENT)
Dept: TRANSPLANT | Facility: CLINIC | Age: 53
End: 2023-12-18
Payer: COMMERCIAL

## 2023-12-18 NOTE — TELEPHONE ENCOUNTER
Cyclosporine = 142  (12/15/23)  Goal   Current CSA dose 100 mg BID?    Previous two results were also not a good 12 hour trough    There are two prescriptions  for CSA, one for 100 mg BID and a more current refill for 25 mg BID.    PLAN:   Call and confirm this was a good 12 - hour trough.   Verify current dose.   Confirm no new medications or illness (daniel. Diarrhea).  Confirm any MISSED DOSES.   If good trough and correct dose above, recommend:  stay on the same dose     Is this more than a 50% increase or decrease in current IS dose: No  If YES, justification: N/A  *If > 50% change in immunosuppression dose, repeat labs in 1 week.     Recheck level in 1-2 weeks and make sure it is a good trough to avoid additional lab draws.      OUTCOME:  Confirms currently on 125mg BID  Since his levels are too low on 100 mg BID and too high on 125 mg BID,   will decrease CSA dose to 125 mg (AM) / 100 mg (PM)  Dr Lei prescribes his IS and will let them know of dose change.  He sees Dr Lei 1/4/23 and reports he will recheck CSA then.

## 2024-01-04 ENCOUNTER — OFFICE VISIT (OUTPATIENT)
Dept: FAMILY MEDICINE | Facility: CLINIC | Age: 54
End: 2024-01-04
Payer: COMMERCIAL

## 2024-01-04 VITALS
HEART RATE: 56 BPM | RESPIRATION RATE: 20 BRPM | WEIGHT: 271 LBS | HEIGHT: 71 IN | SYSTOLIC BLOOD PRESSURE: 161 MMHG | DIASTOLIC BLOOD PRESSURE: 113 MMHG | BODY MASS INDEX: 37.94 KG/M2 | TEMPERATURE: 98.6 F | OXYGEN SATURATION: 97 %

## 2024-01-04 DIAGNOSIS — Z12.12 SCREENING FOR COLORECTAL CANCER: ICD-10-CM

## 2024-01-04 DIAGNOSIS — Z12.11 SCREEN FOR COLON CANCER: Primary | ICD-10-CM

## 2024-01-04 DIAGNOSIS — M10.9 GOUT, UNSPECIFIED CAUSE, UNSPECIFIED CHRONICITY, UNSPECIFIED SITE: ICD-10-CM

## 2024-01-04 DIAGNOSIS — I15.1 HTN, KIDNEY TRANSPLANT RELATED: ICD-10-CM

## 2024-01-04 DIAGNOSIS — G47.33 OSA ON CPAP: ICD-10-CM

## 2024-01-04 DIAGNOSIS — E78.5 DYSLIPIDEMIA: ICD-10-CM

## 2024-01-04 DIAGNOSIS — Z12.11 SCREENING FOR COLORECTAL CANCER: ICD-10-CM

## 2024-01-04 DIAGNOSIS — M1A.39X0 CHRONIC GOUT DUE TO RENAL IMPAIRMENT OF MULTIPLE SITES WITHOUT TOPHUS: ICD-10-CM

## 2024-01-04 DIAGNOSIS — Z94.0 HTN, KIDNEY TRANSPLANT RELATED: ICD-10-CM

## 2024-01-04 DIAGNOSIS — Z00.00 HEALTH CARE MAINTENANCE: ICD-10-CM

## 2024-01-04 DIAGNOSIS — N18.32 CHRONIC KIDNEY DISEASE, STAGE 3B (H): ICD-10-CM

## 2024-01-04 DIAGNOSIS — G47.419 PRIMARY NARCOLEPSY WITHOUT CATAPLEXY: ICD-10-CM

## 2024-01-04 DIAGNOSIS — N18.4 STAGE 4 CHRONIC KIDNEY DISEASE (H): ICD-10-CM

## 2024-01-04 PROBLEM — E66.812 CLASS 2 SEVERE OBESITY DUE TO EXCESS CALORIES WITH SERIOUS COMORBIDITY IN ADULT (H): Status: ACTIVE | Noted: 2024-01-04

## 2024-01-04 PROBLEM — E66.01 CLASS 2 SEVERE OBESITY DUE TO EXCESS CALORIES WITH SERIOUS COMORBIDITY IN ADULT (H): Status: ACTIVE | Noted: 2024-01-04

## 2024-01-04 PROBLEM — E66.01 MORBID OBESITY (H): Status: RESOLVED | Noted: 2023-03-17 | Resolved: 2024-01-04

## 2024-01-04 PROCEDURE — 99214 OFFICE O/P EST MOD 30 MIN: CPT | Performed by: INTERNAL MEDICINE

## 2024-01-04 RX ORDER — PRAVASTATIN SODIUM 40 MG
40 TABLET ORAL DAILY
Qty: 90 TABLET | Refills: 3 | Status: SHIPPED | OUTPATIENT
Start: 2024-01-04

## 2024-01-04 RX ORDER — ALLOPURINOL 300 MG/1
1 TABLET ORAL 2 TIMES DAILY
Qty: 180 TABLET | Refills: 3 | Status: SHIPPED | OUTPATIENT
Start: 2024-01-04 | End: 2024-08-27

## 2024-01-04 RX ORDER — METOPROLOL TARTRATE 75 MG/1
1 TABLET, FILM COATED ORAL 2 TIMES DAILY
Qty: 180 TABLET | Refills: 3 | Status: SHIPPED | OUTPATIENT
Start: 2024-01-04

## 2024-01-04 RX ORDER — AMLODIPINE BESYLATE 5 MG/1
5 TABLET ORAL DAILY
Qty: 90 TABLET | Refills: 3 | Status: SHIPPED | OUTPATIENT
Start: 2024-01-04

## 2024-01-04 NOTE — ASSESSMENT & PLAN NOTE
h/o LUKT in '08 at [facility] (previously on HD for ~3 months), baseline SCr ~2.5.  Original disease was IgA nephropathy  IS: cyclosporine 100/125mg BID, MMF 1g BID   - worsening proteinuria  12/2023: Transplant biopsy pursued for worsening allograft function and worsening proteinuria -significant for chronic antibody mediated rejection, CD4 positive; significant chronic transplant glomerulopathy.  Background IgA findings.  Significant interstitial fibrosis and tubular atrophy   -Direction from transplant clinic not to pursue any specific treatments, felt more chronic related changes    1/2024: Reviewed biopsy results with patient.  Expressed more chronic, transplant related changes.  Recommending optimization of his blood pressure control of less than 130/80.  Need for future relisting for transplant once GFR below 20 mL/min.  Should explore other social resources revolving around potential kidney donors.  -Defer cyclosporine dosing to his transplant provider

## 2024-01-04 NOTE — ASSESSMENT & PLAN NOTE
RAUL with suspected narcolepsy based on previous evaluation by Dr. Bloom   - on Ritalin   - on CPAP

## 2024-01-04 NOTE — PROGRESS NOTES
Assessment & Plan   Problem List Items Addressed This Visit          Endocrine    Dyslipidemia    Relevant Medications    pravastatin (PRAVACHOL) 40 MG tablet    Other Relevant Orders    Lipid panel reflex to direct LDL Non-fasting    Chronic gout due to renal impairment of multiple sites without tophus     - on allopurinol 300mg BID, probenecid 500mg BID   - flares generally responsive to prednisone therapy         Relevant Medications    allopurinol (ZYLOPRIM) 300 MG tablet       Circulatory    HTN, kidney transplant related     uncontrolled  current antihypertensive regimen:  metoprolol 75mg BID, clonidine 0.2mg BID, losartan 50mg   regimen changes: begin on amlodipine 5mg daily  intolerance:  future titration/work-up plan:          Relevant Medications    Metoprolol Tartrate 75 MG TABS    amLODIPine (NORVASC) 5 MG tablet       Urinary    Stage 4 chronic kidney disease (H)     h/o LUKT in '08 at [facility] (previously on HD for ~3 months), baseline SCr ~2.5.  Original disease was IgA nephropathy  IS: cyclosporine 100/125mg BID, MMF 1g BID   - worsening proteinuria  12/2023: Transplant biopsy pursued for worsening allograft function and worsening proteinuria -significant for chronic antibody mediated rejection, CD4 positive; significant chronic transplant glomerulopathy.  Background IgA findings.  Significant interstitial fibrosis and tubular atrophy   -Direction from transplant clinic not to pursue any specific treatments, felt more chronic related changes    1/2024: Reviewed biopsy results with patient.  Expressed more chronic, transplant related changes.  Recommending optimization of his blood pressure control of less than 130/80.  Need for future relisting for transplant once GFR below 20 mL/min.  Should explore other social resources revolving around potential kidney donors.  -Defer cyclosporine dosing to his transplant provider            Other    Narcolepsy     RAUL with suspected narcolepsy based on previous  "evaluation by Dr. Bloom   - on Ritalin   - on CPAP          Other Visit Diagnoses       Screen for colon cancer    -  Primary    Relevant Orders    Colonoscopy Screening  Referral    Health care maintenance        Relevant Orders    REVIEW OF HEALTH MAINTENANCE PROTOCOL ORDERS (Completed)    Screening for colorectal cancer                      BMI:   Estimated body mass index is 37.8 kg/m  as calculated from the following:    Height as of this encounter: 1.803 m (5' 11\").    Weight as of this encounter: 122.9 kg (271 lb).       FUTURE APPOINTMENTS:       - Follow-up visit in 4 months    Eric Lei MD  [facility]    Andreina Palacio is a 53 year old, presenting for the following health issues: Of note, have not seen in clinic since 2021.  Returns for follow-up after recent transplant biopsy instructed by his [facility] transplant team.  Reviewed his biopsy results mainly notable for significant chronic changes including transplant with myelopathy and chronic antibody mediated rejection process; positive CD4 staining.  No cellular rejection features.  Does have background IgA staining consistent with prior IgA nephropathy history.  No immediate complications from biopsy.  No hematuria.  Seeking input on biopsy results and other measures he can take to maintain current kidney function.  In clinic blood pressures consistently elevated this past calendar year  Kidney Problem (Results.)        1/4/2024     2:14 PM   Additional Questions   Roomed by Deisy TIJERINA       History of Present Illness       CKD: He is not using over the counter pain medicine.     Hyperlipidemia:  He presents for follow up of hyperlipidemia.   He is taking medication to lower cholesterol. He is not having myalgia or other side effects to statin medications.    Hypertension: He presents for follow up of hypertension.  He does not check blood pressure  regularly outside of the clinic. " "Outside blood pressures have been over 140/90. He does not follow a low salt diet.     He eats 0-1 servings of fruits and vegetables daily.He consumes 1 sweetened beverage(s) daily.He exercises with enough effort to increase his heart rate 30 to 60 minutes per day.  He exercises with enough effort to increase his heart rate 3 or less days per week.   He is taking medications regularly.         Review of Systems   Constitutional, HEENT, cardiovascular, pulmonary, gi and gu systems are negative, except as otherwise noted.      Objective    BP (!) 158/99   Pulse 56   Temp 98.6  F (37  C)   Resp 20   Ht 1.803 m (5' 11\")   Wt 122.9 kg (271 lb)   SpO2 97%   BMI 37.80 kg/m    Body mass index is 37.8 kg/m .  Physical Exam   GENERAL: healthy, alert and no distress  NECK: no adenopathy, no asymmetry, masses, or scars and thyroid normal to palpation  RESP: lungs clear to auscultation - no rales, rhonchi or wheezes  CV: regular rate and rhythm, normal S1 S2, no S3 or S4, no murmur, click or rub, no peripheral edema and peripheral pulses strong  ABDOMEN: soft, nontender, no hepatosplenomegaly, no masses and bowel sounds normal  MS: no gross musculoskeletal defects noted, no edema    Lab on 12/15/2023   Component Date Value Ref Range Status    Cyclosporine 12/15/2023 142  50 - 400 ug/L Final    Comment: Cyclosporine Reference Range (ug/L):    Kidney Transplant:  Pediatric  0-3 months post transplant: 175-200  3-6 months post transplant: 150-175  6-9 months post transplant: 125-150  9-12 months post transplant: 100-125  >12 months post transplant:     Adult  0-3 months post transplant: 175-200  3-6 months post transplant: 150-200  6-12 months post transplant: 125-150  >12 months post transplant:     Heart Transplant:  Pediatric  0-12 months post transplant: 200-250  >12 months post transplant: 100-125    Adult  0-3 months post transplant: 150-250  0-3 months post transplant: 150-250  3-6 months post transplant: " 125-225  6-12 months post transplant: 100-200  >12 months post transplant:     Heart-Lung Block: 200-250    Lung Transplant:  0-12 months post transplant: 175-225  >12 months post transplant: 125-175    Liver Transplant:  0-6 months post transplant: 150-200  >6 months post transplant: 100-150    Pancreas Transplant:  0-6 months post transplant: 200-250  6-12                            months post transplant: 150-200  >12 months post transplant: 100-150    Bone Marrow Transplant: 200-400     Cyclosporine Last Dose Date 12/15/2023 12/14/2023   Final    Cyclosporine Last Dose Time 12/15/2023  9:00 PM   Final

## 2024-01-04 NOTE — ASSESSMENT & PLAN NOTE
- on allopurinol 300mg BID, probenecid 500mg BID   - flares generally responsive to prednisone therapy

## 2024-01-04 NOTE — COMMUNITY RESOURCES LIST (ENGLISH)
01/04/2024   [facility]  N/A  For questions about this resource list or additional care needs, please contact your primary care clinic or care manager.  Phone: [phone]   Email: N/A   Address: [address] [place] [address]   Hours: N/A        Transportation       Free or low-cost transportation  1  [employer] [place] [employer] Distance: 9.53 miles      In-Person   [address] [place] [address]  Language: English  Hours: Mon - Fri 6:00 AM - 8:00 PM , Sat 8:00 AM - 6:00 PM , Sun 8:00 AM - 3:00 PM  Fees: Self Pay   Phone: (329) 880-7852 Email: 6connect@[employer] Website: https://[employer]?Name=Robbi%20Falls     2  [employer] - Free or low-cost transportation Distance: 21.14 miles      In-Person   [address] [place] [address]  Language: English  Hours: Mon - Fri 9:00 AM - 5:00 PM  Fees: Free   Phone: (857) 563-7501 Email: info@[employer] Website: https://www.[employer]/     Transportation to medical appointments  3  [employer] [place] [employer] Distance: 9.53 miles      In-Person   [address] [place] [address]  Language: English  Hours: Mon - Fri 6:00 AM - 8:00 PM , Sat 8:00 AM - 6:00 PM , Sun 8:00 AM - 3:00 PM  Fees: Self Pay   Phone: (895) 117-2984 Email: 6connect@[employer] Website: https://[employer]?Name=Robbi%20Falls     4  [employer] Distance: 21.93 miles      [address] [place] [address]  Language: English, Tristanian, Hmong, Uzbek, Uzbek  Hours: Mon - Sun 5:00 AM - 10:00 PM  Fees: Insurance, Self Pay   Phone: (254) 129-7604 Email: [employer]_mobility@[employer] Website: https://www.[facility].info/Providers/[employer]/Transportation/1?pos=9          Important Numbers & Websites       Emergency Services   911  [facility]   311  Poison Control   (489) 660-2126  Suicide Prevention Lifeline   (758) 422-7196  (TALK)  Child Abuse Hotline   (410) 509-1265 (4-A-Child)  Sexual Assault Hotline   (597) 206-4249 (HOPE)  National Runaway Safeline   (245) 791-3452 (RUNAWAY)  All-Options Talkline   (855) 846-7686  Substance Abuse Referral   (456) 512-1008 (HELP)

## 2024-01-04 NOTE — ASSESSMENT & PLAN NOTE
uncontrolled  current antihypertensive regimen:  metoprolol 75mg BID, clonidine 0.2mg BID, losartan 50mg   regimen changes: begin on amlodipine 5mg daily  intolerance:  future titration/work-up plan:

## 2024-01-11 ENCOUNTER — LAB (OUTPATIENT)
Dept: LAB | Facility: CLINIC | Age: 54
End: 2024-01-11
Payer: COMMERCIAL

## 2024-01-11 DIAGNOSIS — N18.4 STAGE 4 CHRONIC KIDNEY DISEASE (H): Primary | ICD-10-CM

## 2024-01-11 DIAGNOSIS — E78.5 DYSLIPIDEMIA: ICD-10-CM

## 2024-01-11 DIAGNOSIS — N18.32 CHRONIC KIDNEY DISEASE, STAGE 3B (H): ICD-10-CM

## 2024-01-11 LAB
ANION GAP SERPL CALCULATED.3IONS-SCNC: 10 MMOL/L (ref 7–15)
BUN SERPL-MCNC: 62.9 MG/DL (ref 6–20)
CALCIUM SERPL-MCNC: 9.3 MG/DL (ref 8.6–10)
CHLORIDE SERPL-SCNC: 107 MMOL/L (ref 98–107)
CHOLEST SERPL-MCNC: 169 MG/DL
CREAT SERPL-MCNC: 3 MG/DL (ref 0.67–1.17)
CREAT UR-MCNC: 59.9 MG/DL
DEPRECATED HCO3 PLAS-SCNC: 23 MMOL/L (ref 22–29)
EGFRCR SERPLBLD CKD-EPI 2021: 24 ML/MIN/1.73M2
FASTING STATUS PATIENT QL REPORTED: YES
GLUCOSE SERPL-MCNC: 133 MG/DL (ref 70–99)
HDLC SERPL-MCNC: 29 MG/DL
LDLC SERPL CALC-MCNC: 106 MG/DL
MICROALBUMIN UR-MCNC: 2397 MG/L
MICROALBUMIN/CREAT UR: 4001.67 MG/G CR (ref 0–17)
NONHDLC SERPL-MCNC: 140 MG/DL
POTASSIUM SERPL-SCNC: 5 MMOL/L (ref 3.4–5.3)
SODIUM SERPL-SCNC: 140 MMOL/L (ref 135–145)
TRIGL SERPL-MCNC: 170 MG/DL

## 2024-01-11 PROCEDURE — 80061 LIPID PANEL: CPT

## 2024-01-11 PROCEDURE — 82043 UR ALBUMIN QUANTITATIVE: CPT

## 2024-01-11 PROCEDURE — 80048 BASIC METABOLIC PNL TOTAL CA: CPT

## 2024-01-11 PROCEDURE — 36415 COLL VENOUS BLD VENIPUNCTURE: CPT

## 2024-01-11 PROCEDURE — 82570 ASSAY OF URINE CREATININE: CPT

## 2024-01-15 ENCOUNTER — PATIENT OUTREACH (OUTPATIENT)
Dept: ONCOLOGY | Facility: CLINIC | Age: 54
End: 2024-01-15
Payer: COMMERCIAL

## 2024-01-29 NOTE — TELEPHONE ENCOUNTER
[facility]: Hematology                                                                                          Patient was referred to classical hematology in August 2023, for light chain abnormalities.  He has since had a renal biopsy.    Patient has cancelled 2 appointments in under 48 hours and no showed x 1    A new referral is needed if his referring care team feels he still has hematology needs      Mabel Hamlin LPN  Hematology Care Coordination  [phone]

## 2024-02-02 ENCOUNTER — TELEPHONE (OUTPATIENT)
Dept: TRANSPLANT | Facility: CLINIC | Age: 54
End: 2024-02-02
Payer: COMMERCIAL

## 2024-02-02 NOTE — TELEPHONE ENCOUNTER
Called to say he had a visit with Dr Lei and was told his kidney function is declining.  GFR is trending down and Cr trending  up.    12/11/23 : Kidney transplant Biopsy  Bakari Hurtado MD Ututalum, Teresa, RN  Biopsy with mostly chronic changes and nothing I would treat.       Requested increased lab monitoring.        Bakari Hurtado MD Ututalum, Teresa, RN  If his GFR goes any lower, we can refer him for another transplant.      ----- Message -----  From: Karla Gandhi RN  Sent: 2/2/2024   4:00 PM CST  To: Bakari Hurtado MD  Subject: gfr, cr                                          Dr Hurtado,    Called to say he had a visit with Dr Lei and was told his kidney function is declining.    GFR is trending down and Cr trending  up.  Last GFR 24.    12/11/23 : Kidney transplant Biopsy  Bakari Hurtado MD Ututalum, Teresa, RN  Biopsy with mostly chronic changes and nothing I would treat.     He is requesting more frequent lab tests.  I will update to monthly labs.    Is there anything else you want ordered?  Too early to refer for retransplant?    Thanks,  [facility]   Phone: [phone]   Fax: [phone]            PLAN:  Call and make aware labs increased to monthly.  Will refer for retransplant if it goes lower.      OUTCOME:  Left VM about plan.

## 2024-02-02 NOTE — LETTER
PHYSICIAN ORDERS    DATE & TIME ISSUED: 2024 11:03 AM  PATIENT NAME: Hakeem Romero   : 1970     [facility] MR# [if applicable]: [id number]     DIAGNOSIS / ICD - 10 CODES  Kidney Transplanted (Z94.0)  Pancreas Transplant (Z94.83)  After Care Following Organ Transplant (Z48.298)  Long Term Use of High-risk Medication (Z79.899)  Immunosuppressed Status (Z92.25)  Complications Kidney Transplant (T86.10)  Proteinuria (R80.9)  Creatinine Elevation (R79.89)      Monthly   Hemogram and Platelet  Basic Metabolic Panel (Sodium,potassium,chloride,CO2,creatinine,urea,nitrogen,glucose,calcium)   / tacrolimus / Prograf drug level  Cyclosporine drug level    Every 3 months  Urine for protein creatinine ratio      Patient should release information to the [facility].   Please fax results to the Transplant Center at [phone].  Any questions please call [phone] or [phone].        .

## 2024-02-02 NOTE — TELEPHONE ENCOUNTER
General  Route to LPN    Reason for call: Hakeem was calling in due to his creatine levels elevating and his doctor says he needs to speak to his coordinator about increasing labs due to this. Needs call back from his coordinator     Call back needed? Yes    Return Call Needed  Same as documented in contacts section  When to return call?: Greater than one day: Route standard priority

## 2024-02-06 DIAGNOSIS — Z79.899 LONG-TERM USE OF HIGH-RISK MEDICATION: ICD-10-CM

## 2024-02-06 DIAGNOSIS — Z94.83 PANCREAS TRANSPLANTED (H): ICD-10-CM

## 2024-02-06 DIAGNOSIS — Z92.25 STATUS POST RECENT IMMUNOSUPPRESSIVE THERAPY: ICD-10-CM

## 2024-02-06 DIAGNOSIS — Z94.0 TRANSPLANTED KIDNEY: Primary | ICD-10-CM

## 2024-02-06 DIAGNOSIS — R79.89 ELEVATED SERUM CREATININE: ICD-10-CM

## 2024-02-06 DIAGNOSIS — T86.10 COMPLICATION OF TRANSPLANTED KIDNEY, UNSPECIFIED COMPLICATION: ICD-10-CM

## 2024-02-06 DIAGNOSIS — R80.9 PROTEINURIA, UNSPECIFIED TYPE: ICD-10-CM

## 2024-02-06 DIAGNOSIS — Z48.298 AFTERCARE FOLLOWING ORGAN TRANSPLANT: ICD-10-CM

## 2024-02-06 DIAGNOSIS — R80.9 PROTEINURIA: ICD-10-CM

## 2024-02-06 DIAGNOSIS — T86.10 COMPLICATION OF TRANSPLANTED KIDNEY: ICD-10-CM

## 2024-02-08 DIAGNOSIS — Z94.0 KIDNEY REPLACED BY TRANSPLANT: ICD-10-CM

## 2024-02-08 RX ORDER — CYCLOSPORINE 100 MG/1
CAPSULE, LIQUID FILLED ORAL
Qty: 60 CAPSULE | Refills: 11 | Status: SHIPPED | OUTPATIENT
Start: 2024-02-08 | End: 2024-04-03

## 2024-02-25 DIAGNOSIS — Z94.0 KIDNEY REPLACED BY TRANSPLANT: ICD-10-CM

## 2024-02-26 RX ORDER — MYCOPHENOLATE MOFETIL 250 MG/1
CAPSULE ORAL
Qty: 720 CAPSULE | Refills: 3 | Status: SHIPPED | OUTPATIENT
Start: 2024-02-26

## 2024-02-27 DIAGNOSIS — I15.1 HTN, KIDNEY TRANSPLANT RELATED: Primary | ICD-10-CM

## 2024-02-27 DIAGNOSIS — Z94.0 HTN, KIDNEY TRANSPLANT RELATED: Primary | ICD-10-CM

## 2024-02-28 RX ORDER — CLONIDINE HYDROCHLORIDE 0.1 MG/1
0.1 TABLET ORAL 2 TIMES DAILY
Qty: 180 TABLET | Refills: 3 | Status: SHIPPED | OUTPATIENT
Start: 2024-02-28

## 2024-03-11 ENCOUNTER — TELEPHONE (OUTPATIENT)
Dept: TRANSPLANT | Facility: CLINIC | Age: 54
End: 2024-03-11
Payer: COMMERCIAL

## 2024-03-11 NOTE — TELEPHONE ENCOUNTER
General  Route to LPN    Reason for call: Hakeem says he is calling to follow up from a previous call. He asked to speak to his coordinator    Call back needed? Yes    Return Call Needed  Same as documented in contacts section  When to return call?: Greater than one day: Route standard priority

## 2024-03-21 ENCOUNTER — LAB (OUTPATIENT)
Dept: LAB | Facility: CLINIC | Age: 54
End: 2024-03-21
Payer: COMMERCIAL

## 2024-03-21 DIAGNOSIS — Z94.83 PANCREAS TRANSPLANTED (H): ICD-10-CM

## 2024-03-21 DIAGNOSIS — T86.10 COMPLICATION OF TRANSPLANTED KIDNEY: ICD-10-CM

## 2024-03-21 DIAGNOSIS — Z94.0 TRANSPLANTED KIDNEY: ICD-10-CM

## 2024-03-21 DIAGNOSIS — Z48.298 AFTERCARE FOLLOWING ORGAN TRANSPLANT: ICD-10-CM

## 2024-03-21 DIAGNOSIS — T86.10 COMPLICATION OF TRANSPLANTED KIDNEY, UNSPECIFIED COMPLICATION: ICD-10-CM

## 2024-03-21 DIAGNOSIS — R80.9 PROTEINURIA: ICD-10-CM

## 2024-03-21 DIAGNOSIS — Z79.899 LONG-TERM USE OF HIGH-RISK MEDICATION: ICD-10-CM

## 2024-03-21 DIAGNOSIS — Z92.25 STATUS POST RECENT IMMUNOSUPPRESSIVE THERAPY: ICD-10-CM

## 2024-03-21 DIAGNOSIS — R80.9 PROTEINURIA, UNSPECIFIED TYPE: ICD-10-CM

## 2024-03-21 DIAGNOSIS — R79.89 ELEVATED SERUM CREATININE: ICD-10-CM

## 2024-03-21 LAB
ALBUMIN MFR UR ELPH: 311 MG/DL
ANION GAP SERPL CALCULATED.3IONS-SCNC: 9 MMOL/L (ref 7–15)
BUN SERPL-MCNC: 56.3 MG/DL (ref 6–20)
CALCIUM SERPL-MCNC: 9.5 MG/DL (ref 8.6–10)
CHLORIDE SERPL-SCNC: 108 MMOL/L (ref 98–107)
CREAT SERPL-MCNC: 3.16 MG/DL (ref 0.67–1.17)
CREAT UR-MCNC: 50.4 MG/DL
CYCLOSPORINE BLD LC/MS/MS-MCNC: 95 UG/L (ref 50–400)
DEPRECATED HCO3 PLAS-SCNC: 23 MMOL/L (ref 22–29)
EGFRCR SERPLBLD CKD-EPI 2021: 22 ML/MIN/1.73M2
ERYTHROCYTE [DISTWIDTH] IN BLOOD BY AUTOMATED COUNT: 13.1 % (ref 10–15)
GLUCOSE SERPL-MCNC: 124 MG/DL (ref 70–99)
HCT VFR BLD AUTO: 42.6 % (ref 40–53)
HGB BLD-MCNC: 14.4 G/DL (ref 13.3–17.7)
MCH RBC QN AUTO: 28.6 PG (ref 26.5–33)
MCHC RBC AUTO-ENTMCNC: 33.8 G/DL (ref 31.5–36.5)
MCV RBC AUTO: 85 FL (ref 78–100)
PLATELET # BLD AUTO: 179 10E3/UL (ref 150–450)
POTASSIUM SERPL-SCNC: 5.3 MMOL/L (ref 3.4–5.3)
PROT/CREAT 24H UR: 6.17 MG/MG CR (ref 0–0.2)
RBC # BLD AUTO: 5.03 10E6/UL (ref 4.4–5.9)
SODIUM SERPL-SCNC: 140 MMOL/L (ref 135–145)
TACROLIMUS BLD-MCNC: <1 UG/L (ref 5–15)
TME LAST DOSE: ABNORMAL H
TME LAST DOSE: ABNORMAL H
TME LAST DOSE: NORMAL H
TME LAST DOSE: NORMAL H
WBC # BLD AUTO: 7.1 10E3/UL (ref 4–11)

## 2024-03-21 PROCEDURE — 80197 ASSAY OF TACROLIMUS: CPT

## 2024-03-21 PROCEDURE — 80048 BASIC METABOLIC PNL TOTAL CA: CPT

## 2024-03-21 PROCEDURE — 85027 COMPLETE CBC AUTOMATED: CPT

## 2024-03-21 PROCEDURE — 36415 COLL VENOUS BLD VENIPUNCTURE: CPT

## 2024-03-21 PROCEDURE — 84156 ASSAY OF PROTEIN URINE: CPT

## 2024-03-21 PROCEDURE — 80158 DRUG ASSAY CYCLOSPORINE: CPT

## 2024-03-22 ENCOUNTER — TELEPHONE (OUTPATIENT)
Dept: TRANSPLANT | Facility: CLINIC | Age: 54
End: 2024-03-22
Payer: COMMERCIAL

## 2024-03-22 DIAGNOSIS — Z94.83 PANCREAS REPLACED BY TRANSPLANT (H): ICD-10-CM

## 2024-03-22 DIAGNOSIS — Z92.25 STATUS POST RECENT IMMUNOSUPPRESSIVE THERAPY: ICD-10-CM

## 2024-03-22 DIAGNOSIS — Z94.0 KIDNEY REPLACED BY TRANSPLANT: Primary | ICD-10-CM

## 2024-03-22 DIAGNOSIS — Z79.899 ENCOUNTER FOR LONG-TERM (CURRENT) USE OF HIGH-RISK MEDICATION: ICD-10-CM

## 2024-03-22 DIAGNOSIS — Z48.298 AFTERCARE FOLLOWING ORGAN TRANSPLANT: ICD-10-CM

## 2024-03-22 DIAGNOSIS — N18.4 STAGE 4 CHRONIC KIDNEY DISEASE (H): ICD-10-CM

## 2024-03-22 DIAGNOSIS — T86.10 COMPLICATIONS, KIDNEY TRANSPLANT: ICD-10-CM

## 2024-03-22 NOTE — TELEPHONE ENCOUNTER
Sent corrected  lab orders to:  [facility]   Phone: [phone]   Fax: [phone]

## 2024-03-22 NOTE — LETTER
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 Transplant [facility].   Please fax results to the Transplant Center at [phone].  Any questions please call [phone] or [phone].        .

## 2024-03-28 DIAGNOSIS — Z94.0 KIDNEY REPLACED BY TRANSPLANT: ICD-10-CM

## 2024-03-28 RX ORDER — CYCLOSPORINE 100 MG/1
100 CAPSULE, LIQUID FILLED ORAL 2 TIMES DAILY
Qty: 60 CAPSULE | Refills: 11 | OUTPATIENT
Start: 2024-03-28

## 2024-03-28 NOTE — TELEPHONE ENCOUNTER
Pharmacy only has 25MG on file. Said he takes 100MG in the morning and 125MG at night, but when they added the 25MG at night the pharmacy took out the 100 so only has 25 on file. Patient would like a call back if there are any questions.   ====    Medication Question or Refill        What medication are you calling about (include dose and sig)?:     Preferred Pharmacy:       [facility] - [address] [address]  Phone: [phone] Fax: [phone]          Controlled Substance Agreement on file:   CSA -- Patient Level:    CSA: None found at the patient level.       Who prescribed the medication?: [facility]    Do you need a refill? Yes    Do you have any questions or concerns?  Yes:     Could we send this information to you in [place] or would you prefer to receive a phone call?:   Patient would prefer a phone call   Okay to leave a detailed message?: Yes at Home number on file [phone] (home)

## 2024-04-02 ENCOUNTER — TELEPHONE (OUTPATIENT)
Dept: FAMILY MEDICINE | Facility: CLINIC | Age: 54
End: 2024-04-02
Payer: COMMERCIAL

## 2024-04-02 DIAGNOSIS — Z94.0 KIDNEY REPLACED BY TRANSPLANT: ICD-10-CM

## 2024-04-02 NOTE — TELEPHONE ENCOUNTER
Reason for Call:  Medication or medication refill:    Do you use a [facility]?  Name of the pharmacy and phone number for the current request[facility]    Name of the medication requested:cyclosporine   100 mg    Other request: please call when ready    Can we leave a detailed message on this number? YES    Phone number patient can be reached at: Cell number on file:    Telephone Information:   Mobile [phone]       Best Time: anytime    Call taken on 4/2/2024 at 4:39 PM by CONRAD MEEKS

## 2024-04-03 RX ORDER — CYCLOSPORINE 100 MG/1
100 CAPSULE, LIQUID FILLED ORAL 2 TIMES DAILY
Qty: 60 CAPSULE | Refills: 11 | Status: SHIPPED | OUTPATIENT
Start: 2024-04-03

## 2024-05-03 ENCOUNTER — LAB (OUTPATIENT)
Dept: LAB | Facility: CLINIC | Age: 54
End: 2024-05-03
Payer: COMMERCIAL

## 2024-05-03 DIAGNOSIS — T86.10 COMPLICATIONS, KIDNEY TRANSPLANT: ICD-10-CM

## 2024-05-03 DIAGNOSIS — Z94.83 PANCREAS REPLACED BY TRANSPLANT (H): ICD-10-CM

## 2024-05-03 DIAGNOSIS — Z94.0 KIDNEY REPLACED BY TRANSPLANT: ICD-10-CM

## 2024-05-03 DIAGNOSIS — Z92.25 STATUS POST RECENT IMMUNOSUPPRESSIVE THERAPY: ICD-10-CM

## 2024-05-03 DIAGNOSIS — N18.4 STAGE 4 CHRONIC KIDNEY DISEASE (H): ICD-10-CM

## 2024-05-03 DIAGNOSIS — Z79.899 ENCOUNTER FOR LONG-TERM (CURRENT) USE OF HIGH-RISK MEDICATION: ICD-10-CM

## 2024-05-03 DIAGNOSIS — Z48.298 AFTERCARE FOLLOWING ORGAN TRANSPLANT: ICD-10-CM

## 2024-05-03 LAB
CYCLOSPORINE BLD LC/MS/MS-MCNC: 143 UG/L (ref 50–400)
ERYTHROCYTE [DISTWIDTH] IN BLOOD BY AUTOMATED COUNT: 13.3 % (ref 10–15)
HCT VFR BLD AUTO: 41 % (ref 40–53)
HGB BLD-MCNC: 13.5 G/DL (ref 13.3–17.7)
MCH RBC QN AUTO: 28.1 PG (ref 26.5–33)
MCHC RBC AUTO-ENTMCNC: 32.9 G/DL (ref 31.5–36.5)
MCV RBC AUTO: 85 FL (ref 78–100)
PLATELET # BLD AUTO: 201 10E3/UL (ref 150–450)
RBC # BLD AUTO: 4.81 10E6/UL (ref 4.4–5.9)
TME LAST DOSE: NORMAL H
TME LAST DOSE: NORMAL H
WBC # BLD AUTO: 7.5 10E3/UL (ref 4–11)

## 2024-05-03 PROCEDURE — 82043 UR ALBUMIN QUANTITATIVE: CPT

## 2024-05-03 PROCEDURE — 36415 COLL VENOUS BLD VENIPUNCTURE: CPT

## 2024-05-03 PROCEDURE — 82570 ASSAY OF URINE CREATININE: CPT

## 2024-05-03 PROCEDURE — 80048 BASIC METABOLIC PNL TOTAL CA: CPT

## 2024-05-03 PROCEDURE — 85027 COMPLETE CBC AUTOMATED: CPT

## 2024-05-03 PROCEDURE — 84156 ASSAY OF PROTEIN URINE: CPT

## 2024-05-03 PROCEDURE — 80158 DRUG ASSAY CYCLOSPORINE: CPT

## 2024-05-04 LAB
ALBUMIN MFR UR ELPH: 364 MG/DL
ANION GAP SERPL CALCULATED.3IONS-SCNC: 11 MMOL/L (ref 7–15)
BUN SERPL-MCNC: 52.5 MG/DL (ref 6–20)
CALCIUM SERPL-MCNC: 8.6 MG/DL (ref 8.6–10)
CHLORIDE SERPL-SCNC: 109 MMOL/L (ref 98–107)
CREAT SERPL-MCNC: 3.37 MG/DL (ref 0.67–1.17)
CREAT UR-MCNC: 94.6 MG/DL
CREAT UR-MCNC: 94.6 MG/DL
DEPRECATED HCO3 PLAS-SCNC: 20 MMOL/L (ref 22–29)
EGFRCR SERPLBLD CKD-EPI 2021: 21 ML/MIN/1.73M2
GLUCOSE SERPL-MCNC: 107 MG/DL (ref 70–99)
MICROALBUMIN UR-MCNC: 2517 MG/L
MICROALBUMIN/CREAT UR: 2660.68 MG/G CR (ref 0–17)
POTASSIUM SERPL-SCNC: 5 MMOL/L (ref 3.4–5.3)
PROT/CREAT 24H UR: 3.85 MG/MG CR (ref 0–0.2)
SODIUM SERPL-SCNC: 140 MMOL/L (ref 135–145)

## 2024-05-13 ENCOUNTER — TELEPHONE (OUTPATIENT)
Dept: TRANSPLANT | Facility: CLINIC | Age: 54
End: 2024-05-13
Payer: COMMERCIAL

## 2024-05-13 DIAGNOSIS — T86.10 COMPLICATIONS, KIDNEY TRANSPLANT: ICD-10-CM

## 2024-05-13 DIAGNOSIS — Z94.0 KIDNEY TRANSPLANTED: Primary | ICD-10-CM

## 2024-05-13 DIAGNOSIS — Z48.298 AFTERCARE FOLLOWING ORGAN TRANSPLANT: ICD-10-CM

## 2024-05-13 NOTE — TELEPHONE ENCOUNTER
Bakari Hurtado MD Ututalum, Teresa, RN  No, Dr. Lei should be managing him.    Gaetano      ----- Message -----  From: Karla Gandhi RN  Sent: 5/13/2024   2:23 PM CDT  To: Bakari Hurtado MD  Subject: RE: GFR declining                                Do you need to see him sooner?  He was asking. He just saw Dr Lei 1/4/24.      ----- Message -----  From: Bakari Hurtado MD  Sent: 5/13/2024   1:26 PM CDT  To: Karla Gandhi RN  Subject: RE: GFR declining                                Yes, okay to refer for transplant.    ----- Message -----  From: Karla Gandhi RN  Sent: 5/13/2024   1:24 PM CDT  To: Bakari Hurtado MD  Subject: GFR declining                                    Dr Hurtado,    GFR continues to decline.  Last GFR 21.  Refer for retransplant?  He called and wanted to check what the next step is.  You had said wait until GFR 20.    Thanks,  Yamil      OUTCOME:  [facility] referral changed to [id number].  MyChart message sent to Hakeem

## 2024-05-13 NOTE — PROGRESS NOTES
Left message to report GFR decreased to 21 (5/3/24).    PLAN:  Call back Hakeem Romero.    OUTCOME:  [facility] referral placed.  Spoke to Hakeem.  Made aware [facility] referral placed and that [facility] will let Dr Hurtado know GFR continues to decrease.  Message sent to Dr Hurtado.

## 2024-05-14 ENCOUNTER — REFERRAL (OUTPATIENT)
Dept: TRANSPLANT | Facility: CLINIC | Age: 54
End: 2024-05-14

## 2024-05-14 DIAGNOSIS — I10 ESSENTIAL HYPERTENSION: ICD-10-CM

## 2024-05-14 DIAGNOSIS — E78.5 DYSLIPIDEMIA: ICD-10-CM

## 2024-05-14 DIAGNOSIS — N18.4 CHRONIC KIDNEY DISEASE, STAGE IV (SEVERE) (H): ICD-10-CM

## 2024-05-14 DIAGNOSIS — Z94.0 KIDNEY REPLACED BY TRANSPLANT: ICD-10-CM

## 2024-05-14 DIAGNOSIS — G47.33 OSA ON CPAP: Primary | ICD-10-CM

## 2024-05-14 DIAGNOSIS — D84.9 IMMUNOSUPPRESSED STATUS (H): ICD-10-CM

## 2024-05-14 DIAGNOSIS — Z76.82 ORGAN TRANSPLANT CANDIDATE: ICD-10-CM

## 2024-05-14 DIAGNOSIS — N02.B9 IGA NEPHROPATHY: ICD-10-CM

## 2024-05-14 DIAGNOSIS — T86.12 KIDNEY TRANSPLANT FAILURE: ICD-10-CM

## 2024-05-14 DIAGNOSIS — Z01.818 PRE-TRANSPLANT EVALUATION FOR KIDNEY TRANSPLANT: ICD-10-CM

## 2024-05-14 DIAGNOSIS — Z48.298 AFTERCARE FOLLOWING ORGAN TRANSPLANT: ICD-10-CM

## 2024-05-14 NOTE — LETTER
Hakeem Romero  [address] [place] [address]  [place] [address]                August 9, 2024    Evonne Palacio,     It was a pleasure to speak with you over the phone on 05/21/2024 in preparation of your pre-kidney transplant evaluation. I am sending this letter to review the pre-transplant information we covered.     Please see your schedule in your My Chart for your pre-kidney transplant evaluation on 08/15/2024 starting at 7:30 AM. [facility]  [address] [place] [address]    For parking options, please park in the open lot across the street from the front door of our Clinic.  Otherwise, enter the [facility] / arrival plaza from [facility] and attendants can assist you based on your needs.  parking is available for those with limited mobility M-F from 7:00 am to 5:00 pm.     All in-person provider appointments will be on the third floor, 3A. Lab, EKG, and chest x-ray will be on the 1st floor. Echocardiogram will be on the 3rd floor. There are help desks in the clinic that can provide additional information, if you should need assistance.    Please bring your designated care person(s) to your appointment day to help listen to the patient education and ask questions that are important to you. You can eat/drink normally on this day. Please do not fast, as the appointment day will most likely go until 3 PM. There is a coffee shop on street level for you to purchase food and you can also bring food from home. Please be aware there are no microwaves or refrigerators for patient use at the clinic. Also, take all your prescribed medications as ordered on this day. There are no medication lab tests ordered during your appointments.    Upon completion of your appointments, I will compile the outcomes and have your results reviewed at the Transplant Team Selection Committee on Wednesday, 08/21/2024, of the following  week. This is a medical review meeting only, you will not be asked to attend. I will call you within 10 business days after this meeting to inform you of the outcomes and to assist in planning for the completion of your evaluation. I will also send you a transplant evaluation summary letter after our call.     You will receive an email from our Transplant Office which contains a Receipt of Information consent and patient educational materials. Please read and electronically sign the Receipt of Information consent as well as read the educational materials prior to your evaluation appointments on 08/15/2024.     Thank you for attending your virtual education class on 07/11/2024. Education must be completed prior to activation on the transplant list. I am including the link to the same video set here if you wish to review it again:       Additional transplant resources are as follows:   www.unos.org. UNOS, or United Network of Organ Sharing, is the national organization in our country that maintains all the organ wait lists and is responsible for the rules and regulations for organ allocation. I recommend looking at the Transplant Living section as this area has content created for patients.   www.srtr.org SRTR, or the Scientific Registry for Transplant Recipients is a national data base that all Transplant Centers report their success and failure rate for all organ types twice per year. The results are public knowledge and do provide a good perspective of organ transplant.     If the transplant providers tell you at your appointments that you should start to have live donors register with our Program to initiate their evaluations, please provide this registration website: [email or web address]   The donor will receive a detailed email response back with information and next steps specific to their situation. It is important that the donor responds to the email in order to move forward with their  evaluation. Donors can also call our Office and ask to speak with a live donor coordinator in the event of questions at [phone].     Please let me know of any questions or concerns.     Thank you,  Sulma Griffin, RN, BSN  Pre Kidney Pancreas Transplant Coordinator   [facility]  Solid Organ Transplant [address] [address]  [email or web address]   Office Number: [phone] Direct Number: [phone]   Fax Number: [phone]  Employed by [facility]     CC's: Dr. Eric Lei

## 2024-05-14 NOTE — LETTER
Hakeem Romero  [address] [place] [id number]        Dear Hakeem,    Thank you for your interest in the Transplant Center at [facility]. We look forward to being a part of your care team and assisting you through the transplant process.    As we discussed, your transplant coordinator is Sulma Griffin (Kidney).  You may call your coordinator at any time with questions or concerns.  Your first scheduled call will be on 5/21/2024 between 8am-12pm.  If this needs to change, call [phone].    Please complete the following.    Fill out and return the enclosed forms  Authorization for Electronic Communication  Authorization to Discuss Protected Health Information  Authorization for Release of Protected Health Information    Sign up for:  [employer], access to your electronic medical record (see enclosed pamphlet)  [employer], a transplant education website                                                      My Transplant Place     You can use these tools to learn more about your transplant, communicate with your care team, and track your medical details      Sincerely,      Solid Organ Transplant  [facility]    cc: Referring Physician PCP

## 2024-05-16 VITALS — WEIGHT: 260 LBS | HEIGHT: 71 IN | BODY MASS INDEX: 36.4 KG/M2

## 2024-05-16 NOTE — TELEPHONE ENCOUNTER
SOT KIDNEY INTAKE   May 16, 2024 11:12 AM - Giuliana South LPN:     PCP: Eric Lei MD   Referring Organization: Internal   Referring Provider: Bakari Hurtado MD  Referring Diagnosis: s/p kidney txp, complications of txp     GFR/Date: 21    Previous transplants: kidney txp #1 8/19/2008  Cancer history: No  Cardiac history: No  Biopsy: Renal 12/2023  Oxygen use at rest: 0 with activity: 0    BMI: 36.26 (BMI> 40 - RNCC call only/ no PKE date)      Is patient in a group home/assisted living? No  Does patient have a guardian? No     Referral intake process completed.  Patient is aware that after financial approval is received, medical records will be requested.   Patient confirmed for a callback from transplant coordinator on 5/21/2024. (within 2 weeks)  Tentative evaluation date 8/15/2024 slot 1 (within 4 weeks) if appointment is virtual, does patient have capabilities of setting this up? No    Confirmed coordinator will discuss evaluation process in more detail at the time of their call.   Patient is aware of the need to arrange age appropriate cancer screening, vaccinations, and dental care.  Reminded patient to complete questionnaire, complete medical records release, and review packet prior to evaluation visit .  Assessed patient for special needs (ie-wheelchair, assistance, guardian, and ):  None   Patient instructed to call [phone] with questions.     Patient gave verbal consent during intake call to obtain medical records and documents outside of [facility]:  Yes

## 2024-05-17 ENCOUNTER — DOCUMENTATION ONLY (OUTPATIENT)
Dept: TRANSPLANT | Facility: CLINIC | Age: 54
End: 2024-05-17
Payer: COMMERCIAL

## 2024-05-21 NOTE — TELEPHONE ENCOUNTER
Reviewed pt's chart for pre-kidney transplant evaluation planning. Coordinator first call on 05/21/2024. PKE STD on 08/15/2024 slot 1.      services required: no. Is pt able to attend virtual education class? 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 Qualifying GFR of 20 on 06/07/2024.  Pt is not on dialysis.  Pt is non diabetic.     Health hx: S/P LDKTX 08/19/2008, primary kidney disease IgA nephropathy. Immunosuppression: cyclosporine, mycophenolate mofetil, Prednisone. Other medical conditions include: hypertension, hyperlipidemia, RAUL on CPAP, gout.   Heart hx: No chronic history. Last seen by cardiologist 04/18/2008 prior to kidney transplant. NM stress test done 09/20/2010 for chest tightness - no ischemic findings and EF of 66%.   Lung hx: No chronic other then RAUL on CPAP.   Surgical hx: LDKTX 08/19/2008, herniorrhaphy ventral 04/14/2023   Dermatological hx: due for skin screening now     Pt no smoker, does weekly consume alcohol, and no recreational drugs. Does no have current infection, no non-healing wounds, or no active cancer.    Health maintenance items: BMI 36 on 05/21/2024.  Dermatology - last seen a few years ago, due now.  Colonoscopy: has never had one - PCP has ordered/ needs scheduling, .  Dental: UTD, goes every 3-6 months.    Vaccinations: COVID due, was hepatitis B surface AB positive in 2008.     Pt is independent w/ ADLs.  Pt lives in home w/ wife.  wife support following transplant. Pt does not know of living donors.     Imaging Available: CT abd pelvic wo contrast 06/22/2023 - had mildly enlarged retroperitoneal and pelvic lymph nodes seen on this. EBV DNA on 07/14/2023 was not detected.     Contacted patient and introduced myself as their  Transplant Coordinator, also introduced the role of the Transplant Coordinator in the transplant process.  Explained the purpose of this call including reviewing next steps and answering questions.      Confirmed Referring Provider, Dr. Eric Lei; Dialysis Center, n/a; and Primary Care Physician, Dr. Eric Lei. Explained to the patient of the importance of continued communication with referring providers and primary care physicians.      Reviewed components of transplant evaluation process including necessary appointments, tests, and procedures.  Instructed pt to bring 1-2 people with them to eval and to eat and drink and normally on eval day    Answered questions for patient regarding evaluation, provided my name and contact information and requested they call/message with any additional questions or concerns.  Informed patient they will receive a letter with information discussed in referral call. Determined that patient would like information regarding transplant by:       Has regular Mail, Email, [employer], and/or Phone Call.  Encouraged the use of [employer].     Pt confirmed he will attend virtual transplant class on 06/27/2024 slot 1 - changed to 07/11/2024. Additional informational web sites about transplant were discussed. Links provided to www.[facility].org and www.srtr.org in letter sent to patient.  Pt expressed excellent understanding of all and were in excellent agreement with the plan.    Confirmed STD 08/15/2024 slot 1. Informed pt to see eval schedule in his My Chart. Informed patient he will receive an email from our Office prior to eval with a Receipt of Info and educational materials - instructed to read materials and sign consent prior to eval.     Smartset orders generated in EPIC for pre kidney alone evaluation on 08/15/2024 slot 1.      Generated pre transplant education letter in EPIC - electronically sent to pt/ providers.

## 2024-05-31 ENCOUNTER — OFFICE VISIT (OUTPATIENT)
Dept: FAMILY MEDICINE | Facility: CLINIC | Age: 54
End: 2024-05-31
Attending: INTERNAL MEDICINE
Payer: COMMERCIAL

## 2024-05-31 VITALS
HEIGHT: 71 IN | WEIGHT: 271.6 LBS | RESPIRATION RATE: 18 BRPM | SYSTOLIC BLOOD PRESSURE: 134 MMHG | HEART RATE: 69 BPM | DIASTOLIC BLOOD PRESSURE: 82 MMHG | OXYGEN SATURATION: 95 % | TEMPERATURE: 97.8 F | BODY MASS INDEX: 38.02 KG/M2

## 2024-05-31 DIAGNOSIS — Z94.0 HTN, KIDNEY TRANSPLANT RELATED: ICD-10-CM

## 2024-05-31 DIAGNOSIS — Z00.00 HEALTHCARE MAINTENANCE: ICD-10-CM

## 2024-05-31 DIAGNOSIS — M1A.39X0 CHRONIC GOUT DUE TO RENAL IMPAIRMENT OF MULTIPLE SITES WITHOUT TOPHUS: ICD-10-CM

## 2024-05-31 DIAGNOSIS — I15.1 HTN, KIDNEY TRANSPLANT RELATED: ICD-10-CM

## 2024-05-31 DIAGNOSIS — Z23 NEED FOR SHINGLES VACCINE: Primary | ICD-10-CM

## 2024-05-31 DIAGNOSIS — N18.4 STAGE 4 CHRONIC KIDNEY DISEASE (H): ICD-10-CM

## 2024-05-31 DIAGNOSIS — Z23 NEED FOR TDAP VACCINATION: ICD-10-CM

## 2024-05-31 DIAGNOSIS — E66.9 OBESITY WITH SERIOUS COMORBIDITY, UNSPECIFIED CLASSIFICATION, UNSPECIFIED OBESITY TYPE: ICD-10-CM

## 2024-05-31 DIAGNOSIS — D84.9 IMMUNOSUPPRESSED STATUS (H): ICD-10-CM

## 2024-05-31 DIAGNOSIS — Z94.0 KIDNEY REPLACED BY TRANSPLANT: ICD-10-CM

## 2024-05-31 PROBLEM — D75.1 POST-TRANSPLANT ERYTHROCYTOSIS: Status: RESOLVED | Noted: 2019-01-19 | Resolved: 2024-05-31

## 2024-05-31 PROCEDURE — 90471 IMMUNIZATION ADMIN: CPT | Performed by: INTERNAL MEDICINE

## 2024-05-31 PROCEDURE — 90472 IMMUNIZATION ADMIN EACH ADD: CPT | Performed by: INTERNAL MEDICINE

## 2024-05-31 PROCEDURE — 90750 HZV VACC RECOMBINANT IM: CPT | Performed by: INTERNAL MEDICINE

## 2024-05-31 PROCEDURE — 90715 TDAP VACCINE 7 YRS/> IM: CPT | Performed by: INTERNAL MEDICINE

## 2024-05-31 PROCEDURE — 99214 OFFICE O/P EST MOD 30 MIN: CPT | Mod: 25 | Performed by: INTERNAL MEDICINE

## 2024-05-31 NOTE — ASSESSMENT & PLAN NOTE
- on allopurinol 300mg BID, probenecid 500mg BID   -Will need to consider future allopurinol dose reduction given his decline in GFR   - flares generally responsive to prednisone therapy

## 2024-05-31 NOTE — ASSESSMENT & PLAN NOTE
h/o LUKT in '08 at [facility] (previously on HD for ~3 months), baseline SCr ~3.  Original disease was IgA nephropathy  IS: cyclosporine 100/125mg BID, MMF 1g BID   - worsening proteinuria; ~2g/g  12/2023: Transplant biopsy pursued for worsening allograft function and worsening proteinuria -significant for chronic antibody mediated rejection, CD4 positive; significant chronic transplant glomerulopathy.  Background IgA findings.  Significant interstitial fibrosis and tubular atrophy   -Direction from transplant clinic not to pursue any specific treatments, felt more chronic related changes    1/2024: Reviewed biopsy results with patient.  Expressed more chronic, transplant related changes.  Recommending optimization of his blood pressure control of less than 130/80.  Need for future relisting for transplant once GFR below 20 mL/min.  Should explore other social resources revolving around potential kidney donors.  -Defer cyclosporine dosing to his transplant provider    5/2024: Defer any further albuminuria medication options to his transplant team, specifically whether candidate for SGLT2 inhibitor use  Has maintain optimal blood pressure control since amlodipine introduction  Discussed pretransplant and need for completing other screening, immunization recommendations  -Tdap and Shingrix today  Provided number to schedule his screening colonoscopy in near future  Need to see dermatologist for annual skin surveillance  Likely will need to see BMI lower, defer to his formal pretransplant evaluation to set his necessary pretransplant weight goal

## 2024-05-31 NOTE — ASSESSMENT & PLAN NOTE
controlled  current antihypertensive regimen:  metoprolol 75mg BID, clonidine 0.2mg BID, losartan 50mg, amlodipine 5mg daily  regimen changes:   intolerance:  future titration/work-up plan:

## 2024-05-31 NOTE — PROGRESS NOTES
Assessment & Plan   Problem List Items Addressed This Visit          Digestive    Obesity       Endocrine    Chronic gout due to renal impairment of multiple sites without tophus     - on allopurinol 300mg BID, probenecid 500mg BID   -Will need to consider future allopurinol dose reduction given his decline in GFR   - flares generally responsive to prednisone therapy              Circulatory    HTN, kidney transplant related     controlled  current antihypertensive regimen:  metoprolol 75mg BID, clonidine 0.2mg BID, losartan 50mg, amlodipine 5mg daily  regimen changes:   intolerance:  future titration/work-up plan:             Immune    Immunosuppressed status (H24)       Urinary    Stage 4 chronic kidney disease (H)     h/o LUKT in '08 at [facility] (previously on HD for ~3 months), baseline SCr ~3.  Original disease was IgA nephropathy  IS: cyclosporine 100/125mg BID, MMF 1g BID   - worsening proteinuria; ~2g/g  12/2023: Transplant biopsy pursued for worsening allograft function and worsening proteinuria -significant for chronic antibody mediated rejection, CD4 positive; significant chronic transplant glomerulopathy.  Background IgA findings.  Significant interstitial fibrosis and tubular atrophy   -Direction from transplant clinic not to pursue any specific treatments, felt more chronic related changes    1/2024: Reviewed biopsy results with patient.  Expressed more chronic, transplant related changes.  Recommending optimization of his blood pressure control of less than 130/80.  Need for future relisting for transplant once GFR below 20 mL/min.  Should explore other social resources revolving around potential kidney donors.  -Defer cyclosporine dosing to his transplant provider    5/2024: Defer any further albuminuria medication options to his transplant team, specifically whether candidate for SGLT2 inhibitor use  Has maintain optimal blood pressure control since amlodipine introduction  Discussed pretransplant and  "need for completing other screening, immunization recommendations  -Tdap and Shingrix today  Provided number to schedule his screening colonoscopy in near future  Need to see dermatologist for annual skin surveillance  Likely will need to see BMI lower, defer to his formal pretransplant evaluation to set his necessary pretransplant weight goal            Other    Kidney replaced by transplant    Healthcare maintenance     Other Visit Diagnoses       Need for shingles vaccine    -  Primary    Relevant Orders    ZOSTER RECOMBINANT ADJUVANTED (SHINGRIX) (Completed)    Need for Tdap vaccination        Relevant Orders    TDAP 7+ (ADACEL,BOOSTRIX) (Completed)                  BMI  Estimated body mass index is 37.88 kg/m  as calculated from the following:    Height as of this encounter: 1.803 m (5' 11\").    Weight as of this encounter: 123.2 kg (271 lb 9.6 oz).   Weight management plan: Discussed healthy diet and exercise guidelines    FUTURE APPOINTMENTS:       - Follow-up visit in 6 months      Andreina Palacio is a 54 year old, presenting for the following health issues: Returns for regular follow-up due to his chronic kidney disease.  He is scheduled for pretransplant evaluation later this summer.  Reviewed his most recent labs, creatinine in the low 30s, estimated GFR approximately 20 mL/min.  Does not endorse any uremic features.  Stable immunosuppression course.  No immunosuppression complications.  Does want to move forward with updating his vaccinations, has yet to schedule screening colonoscopy.  Tolerating amlodipine introduction -has noted some mild degree of lower extremity swelling.  In clinic blood pressures noticeably improved.  Results (Lab results F/U)        5/31/2024    12:23 PM   Additional Questions   Roomed by YUSUF Love     History of Present Illness       CKD: He is not using over the counter pain medicine.     He eats 0-1 servings of fruits and vegetables daily.He consumes 1 sweetened beverage(s) " "daily.He exercises with enough effort to increase his heart rate 10 to 19 minutes per day.  He exercises with enough effort to increase his heart rate 4 days per week.   He is taking medications regularly.           Review of Systems  Constitutional, HEENT, cardiovascular, pulmonary, gi and gu systems are negative, except as otherwise noted.      Objective    /82 (BP Location: Right arm, Patient Position: Sitting, Cuff Size: Adult Large)   Pulse 69   Temp 97.8  F (36.6  C) (Tympanic)   Resp 18   Ht 1.803 m (5' 11\")   Wt 123.2 kg (271 lb 9.6 oz)   SpO2 95%   BMI 37.88 kg/m    Body mass index is 37.88 kg/m .  Physical Exam   GENERAL: alert and no distress  NECK: no adenopathy, no asymmetry, masses, or scars  RESP: lungs clear to auscultation - no rales, rhonchi or wheezes  CV: regular rate and rhythm, normal S1 S2, no S3 or S4, no murmur, click or rub, no peripheral edema  ABDOMEN: soft, nontender, no hepatosplenomegaly, no masses and bowel sounds normal  MS: no gross musculoskeletal defects noted, no edema    Lab on 05/03/2024   Component Date Value Ref Range Status    WBC Count 05/03/2024 7.5  4.0 - 11.0 10e3/uL Final    RBC Count 05/03/2024 4.81  4.40 - 5.90 10e6/uL Final    Hemoglobin 05/03/2024 13.5  13.3 - 17.7 g/dL Final    Hematocrit 05/03/2024 41.0  40.0 - 53.0 % Final    MCV 05/03/2024 85  78 - 100 fL Final    MCH 05/03/2024 28.1  26.5 - 33.0 pg Final    MCHC 05/03/2024 32.9  31.5 - 36.5 g/dL Final    RDW 05/03/2024 13.3  10.0 - 15.0 % Final    Platelet Count 05/03/2024 201  150 - 450 10e3/uL Final    Sodium 05/03/2024 140  135 - 145 mmol/L Final    Reference intervals for this test were updated on 09/26/2023 to more accurately reflect our healthy population. There may be differences in the flagging of prior results with similar values performed with this method. Interpretation of those prior results can be made in the context of the updated reference intervals.     Potassium 05/03/2024 5.0  3.4 " - 5.3 mmol/L Final    Chloride 05/03/2024 109 (H)  98 - 107 mmol/L Final    Carbon Dioxide (CO2) 05/03/2024 20 (L)  22 - 29 mmol/L Final    Anion Gap 05/03/2024 11  7 - 15 mmol/L Final    Urea Nitrogen 05/03/2024 52.5 (H)  6.0 - 20.0 mg/dL Final    Creatinine 05/03/2024 3.37 (H)  0.67 - 1.17 mg/dL Final    GFR Estimate 05/03/2024 21 (L)  >60 mL/min/1.73m2 Final    Calcium 05/03/2024 8.6  8.6 - 10.0 mg/dL Final    Glucose 05/03/2024 107 (H)  70 - 99 mg/dL Final    Cyclosporine 05/03/2024 143  50 - 400 ug/L Final    Comment: Cyclosporine Reference Range (ug/L):    Kidney Transplant:  Pediatric  0-3 months post transplant: 175-200  3-6 months post transplant: 150-175  6-9 months post transplant: 125-150  9-12 months post transplant: 100-125  >12 months post transplant:     Adult  0-3 months post transplant: 175-200  3-6 months post transplant: 150-200  6-12 months post transplant: 125-150  >12 months post transplant:     Heart Transplant:  Pediatric  0-12 months post transplant: 200-250  >12 months post transplant: 100-125    Adult  0-3 months post transplant: 150-250  0-3 months post transplant: 150-250  3-6 months post transplant: 125-225  6-12 months post transplant: 100-200  >12 months post transplant:     Heart-Lung Block: 200-250    Lung Transplant:  0-12 months post transplant: 175-225  >12 months post transplant: 125-175    Liver Transplant:  0-6 months post transplant: 150-200  >6 months post transplant: 100-150    Pancreas Transplant:  0-6 months post transplant: 200-250  6-12                            months post transplant: 150-200  >12 months post transplant: 100-150    Bone Marrow Transplant: 200-400     Cyclosporine Last Dose Date 05/03/2024 5/2/2024   Final    Cyclosporine Last Dose Time 05/03/2024 10:00 PM   Final    Total Protein Urine mg/dL 05/03/2024 364.0    mg/dL Final    The reference ranges have not been established in urine protein. The results should be integrated into the  clinical context for interpretation.    Total Protein Urine mg/mg Creat 05/03/2024 3.85 (H)  0.00 - 0.20 mg/mg Cr Final    Creatinine Urine mg/dL 05/03/2024 94.6  mg/dL Final    The reference ranges have not been established in urine creatinine. The results should be integrated into the clinical context for interpretation.  The reference ranges have not been established in urine creatinine. The results should be integrated into the clinical context for interpretation.    Creatinine Urine mg/dL 05/03/2024 94.6  mg/dL Final    The reference ranges have not been established in urine creatinine. The results should be integrated into the clinical context for interpretation.    Albumin Urine mg/L 05/03/2024 2,517.0  mg/L Final    The reference ranges have not been established in urine albumin. The results should be integrated into the clinical context for interpretation.    Albumin Urine mg/g Cr 05/03/2024 2,660.68 (H)  0.00 - 17.00 mg/g Cr Final    Microalbuminuria is defined as an albumin:creatinine ratio of 17 to 299 for males and 25 to 299 for females. A ratio of albumin:creatinine of 300 or higher is indicative of overt proteinuria.  Due to biologic variability, positive results should be confirmed by a second, first-morning random or 24-hour timed urine specimen. If there is discrepancy, a third specimen is recommended. When 2 out of 3 results are in the microalbuminuria range, this is evidence for incipient nephropathy and warrants increased efforts at glucose control, blood pressure control, and institution of therapy with an angiotensin-converting-enzyme (ACE) inhibitor (if the patient can tolerate it).             Signed Electronically by: Eric Lei MD

## 2024-06-07 ENCOUNTER — LAB (OUTPATIENT)
Dept: LAB | Facility: CLINIC | Age: 54
End: 2024-06-07
Payer: COMMERCIAL

## 2024-06-07 ENCOUNTER — TELEPHONE (OUTPATIENT)
Dept: TRANSPLANT | Facility: CLINIC | Age: 54
End: 2024-06-07
Payer: COMMERCIAL

## 2024-06-07 DIAGNOSIS — Z92.25 STATUS POST RECENT IMMUNOSUPPRESSIVE THERAPY: ICD-10-CM

## 2024-06-07 DIAGNOSIS — Z79.899 ENCOUNTER FOR LONG-TERM (CURRENT) USE OF HIGH-RISK MEDICATION: ICD-10-CM

## 2024-06-07 DIAGNOSIS — T86.10 COMPLICATIONS, KIDNEY TRANSPLANT: ICD-10-CM

## 2024-06-07 DIAGNOSIS — Z94.0 KIDNEY REPLACED BY TRANSPLANT: ICD-10-CM

## 2024-06-07 DIAGNOSIS — Z94.83 PANCREAS REPLACED BY TRANSPLANT (H): ICD-10-CM

## 2024-06-07 DIAGNOSIS — Z48.298 AFTERCARE FOLLOWING ORGAN TRANSPLANT: ICD-10-CM

## 2024-06-07 LAB
CYCLOSPORINE BLD LC/MS/MS-MCNC: 221 UG/L (ref 50–400)
ERYTHROCYTE [DISTWIDTH] IN BLOOD BY AUTOMATED COUNT: 13.2 % (ref 10–15)
HCT VFR BLD AUTO: 41.1 % (ref 40–53)
HGB BLD-MCNC: 14.1 G/DL (ref 13.3–17.7)
MCH RBC QN AUTO: 29 PG (ref 26.5–33)
MCHC RBC AUTO-ENTMCNC: 34.3 G/DL (ref 31.5–36.5)
MCV RBC AUTO: 84 FL (ref 78–100)
PLATELET # BLD AUTO: 190 10E3/UL (ref 150–450)
RBC # BLD AUTO: 4.87 10E6/UL (ref 4.4–5.9)
TME LAST DOSE: NORMAL H
TME LAST DOSE: NORMAL H
WBC # BLD AUTO: 9.6 10E3/UL (ref 4–11)

## 2024-06-07 PROCEDURE — 85027 COMPLETE CBC AUTOMATED: CPT

## 2024-06-07 PROCEDURE — 80158 DRUG ASSAY CYCLOSPORINE: CPT

## 2024-06-07 PROCEDURE — 36415 COLL VENOUS BLD VENIPUNCTURE: CPT

## 2024-06-07 PROCEDURE — 80048 BASIC METABOLIC PNL TOTAL CA: CPT

## 2024-06-07 NOTE — TELEPHONE ENCOUNTER
Cyclosporine = 221 (6/7/24)  Goal   Current CSA dose 100 mg BID    Dr Eric Lei manages and prescribes IS.    Called Hakeem Romero  States has not heard from Dr lei yet.  Denies taking his meds prior to lab draw but then he is not totally sure.  RNCC recommended reaching out to Dr Lei's nurse see if they want to recheck levels next week and make sure it is a 12 hour trough.  Verbalized understanding.

## 2024-06-08 LAB
ANION GAP SERPL CALCULATED.3IONS-SCNC: 12 MMOL/L (ref 7–15)
BUN SERPL-MCNC: 56.1 MG/DL (ref 6–20)
CALCIUM SERPL-MCNC: 8.7 MG/DL (ref 8.6–10)
CHLORIDE SERPL-SCNC: 106 MMOL/L (ref 98–107)
CREAT SERPL-MCNC: 3.51 MG/DL (ref 0.67–1.17)
DEPRECATED HCO3 PLAS-SCNC: 20 MMOL/L (ref 22–29)
EGFRCR SERPLBLD CKD-EPI 2021: 20 ML/MIN/1.73M2
GLUCOSE SERPL-MCNC: 113 MG/DL (ref 70–99)
POTASSIUM SERPL-SCNC: 5.2 MMOL/L (ref 3.4–5.3)
SODIUM SERPL-SCNC: 138 MMOL/L (ref 135–145)

## 2024-06-25 ENCOUNTER — TELEPHONE (OUTPATIENT)
Dept: TRANSPLANT | Facility: CLINIC | Age: 54
End: 2024-06-25
Payer: COMMERCIAL

## 2024-06-25 NOTE — TELEPHONE ENCOUNTER
Hakeem called and would like to reschedule his virtual MTP class. We are changing him to 7/11/24 at 0930. He will watch his MyChart for confirmation.

## 2024-07-11 ENCOUNTER — VIRTUAL VISIT (OUTPATIENT)
Dept: TRANSPLANT | Facility: CLINIC | Age: 54
End: 2024-07-11
Attending: SURGERY
Payer: COMMERCIAL

## 2024-07-11 DIAGNOSIS — Z76.82 AWAITING ORGAN TRANSPLANT: Primary | ICD-10-CM

## 2024-07-19 ENCOUNTER — LAB (OUTPATIENT)
Dept: LAB | Facility: CLINIC | Age: 54
End: 2024-07-19
Payer: COMMERCIAL

## 2024-07-19 DIAGNOSIS — Z92.25 STATUS POST RECENT IMMUNOSUPPRESSIVE THERAPY: ICD-10-CM

## 2024-07-19 DIAGNOSIS — Z94.83 PANCREAS REPLACED BY TRANSPLANT (H): ICD-10-CM

## 2024-07-19 DIAGNOSIS — Z48.298 AFTERCARE FOLLOWING ORGAN TRANSPLANT: ICD-10-CM

## 2024-07-19 DIAGNOSIS — Z79.899 ENCOUNTER FOR LONG-TERM (CURRENT) USE OF HIGH-RISK MEDICATION: ICD-10-CM

## 2024-07-19 DIAGNOSIS — N18.4 STAGE 4 CHRONIC KIDNEY DISEASE (H): Primary | ICD-10-CM

## 2024-07-19 DIAGNOSIS — Z94.0 KIDNEY REPLACED BY TRANSPLANT: ICD-10-CM

## 2024-07-19 DIAGNOSIS — T86.10 COMPLICATIONS, KIDNEY TRANSPLANT: ICD-10-CM

## 2024-07-19 LAB
ALBUMIN MFR UR ELPH: 407 MG/DL
ANION GAP SERPL CALCULATED.3IONS-SCNC: 9 MMOL/L (ref 7–15)
BUN SERPL-MCNC: 62.4 MG/DL (ref 6–20)
CALCIUM SERPL-MCNC: 8.8 MG/DL (ref 8.8–10.4)
CHLORIDE SERPL-SCNC: 107 MMOL/L (ref 98–107)
CREAT SERPL-MCNC: 3.62 MG/DL (ref 0.67–1.17)
CREAT UR-MCNC: 72.7 MG/DL
CYCLOSPORINE BLD LC/MS/MS-MCNC: 125 UG/L (ref 50–400)
EGFRCR SERPLBLD CKD-EPI 2021: 19 ML/MIN/1.73M2
ERYTHROCYTE [DISTWIDTH] IN BLOOD BY AUTOMATED COUNT: 13.1 % (ref 10–15)
GLUCOSE SERPL-MCNC: 111 MG/DL (ref 70–99)
HCO3 SERPL-SCNC: 21 MMOL/L (ref 22–29)
HCT VFR BLD AUTO: 45.6 % (ref 40–53)
HGB BLD-MCNC: 15.4 G/DL (ref 13.3–17.7)
MCH RBC QN AUTO: 28.3 PG (ref 26.5–33)
MCHC RBC AUTO-ENTMCNC: 33.8 G/DL (ref 31.5–36.5)
MCV RBC AUTO: 84 FL (ref 78–100)
PLATELET # BLD AUTO: 179 10E3/UL (ref 150–450)
POTASSIUM SERPL-SCNC: 5 MMOL/L (ref 3.4–5.3)
PROT/CREAT 24H UR: 5.6 MG/MG CR (ref 0–0.2)
RBC # BLD AUTO: 5.44 10E6/UL (ref 4.4–5.9)
SODIUM SERPL-SCNC: 137 MMOL/L (ref 135–145)
TME LAST DOSE: NORMAL H
TME LAST DOSE: NORMAL H
WBC # BLD AUTO: 9 10E3/UL (ref 4–11)

## 2024-07-19 PROCEDURE — 80158 DRUG ASSAY CYCLOSPORINE: CPT

## 2024-07-19 PROCEDURE — 80048 BASIC METABOLIC PNL TOTAL CA: CPT

## 2024-07-19 PROCEDURE — 84156 ASSAY OF PROTEIN URINE: CPT

## 2024-07-19 PROCEDURE — 85027 COMPLETE CBC AUTOMATED: CPT

## 2024-07-19 PROCEDURE — 82043 UR ALBUMIN QUANTITATIVE: CPT

## 2024-07-19 PROCEDURE — 36415 COLL VENOUS BLD VENIPUNCTURE: CPT

## 2024-07-19 PROCEDURE — 82570 ASSAY OF URINE CREATININE: CPT

## 2024-07-20 LAB
CREAT UR-MCNC: 72.7 MG/DL
MICROALBUMIN UR-MCNC: 2654 MG/L
MICROALBUMIN/CREAT UR: 3650.62 MG/G CR (ref 0–17)

## 2024-07-27 ENCOUNTER — HEALTH MAINTENANCE LETTER (OUTPATIENT)
Age: 54
End: 2024-07-27

## 2024-08-07 ENCOUNTER — TELEPHONE (OUTPATIENT)
Dept: TRANSPLANT | Facility: CLINIC | Age: 54
End: 2024-08-07

## 2024-08-07 NOTE — TELEPHONE ENCOUNTER
Patient Call: General  Route to LPN    Reason for call: Patient called to ask some questions about appointments coming up on 8/15/2024. More details with call back.     Call back needed? Yes    Return Call Needed  Same as documented in contacts section  When to return call?: Same day: Route High Priority

## 2024-08-08 ENCOUNTER — TELEPHONE (OUTPATIENT)
Dept: TRANSPLANT | Facility: CLINIC | Age: 54
End: 2024-08-08
Payer: COMMERCIAL

## 2024-08-08 NOTE — TELEPHONE ENCOUNTER
Called pt for a reminder of his transplant evaluation 8/15. Pt had questions about his support person physically being there with him for evaluation. RNCC stated it is asked that everyone brings a support person with, if there are issues where they are unable to attend, a follow up visit may be warranted. Encouraged support person to be at least on the phone while on visits. Pt verbalized understanding.    Reminded him to be there at 7:30 AM, eat and drink as normal, bring snacks, take your medications, and bring something to occupy his time.

## 2024-08-08 NOTE — TELEPHONE ENCOUNTER
Returned a call today to patient and spoke with him. He explained Mehnaz answered all of his questions and he is looking forward to attending pre kidney eval on 08/15/2024.

## 2024-08-14 LAB
ABO/RH(D): NORMAL
ANTIBODY SCREEN: NEGATIVE
SPECIMEN EXPIRATION DATE: NORMAL

## 2024-08-15 ENCOUNTER — LAB (OUTPATIENT)
Dept: LAB | Facility: CLINIC | Age: 54
End: 2024-08-15
Attending: NURSE PRACTITIONER
Payer: COMMERCIAL

## 2024-08-15 ENCOUNTER — ANCILLARY PROCEDURE (OUTPATIENT)
Dept: CARDIOLOGY | Facility: CLINIC | Age: 54
End: 2024-08-15
Attending: NURSE PRACTITIONER
Payer: COMMERCIAL

## 2024-08-15 ENCOUNTER — ALLIED HEALTH/NURSE VISIT (OUTPATIENT)
Dept: TRANSPLANT | Facility: CLINIC | Age: 54
End: 2024-08-15
Attending: NURSE PRACTITIONER
Payer: COMMERCIAL

## 2024-08-15 ENCOUNTER — ANCILLARY PROCEDURE (OUTPATIENT)
Dept: GENERAL RADIOLOGY | Facility: CLINIC | Age: 54
End: 2024-08-15
Attending: NURSE PRACTITIONER
Payer: COMMERCIAL

## 2024-08-15 ENCOUNTER — DOCUMENTATION ONLY (OUTPATIENT)
Dept: TRANSPLANT | Facility: CLINIC | Age: 54
End: 2024-08-15

## 2024-08-15 ENCOUNTER — ALLIED HEALTH/NURSE VISIT (OUTPATIENT)
Dept: TRANSPLANT | Facility: CLINIC | Age: 54
End: 2024-08-15

## 2024-08-15 VITALS
WEIGHT: 277.6 LBS | OXYGEN SATURATION: 95 % | SYSTOLIC BLOOD PRESSURE: 148 MMHG | HEART RATE: 59 BPM | BODY MASS INDEX: 37.6 KG/M2 | HEIGHT: 72 IN | DIASTOLIC BLOOD PRESSURE: 89 MMHG

## 2024-08-15 DIAGNOSIS — Z94.0 KIDNEY REPLACED BY TRANSPLANT: ICD-10-CM

## 2024-08-15 DIAGNOSIS — Z48.298 AFTERCARE FOLLOWING ORGAN TRANSPLANT: ICD-10-CM

## 2024-08-15 DIAGNOSIS — Z01.818 PRE-TRANSPLANT EVALUATION FOR KIDNEY TRANSPLANT: ICD-10-CM

## 2024-08-15 DIAGNOSIS — N02.B9 IGA NEPHROPATHY: ICD-10-CM

## 2024-08-15 DIAGNOSIS — G47.33 OSA ON CPAP: ICD-10-CM

## 2024-08-15 DIAGNOSIS — N18.4 CHRONIC KIDNEY DISEASE, STAGE IV (SEVERE) (H): ICD-10-CM

## 2024-08-15 DIAGNOSIS — I10 ESSENTIAL HYPERTENSION: ICD-10-CM

## 2024-08-15 DIAGNOSIS — D84.9 IMMUNOSUPPRESSED STATUS (H): ICD-10-CM

## 2024-08-15 DIAGNOSIS — E66.9 OBESITY (BMI 30-39.9): ICD-10-CM

## 2024-08-15 DIAGNOSIS — Z76.82 ORGAN TRANSPLANT CANDIDATE: Primary | ICD-10-CM

## 2024-08-15 DIAGNOSIS — N18.4 STAGE 4 CHRONIC KIDNEY DISEASE (H): ICD-10-CM

## 2024-08-15 DIAGNOSIS — T86.12 FAILED KIDNEY TRANSPLANT: Primary | ICD-10-CM

## 2024-08-15 DIAGNOSIS — Z76.82 ORGAN TRANSPLANT CANDIDATE: ICD-10-CM

## 2024-08-15 DIAGNOSIS — E78.5 DYSLIPIDEMIA: ICD-10-CM

## 2024-08-15 DIAGNOSIS — R00.1 SINUS BRADYCARDIA BY ELECTROCARDIOGRAM: ICD-10-CM

## 2024-08-15 DIAGNOSIS — T86.12 KIDNEY TRANSPLANT FAILURE: ICD-10-CM

## 2024-08-15 DIAGNOSIS — Z01.818 ENCOUNTER FOR PRE-TRANSPLANT EVALUATION FOR KIDNEY TRANSPLANT: ICD-10-CM

## 2024-08-15 DIAGNOSIS — T86.12 FAILED KIDNEY TRANSPLANT: ICD-10-CM

## 2024-08-15 DIAGNOSIS — R73.03 BORDERLINE DIABETES MELLITUS: ICD-10-CM

## 2024-08-15 DIAGNOSIS — N18.5 STAGE 5 CHRONIC KIDNEY DISEASE NOT ON CHRONIC DIALYSIS (H): ICD-10-CM

## 2024-08-15 LAB
A1 AB TITR SERPL: >256 {TITER}
ABO/RH(D): NORMAL
ALBUMIN SERPL BCG-MCNC: 3.3 G/DL (ref 3.5–5.2)
ALBUMIN UR-MCNC: 600 MG/DL
ALP SERPL-CCNC: 124 U/L (ref 40–150)
ALT SERPL W P-5'-P-CCNC: 12 U/L (ref 0–70)
AMORPH CRY #/AREA URNS HPF: ABNORMAL /HPF
ANION GAP SERPL CALCULATED.3IONS-SCNC: 13 MMOL/L (ref 7–15)
ANTIBODY TITER IGM SCREEN: NEGATIVE
APPEARANCE UR: CLEAR
APTT PPP: 29 SECONDS (ref 22–38)
AST SERPL W P-5'-P-CCNC: 13 U/L (ref 0–45)
ATRIAL RATE - MUSE: 55 BPM
B IGG TITR SERPL: 2 {TITER}
B IGM TITR SERPL: 4 {TITER}
BASOPHILS # BLD AUTO: 0 10E3/UL (ref 0–0.2)
BASOPHILS NFR BLD AUTO: 1 %
BILIRUB SERPL-MCNC: 0.3 MG/DL
BILIRUB UR QL STRIP: NEGATIVE
BUN SERPL-MCNC: 65.2 MG/DL (ref 6–20)
CALCIUM SERPL-MCNC: 8.2 MG/DL (ref 8.8–10.4)
CHLORIDE SERPL-SCNC: 110 MMOL/L (ref 98–107)
COLOR UR AUTO: ABNORMAL
CREAT SERPL-MCNC: 4.4 MG/DL (ref 0.67–1.17)
DIASTOLIC BLOOD PRESSURE - MUSE: NORMAL MMHG
EGFRCR SERPLBLD CKD-EPI 2021: 15 ML/MIN/1.73M2
EOSINOPHIL # BLD AUTO: 0.2 10E3/UL (ref 0–0.7)
EOSINOPHIL NFR BLD AUTO: 3 %
ERYTHROCYTE [DISTWIDTH] IN BLOOD BY AUTOMATED COUNT: 13.3 % (ref 10–15)
GLUCOSE SERPL-MCNC: 166 MG/DL (ref 70–99)
GLUCOSE UR STRIP-MCNC: 200 MG/DL
GRANULAR CAST: 1 /LPF
HBA1C MFR BLD: 6 %
HBV CORE AB SERPL QL IA: NONREACTIVE
HBV SURFACE AB SERPL IA-ACNC: <3.5 M[IU]/ML
HBV SURFACE AB SERPL IA-ACNC: NONREACTIVE M[IU]/ML
HBV SURFACE AG SERPL QL IA: NONREACTIVE
HCO3 SERPL-SCNC: 19 MMOL/L (ref 22–29)
HCT VFR BLD AUTO: 40.8 % (ref 40–53)
HCV AB SERPL QL IA: NONREACTIVE
HGB BLD-MCNC: 14 G/DL (ref 13.3–17.7)
HGB UR QL STRIP: ABNORMAL
HIV 1+2 AB+HIV1 P24 AG SERPL QL IA: NONREACTIVE
HYALINE CASTS: 4 /LPF
IMM GRANULOCYTES # BLD: 0 10E3/UL
IMM GRANULOCYTES NFR BLD: 0 %
INR PPP: 1.04 (ref 0.85–1.15)
INTERPRETATION ECG - MUSE: NORMAL
KETONES UR STRIP-MCNC: NEGATIVE MG/DL
LEUKOCYTE ESTERASE UR QL STRIP: ABNORMAL
LVEF ECHO: NORMAL
LYMPHOCYTES # BLD AUTO: 1.4 10E3/UL (ref 0.8–5.3)
LYMPHOCYTES NFR BLD AUTO: 20 %
MCH RBC QN AUTO: 28.8 PG (ref 26.5–33)
MCHC RBC AUTO-ENTMCNC: 34.3 G/DL (ref 31.5–36.5)
MCV RBC AUTO: 84 FL (ref 78–100)
MONOCYTES # BLD AUTO: 0.6 10E3/UL (ref 0–1.3)
MONOCYTES NFR BLD AUTO: 8 %
NEUTROPHILS # BLD AUTO: 4.8 10E3/UL (ref 1.6–8.3)
NEUTROPHILS NFR BLD AUTO: 68 %
NITRATE UR QL: NEGATIVE
NRBC # BLD AUTO: 0 10E3/UL
NRBC BLD AUTO-RTO: 0 /100
P AXIS - MUSE: 1 DEGREES
PH UR STRIP: 6 [PH] (ref 5–7)
PLATELET # BLD AUTO: 156 10E3/UL (ref 150–450)
POTASSIUM SERPL-SCNC: 4.2 MMOL/L (ref 3.4–5.3)
PR INTERVAL - MUSE: 144 MS
PROT SERPL-MCNC: 5.3 G/DL (ref 6.4–8.3)
PSA SERPL DL<=0.01 NG/ML-MCNC: 2.72 NG/ML (ref 0–3.5)
QRS DURATION - MUSE: 94 MS
QT - MUSE: 466 MS
QTC - MUSE: 445 MS
R AXIS - MUSE: -29 DEGREES
RBC # BLD AUTO: 4.86 10E6/UL (ref 4.4–5.9)
RBC URINE: 5 /HPF
SODIUM SERPL-SCNC: 142 MMOL/L (ref 135–145)
SP GR UR STRIP: 1.02 (ref 1–1.03)
SPECIMEN EXPIRATION DATE: NORMAL
SPECIMEN EXPIRATION DATE: NORMAL
SQUAMOUS EPITHELIAL: <1 /HPF
SYSTOLIC BLOOD PRESSURE - MUSE: NORMAL MMHG
T AXIS - MUSE: 59 DEGREES
T PALLIDUM AB SER QL: NONREACTIVE
UROBILINOGEN UR STRIP-MCNC: NORMAL MG/DL
VENTRICULAR RATE- MUSE: 55 BPM
WBC # BLD AUTO: 7.1 10E3/UL (ref 4–11)
WBC URINE: 6 /HPF

## 2024-08-15 PROCEDURE — 86481 TB AG RESPONSE T-CELL SUSP: CPT | Performed by: NURSE PRACTITIONER

## 2024-08-15 PROCEDURE — G0103 PSA SCREENING: HCPCS | Performed by: PATHOLOGY

## 2024-08-15 PROCEDURE — 71046 X-RAY EXAM CHEST 2 VIEWS: CPT | Performed by: RADIOLOGY

## 2024-08-15 PROCEDURE — 81241 F5 GENE: CPT | Performed by: NURSE PRACTITIONER

## 2024-08-15 PROCEDURE — 93306 TTE W/DOPPLER COMPLETE: CPT | Performed by: INTERNAL MEDICINE

## 2024-08-15 PROCEDURE — 81378 HLA I & II TYPING HR: CPT | Performed by: NURSE PRACTITIONER

## 2024-08-15 PROCEDURE — 86803 HEPATITIS C AB TEST: CPT | Performed by: NURSE PRACTITIONER

## 2024-08-15 PROCEDURE — 85613 RUSSELL VIPER VENOM DILUTED: CPT | Performed by: NURSE PRACTITIONER

## 2024-08-15 PROCEDURE — 85730 THROMBOPLASTIN TIME PARTIAL: CPT | Performed by: PATHOLOGY

## 2024-08-15 PROCEDURE — 86832 HLA CLASS I HIGH DEFIN QUAL: CPT | Performed by: NURSE PRACTITIONER

## 2024-08-15 PROCEDURE — 87340 HEPATITIS B SURFACE AG IA: CPT | Performed by: NURSE PRACTITIONER

## 2024-08-15 PROCEDURE — 86850 RBC ANTIBODY SCREEN: CPT

## 2024-08-15 PROCEDURE — 85025 COMPLETE CBC W/AUTO DIFF WBC: CPT | Performed by: PATHOLOGY

## 2024-08-15 PROCEDURE — 86886 COOMBS TEST INDIRECT TITER: CPT

## 2024-08-15 PROCEDURE — 85610 PROTHROMBIN TIME: CPT | Performed by: PATHOLOGY

## 2024-08-15 PROCEDURE — 85390 FIBRINOLYSINS SCREEN I&R: CPT | Mod: 26 | Performed by: PATHOLOGY

## 2024-08-15 PROCEDURE — 86900 BLOOD TYPING SEROLOGIC ABO: CPT

## 2024-08-15 PROCEDURE — 83036 HEMOGLOBIN GLYCOSYLATED A1C: CPT | Performed by: NURSE PRACTITIONER

## 2024-08-15 PROCEDURE — 86147 CARDIOLIPIN ANTIBODY EA IG: CPT | Performed by: NURSE PRACTITIONER

## 2024-08-15 PROCEDURE — 99213 OFFICE O/P EST LOW 20 MIN: CPT | Performed by: TRANSPLANT SURGERY

## 2024-08-15 PROCEDURE — 93000 ELECTROCARDIOGRAM COMPLETE: CPT | Performed by: INTERNAL MEDICINE

## 2024-08-15 PROCEDURE — 86704 HEP B CORE ANTIBODY TOTAL: CPT | Performed by: NURSE PRACTITIONER

## 2024-08-15 PROCEDURE — 86833 HLA CLASS II HIGH DEFIN QUAL: CPT | Performed by: NURSE PRACTITIONER

## 2024-08-15 PROCEDURE — 36415 COLL VENOUS BLD VENIPUNCTURE: CPT | Performed by: PATHOLOGY

## 2024-08-15 PROCEDURE — 86787 VARICELLA-ZOSTER ANTIBODY: CPT | Performed by: NURSE PRACTITIONER

## 2024-08-15 PROCEDURE — 80053 COMPREHEN METABOLIC PANEL: CPT | Performed by: PATHOLOGY

## 2024-08-15 PROCEDURE — 99207 PR NO CHARGE COORDINATED CARE PS: CPT

## 2024-08-15 PROCEDURE — 99000 SPECIMEN HANDLING OFFICE-LAB: CPT | Performed by: PATHOLOGY

## 2024-08-15 PROCEDURE — 86706 HEP B SURFACE ANTIBODY: CPT | Performed by: NURSE PRACTITIONER

## 2024-08-15 PROCEDURE — 85670 THROMBIN TIME PLASMA: CPT | Performed by: NURSE PRACTITIONER

## 2024-08-15 PROCEDURE — 81001 URINALYSIS AUTO W/SCOPE: CPT | Performed by: PATHOLOGY

## 2024-08-15 PROCEDURE — 86780 TREPONEMA PALLIDUM: CPT | Performed by: NURSE PRACTITIONER

## 2024-08-15 PROCEDURE — 86665 EPSTEIN-BARR CAPSID VCA: CPT | Performed by: NURSE PRACTITIONER

## 2024-08-15 PROCEDURE — 86644 CMV ANTIBODY: CPT | Performed by: NURSE PRACTITIONER

## 2024-08-15 PROCEDURE — 99204 OFFICE O/P NEW MOD 45 MIN: CPT | Performed by: TRANSPLANT SURGERY

## 2024-08-15 NOTE — PROGRESS NOTES
P[facility] Clinic Summary:     Team's Concerns/Comments: Hakeem verbalized interested in seeing weight management, current BMI is acceptable for kidney transplant. Due for colonoscopy and dermatology. Patient signed the receipt of information and [facility] consent form.     Candidacy Category: Green    Action/Plan: Discuss at committee.     Expected Selection Committee Date: 08/21/2024

## 2024-08-15 NOTE — LETTER
8/15/2024      Hakeem Romero  [address]  [facility] [address]      Dear Colleague,    Thank you for referring your patient, Hakeem Romero, to the [facility]. Please see a copy of my visit note below.    TRANSPLANT NEPHROLOGY RECIPIENT EVALUATION NOTE    Assessment and Plan:  # Kidney Transplant Evaluation: Patient is a good candidate overall. Benefits of a living donor transplant were discussed.    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 Qualifying GFR since 7/2024.      #IgA nephropathy:   Initially biopsy proven in 5/2007. S/p LDKTx 8/08. Transplant biopsy pursued for worsening allograft function and worsening proteinuria. Most recent allograft biopsy 12/2023:  with chronic antibody mediated rejection, CD4 positive; significant chronic transplant glomerulopathy. Background IgA findings. Significant interstitial fibrosis and tubular atrophy. Remains on Cyclosporine BID.    # Cardiac Risk:  Recommend cardiac risk assessment with known history of mild arterial calcifications, history of CKD, obesity, and HTN.    # PAD Screening: updated imaging per surgeon. Last CT 6/2023    #Retroperitoneal Lymphadenopathy: incidental finding 3/2023. CT 6/23 with two nodes medial to the transplant kidney along the right iliac chain are unchanged. Largest one along the right common iliac vessel measures 1.8 x 1.1 cm. Other one further inferiorly is less than a centimeter. Previous review with surgeon, monitor CT A/p annually. Due in June 2024.  EBV PCR 7/23 negative. Repeat.    # Gout: Controlled. On Allopurinol and probenacid.  Prednisone with flares. No episode in the past year.     # RAUL: fairly consistent with wearing mask    #Obesity, Central:  BMI 37. Refer to weight loss management. Benefit from weight loss but ok per surgeon at current BMI.     #Bradycardia, Sinus:  HR 55 on EKG, no palpitations. On Metoprolol    # Borderline DM: HgbA1c =6 %.    # Health Maintenance: Colonoscopy: Not up to date, Dermatology: Not up to date, and Dental: Up to date    - Discussed the risks and benefits of a transplant, including the risk of surgery and immunosuppression medications.  Patient's overall evaluation will be discussed in the Transplant Program's regular meeting with a final recommendation on the patients suitability for transplant to be made at that time.    Pending completion of the full evaluation, patient presently appears to be enough of an acceptable kidney transplant recipient candidate to have any potential kidney donors start the evaluation process.      Evaluation:  Hakeem Romero was seen in consultation at the request of Dr. Thanh Louis for evaluation as a potential kidney transplant recipient.    Reason for Visit:  Hakeem Romero is a 54 year old male with CKD from IgA nephropathy, who presents for kidney transplant evaluation.    History of Present Illness:   Hx of intermittent gross hematuria in 2005. Sought medical attention in early 2007, at that time, Cr 2.1. Started dialysis in 3/2008.  Underwent transplant in 8/2008. He has not yet returned to dialysis. Presents today for kidney re-transplant evaluation.         Kidney Disease Hx:        Diagnosed age 38       Kidney Disease Dx: IgA nephropathy       Biopsy Proven: Yes; Biopsy proven (5/07), no crescents.     12/2023: Transplant biopsy -significant for chronic antibody mediated rejection, CD4 positive; significant chronic transplant glomerulopathy. Background IgA findings. Significant interstitial fibrosis and tubular atrophy        On Dialysis: No        Primary Nephrologist: Dave Lei       H/o Kidney Stones:  Nephrolithiasis x1 episode, '06 , none post tx.       H/o Recurrent/Frequent UTI: No, continues to make urine         Diabetic Hx: Borderline        Diagnosis Date: 8/2024       Medication History: None       Diabetic Control:   Last HbA1c: 6%       Hypoglycemic Unawareness: No       End-Organ Damage due to DM: None          Cardiac/Vascular Disease Risk Factors:        Cardiac Risk Factors: Hypertension and CKD       Known CAD: Yes, Mild arterial calcifications on CT 6/23       Known PAD/Caludication Symptoms: No       Known Heart Failure: No       Arrhythmia: No       Pulmonary Hypertension: No       Valvular Disease: No       Other: None         Viral Serology Status       CMV IgG Antibody: Negative       EBV IgG Antibody: Positive         Volume Status/Weight:        Volume status: Mildly hypervolemic       Weight:  BMI within guidelines, but truncal obesity       BMI: There is no height or weight on file to calculate BMI.         Functional Capacity/Frailty:         Working full time, Manager at [employer], able to lift boxes and walk 15-20K steps in a shift if needed. On average day more sedentary. Enjoys golf.      Fatigue/Decreased Energy: [] No [x] Yes Decreased energy in last 4 months, increased tired   Chest Pain or SOB with Exertion: [x] No [] Yes    Significant Weight Change: [x] No [] Yes    Nausea, Vomiting or Diarrhea: [x] No [] Yes    Fever, Sweats or Chills:  [x] No [] Yes    Leg Swelling [] No [x] Yes Chronic right leg swollen from lymph.       History of Cancer: None    # Hx calcified splenic granuloma     #Narcolepsy: Stable    Allergy Testing Questions:   Medication that caused a reaction None   Antibiotics used that didn't give an allergic reaction?  Patient doesn't know    COVID Vaccination Up To Date: Yes    Potential Living Kidney Donors: Yes    Review of Systems:  A comprehensive review of systems was obtained and negative, except as  noted in the HPI or PMH.    Past Medical History:   Medical record was reviewed and PMH was discussed with patient and noted below.  Past Medical History:   Diagnosis Date     Anemia in chronic kidney disease(285.21)      Dialysis patient (H24)      Dyslipidemia      End stage kidney disease (H)      GERD (gastroesophageal reflux disease)      Gout      High risk medication use      Hypertension      IgA nephropathy      Immunosuppressed status (H24)      Kidney replaced by transplant      Narcolepsy      RAUL on CPAP      Secondary hyperparathyroidism (of renal origin)      Splenic calcification     h/o calcified splenic granulomas       Past Social History:   Past Surgical History:   Procedure Laterality Date     HERNIORRHAPHY VENTRAL N/A 4/14/2023    Procedure: HERNIORRHAPHY, VENTRAL, OPEN;  Surgeon: Chrystal Harding DO;  Location: [facility]     s/p LDKT       Personal history of bleeding or anesthesia problems: No    Family History:  Family History   Problem Relation Age of Onset     C.A.D. Father      Hypertension Father      Diabetes Father      Colon Cancer Father 70     Myocardial Infarction Maternal Grandfather 38     C.A.D. Paternal Grandfather        Personal History:   Social History     Socioeconomic History     Marital status:      Spouse name: Not on file     Number of children: 0     Years of education: Not on file     Highest education level: Not on file   Occupational History     Not on file   Tobacco Use     Smoking status: Never     Passive exposure: Never     Smokeless tobacco: Never   Vaping Use     Vaping status: Never Used   Substance and Sexual Activity     Alcohol use: Yes     Alcohol/week: 3.3 standard drinks of alcohol     Types: 4 Standard drinks or equivalent per week     Comment: a few drinks a week     Drug use: No     Sexual activity: Not on file   Other Topics Concern     Parent/sibling w/ CABG, MI or angioplasty before 65F 55M? No   Social History Narrative     Not on  file     Social Determinants of Health     Financial Resource Strain: Low Risk  (1/4/2024)    Financial Resource Strain      Within the past 12 months, have you or your family members you live with been unable to get utilities (heat, electricity) when it was really needed?: No   Food Insecurity: Low Risk  (1/4/2024)    Food Insecurity      Within the past 12 months, did you worry that your food would run out before you got money to buy more?: No      Within the past 12 months, did the food you bought just not last and you didn t have money to get more?: No   Transportation Needs: High Risk (1/4/2024)    Transportation Needs      Within the past 12 months, has lack of transportation kept you from medical appointments, getting your medicines, non-medical meetings or appointments, work, or from getting things that you need?: Yes   Physical Activity: Not on file   Stress: Not on file   Social Connections: Not on file   Interpersonal Safety: Low Risk  (1/4/2024)    Interpersonal Safety      Do you feel physically and emotionally safe where you currently live?: Yes      Within the past 12 months, have you been hit, slapped, kicked or otherwise physically hurt by someone?: No      Within the past 12 months, have you been humiliated or emotionally abused in other ways by your partner or ex-partner?: No   Housing Stability: Low Risk  (1/4/2024)    Housing Stability      Do you have housing? : Yes      Are you worried about losing your housing?: No       Allergies:  No Known Allergies    Medications:  Current Outpatient Medications   Medication Sig Dispense Refill     allopurinol (ZYLOPRIM) 300 MG tablet Take 1 tablet (300 mg) by mouth 2 times daily 180 tablet 3     amLODIPine (NORVASC) 5 MG tablet Take 1 tablet (5 mg) by mouth daily 90 tablet 3     CELLCEPT (BRAND) 250 MG capsule TAKE 4 CAPSULES BY MOUTH TWICE  DAILY 720 capsule 3     cloNIDine (CATAPRES) 0.1 MG tablet TAKE 1 TABLET BY MOUTH TWICE  DAILY 180 tablet 3      cycloSPORINE modified (GENERIC EQUIVALENT) 100 MG capsule Take 1 capsule (100 mg) by mouth 2 times daily 60 capsule 11     cycloSPORINE modified (GENERIC EQUIVALENT) 25 MG capsule TAKE 1 CAPSULE BY MOUTH  TWICE DAILY 180 capsule 3     losartan (COZAAR) 50 MG tablet Take 1 tablet (50 mg) by mouth daily       methylphenidate (RITALIN) 5 MG tablet TAKE 1-2 TABS BY MOUTH TWICE A DAY .LAST DOSE BEFORE 6 PM (Patient not taking: Reported on 5/31/2024)  0     Metoprolol Tartrate 75 MG TABS Take 1 tablet by mouth 2 times daily 180 tablet 3     pravastatin (PRAVACHOL) 40 MG tablet Take 1 tablet (40 mg) by mouth daily 90 tablet 3     predniSONE (DELTASONE) 10 MG tablet Take 40 mg by mouth as needed Gout Flair-ups       probenecid (BENEMID) 500 MG tablet Take 500 mg by mouth 2 times daily       sulfamethoxazole-trimethoprim (BACTRIM/SEPTRA) 400-80 MG per tablet TAKE 1 TABLET BY MOUTH  EVERY MON, WED, FRI MORNING 39 tablet 11     tadalafil (CIALIS) 10 MG tablet Take 10 mg by mouth daily as needed       No current facility-administered medications for this visit.       Vitals:  There were no vitals taken for this visit.    Exam:  GENERAL APPEARANCE: alert and no distress  HENT: mouth without ulcers or lesions  RESP: lungs clear to auscultation - no rales, rhonchi or wheezes  CV: regular rhythm, normal rate, no rub, no murmur  EDEMA: RLE edema +1 (chronic per pt), trace to LLE  ABDOMEN: soft, nondistended, nontender, bowel sounds normal  MS: extremities normal - no gross deformities noted, no evidence of inflammation in joints, no muscle tenderness  SKIN: no rash    Results:   Recent Results (from the past 336 hour(s))   EKG 12-lead, tracing only [EKG1]    Collection Time: 08/15/24  1:36 PM   Result Value Ref Range    Systolic Blood Pressure  mmHg    Diastolic Blood Pressure  mmHg    Ventricular Rate 55 BPM    Atrial Rate 55 BPM    MI Interval 144 ms    QRS Duration 94 ms     ms    QTc 445 ms    P Axis 1 degrees    R AXIS  -29 degrees    T Axis 59 degrees    Interpretation ECG       Sinus bradycardia with occasional Premature ventricular complexes  Otherwise normal ECG  When compared with ECG of 19-Aug-2008 17:37,  MANUAL COMPARISON REQUIRED PREVIOUS ECG IS INCOMPATIBLE  Confirmed by MD PHYLLIS, MARIELA ([id number]) on 8/15/2024 8:10:31 PM     ABO and Rh    Collection Time: 08/15/24  2:09 PM   Result Value Ref Range    ABO/RH(D) O POS     SPECIMEN EXPIRATION DATE [id number]    Antibody titer red cell [[id number]]    Collection Time: 08/15/24  2:20 PM   Result Value Ref Range    Anti-A1 IgG Titer >256     Anti-B IgG Titer 2     Anti-B IgM Titer 4     SPECIMEN EXPIRATION DATE [id number]     ANTIBODY TITER IGM SCREEN Negative    Comprehensive metabolic panel [LAB17]    Collection Time: 08/15/24  2:20 PM   Result Value Ref Range    Sodium 142 135 - 145 mmol/L    Potassium 4.2 3.4 - 5.3 mmol/L    Carbon Dioxide (CO2) 19 (L) 22 - 29 mmol/L    Anion Gap 13 7 - 15 mmol/L    Urea Nitrogen 65.2 (H) 6.0 - 20.0 mg/dL    Creatinine 4.40 (H) 0.67 - 1.17 mg/dL    GFR Estimate 15 (L) >60 mL/min/1.73m2    Calcium 8.2 (L) 8.8 - 10.4 mg/dL    Chloride 110 (H) 98 - 107 mmol/L    Glucose 166 (H) 70 - 99 mg/dL    Alkaline Phosphatase 124 40 - 150 U/L    AST 13 0 - 45 U/L    ALT 12 0 - 70 U/L    Protein Total 5.3 (L) 6.4 - 8.3 g/dL    Albumin 3.3 (L) 3.5 - 5.2 g/dL    Bilirubin Total 0.3 <=1.2 mg/dL   Cardiolipin Lorraine IgG and IgM [LAB 6836]    Collection Time: 08/15/24  2:20 PM   Result Value Ref Range    Cardiolipin [place] IgG Instrument Value <2.0 <10.0 GPL-U/mL    Cardiolipin Antibody IgG Negative Negative    Cardiolipin [place] IgM Instrument Value 2.0 <10.0 MPL-U/mL    Cardiolipin Antibody IgM Negative Negative   INR [IBY5222]    Collection Time: 08/15/24  2:20 PM   Result Value Ref Range    INR 1.04 0.85 - 1.15   Lupus Anticoagulant Panel [JAG1890]    Collection Time: 08/15/24  2:20 PM   Result Value Ref Range    PTT Ratio 1.06 <1.30    DRVVT Screen Ratio 0.97  <1.08    Lupus Result Negative Negative    Lupus Interpretation       The INR is normal.  APTT ratio is normal.    DRVVT Screen ratio is normal.  Thrombin time is normal.  NEGATIVE TEST; A LUPUS ANTICOAGULANT WAS NOT DETECTED IN THIS SPECIMEN WITHIN THE LIMITS OF THE TESTING REPERTOIRE.  If the clinical picture is strongly suggestive of an antiphospholipid syndrome, recommend anticardiolipin and beta-2-glycoprotein (IgG and IgM) antibody tests.       A resident/fellow in an [facility] accredited training program was involved in the initial review, preparation, and/or interpretation of this case.  I, as the senior physician, attest that I: (i) reviewed patient clinical records if indicated; (ii) reviewed relevant lab test results; and (iii) agree with the report, diagnosis(es), and interpretation as documented by the resident/fellow and edited/confirmed by me. Reporting resident/fellow: Chino Krishnan MD.      Kendra Ortiz MD, PhD  [facility]       Partial thromboplastin time [LAB56]    Collection Time: 08/15/24  2:20 PM   Result Value Ref Range    aPTT 29 22 - 38 Seconds   Thrombin time [HWD719]    Collection Time: 08/15/24  2:20 PM   Result Value Ref Range    Thrombin Time 18.4 13.0 - 19.0 Seconds   CMV Antibody IgG [[id number]]    Collection Time: 08/15/24  2:20 PM   Result Value Ref Range    CMV Lorraine IgG Instrument Value <0.20 <0.60 U/mL    CMV Antibody IgG No detectable antibody. No detectable antibody.    EBV Capsid Antibody IgG [[id number]]    Collection Time: 08/15/24  2:20 PM   Result Value Ref Range    EBV Capsid Lorraine IgG Instrument Value 40.9 (H) <18.0 U/mL    EBV Capsid Antibody IgG Positive (A) No detectable antibody.   Hepatitis B core antibody [[id number]]    Collection Time: 08/15/24  2:20 PM   Result Value Ref Range    Hepatitis B Core Antibody Total Nonreactive Nonreactive   Hepatitis B Surface Antibody [[id number]]    Collection Time: 08/15/24  2:20 PM   Result Value Ref Range    Hepatitis B Surface Antibody  Nonreactive     Hepatitis B Surface Antibody Instrument Value <3.50 <8.5 m[IU]/mL   Hepatitis B surface antigen [[id number]]    Collection Time: 08/15/24  2:20 PM   Result Value Ref Range    Hepatitis B Surface Antigen Nonreactive Nonreactive   Hepatitis C antibody [[id number]]    Collection Time: 08/15/24  2:20 PM   Result Value Ref Range    Hepatitis C Antibody Nonreactive Nonreactive   HIV Antigen Antibody Combo Pretransplant Cascade    Collection Time: 08/15/24  2:20 PM   Result Value Ref Range    HIV Antigen Antibody Combo Pretransplant Nonreactive Nonreactive   Treponema Abs w Reflex to RPR and Titer [[id number]]    Collection Time: 08/15/24  2:20 PM   Result Value Ref Range    Treponema Antibody Total Nonreactive Nonreactive   Varicella Zoster Virus Antibody IgG [[id number]]    Collection Time: 08/15/24  2:20 PM   Result Value Ref Range    VZV Lorraine IgG Instrument Value >4,000.0 <135.0 Index    Varicella Zoster Antibody IgG Positive    Hemoglobin A1c [LAB90]    Collection Time: 08/15/24  2:20 PM   Result Value Ref Range    Hemoglobin A1C 6.0 (H) <5.7 %   Prostate spec antigen screen [[id number]]    Collection Time: 08/15/24  2:20 PM   Result Value Ref Range    Prostate Specific Antigen Screen 2.72 0.00 - 3.50 ng/mL   Adult Type and Screen    Collection Time: 08/15/24  2:20 PM   Result Value Ref Range    ABO/RH(D) O POS     Antibody Screen Negative Negative    SPECIMEN EXPIRATION DATE [id number]    CBC with platelets and differential    Collection Time: 08/15/24  2:20 PM   Result Value Ref Range    WBC Count 7.1 4.0 - 11.0 10e3/uL    RBC Count 4.86 4.40 - 5.90 10e6/uL    Hemoglobin 14.0 13.3 - 17.7 g/dL    Hematocrit 40.8 40.0 - 53.0 %    MCV 84 78 - 100 fL    MCH 28.8 26.5 - 33.0 pg    MCHC 34.3 31.5 - 36.5 g/dL    RDW 13.3 10.0 - 15.0 %    Platelet Count 156 150 - 450 10e3/uL    % Neutrophils 68 %    % Lymphocytes 20 %    % Monocytes 8 %    % Eosinophils 3 %    % Basophils 1 %    % Immature Granulocytes 0 %    NRBCs per  100 WBC 0 <1 /100    Absolute Neutrophils 4.8 1.6 - 8.3 10e3/uL    Absolute Lymphocytes 1.4 0.8 - 5.3 10e3/uL    Absolute Monocytes 0.6 0.0 - 1.3 10e3/uL    Absolute Eosinophils 0.2 0.0 - 0.7 10e3/uL    Absolute Basophils 0.0 0.0 - 0.2 10e3/uL    Absolute Immature Granulocytes 0.0 <=0.4 10e3/uL    Absolute NRBCs 0.0 10e3/uL   Quantiferon TB Gold Plus Grey Tube    Collection Time: 08/15/24  2:20 PM    Specimen: Arm, Right; Blood   Result Value Ref Range    Quantiferon Nil Tube 0.00 IU/mL   Quantiferon TB Gold Plus Green Tube    Collection Time: 08/15/24  2:20 PM    Specimen: Arm, Right; Blood   Result Value Ref Range    Quantiferon TB1 Tube 0.05 IU/mL   Quantiferon TB Gold Plus Yellow Tube    Collection Time: 08/15/24  2:20 PM    Specimen: Arm, Right; Blood   Result Value Ref Range    Quantiferon TB2 Tube 0.07    Quantiferon TB Gold Plus Purple Tube    Collection Time: 08/15/24  2:20 PM    Specimen: Arm, Right; Blood   Result Value Ref Range    Quantiferon Mitogen 10.00 IU/mL   Quantiferon TB Gold Plus    Collection Time: 08/15/24  2:20 PM    Specimen: Arm, Right; Blood   Result Value Ref Range    Quantiferon-TB Gold Plus Negative Negative    TB1 Ag minus Nil Value 0.05 IU/mL    TB2 Ag minus Nil Value 0.07 IU/mL    Mitogen minus Nil Result 10.00 IU/mL    Nil Result 0.00 IU/mL   HLA-ABC Typing High Resolution    Collection Time: 08/15/24  2:20 PM   Result Value Ref Range    ABTEST METHOD NGS     hiresA-1 A*02:01     hiresA-1Equiv 2     hiresA-2 A*11:01     hiresA-2Equiv 11     hiresB-1 B*35:01     hiresB-1Equiv 35     hiresB-2 B*49:01     hiresB-2Equiv 49     hiresC-1 C*04:01     hiresC-1Equiv 4     hiresC-2 C*07:01     hiresC-2Equiv 7     hiresBw-1 Bw*4     hiresBw-2 Bw*6    HLA-DR Typing High Resolution    Collection Time: 08/15/24  2:20 PM   Result Value Ref Range    DRhiresTestM NGS     hiresDPA1-1 DPA1*01:03     hiresDPB1-1 DPB1*02:01     hiresDPB1-2 DPB1*04:01     hiresDQA1-1 DQA1*01:04     hiresDQA1-2  DQA1*03:01     hiresDQB1-1 DQB1*03:02     hiresDQB1-1Equiv 8     hiresDQB1-2 DQB1*05:03     hiresDQB1-2Equiv 5     hiresDRB1-1 DRB1*04:03     hiresDRB1-1Equiv 4     hiresDRB1-2 DRB1*14:54     hiresDRB1-2Equiv 14     hiresDRB3-1 DRB3*02:02     hiresDRB3-1Equiv 52     hiresDRB4-2 DRB4*01:03     hiresDRB4-2Equiv 53    UA with Microscopic reflex to Culture    Collection Time: 08/15/24  2:21 PM    Specimen: Urine, Midstream   Result Value Ref Range    Color Urine Light Yellow Colorless, Straw, Light Yellow, Yellow    Appearance Urine Clear Clear    Glucose Urine 200 (A) Negative mg/dL    Bilirubin Urine Negative Negative    Ketones Urine Negative Negative mg/dL    Specific Gravity Urine 1.018 1.003 - 1.035    Blood Urine Small (A) Negative    pH Urine 6.0 5.0 - 7.0    Protein Albumin Urine 600 (A) Negative mg/dL    Urobilinogen Urine Normal Normal, 2.0 mg/dL    Nitrite Urine Negative Negative    Leukocyte Esterase Urine Trace (A) Negative    Amorphous Crystals Urine Few (A) None Seen /HPF    RBC Urine 5 (H) <=2 /HPF    WBC Urine 6 (H) <=5 /HPF    Squamous Epithelials Urine <1 <=1 /HPF    Hyaline Casts Urine 4 (H) <=2 /LPF    Granular Casts Urine 1 (H) None Seen /LPF   Echocardiogram Complete    Collection Time: 08/15/24  2:53 PM   Result Value Ref Range    LVEF  55-60%        I spent a total of 60 minutes on the date of the encounter doing chart review, performing a history and physical exam, completing documentation and any further activities as noted above.       Again, thank you for allowing me to participate in the care of your patient.        Sincerely,        Tata Martinez NP

## 2024-08-15 NOTE — PROGRESS NOTES
Patient is here to understand the risk benefits alternatives of renal transplantation  Please review the notes of my nephrology colleagues for full clinical details    Patient received a kidney transplant 15 years ago for IgA nephropathy.  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  He is not yet on dialysis.  GFR is 19.    BP (!) 148/89   Pulse 59   Ht 1.829 m (6')   Wt 125.9 kg (277 lb 9.6 oz)   SpO2 95%   BMI 37.65 kg/m      Examination of the abdomen: The abdomen is soft the left-sided femoral pulses well felt    Clinical impression: Failed kidney transplant    Recommendations: re do kidney transplant it would benefit to lose some weight but we could still do the kidney transplant at this BMI.;     I had a long discussion with the patient regarding kidney transplantation in general and the following points in particular:    Survival statistics at one, five and ten years following kidney transplantation both for living-related and cadaveric allografts.  The kidney transplant selection committee process.  The complications following kidney transplant that included but were not limited to wound infection, vascular complications, ureter leak, ureteral strictures, and bowel obstruction  The need for lifelong immunosuppressive therapy and the side effects of these medications including specifically the risk of cancer and lymphoma.  The waiting list time of approximately a year or more for cadaveric transplants.  The statistical superiority of a living-related donor and the compelling reasons to encourage that therapy.

## 2024-08-15 NOTE — PROGRESS NOTES
[facility] SOLID ORGAN TRANSPLANT  OUTPATIENT MNT: KIDNEY TRANSPLANT EVALUATION    Current BMI: 37.65 (HT 72 in,  lbs 9.6 ounces/125.9 kg)  BMI guideline for kidney transplant up to a BMI of 40 / per surgeon discretion     Frailty Assessment-- Not Frail  Handgrip Strength: 42    Reference:  Score of 0-2 = Not Frail  Score of 3-5 = Frail     FraiLT-- Robust (LFI: 2.89)  [email or web address]  Reference Range  Robust <3.2  Pre Frail 3.2-4.3  Frail >4.4     TIME SPENT: 15 minutes  VISIT TYPE: Initial   REFERRING PHYSICIAN: [place] CNP  PT ACCOMPANIED BY: wife    History of previous txp: yes (Kidney)  Dialysis: no    NUTRITION ASSESSMENT  Does not add Na to food but may eat some higher food.    - Meal prep & grocery shopping: meal prep self, grocery shopping   - Previous RD education: first transplant. Diet - renal diet   - Appetite: good  - Food allergies/intolerances: no   - Issues chewing or swallowing: no  - N/V/D/C: no   - Food access concerns: no    Vitamins, Supplements, Pertinent Meds: no   Herbal Medicines/Supplements: no  Protein Supplements: no    Weight hx // fluid retention:   - right GIO sometimes  - no recent weight loss   PHYSICAL ACTIVITY   Completes outside work and golf weekly      DIET RECALL  Breakfast Breakfast sandwich (graham,egg and cheese biscuit or ham and cheese sandwich)   Lunch Ham and cheese sandwich and potato chips   Dinner Pasta with protein (hamburger, chicken or ham)   Snacks Does not do much snacking    Beverages Mountain dew once daily, lemonade, water, milk   Alcohol 4-5 drinks weekly (less in winter when no golf) - then 2 drinks weekly    Dining out Take out twice weekly      LABS  (7/19): K+ 5 mmol/L     NUTRITION DIAGNOSIS   No nutrition diagnosis identified at this time     NUTRITION INTERVENTION   Nutrition education provided:  Discussed sodium intake (low sodium foods and drinks, seasoning food without salt and tips for low sodium  diet).    Reviewed post txp diet guidelines in brief (will review in further detail post txp):  (1) Review of proper food safety measures d/t immunosuppressant therapy post-op and increased risk for food-borne illness    (2) Avoid the following post txp d/t risk for rejection, unknown effects on the organs, and/or potential interactions with immunosuppressants:  - Herbal, Chinese, holistic, chiropractic, natural, alternative medicines and supplements  - Detoxes and cleanses  - Weight loss pills  - Protein powders or other products with extracts or herbs (ie green tea extract)    (3) Med regimen and possible side effects    Patient Understanding: Pt verbalized understanding of education provided.  Expected Engagement: good  Follow-Up Plans: PRN     NUTRITION GOALS   No nutrition goals identified at this time     Mel Myrick MS/CRISTIAN/URSULA/JACKIEC

## 2024-08-15 NOTE — PROGRESS NOTES
TRANSPLANT NEPHROLOGY RECIPIENT EVALUATION NOTE    Assessment and Plan:  # Kidney Transplant Evaluation: Patient is a good candidate overall. Benefits of a living donor transplant were discussed.    Recommendations:  - Update Health Maintenance, Dermatology and Colonoscopy  -Cardiac risk assessement  -Weight loss management   -Repeat CT A/P and iliacs to evaluate retroperitoneal Lymphadenopathy and evaluate vascular targets  -EBV PCR for lymphadenopathy  -SPEP, Serum kappa rain light chains, would refer patient to Hematology.   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 Qualifying GFR since 7/2024.      #IgA nephropathy:   Initially biopsy proven in 5/2007. S/p LDKTx 8/08. Transplant biopsy pursued for worsening allograft function and worsening proteinuria. Most recent allograft biopsy 12/2023:  with chronic antibody mediated rejection, CD4 positive; significant chronic transplant glomerulopathy. Background IgA findings. Significant interstitial fibrosis and tubular atrophy. Remains on Cyclosporine 125mg qam/100mg qpm.    # Cardiac Risk:  Recommend cardiac risk assessment with known history of mild arterial calcifications, history of CKD, obesity, and HTN.    # PAD Screening: updated imaging per surgeon. Last CT 6/2023    #Retroperitoneal Lymphadenopathy: incidental finding 3/2023. CT 6/23 with two nodes medial to the transplant kidney along the right iliac chain are unchanged. Largest one along the right common iliac vessel measures 1.8 x 1.1 cm. Other one further inferiorly is less than a centimeter. Previous review with surgeon, monitor CT A/p annually. Due in June 2024.  EBV PCR 7/23 negative. Repeat.    # Gout: Controlled. On Allopurinol and probenacid. Prednisone with flares. No episode in the past year.     # RAUL: fairly consistent with  wearing mask    #Obesity, Central:  BMI 37. Refer to weight loss management. Benefit from weight loss but ok per surgeon at current BMI.     #Bradycardia, Sinus:  HR 55 on EKG, no palpitations. On Metoprolol    # Borderline DM: HgbA1c =6 %.    # paraproteinemia: 7/23 Kappa Lambda ratio elevated at 2.15,  will need to repeat if elevated, Refer to Hematology.     # Health Maintenance: Colonoscopy: Not up to date, Dermatology: Not up to date, and Dental: Up to date    - Discussed the risks and benefits of a transplant, including the risk of surgery and immunosuppression medications.  Patient's overall evaluation will be discussed in the Transplant Program's regular meeting with a final recommendation on the patients suitability for transplant to be made at that time.    Pending completion of the full evaluation, patient presently appears to be enough of an acceptable kidney transplant recipient candidate to have any potential kidney donors start the evaluation process.      Evaluation:  Hakeem Romero was seen in consultation at the request of Dr. Thanh Louis for evaluation as a potential kidney transplant recipient.    Reason for Visit:  Hakeem Romero is a 54 year old male with CKD from IgA nephropathy, who presents for kidney transplant evaluation.    History of Present Illness:   Hx of intermittent gross hematuria in 2005. Sought medical attention in early 2007, at that time, Cr 2.1. Started dialysis in 3/2008.  Underwent transplant in 8/2008. He has not yet returned to dialysis. Presents today for kidney re-transplant evaluation.         Kidney Disease Hx:        Diagnosed age 38       Kidney Disease Dx: IgA nephropathy       Biopsy Proven: Yes; Biopsy proven (5/07), no crescents.     12/2023: Transplant biopsy -significant for chronic antibody mediated rejection, CD4 positive; significant chronic transplant glomerulopathy. Background IgA findings. Significant interstitial fibrosis and tubular  atrophy        On Dialysis: No       Primary Nephrologist: Dave Lei       H/o Kidney Stones:  Nephrolithiasis x1 episode, '06 , none post tx.       H/o Recurrent/Frequent UTI: No, continues to make urine         Diabetic Hx: Borderline        Diagnosis Date: 8/2024       Medication History: None       Diabetic Control:   Last HbA1c: 6%       Hypoglycemic Unawareness: No       End-Organ Damage due to DM: None          Cardiac/Vascular Disease Risk Factors:        Cardiac Risk Factors: Hypertension and CKD       Known CAD: Yes, Mild arterial calcifications on CT 6/23       Known PAD/Caludication Symptoms: No       Known Heart Failure: No       Arrhythmia: No       Pulmonary Hypertension: No       Valvular Disease: No       Other: None         Viral Serology Status       CMV IgG Antibody: Negative       EBV IgG Antibody: Positive         Volume Status/Weight:        Volume status: Mildly hypervolemic       Weight:  BMI within guidelines, but truncal obesity       BMI: There is no height or weight on file to calculate BMI.         Functional Capacity/Frailty:         Working full time, Manager at [employer], able to lift boxes and walk 15-20K steps in a shift if needed. On average day more sedentary. Enjoys golf.      Fatigue/Decreased Energy: [] No [x] Yes Decreased energy in last 4 months, increased tired   Chest Pain or SOB with Exertion: [x] No [] Yes    Significant Weight Change: [x] No [] Yes    Nausea, Vomiting or Diarrhea: [x] No [] Yes    Fever, Sweats or Chills:  [x] No [] Yes    Leg Swelling [] No [x] Yes Chronic right leg swollen from lymph.       History of Cancer: None    # Hx calcified splenic granuloma     #Narcolepsy: Stable    Allergy Testing Questions:   Medication that caused a reaction None   Antibiotics used that didn't give an allergic reaction?  Patient doesn't know    COVID Vaccination Up To Date: Yes    Potential Living Kidney Donors: Yes    Review of Systems:  A comprehensive review of systems  was obtained and negative, except as noted in the HPI or PMH.    Past Medical History:   Medical record was reviewed and PMH was discussed with patient and noted below.  Past Medical History:   Diagnosis Date    Anemia in chronic kidney disease(285.21)     Dialysis patient (H24)     Dyslipidemia     End stage kidney disease (H)     GERD (gastroesophageal reflux disease)     Gout     High risk medication use     Hypertension     IgA nephropathy     Immunosuppressed status (H24)     Kidney replaced by transplant     Narcolepsy     RAUL on CPAP     Secondary hyperparathyroidism (of renal origin)     Splenic calcification     h/o calcified splenic granulomas       Past Social History:   Past Surgical History:   Procedure Laterality Date    HERNIORRHAPHY VENTRAL N/A 4/14/2023    Procedure: HERNIORRHAPHY, VENTRAL, OPEN;  Surgeon: Chrystal Harding DO;  Location: [facility] OR    s/p [facility]       Personal history of bleeding or anesthesia problems: No    Family History:  Family History   Problem Relation Age of Onset    C.A.D. Father     Hypertension Father     Diabetes Father     Colon Cancer Father 70    Myocardial Infarction Maternal Grandfather 38    C.A.D. Paternal Grandfather        Personal History:   Social History     Socioeconomic History    Marital status:      Spouse name: Not on file    Number of children: 0    Years of education: Not on file    Highest education level: Not on file   Occupational History    Not on file   Tobacco Use    Smoking status: Never     Passive exposure: Never    Smokeless tobacco: Never   Vaping Use    Vaping status: Never Used   Substance and Sexual Activity    Alcohol use: Yes     Alcohol/week: 3.3 standard drinks of alcohol     Types: 4 Standard drinks or equivalent per week     Comment: a few drinks a week    Drug use: No    Sexual activity: Not on file   Other Topics Concern    Parent/sibling w/ CABG, MI or angioplasty before 65F 55M? No   Social History Narrative    Not on  file     Social Determinants of Health     Financial Resource Strain: Low Risk  (1/4/2024)    Financial Resource Strain     Within the past 12 months, have you or your family members you live with been unable to get utilities (heat, electricity) when it was really needed?: No   Food Insecurity: Low Risk  (1/4/2024)    Food Insecurity     Within the past 12 months, did you worry that your food would run out before you got money to buy more?: No     Within the past 12 months, did the food you bought just not last and you didn t have money to get more?: No   Transportation Needs: High Risk (1/4/2024)    Transportation Needs     Within the past 12 months, has lack of transportation kept you from medical appointments, getting your medicines, non-medical meetings or appointments, work, or from getting things that you need?: Yes   Physical Activity: Not on file   Stress: Not on file   Social Connections: Not on file   Interpersonal Safety: Low Risk  (1/4/2024)    Interpersonal Safety     Do you feel physically and emotionally safe where you currently live?: Yes     Within the past 12 months, have you been hit, slapped, kicked or otherwise physically hurt by someone?: No     Within the past 12 months, have you been humiliated or emotionally abused in other ways by your partner or ex-partner?: No   Housing Stability: Low Risk  (1/4/2024)    Housing Stability     Do you have housing? : Yes     Are you worried about losing your housing?: No       Allergies:  No Known Allergies    Medications:  Current Outpatient Medications   Medication Sig Dispense Refill    allopurinol (ZYLOPRIM) 300 MG tablet Take 1 tablet (300 mg) by mouth 2 times daily 180 tablet 3    amLODIPine (NORVASC) 5 MG tablet Take 1 tablet (5 mg) by mouth daily 90 tablet 3    CELLCEPT (BRAND) 250 MG capsule TAKE 4 CAPSULES BY MOUTH TWICE  DAILY 720 capsule 3    cloNIDine (CATAPRES) 0.1 MG tablet TAKE 1 TABLET BY MOUTH TWICE  DAILY 180 tablet 3    cycloSPORINE  modified (GENERIC EQUIVALENT) 100 MG capsule Take 1 capsule (100 mg) by mouth 2 times daily 60 capsule 11    cycloSPORINE modified (GENERIC EQUIVALENT) 25 MG capsule TAKE 1 CAPSULE BY MOUTH  TWICE DAILY 180 capsule 3    losartan (COZAAR) 50 MG tablet Take 1 tablet (50 mg) by mouth daily      methylphenidate (RITALIN) 5 MG tablet TAKE 1-2 TABS BY MOUTH TWICE A DAY .LAST DOSE BEFORE 6 PM (Patient not taking: Reported on 5/31/2024)  0    Metoprolol Tartrate 75 MG TABS Take 1 tablet by mouth 2 times daily 180 tablet 3    pravastatin (PRAVACHOL) 40 MG tablet Take 1 tablet (40 mg) by mouth daily 90 tablet 3    predniSONE (DELTASONE) 10 MG tablet Take 40 mg by mouth as needed Gout Flair-ups      probenecid (BENEMID) 500 MG tablet Take 500 mg by mouth 2 times daily      sulfamethoxazole-trimethoprim (BACTRIM/SEPTRA) 400-80 MG per tablet TAKE 1 TABLET BY MOUTH  EVERY MON, WED, FRI MORNING 39 tablet 11    tadalafil (CIALIS) 10 MG tablet Take 10 mg by mouth daily as needed       No current facility-administered medications for this visit.       Vitals:  There were no vitals taken for this visit.    Exam:  GENERAL APPEARANCE: alert and no distress  HENT: mouth without ulcers or lesions  RESP: lungs clear to auscultation - no rales, rhonchi or wheezes  CV: regular rhythm, normal rate, no rub, no murmur  EDEMA: RLE edema +1 (chronic per pt), trace to LLE  ABDOMEN: soft, nondistended, nontender, bowel sounds normal  MS: extremities normal - no gross deformities noted, no evidence of inflammation in joints, no muscle tenderness  SKIN: no rash    Results:   Recent Results (from the past 336 hour(s))   EKG 12-lead, tracing only [EKG1]    Collection Time: 08/15/24  1:36 PM   Result Value Ref Range    Systolic Blood Pressure  mmHg    Diastolic Blood Pressure  mmHg    Ventricular Rate 55 BPM    Atrial Rate 55 BPM    HI Interval 144 ms    QRS Duration 94 ms     ms    QTc 445 ms    P Axis 1 degrees    R AXIS -29 degrees    T Axis 59  degrees    Interpretation ECG       Sinus bradycardia with occasional Premature ventricular complexes  Otherwise normal ECG  When compared with ECG of 19-Aug-2008 17:37,  MANUAL COMPARISON REQUIRED PREVIOUS ECG IS INCOMPATIBLE  Confirmed by MD PHYLLIS, MARIELA ([id number]) on 8/15/2024 8:10:31 PM     ABO and Rh    Collection Time: 08/15/24  2:09 PM   Result Value Ref Range    ABO/RH(D) O POS     SPECIMEN EXPIRATION DATE [id number]    Antibody titer red cell [[id number]]    Collection Time: 08/15/24  2:20 PM   Result Value Ref Range    Anti-A1 IgG Titer >256     Anti-B IgG Titer 2     Anti-B IgM Titer 4     SPECIMEN EXPIRATION DATE [id number]     ANTIBODY TITER IGM SCREEN Negative    Comprehensive metabolic panel [LAB17]    Collection Time: 08/15/24  2:20 PM   Result Value Ref Range    Sodium 142 135 - 145 mmol/L    Potassium 4.2 3.4 - 5.3 mmol/L    Carbon Dioxide (CO2) 19 (L) 22 - 29 mmol/L    Anion Gap 13 7 - 15 mmol/L    Urea Nitrogen 65.2 (H) 6.0 - 20.0 mg/dL    Creatinine 4.40 (H) 0.67 - 1.17 mg/dL    GFR Estimate 15 (L) >60 mL/min/1.73m2    Calcium 8.2 (L) 8.8 - 10.4 mg/dL    Chloride 110 (H) 98 - 107 mmol/L    Glucose 166 (H) 70 - 99 mg/dL    Alkaline Phosphatase 124 40 - 150 U/L    AST 13 0 - 45 U/L    ALT 12 0 - 70 U/L    Protein Total 5.3 (L) 6.4 - 8.3 g/dL    Albumin 3.3 (L) 3.5 - 5.2 g/dL    Bilirubin Total 0.3 <=1.2 mg/dL   Cardiolipin Lorraine IgG and IgM [LAB 6836]    Collection Time: 08/15/24  2:20 PM   Result Value Ref Range    Cardiolipin [place]raine IgG Instrument Value <2.0 <10.0 GPL-U/mL    Cardiolipin Antibody IgG Negative Negative    Cardiolipin [place] IgM Instrument Value 2.0 <10.0 MPL-U/mL    Cardiolipin Antibody IgM Negative Negative   INR [[id number]]    Collection Time: 08/15/24  2:20 PM   Result Value Ref Range    INR 1.04 0.85 - 1.15   Lupus Anticoagulant Panel [[id number]]    Collection Time: 08/15/24  2:20 PM   Result Value Ref Range    PTT Ratio 1.06 <1.30    DRVVT Screen Ratio 0.97 <1.08    Lupus Result  Negative Negative    Lupus Interpretation       The INR is normal.  APTT ratio is normal.    DRVVT Screen ratio is normal.  Thrombin time is normal.  NEGATIVE TEST; A LUPUS ANTICOAGULANT WAS NOT DETECTED IN THIS SPECIMEN WITHIN THE LIMITS OF THE TESTING REPERTOIRE.  If the clinical picture is strongly suggestive of an antiphospholipid syndrome, recommend anticardiolipin and beta-2-glycoprotein (IgG and IgM) antibody tests.       A resident/fellow in an [facility] accredited training program was involved in the initial review, preparation, and/or interpretation of this case.  I, as the senior physician, attest that I: (i) reviewed patient clinical records if indicated; (ii) reviewed relevant lab test results; and (iii) agree with the report, diagnosis(es), and interpretation as documented by the resident/fellow and edited/confirmed by me. Reporting resident/fellow: Chino Krishnan MD.      Kendra Ortiz MD, PhD  [facility]       Partial thromboplastin time [LAB56]    Collection Time: 08/15/24  2:20 PM   Result Value Ref Range    aPTT 29 22 - 38 Seconds   Thrombin time [[id number]]    Collection Time: 08/15/24  2:20 PM   Result Value Ref Range    Thrombin Time 18.4 13.0 - 19.0 Seconds   CMV Antibody IgG [[id number]]    Collection Time: 08/15/24  2:20 PM   Result Value Ref Range    CMV Lorraine IgG Instrument Value <0.20 <0.60 U/mL    CMV Antibody IgG No detectable antibody. No detectable antibody.    EBV Capsid Antibody IgG [[id number]]    Collection Time: 08/15/24  2:20 PM   Result Value Ref Range    EBV Capsid Lorraine IgG Instrument Value 40.9 (H) <18.0 U/mL    EBV Capsid Antibody IgG Positive (A) No detectable antibody.   Hepatitis B core antibody [[id number]]    Collection Time: 08/15/24  2:20 PM   Result Value Ref Range    Hepatitis B Core Antibody Total Nonreactive Nonreactive   Hepatitis B Surface Antibody [[id number]]    Collection Time: 08/15/24  2:20 PM   Result Value Ref Range    Hepatitis B Surface Antibody Nonreactive      Hepatitis B Surface Antibody Instrument Value <3.50 <8.5 m[IU]/mL   Hepatitis B surface antigen [[id number]]    Collection Time: 08/15/24  2:20 PM   Result Value Ref Range    Hepatitis B Surface Antigen Nonreactive Nonreactive   Hepatitis C antibody [[id number]]    Collection Time: 08/15/24  2:20 PM   Result Value Ref Range    Hepatitis C Antibody Nonreactive Nonreactive   HIV Antigen Antibody Combo Pretransplant Cascade    Collection Time: 08/15/24  2:20 PM   Result Value Ref Range    HIV Antigen Antibody Combo Pretransplant Nonreactive Nonreactive   Treponema Abs w Reflex to RPR and Titer [[id number]]    Collection Time: 08/15/24  2:20 PM   Result Value Ref Range    Treponema Antibody Total Nonreactive Nonreactive   Varicella Zoster Virus Antibody IgG [[id number]]    Collection Time: 08/15/24  2:20 PM   Result Value Ref Range    VZV Lorraine IgG Instrument Value >4,000.0 <135.0 Index    Varicella Zoster Antibody IgG Positive    Hemoglobin A1c [LAB90]    Collection Time: 08/15/24  2:20 PM   Result Value Ref Range    Hemoglobin A1C 6.0 (H) <5.7 %   Prostate spec antigen screen [[id number]]    Collection Time: 08/15/24  2:20 PM   Result Value Ref Range    Prostate Specific Antigen Screen 2.72 0.00 - 3.50 ng/mL   Adult Type and Screen    Collection Time: 08/15/24  2:20 PM   Result Value Ref Range    ABO/RH(D) O POS     Antibody Screen Negative Negative    SPECIMEN EXPIRATION DATE [id number]    CBC with platelets and differential    Collection Time: 08/15/24  2:20 PM   Result Value Ref Range    WBC Count 7.1 4.0 - 11.0 10e3/uL    RBC Count 4.86 4.40 - 5.90 10e6/uL    Hemoglobin 14.0 13.3 - 17.7 g/dL    Hematocrit 40.8 40.0 - 53.0 %    MCV 84 78 - 100 fL    MCH 28.8 26.5 - 33.0 pg    MCHC 34.3 31.5 - 36.5 g/dL    RDW 13.3 10.0 - 15.0 %    Platelet Count 156 150 - 450 10e3/uL    % Neutrophils 68 %    % Lymphocytes 20 %    % Monocytes 8 %    % Eosinophils 3 %    % Basophils 1 %    % Immature Granulocytes 0 %    NRBCs per 100 WBC 0 <1 /100     Absolute Neutrophils 4.8 1.6 - 8.3 10e3/uL    Absolute Lymphocytes 1.4 0.8 - 5.3 10e3/uL    Absolute Monocytes 0.6 0.0 - 1.3 10e3/uL    Absolute Eosinophils 0.2 0.0 - 0.7 10e3/uL    Absolute Basophils 0.0 0.0 - 0.2 10e3/uL    Absolute Immature Granulocytes 0.0 <=0.4 10e3/uL    Absolute NRBCs 0.0 10e3/uL   Quantiferon TB Gold Plus Grey Tube    Collection Time: 08/15/24  2:20 PM    Specimen: Arm, Right; Blood   Result Value Ref Range    Quantiferon Nil Tube 0.00 IU/mL   Quantiferon TB Gold Plus Green Tube    Collection Time: 08/15/24  2:20 PM    Specimen: Arm, Right; Blood   Result Value Ref Range    Quantiferon TB1 Tube 0.05 IU/mL   Quantiferon TB Gold Plus Yellow Tube    Collection Time: 08/15/24  2:20 PM    Specimen: Arm, Right; Blood   Result Value Ref Range    Quantiferon TB2 Tube 0.07    Quantiferon TB Gold Plus Purple Tube    Collection Time: 08/15/24  2:20 PM    Specimen: Arm, Right; Blood   Result Value Ref Range    Quantiferon Mitogen 10.00 IU/mL   Quantiferon TB Gold Plus    Collection Time: 08/15/24  2:20 PM    Specimen: Arm, Right; Blood   Result Value Ref Range    Quantiferon-TB Gold Plus Negative Negative    TB1 Ag minus Nil Value 0.05 IU/mL    TB2 Ag minus Nil Value 0.07 IU/mL    Mitogen minus Nil Result 10.00 IU/mL    Nil Result 0.00 IU/mL   HLA-ABC Typing High Resolution    Collection Time: 08/15/24  2:20 PM   Result Value Ref Range    ABTEST METHOD NGS     hiresA-1 A*02:01     hiresA-1Equiv 2     hiresA-2 A*11:01     hiresA-2Equiv 11     hiresB-1 B*35:01     hiresB-1Equiv 35     hiresB-2 B*49:01     hiresB-2Equiv 49     hiresC-1 C*04:01     hiresC-1Equiv 4     hiresC-2 C*07:01     hiresC-2Equiv 7     hiresBw-1 Bw*4     hiresBw-2 Bw*6    HLA-DR Typing High Resolution    Collection Time: 08/15/24  2:20 PM   Result Value Ref Range    DRhiresTestM NGS     hiresDPA1-1 DPA1*01:03     hiresDPB1-1 DPB1*02:01     hiresDPB1-2 DPB1*04:01     hiresDQA1-1 DQA1*01:04     hiresDQA1-2 DQA1*03:01     hiresDQB1-1  DQB1*03:02     hiresDQB1-1Equiv 8     hiresDQB1-2 DQB1*05:03     hiresDQB1-2Equiv 5     hiresDRB1-1 DRB1*04:03     hiresDRB1-1Equiv 4     hiresDRB1-2 DRB1*14:54     hiresDRB1-2Equiv 14     hiresDRB3-1 DRB3*02:02     hiresDRB3-1Equiv 52     hiresDRB4-2 DRB4*01:03     hiresDRB4-2Equiv 53    UA with Microscopic reflex to Culture    Collection Time: 08/15/24  2:21 PM    Specimen: Urine, Midstream   Result Value Ref Range    Color Urine Light Yellow Colorless, Straw, Light Yellow, Yellow    Appearance Urine Clear Clear    Glucose Urine 200 (A) Negative mg/dL    Bilirubin Urine Negative Negative    Ketones Urine Negative Negative mg/dL    Specific Gravity Urine 1.018 1.003 - 1.035    Blood Urine Small (A) Negative    pH Urine 6.0 5.0 - 7.0    Protein Albumin Urine 600 (A) Negative mg/dL    Urobilinogen Urine Normal Normal, 2.0 mg/dL    Nitrite Urine Negative Negative    Leukocyte Esterase Urine Trace (A) Negative    Amorphous Crystals Urine Few (A) None Seen /HPF    RBC Urine 5 (H) <=2 /HPF    WBC Urine 6 (H) <=5 /HPF    Squamous Epithelials Urine <1 <=1 /HPF    Hyaline Casts Urine 4 (H) <=2 /LPF    Granular Casts Urine 1 (H) None Seen /LPF   Echocardiogram Complete    Collection Time: 08/15/24  2:53 PM   Result Value Ref Range    LVEF  55-60%        I spent a total of 60 minutes on the date of the encounter doing chart review, performing a history and physical exam, completing documentation and any further activities as noted above.

## 2024-08-15 NOTE — LETTER
8/15/2024      Hakeem Romero  [address]  [facility] [address]      Dear Colleague,    Thank you for referring your patient, Hakeem Romero, to the [facility]PL[facility]. Please see a copy of my visit note below.    Patient is here to understand the risk benefits alternatives of renal transplantation  Please review the notes of my nephrology colleagues for full clinical details    Patient received a kidney transplant 15 years ago for IgA nephropathy.  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  He is not yet on dialysis.  GFR is 19.    BP (!) 148/89   Pulse 59   Ht 1.829 m (6')   Wt 125.9 kg (277 lb 9.6 oz)   SpO2 95%   BMI 37.65 kg/m      Examination of the abdomen: The abdomen is soft the left-sided femoral pulses well felt    Clinical impression: Failed kidney transplant    Recommendations: re do kidney transplant it would benefit to lose some weight but we could still do the kidney transplant at this BMI.;     I had a long discussion with the patient regarding kidney transplantation in general and the following points in particular:    Survival statistics at one, five and ten years following kidney transplantation both for living-related and cadaveric allografts.  The kidney transplant selection committee process.  The complications following kidney transplant that included but were not limited to wound infection, vascular complications, ureter leak, ureteral strictures, and bowel obstruction  The need for lifelong immunosuppressive therapy and the side effects of these medications including specifically the risk of cancer and lymphoma.  The waiting list time of approximately a year or more for cadaveric transplants.  The statistical superiority of a living-related donor and the compelling reasons to encourage that therapy.            Again, thank you for allowing me to participate in the care of your patient.         Sincerely,        Thanh Louis MD

## 2024-08-16 LAB
CARDIOLIPIN IGG SER IA-ACNC: <2 GPL-U/ML
CARDIOLIPIN IGG SER IA-ACNC: NEGATIVE
CARDIOLIPIN IGM SER IA-ACNC: 2 MPL-U/ML
CARDIOLIPIN IGM SER IA-ACNC: NEGATIVE
CMV IGG SERPL IA-ACNC: <0.2 U/ML
CMV IGG SERPL IA-ACNC: NORMAL
DRVVT SCREEN RATIO: 0.97
EBV VCA IGG SER IA-ACNC: 40.9 U/ML
EBV VCA IGG SER IA-ACNC: POSITIVE
LA PPP-IMP: NEGATIVE
LUPUS INTERPRETATION: NORMAL
PTT RATIO: 1.06
THROMBIN TIME: 18.4 SECONDS (ref 13–19)
VZV IGG SER QL IA: >4000 INDEX
VZV IGG SER QL IA: POSITIVE

## 2024-08-16 NOTE — PROGRESS NOTES
Psychosocial Assessment  Patient Name/ Age: Hakeem Romero 54 year old   Medical Record #: [id number]  Duration of Interview: 40 min  Process:   Face-to-Face Interview                (counseling < 50%)   Present at Appointment: Wife, Libertad        : TESSA Parker, HANNA Date:  August 16, 2024        Type of transplant: Kidney    Donor type:      Cadaver   Prior Transplants:    Yes  Status of Transplant: Hakeem reports that Libertad donated her kidney to him during his last tx.        Current Living Situation    Location:   [address] [place] [id number]  With Whom: lives with their spouse       Family/ Social Support:    Hakeem identified Libertad as his primary support system. Hakeem notes that for his last tx, Libertad donated her kidney to him, so his primary support system came from his parents. He reports that this time, Libertad will care for him and her parents will be available as needed since they live next door.  Available, helpful   Committed relationship:  Hakeem and Libertad are .     Stable/supportive   Other supports:   Hakeem's best friend lives in [place], [place]. They golf together 1x per week and stay in regular communication. Hakeem notes that his best friend provides him a multitude of emotional support. 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 He notes that for his last transplant he also was employed at [employer] and did not have any issue with taking time off for recovery.    Income   Salary/wages, Savings, and Spouse's income   Insurance      At this time, patient can afford medication costs:  Yes  Private Insurance    [facility]       Medical Status    Current mode of treatment for ESRD None   Complications  Pt is not a diabetic.  None       Behavioral    Tobacco Use  No Chemical Dependency No    Hakeem reports that he drinks about 2-4 drinks per week. He has never used any medications not prescribed his doctor and has never completed any chemical dependency programming.      Psychiatric Impairment No  Hakeem reports his mental health is well managed at this time. Hakeem notes a hx of counseling in the past that he found to be helpful. Hakeem denies any hx of SI and no use of medications to manage mental health symptoms.     Reading ability Good  Education Level: Bachelors Degree Recent Legal History No      Coping Style/Strategies: Adding space between his reaction and his response to situations to reflect and self regulate     Ability to Adhere to Complex Medical Regime: Yes     Adherence History: Hakeem reports some issues in attending lab appointment leading up to his body's rejection. He did not feel this contributed to the rejection other than to notice it was happening sooner rather than later. He reports a plan to prioritize attending lab appointments for this transplant and to prioritize his diet. Hakeem declined any support or resources needed from social work in order to achieve these goals.         Education  _X_ Medicare  _X_ Rehabilitation  _X_ Donor issues  _X_ Community resources  _X_ Post discharge housing  _X_ Financial resources  _X_ Medical insurance options  _X_ Psych adjustment  _X_ Family adjustment  _X_ Health Care Directive - Provided Education   Psychosocial Risks of Transplant Reviewed and Discussed:  _X_ Increased stress related to emotional,            family, social, employment or financial           situation  _X_ Affect on work and/or disability benefits  _X_ Affect on future life insurance  _X_ Transplant outcome expectations may           not be met  _X_ Mental Health Risks: anxiety,           depression, PTSD, guilt, grief and           chronic fatigue     Notable Items:   None noted.       Final Evaluation/Assessment   Patient seemed to process information  well. Appeared well informed, motivated and able to follow post transplant requirements. Behavior was appropriate during interview. Has adequate income and insurance coverage. Adequate social support. No major contraindications noted for transplant.  At this time patient appears to understand the risks and benefits of transplant.      Recommendation  Acceptable    Selection Criteria Met:  Plan for support Yes   Chemical Dependence Yes   Smoking Yes   Mental Health Yes   Adequate Finances Yes    Signature: TESSA Parker, HANNA   Title: Clinical

## 2024-08-17 LAB
GAMMA INTERFERON BACKGROUND BLD IA-ACNC: 0 IU/ML
M TB IFN-G BLD-IMP: NEGATIVE
M TB IFN-G CD4+ BCKGRND COR BLD-ACNC: 10 IU/ML
MITOGEN IGNF BCKGRD COR BLD-ACNC: 0.05 IU/ML
MITOGEN IGNF BCKGRD COR BLD-ACNC: 0.07 IU/ML
QUANTIFERON MITOGEN: 10 IU/ML
QUANTIFERON NIL TUBE: 0 IU/ML
QUANTIFERON TB1 TUBE: 0.05 IU/ML
QUANTIFERON TB2 TUBE: 0.07

## 2024-08-19 ENCOUNTER — TELEPHONE (OUTPATIENT)
Dept: FAMILY MEDICINE | Facility: CLINIC | Age: 54
End: 2024-08-19
Payer: COMMERCIAL

## 2024-08-19 NOTE — TELEPHONE ENCOUNTER
Reason for Call:  Appointment Request    Patient requesting this type of appt:  discuss kidney concerns     Requested provider: Eric Lei    Reason patient unable to be scheduled: Not within requested timeframe    When does patient want to be seen/preferred time:  soon    Comments: next available isn't until Sept. Pt feels he may need dialysis next week    Could we send this information to you in [employer] or would you prefer to receive a phone call?:   No preference   Okay to leave a detailed message?: Yes at Cell number on file:    Telephone Information:   Mobile [phone]       Call taken on 8/19/2024 at 3:48 PM by Camila Perez

## 2024-08-20 LAB
A*: NORMAL
A*LOCUS SEROLOGIC EQUIVALENT: 2
A*LOCUS: NORMAL
A*SEROLOGIC EQUIVALENT: 11
ABTEST METHOD: NORMAL
B*: NORMAL
B*LOCUS SEROLOGIC EQUIVALENT: 35
B*LOCUS: NORMAL
B*SEROLOGIC EQUIVALENT: 49
BW-1: NORMAL
BW-2: NORMAL
C*: NORMAL
C*LOCUS SEROLOGIC EQUIVALENT: 4
C*LOCUS: NORMAL
C*SEROLOGIC EQUIVALENT: 7
DPA1*: NORMAL
DPB1*: NORMAL
DPB1*LOCUS: NORMAL
DQA1*: NORMAL
DQA1*LOCUS: NORMAL
DQB1*: NORMAL
DQB1*LOCUS SEROLOGIC EQUIVALENT: 8
DQB1*LOCUS: NORMAL
DQB1*SEROLOGIC EQUIVALENT: 5
DRB1*: NORMAL
DRB1*LOCUS SEROLOGIC EQUIVALENT: 4
DRB1*LOCUS: NORMAL
DRB1*SEROLOGIC EQUIVALENT: 14
DRB3*LOCUS SEROLOGIC EQUIVALENT: 52
DRB3*LOCUS: NORMAL
DRB4*: NORMAL
DRB4*SEROLOGIC EQUIVALENT: 53
DRSSO TEST METHOD: NORMAL

## 2024-08-20 NOTE — TELEPHONE ENCOUNTER
Spoke with Hakeem - he states he continues to have labs done through the [facility] monthly and his GFR last week was down to 15. He is concerned that dialysis will be required sooner than later. He is having increased fatigue and mild headaches off and on.    He is wanting to have a consult with Dr. Lei to discuss his options more in depth (hemodialysis vs peritoneal) so that when the times comes he is able to make an informed decision.     He also reports concern because when he previously went on Dialysis he had a living donor lined up. This time he does not and he feels as though he will need to be on dialysis for an extended time.     Scheduled with [facility] for 8/27/24 at 9:40am. Patient wondering if there is any way he can be worked in this week for discussion.

## 2024-08-21 ENCOUNTER — COMMITTEE REVIEW (OUTPATIENT)
Dept: TRANSPLANT | Facility: CLINIC | Age: 54
End: 2024-08-21
Payer: COMMERCIAL

## 2024-08-21 PROBLEM — R00.1 SINUS BRADYCARDIA BY ELECTROCARDIOGRAM: Status: ACTIVE | Noted: 2024-08-21

## 2024-08-21 PROBLEM — Z01.818 ENCOUNTER FOR PRE-TRANSPLANT EVALUATION FOR KIDNEY TRANSPLANT: Status: ACTIVE | Noted: 2024-08-21

## 2024-08-21 PROBLEM — R73.03 BORDERLINE DIABETES MELLITUS: Status: ACTIVE | Noted: 2024-08-21

## 2024-08-22 ENCOUNTER — TELEPHONE (OUTPATIENT)
Dept: TRANSPLANT | Facility: CLINIC | Age: 54
End: 2024-08-22
Payer: COMMERCIAL

## 2024-08-22 DIAGNOSIS — N18.5 STAGE 5 CHRONIC KIDNEY DISEASE NOT ON CHRONIC DIALYSIS (H): ICD-10-CM

## 2024-08-22 DIAGNOSIS — I10 ESSENTIAL HYPERTENSION: ICD-10-CM

## 2024-08-22 DIAGNOSIS — Z01.818 PRE-TRANSPLANT EVALUATION FOR KIDNEY TRANSPLANT: ICD-10-CM

## 2024-08-22 DIAGNOSIS — Z94.0 KIDNEY TRANSPLANTED: Primary | ICD-10-CM

## 2024-08-22 DIAGNOSIS — Z94.0 HTN, KIDNEY TRANSPLANT RELATED: ICD-10-CM

## 2024-08-22 DIAGNOSIS — T86.12 FAILED KIDNEY TRANSPLANT: ICD-10-CM

## 2024-08-22 DIAGNOSIS — E66.9 OBESITY (BMI 30-39.9): ICD-10-CM

## 2024-08-22 DIAGNOSIS — Z79.899 ENCOUNTER FOR LONG-TERM (CURRENT) USE OF HIGH-RISK MEDICATION: ICD-10-CM

## 2024-08-22 DIAGNOSIS — D84.9 IMMUNOSUPPRESSED STATUS (H): ICD-10-CM

## 2024-08-22 DIAGNOSIS — K21.9 GASTROESOPHAGEAL REFLUX DISEASE: ICD-10-CM

## 2024-08-22 DIAGNOSIS — T86.10 COMPLICATIONS, KIDNEY TRANSPLANT: ICD-10-CM

## 2024-08-22 DIAGNOSIS — Z48.298 AFTERCARE FOLLOWING ORGAN TRANSPLANT: ICD-10-CM

## 2024-08-22 DIAGNOSIS — N02.B9 IGA NEPHROPATHY: ICD-10-CM

## 2024-08-22 DIAGNOSIS — I15.1 HTN, KIDNEY TRANSPLANT RELATED: ICD-10-CM

## 2024-08-22 DIAGNOSIS — Z94.0 KIDNEY REPLACED BY TRANSPLANT: ICD-10-CM

## 2024-08-22 DIAGNOSIS — Z76.82 ORGAN TRANSPLANT CANDIDATE: ICD-10-CM

## 2024-08-22 DIAGNOSIS — E78.5 DYSLIPIDEMIA: ICD-10-CM

## 2024-08-22 ASSESSMENT — ENCOUNTER SYMPTOMS: NEW SYMPTOMS OF CORONARY ARTERY DISEASE: 0

## 2024-08-22 NOTE — TELEPHONE ENCOUNTER
Listed pt today on the kidney alone waitlist on INACTIVE status due to an incomplete evaluation. Pt is not yet on dialysis. Pt does qualify for wait time with a GFR of 15 done on 08/15/2024.  Pt listed with the option of KDPI > 85% , consent signed. Generated listing letter, transplant evaluation summary letter and PRA order in EPIC today - electronically routed to pt/ providers. Email to Outreach Lab to send PRA mailers to pt.     Contacted patient to review outcome of selection committee meeting (See selection committee encounter).   Informed pt that I have already added him onto the waitlist on INACTIVE status due to an incomplete evaluation. I also explained he will be transferred to the kidney waitlist team now with Peggy AVILES as his new pre kidney transplant coordinator. Explained to patient that he  needs to complete all components of the evaluation to be eligible for active status on the waiting list or to proceed with a live donor kidney transplant.   Reviewed next steps based on outcomes:  from our Office will call pt to make appts for cardiology, hematology, dermatology, lab, aorto iliac artery US and CT abd pelvic wo contrast. Pt to see PCP next week about hgb A1C of 6.0 and to get a referral for weight loss management locally.  Pt to have live donors register now.   Confirmed with patient that on successful completion of outstanding components, patient is eligible for active status and they will receive a follow-up call.   Confirmed that patient has contact information for additional questions or concerns.  Pt expressed excellent understanding of all and was in good agreement with the plan.

## 2024-08-22 NOTE — Clinical Note
Please note I have added pt onto the kidney alone waitlist yesterday on INACTIVE status. Thanks Sulma

## 2024-08-22 NOTE — LETTER
08/22/24        Hakeem Edgar Romero  [address]  [facility] [address]        Dear Hakeem,    It was a pleasure to see you recently for consideration of kidney transplantation. Your pre-transplant evaluation results were reviewed at our Multidisciplinary Selection Committee on 08/21/2024. The Committee is requesting the following items are completed for your evaluation.     Cardiology provider appointment for assessment of your current heart status and to provide a recommendation to proceed with a kidney transplant surgery.   Hematology provider appointment for assessment of your enlarged lymph nodes seen your CT 06/2023 as well as your elevated kappa rain ratio lab results from 2023.   Dermatology provider appointment for skin screening due to your chronic immunosuppression.   Lab appointment for: serum and urine protein electrophoresis, EBV PCR, kappa rain light chains.   CT abdomen pelvic without contrast to assess previously seen enlarged lymph nodes and to assess for arterial calcifications.   Aorto iliac artery ultrasound to assess for any narrowed areas or blockages.   Weight loss management for assistance with weight loss. Please arrange for this locally.   Primary care provider appointment for assessment of your hgb A1c result of 6.0 done here on 08/15/2024. Please arrange for this locally.   Vaccinations are recommended to be up to date for: COVID 19, hepatitis B, pneumovax and Shingrix. Please work with your primary care provider for this.   Colonoscopy needs to remain up to date. Please work with your primary care provider for this.     A  from our Office will call you soon to make appointments for items # 1, 2, 3, 4, 5 and 6.      Per our routine Office workflow as you are now listed, you have been transferred to the kidney waitlist transplant coordinator team. Your new coordinator is Peggy AVILES assisted by Ivet LAWSON.  Either can be reached by calling our Main Office Number at [phone].      Please do let Peggy RN know when you have successfully completed all of the above items.  Peggy will then have the outcomes reviewed at our Multidisciplinary Selection Committee for approval.  When approved, you will be eligible to be changed to ACTIVE status on the kidney waitlist as well as to proceed with a live donor kidney transplant in the event you have an approved live donor.     Please have any potential live kidney donors register online at [facility] to initiate their evaluations through our program's living donor screening tool at [email or web address]. Please note that potential donors will get an e-mail response once they submit their form within 1-2 business days. Potential donors may call our Main Office Number at [phone] and ask to speak with a donor coordinator if they have questions about the process. You will be notified by your transplant coordinator if a live donor has been approved for donation. \      For any questions, please contact myself at the Transplant Office Main Number at [phone] or at my Direct Number at [phone].  You can also send me a My Chart message.      Sincerely,  Sulma Griffin, RN, BSN  Pre Kidney Pancreas Transplant Coordinator   [facility]  Solid Organ Transplant Care   [address] [place] [address] 310  [address] [address]  [email or web address]   Office Number: [phone] Direct Number: [phone]   Fax Number: [phone]  Employed by [facility] [employer]     CC's: Dr. Eric Lei

## 2024-08-22 NOTE — LETTER
PHYSICIAN ORDER   ALA/PRA BLOOD    DATE & TIME ISSUED: 2024 2:23 PM  PATIENT NAME: Hakeem Romero   : 1970     [facility] MR#  [id number]     DIAGNOSIS/ICD-10 CODE: Awaiting Organ transplant [Z76.82}   EXPIRES: (1 YEAR AFTER DATE ISSUED)  EVERY 12 weeks / 3 months   1. Please draw 20ml of blood in red top (plain) tube for Antileukocyte Antibody (ALA or PRA).   2. Label tubes with the patient s name, complete lab slip.         3. Mailers, lab slips with instructions are sent to patient separately.      4. Call the Outreach Lab at [phone] to reorder mailers.       5. Mail blood to (this address is also on the mailers):    IMMUNOLOGY LABORATORY   [facility]   Room [phone] [address]  [address]    .  Yudelka Mercedes in Immunology and Transplantation  Surgical Director, Kidney & Pancreas Transplant Programs  Medical Director, Solid Organ Transplant Unit

## 2024-08-22 NOTE — COMMITTEE REVIEW
Kidney/Pancreas Committee Review Note     Evaluation Date: 8/15/2024  Committee Review Date: 8/21/2024    Organ being evaluated for: Kidney    Transplant Phase: Evaluation  Transplant Status: Active    Transplant Coordinator: Karla Schaefer  Transplant Surgeon: Dr. Louis     Referring Physician: Bakari Hurtado    Primary Diagnosis: Retransplant/ graft failure  Secondary Diagnosis: CKD    Committee Review Members:  Nephrology Tata Martinez, NP, Lesly Mccullough MD, [place], APRN CNP, Carlos Gramajo MD, Bakari Hurtado MD   Nutrition Libertad Rome, CRISTIAN   Pharmacist Deena Singh, [facility]    - Clinical Suri Mesa, MSW, Quique Ventura MSW, Charbel Mcfadden MSW   Transplant NAMRATA THOMPSON, RN, Brenda Kang, TRACI, Kimberlee Hilario, TRACI, Patti Poon APRN CNP, Beti Rodriguez, TRACI, Peggy Kern, RN, Jennifer Day, JENNIFER, Jennifer Romeo, RN, Lolita Umanzor, RN, Yulissa Soto, RN   Transplant Surgery Mariella Rahman MD, MD       Transplant Eligibility: Irreversible chronic kidney disease treated w/dialysis or expected need for dialysis    Committee Review Decision: Approved    Relative Contraindications: None    Absolute Contraindications: None     Committee Chair Mariella Rahman MD verbally attested to the committee's decision.    Committee Discussion Details: Reviewed medical history and evaluation outcomes to date. Pt thought to be a good candidate overall. Recommend pt completes the following items for his eval:  Cardiology provider appointment for risk assessment   Dermatology provider appointment for skin screening   Hematology for follow up on elevated kappa rain light ratio 07/14/2023  Lab appt for: EBV PCR for lymphadenopathy  -SPEP, Serum kappa rain light chains  Abdominal pelvic CT without contrast   Aorto iliac artery US    Hgb A1C 6.0, should follow up with PCP.   Weight loss management - locally  Noted pt has already  signed Receipt of Info and KDPI > 85% consents. Noted pt has already attended transplant class. Determined pt is approved as a candidate for kidney alone transplant. Plan to add pt onto kidney alone waitlist asap on INACTIVE status.

## 2024-08-22 NOTE — TELEPHONE ENCOUNTER
Tata Martinez, Sulma Garcias, TRACI; Karla Gandhi, TRACI  Since I am not sure whose camp this falls under. I messaged you both. Following selection for kidney eval the recommendation was to do review/update imaging. Looking back at old notes, I now noticed his previous CT (6/2023) was reviewed by Dr. Clifton and he was supposed to have a repeat CT scan in a year (June 2024) to evaluate his lymphadenopathy. Additionally could we get an updated EBV PCR with next labs since he has lymphadenopathy.    Thank you,  Tata      --- order placed for EBV PCR quant

## 2024-08-22 NOTE — LETTER
2024    Hakeem Romero  [address] [place] [address]      Dear Mr. Romero,    This letter is sent to confirm that you are a candidate in the kidney transplant program at the [facility].  You were placed on the kidney INACTIVE waitlist on 2024.  This means you will accumulate waiting time but not receive  donor calls. You have been placed on INACTIVE status due to an incomplete evaluation, please see my separate letter regarding the details of this.     Per our routine Office workflow, you will now be transferred to the kidney transplant waitlist coordinator team. Your new coordinator will be Peggy AVILES assisted by Ivet LAWSON.  Either can be reached by calling our Main Office Number at [phone].  You should continue to follow with your post transplant coordinator in our Office, Karla AVILES.       Items we will need from you:    We will receive approval from your insurance company for the transplant procedure when your status is ready to be changed to ACTIVE.  It is critical that you notify us if there is any change in your insurance.  It is also important that you familiarize yourself with the details of your specific insurance policy.  Our patient  is available to assist you if you should have any questions regarding your coverage.    An ALA or PRA blood sample needs to be sent here every 3 months to match you with  donors or any potential living donors. Your next sample is due by 11/15/2024.  You can have this drawn at any [facility] by simply making a lab appointment.  In the event you are having this drawn at an outside lab, special mailing boxes (called mailers) will be sent to you directly from the Outreach Department.  You should take the physician order form and the  to your outside lab when it is time to for this testing to be done.  Additional mailers can be obtained by calling  the Transplant Office and asking to speak to a kidney .    During this waiting period, we may request additional periodic laboratory tests with your primary physician.  It will be your responsibility to remind your physician to forward your results to the Transplant Office.    We need to be kept informed of any changes in your medical condition such as:    changes in your medications,   significant changes in your health  significant infections (such as pneumonia or abscesses)  blood transfusions  any condition which requires hospitalization  any surgery    Remember to complete any routine cancer screening tests required before your transplant.  This includes colonoscopy; prostrate screening for men, and mammogram and gynecologic testing for women, as well as dental work.  Your primary care clinic can assist you with arranging for these exams.  Remind your caregivers to forward copies of the records and final reports.    We want you to know that our program has physician and surgeon coverage 24 hours a day, 365 days a year. In addition, our transplant surgeons and physicians will not be on call for two or more transplant programs more than 30 miles apart unless the circumstances have been reviewed and approved by the [employer] Organ [employer]aring (UNOS) Membership and Professional Standards Committee (MPSC). Finally, our primary physician and primary surgeons are not designated as the primary surgeon or primary physician at more than 1 transplant hospital. If this coverage changes or there are substantial program changes, you will be notified in writing by letter.     Attached is a letter from UNOS that describes the services and information offered to patients by UNOS and the Organ Procurement and Transplantation Network (OPTN).    We appreciate having had the opportunity to participate in your care.  If you have questions, please feel free to call the Transplant Office at [phone] or  [phone].      Sincerely,   Sulma     Kidney Transplant Program    Enclosures: UNOS Letter  CC:  Dr. Eric Lei                         The Organ Procurement and Transplantation Network Toll-free patient services line:  2-523-498-9779  Your resource for organ transplant information    Staffed 8:30 am - 5:00 pm ET Monday - Friday Leave a message 24/7 to receive a call back    The Organ Procurement and Transplantation Network (OPTN) is the national transplant system. It makes the policies that decide how donated organs are matched to patients waiting for a transplant. The OPTN:    Makes sure donated organs get matched to people on the transplant waiting list  Tells people about the donation and transplant processes  Makes sure that the public knows about the need for more organ and tissue donations    The OPTN has a free patient services line that you can call to:  Get more information about:  Organ donation and organ transplants  Donation and transplant policies  Get an information kit with:  A list of transplant hospitals  Waiting list information  Talk about any questions you may have about your transplant hospital or organ procurement organization. The staff will do their best to help you or point you to others who may help.  Find out how you can volunteer with the OPTN and help shape transplant policy     The patient services line number is: 7-547-719-6115    Patient services line staff CANNOT answer questions about your own medical care, including:  Waiting list status  Test results  Medical records  You will need to call your transplant hospital for this information.    The following websites have more information about transplantation and donation:    OPTN: https://optn.transplant.hrsa.gov/    For potential living donors and transplant recipients:    Living with transplant: https://www.transplantliving.org/    Living donation process: https://optn.transplant.hrsa.gov/living-donation/    Financial  assistance: https://www.livingdonorassistance.org/    Transplantation data: https://www.srtr.org/    Organ donation: https://www.organdonor.gov/    Volunteer with the OPTN: https://optn.transplant.[facility].gov/get-involved/

## 2024-08-23 LAB
PROTOCOL CUTOFF: NORMAL
SA 1  COMMENTS: NORMAL
SA 1 CELL: NORMAL
SA 1 TEST METHOD: NORMAL
SA 2 CELL: NORMAL
SA 2 COMMENTS: NORMAL
SA 2 TEST METHOD: NORMAL
SA1 HI RISK ABY: NORMAL
SA1 MOD RISK ABY: NORMAL
SA2 HI RISK ABY: NORMAL
SA2 MOD RISK ABY: NORMAL
UNACCEPTABLE ANTIGENS: NORMAL
UNOS CPRA: 82

## 2024-08-27 ENCOUNTER — OFFICE VISIT (OUTPATIENT)
Dept: FAMILY MEDICINE | Facility: CLINIC | Age: 54
End: 2024-08-27
Payer: COMMERCIAL

## 2024-08-27 ENCOUNTER — PATIENT OUTREACH (OUTPATIENT)
Dept: ONCOLOGY | Facility: CLINIC | Age: 54
End: 2024-08-27

## 2024-08-27 VITALS
BODY MASS INDEX: 37.22 KG/M2 | TEMPERATURE: 97.5 F | OXYGEN SATURATION: 96 % | WEIGHT: 274.8 LBS | HEART RATE: 62 BPM | RESPIRATION RATE: 18 BRPM | DIASTOLIC BLOOD PRESSURE: 128 MMHG | SYSTOLIC BLOOD PRESSURE: 164 MMHG | HEIGHT: 72 IN

## 2024-08-27 DIAGNOSIS — D84.9 IMMUNOSUPPRESSED STATUS (H): ICD-10-CM

## 2024-08-27 DIAGNOSIS — Z94.83 PANCREAS REPLACED BY TRANSPLANT (H): ICD-10-CM

## 2024-08-27 DIAGNOSIS — N18.4 STAGE 4 CHRONIC KIDNEY DISEASE (H): Primary | ICD-10-CM

## 2024-08-27 DIAGNOSIS — E66.9 OBESITY (BMI 30-39.9): ICD-10-CM

## 2024-08-27 DIAGNOSIS — Z76.82 ORGAN TRANSPLANT CANDIDATE: ICD-10-CM

## 2024-08-27 DIAGNOSIS — E78.5 DYSLIPIDEMIA: ICD-10-CM

## 2024-08-27 DIAGNOSIS — Z48.298 AFTERCARE FOLLOWING ORGAN TRANSPLANT: ICD-10-CM

## 2024-08-27 DIAGNOSIS — Z92.25 STATUS POST RECENT IMMUNOSUPPRESSIVE THERAPY: ICD-10-CM

## 2024-08-27 DIAGNOSIS — N18.5 STAGE 5 CHRONIC KIDNEY DISEASE NOT ON CHRONIC DIALYSIS (H): ICD-10-CM

## 2024-08-27 DIAGNOSIS — M10.9 GOUT, UNSPECIFIED CAUSE, UNSPECIFIED CHRONICITY, UNSPECIFIED SITE: ICD-10-CM

## 2024-08-27 DIAGNOSIS — T86.12 FAILED KIDNEY TRANSPLANT: ICD-10-CM

## 2024-08-27 DIAGNOSIS — Z79.899 ENCOUNTER FOR LONG-TERM (CURRENT) USE OF HIGH-RISK MEDICATION: ICD-10-CM

## 2024-08-27 DIAGNOSIS — M1A.39X0 CHRONIC GOUT DUE TO RENAL IMPAIRMENT OF MULTIPLE SITES WITHOUT TOPHUS: ICD-10-CM

## 2024-08-27 DIAGNOSIS — I15.1 HTN, KIDNEY TRANSPLANT RELATED: ICD-10-CM

## 2024-08-27 DIAGNOSIS — Z01.818 PRE-TRANSPLANT EVALUATION FOR KIDNEY TRANSPLANT: ICD-10-CM

## 2024-08-27 DIAGNOSIS — Z94.0 KIDNEY TRANSPLANTED: ICD-10-CM

## 2024-08-27 DIAGNOSIS — Z94.0 HTN, KIDNEY TRANSPLANT RELATED: ICD-10-CM

## 2024-08-27 DIAGNOSIS — I10 ESSENTIAL HYPERTENSION: ICD-10-CM

## 2024-08-27 DIAGNOSIS — N02.B9 IGA NEPHROPATHY: ICD-10-CM

## 2024-08-27 DIAGNOSIS — Z94.0 KIDNEY REPLACED BY TRANSPLANT: ICD-10-CM

## 2024-08-27 LAB
ANION GAP SERPL CALCULATED.3IONS-SCNC: 11 MMOL/L (ref 7–15)
BUN SERPL-MCNC: 73.4 MG/DL (ref 6–20)
CALCIUM SERPL-MCNC: 9 MG/DL (ref 8.8–10.4)
CHLORIDE SERPL-SCNC: 110 MMOL/L (ref 98–107)
CREAT SERPL-MCNC: 4.27 MG/DL (ref 0.67–1.17)
CYCLOSPORINE BLD LC/MS/MS-MCNC: 83 UG/L (ref 50–400)
EGFRCR SERPLBLD CKD-EPI 2021: 16 ML/MIN/1.73M2
ERYTHROCYTE [DISTWIDTH] IN BLOOD BY AUTOMATED COUNT: 13 % (ref 10–15)
GLUCOSE SERPL-MCNC: 118 MG/DL (ref 70–99)
HCO3 SERPL-SCNC: 20 MMOL/L (ref 22–29)
HCT VFR BLD AUTO: 47.6 % (ref 40–53)
HGB BLD-MCNC: 16 G/DL (ref 13.3–17.7)
MCH RBC QN AUTO: 28 PG (ref 26.5–33)
MCHC RBC AUTO-ENTMCNC: 33.6 G/DL (ref 31.5–36.5)
MCV RBC AUTO: 83 FL (ref 78–100)
PLATELET # BLD AUTO: 187 10E3/UL (ref 150–450)
POTASSIUM SERPL-SCNC: 5 MMOL/L (ref 3.4–5.3)
RBC # BLD AUTO: 5.72 10E6/UL (ref 4.4–5.9)
SODIUM SERPL-SCNC: 141 MMOL/L (ref 135–145)
TME LAST DOSE: NORMAL H
TME LAST DOSE: NORMAL H
WBC # BLD AUTO: 8.7 10E3/UL (ref 4–11)

## 2024-08-27 PROCEDURE — 99215 OFFICE O/P EST HI 40 MIN: CPT | Performed by: INTERNAL MEDICINE

## 2024-08-27 PROCEDURE — 36415 COLL VENOUS BLD VENIPUNCTURE: CPT | Performed by: INTERNAL MEDICINE

## 2024-08-27 PROCEDURE — 86334 IMMUNOFIX E-PHORESIS SERUM: CPT | Performed by: PATHOLOGY

## 2024-08-27 PROCEDURE — 87799 DETECT AGENT NOS DNA QUANT: CPT | Performed by: INTERNAL MEDICINE

## 2024-08-27 PROCEDURE — 80048 BASIC METABOLIC PNL TOTAL CA: CPT | Performed by: INTERNAL MEDICINE

## 2024-08-27 PROCEDURE — 80158 DRUG ASSAY CYCLOSPORINE: CPT | Performed by: INTERNAL MEDICINE

## 2024-08-27 PROCEDURE — 84166 PROTEIN E-PHORESIS/URINE/CSF: CPT | Performed by: PATHOLOGY

## 2024-08-27 PROCEDURE — 83521 IG LIGHT CHAINS FREE EACH: CPT | Performed by: INTERNAL MEDICINE

## 2024-08-27 PROCEDURE — 85027 COMPLETE CBC AUTOMATED: CPT | Performed by: INTERNAL MEDICINE

## 2024-08-27 RX ORDER — FUROSEMIDE 40 MG
40 TABLET ORAL DAILY
Qty: 90 TABLET | Refills: 3 | Status: SHIPPED | OUTPATIENT
Start: 2024-08-27

## 2024-08-27 RX ORDER — METHYLPHENIDATE HYDROCHLORIDE 5 MG/1
TABLET ORAL
Refills: 0 | Status: CANCELLED | OUTPATIENT
Start: 2024-08-27

## 2024-08-27 RX ORDER — ALLOPURINOL 300 MG/1
1 TABLET ORAL DAILY
Qty: 90 TABLET | Refills: 3 | Status: SHIPPED | OUTPATIENT
Start: 2024-08-27

## 2024-08-27 NOTE — PROGRESS NOTES
Assessment & Plan   Problem List Items Addressed This Visit          Digestive    Obesity (BMI 30-39.9)       Endocrine    Dyslipidemia    Chronic gout due to renal impairment of multiple sites without tophus     - on allopurinol 300mg BID, probenecid 500mg BID   - flares generally responsive to prednisone therapy    8/2024: Reducing allopurinol down to 300 mg daily given reduction in GFR           Relevant Medications    allopurinol (ZYLOPRIM) 300 MG tablet       Circulatory    HTN, kidney transplant related     uncontrolled  current antihypertensive regimen:  metoprolol 75mg BID, clonidine 0.2mg BID, losartan 50mg, amlodipine 5mg daily  regimen changes: Adding furosemide 40 mg daily  intolerance:  future titration/work-up plan:   8/2024: Consideration for elimination of losartan given reduced GFR; would consider direct vasodilator, either hydralazine or minoxidil in the future depending on blood pressure response            Immune    Immunosuppressed status (H24)       Urinary    Stage 5 chronic kidney disease not on chronic dialysis (H) - Primary     h/o LUKT in '08 at [facility] (previously on HD for ~3 months), baseline SCr ~3.  Original disease was IgA nephropathy  IS: cyclosporine 100/125mg BID, MMF 1g BID   - worsening proteinuria; ~2g/g  12/2023: Transplant biopsy pursued for worsening allograft function and worsening proteinuria -significant for chronic antibody mediated rejection, CD4 positive; significant chronic transplant glomerulopathy.  Background IgA findings.  Significant interstitial fibrosis and tubular atrophy   -Direction from transplant clinic not to pursue any specific treatments, felt more chronic related changes    1/2024: Reviewed biopsy results with patient.  Expressed more chronic, transplant related changes.  Recommending optimization of his blood pressure control of less than 130/80.  Need for future relisting for transplant once GFR below 20 mL/min.  Should explore other social resources  revolving around potential kidney donors.  -Defer cyclosporine dosing to his transplant provider    5/2024: Defer any further albuminuria medication options to his transplant team, specifically whether candidate for SGLT2 inhibitor use  Has maintain optimal blood pressure control since amlodipine introduction  Discussed pretransplant and need for completing other screening, immunization recommendations  -Tdap and Shingrix today  Provided number to schedule his screening colonoscopy in near future  Need to see dermatologist for annual skin surveillance  Likely will need to see BMI lower, defer to his formal pretransplant evaluation to set his necessary pretransplant weight goal    8/2024: Referring to [employer] predialysis education  - eGFR ~15mL/min (progressive decline observed this calendar year)  -Discussed renal replacement modalities specifically home dialysis including peritoneal dialysis versus an in center hemodialysis  -Preference to pursue home therapies to allow for continued work opportunities         Relevant Medications    furosemide (LASIX) 40 MG tablet    allopurinol (ZYLOPRIM) 300 MG tablet    IgA nephropathy    Relevant Medications    furosemide (LASIX) 40 MG tablet       Other    Kidney replaced by transplant    Aftercare following organ transplant     Other Visit Diagnoses       Gout, unspecified cause, unspecified chronicity, unspecified site        Relevant Medications    allopurinol (ZYLOPRIM) 300 MG tablet    Other Relevant Orders    Uric acid    Encounter for long-term (current) use of high-risk medication        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)        Status post recent immunosuppressive therapy        Kidney transplanted        Pre-transplant evaluation for kidney transplant        Organ transplant candidate        Essential hypertension        Failed kidney transplant                   45 minutes spent by me on the date of the encounter doing review of test results,  interpretation of tests, patient visit, and documentation, predialysis education coordination     FUTURE APPOINTMENTS:       - Follow-up visit in 6 weeks      Andreina Palacio is a 54 year old, presenting for the following health issues: Presents for follow-up related to his chronic kidney disease.  Has been working closely with [facility] pretransplant program for relisting.  Steady decline in GFR noted through the year, most recently with estimated GFR of 15.  No reported uremic symptoms.  May be some mild fluid retention lower extremities; in-clinic blood pressures have been noticeably higher.  Appetite good.  Weight stable.  Still working full-time.  No anticipated living donor opportunities in near future.  Has been awaiting further future dialysis options.  Was on hemodialysis briefly prior to his previous transplant.  Consult (Discuss dialysis and different options availabler)        8/27/2024     9:56 AM   Additional Questions   Roomed by YUSUF Love     History of Present Illness       CKD: He uses over the counter pain medication, including tylenol, a few times a week.    He eats 2-3 servings of fruits and vegetables daily.He consumes 1 sweetened beverage(s) daily.He exercises with enough effort to increase his heart rate 10 to 19 minutes per day.    He is taking medications regularly.         Review of Systems  Constitutional, HEENT, cardiovascular, pulmonary, gi and gu systems are negative, except as otherwise noted.      Objective    BP (!) 164/128 (BP Location: Right arm, Patient Position: Sitting, Cuff Size: Adult Large)   Pulse 62   Temp 97.5  F (36.4  C) (Tympanic)   Resp 18   Ht 1.829 m (6')   Wt 124.6 kg (274 lb 12.8 oz)   SpO2 96%   BMI 37.27 kg/m    Body mass index is 37.27 kg/m .  Physical Exam   GENERAL: alert and no distress  NECK: no adenopathy, no asymmetry, masses, or scars  RESP: lungs clear to auscultation - no rales, rhonchi or wheezes  CV: regular rate and rhythm, normal S1 S2, no S3  or S4, no murmur, click or rub, no peripheral edema  ABDOMEN: soft, nontender, no hepatosplenomegaly, no masses and bowel sounds normal  MS: no gross musculoskeletal defects noted, no edema    Ancillary Procedure on 08/15/2024   Component Date Value Ref Range Status    LVEF  08/15/2024 55-60%   Final           Signed Electronically by: Eric Lei MD

## 2024-08-27 NOTE — TELEPHONE ENCOUNTER
New Patient Hematology Nurse Navigator Note     Referral Date: 8/23/24    Referring provider: MELANIA    Referring Clinic/Organization: [facility]     Referred to: classical hematology     Requested provider (if applicable): First available - did not specify     Evaluation for : Pre transplant       Pt is in pre kidney transplant evaluation. Pt has had elevated kappa rain ratio in 2023 along with enlarged lymph nodes on CT. S/P kidney tx 2008, failing now but remains on immunosuppression.        Clinical History (per Nurse review of records provided):    2nd referral to hematology.  Patient has no showed once and cancelled with short notice previously    Referral updates and Plan:   Will schedule at [facility]  Hematology Nurse Navigation   [phone]    [facility] / Hematology   [phone]   [email or web address]

## 2024-08-27 NOTE — ASSESSMENT & PLAN NOTE
uncontrolled  current antihypertensive regimen:  metoprolol 75mg BID, clonidine 0.2mg BID, losartan 50mg, amlodipine 5mg daily  regimen changes: Adding furosemide 40 mg daily  intolerance:  future titration/work-up plan:   8/2024: Consideration for elimination of losartan given reduced GFR; would consider direct vasodilator, either hydralazine or minoxidil in the future depending on blood pressure response

## 2024-08-27 NOTE — ASSESSMENT & PLAN NOTE
- on allopurinol 300mg BID, probenecid 500mg BID   - flares generally responsive to prednisone therapy    8/2024: Reducing allopurinol down to 300 mg daily given reduction in GFR

## 2024-08-27 NOTE — ASSESSMENT & PLAN NOTE
h/o LUKT in '08 at [facility] (previously on HD for ~3 months), baseline SCr ~3.  Original disease was IgA nephropathy  IS: cyclosporine 100/125mg BID, MMF 1g BID   - worsening proteinuria; ~2g/g  12/2023: Transplant biopsy pursued for worsening allograft function and worsening proteinuria -significant for chronic antibody mediated rejection, CD4 positive; significant chronic transplant glomerulopathy.  Background IgA findings.  Significant interstitial fibrosis and tubular atrophy   -Direction from transplant clinic not to pursue any specific treatments, felt more chronic related changes    1/2024: Reviewed biopsy results with patient.  Expressed more chronic, transplant related changes.  Recommending optimization of his blood pressure control of less than 130/80.  Need for future relisting for transplant once GFR below 20 mL/min.  Should explore other social resources revolving around potential kidney donors.  -Defer cyclosporine dosing to his transplant provider    5/2024: Defer any further albuminuria medication options to his transplant team, specifically whether candidate for SGLT2 inhibitor use  Has maintain optimal blood pressure control since amlodipine introduction  Discussed pretransplant and need for completing other screening, immunization recommendations  -Tdap and Shingrix today  Provided number to schedule his screening colonoscopy in near future  Need to see dermatologist for annual skin surveillance  Likely will need to see BMI lower, defer to his formal pretransplant evaluation to set his necessary pretransplant weight goal    8/2024: Referring to [employer] predialysis education  - eGFR ~15mL/min (progressive decline observed this calendar year)  -Discussed renal replacement modalities specifically home dialysis including peritoneal dialysis versus an in center hemodialysis  -Preference to pursue home therapies to allow for continued work opportunities

## 2024-08-28 LAB
ALBUMIN MFR UR ELPH: 78.2 %
ALPHA1 GLOB MFR UR ELPH: 3.5 %
ALPHA2 GLOB MFR UR ELPH: 4.2 %
B-GLOBULIN MFR UR ELPH: 9.2 %
EBV DNA SERPL NAA+PROBE-ACNC: NOT DETECTED IU/ML
GAMMA GLOB MFR UR ELPH: 4.9 %
KAPPA LC FREE SER-MCNC: 8.23 MG/DL (ref 0.33–1.94)
KAPPA LC FREE/LAMBDA FREE SER NEPH: 2.26 {RATIO} (ref 0.26–1.65)
LAMBDA LC FREE SERPL-MCNC: 3.64 MG/DL (ref 0.57–2.63)
LOCATION OF TASK: ABNORMAL
LOCATION OF TASK: NORMAL
M PROTEIN MFR UR ELPH: 0 %
PROT PATTERN SERPL IFE-IMP: NORMAL
PROT PATTERN UR ELPH-IMP: ABNORMAL

## 2024-09-06 ENCOUNTER — TELEPHONE (OUTPATIENT)
Dept: FAMILY MEDICINE | Facility: CLINIC | Age: 54
End: 2024-09-06
Payer: COMMERCIAL

## 2024-09-06 ENCOUNTER — TELEPHONE (OUTPATIENT)
Dept: TRANSPLANT | Facility: CLINIC | Age: 54
End: 2024-09-06

## 2024-09-06 DIAGNOSIS — Z12.11 SCREENING FOR COLORECTAL CANCER: Primary | ICD-10-CM

## 2024-09-06 DIAGNOSIS — Z12.12 SCREENING FOR COLORECTAL CANCER: Primary | ICD-10-CM

## 2024-09-06 NOTE — TELEPHONE ENCOUNTER
Please sign new order for colonoscopy.     Will need to be faxed to [facility] signed.

## 2024-09-06 NOTE — TELEPHONE ENCOUNTER
Pt called to go over appointments that are needed to be active. Pre Pt his mychart message disappeared with the information he was looking for.

## 2024-09-06 NOTE — TELEPHONE ENCOUNTER
Order/Referral Request    Who is requesting: Dr mel Lei    Orders being requested: Order for colonoscopy     Reason service is needed/diagnosis: for kidney transplant    When are orders needed by: ASP    Has this been discussed with Provider: Yes    Does patient have a preference on a Group/Provider/[facility]    Does patient have an appointment scheduled?: No    Where to send orders: Fax    Could we send this information to you in [employer] or would you prefer to receive a phone call?:   No preference   Okay to leave a detailed message?: Yes at Cell number on file:    Telephone Information:   Mobile [phone]

## 2024-09-10 ENCOUNTER — TEAM CONFERENCE (OUTPATIENT)
Dept: TRANSPLANT | Facility: CLINIC | Age: 54
End: 2024-09-10
Payer: COMMERCIAL

## 2024-09-10 NOTE — TELEPHONE ENCOUNTER
Image Review Meeting    ATTENDEES: Dr. Angella Gamez     IMAGES REVIEWED: CT abd pelvic 06/22/2023     DECISION: vessel status suitable for kidney alone transplant, recommend aorto iliac artery US now for updating    INCIDENTALS: No

## 2024-09-12 ENCOUNTER — DOCUMENTATION ONLY (OUTPATIENT)
Dept: TRANSPLANT | Facility: CLINIC | Age: 54
End: 2024-09-12
Payer: COMMERCIAL

## 2024-09-12 ENCOUNTER — TELEPHONE (OUTPATIENT)
Dept: TRANSPLANT | Facility: CLINIC | Age: 54
End: 2024-09-12
Payer: COMMERCIAL

## 2024-09-17 ENCOUNTER — TRANSFERRED RECORDS (OUTPATIENT)
Dept: HEALTH INFORMATION MANAGEMENT | Facility: CLINIC | Age: 54
End: 2024-09-17

## 2024-10-04 ENCOUNTER — LAB (OUTPATIENT)
Dept: LAB | Facility: CLINIC | Age: 54
End: 2024-10-04
Payer: COMMERCIAL

## 2024-10-04 DIAGNOSIS — T86.10 COMPLICATIONS, KIDNEY TRANSPLANT: ICD-10-CM

## 2024-10-04 DIAGNOSIS — Z94.0 KIDNEY REPLACED BY TRANSPLANT: ICD-10-CM

## 2024-10-04 DIAGNOSIS — N18.5 STAGE 5 CHRONIC KIDNEY DISEASE NOT ON CHRONIC DIALYSIS (H): Primary | ICD-10-CM

## 2024-10-04 DIAGNOSIS — Z92.25 STATUS POST RECENT IMMUNOSUPPRESSIVE THERAPY: ICD-10-CM

## 2024-10-04 DIAGNOSIS — M10.9 GOUT, UNSPECIFIED CAUSE, UNSPECIFIED CHRONICITY, UNSPECIFIED SITE: ICD-10-CM

## 2024-10-04 DIAGNOSIS — Z94.83 PANCREAS REPLACED BY TRANSPLANT (H): ICD-10-CM

## 2024-10-04 DIAGNOSIS — Z79.899 ENCOUNTER FOR LONG-TERM (CURRENT) USE OF HIGH-RISK MEDICATION: ICD-10-CM

## 2024-10-04 DIAGNOSIS — N18.4 STAGE 4 CHRONIC KIDNEY DISEASE (H): ICD-10-CM

## 2024-10-04 DIAGNOSIS — Z48.298 AFTERCARE FOLLOWING ORGAN TRANSPLANT: ICD-10-CM

## 2024-10-04 LAB
ALBUMIN MFR UR ELPH: 409 MG/DL
ALBUMIN SERPL BCG-MCNC: 3.6 G/DL (ref 3.5–5.2)
ANION GAP SERPL CALCULATED.3IONS-SCNC: 9 MMOL/L (ref 7–15)
BUN SERPL-MCNC: 72.8 MG/DL (ref 6–20)
CALCIUM SERPL-MCNC: 8.9 MG/DL (ref 8.8–10.4)
CHLORIDE SERPL-SCNC: 111 MMOL/L (ref 98–107)
CREAT SERPL-MCNC: 4.15 MG/DL (ref 0.67–1.17)
CREAT UR-MCNC: 62.7 MG/DL
CREAT UR-MCNC: 62.7 MG/DL
CYCLOSPORINE BLD LC/MS/MS-MCNC: 97 UG/L (ref 50–400)
EGFRCR SERPLBLD CKD-EPI 2021: 16 ML/MIN/1.73M2
ERYTHROCYTE [DISTWIDTH] IN BLOOD BY AUTOMATED COUNT: 12.8 % (ref 10–15)
GLUCOSE SERPL-MCNC: 111 MG/DL (ref 70–99)
HCO3 SERPL-SCNC: 21 MMOL/L (ref 22–29)
HCT VFR BLD AUTO: 43 % (ref 40–53)
HGB BLD-MCNC: 14.3 G/DL (ref 13.3–17.7)
MCH RBC QN AUTO: 27.7 PG (ref 26.5–33)
MCHC RBC AUTO-ENTMCNC: 33.3 G/DL (ref 31.5–36.5)
MCV RBC AUTO: 83 FL (ref 78–100)
MICROALBUMIN UR-MCNC: 2787 MG/L
MICROALBUMIN/CREAT UR: 4444.98 MG/G CR (ref 0–17)
PHOSPHATE SERPL-MCNC: 5.2 MG/DL (ref 2.5–4.5)
PLATELET # BLD AUTO: 174 10E3/UL (ref 150–450)
POTASSIUM SERPL-SCNC: 4.9 MMOL/L (ref 3.4–5.3)
PROT/CREAT 24H UR: 6.52 MG/MG CR (ref 0–0.2)
RBC # BLD AUTO: 5.17 10E6/UL (ref 4.4–5.9)
SODIUM SERPL-SCNC: 141 MMOL/L (ref 135–145)
TME LAST DOSE: NORMAL H
TME LAST DOSE: NORMAL H
URATE SERPL-MCNC: 8.6 MG/DL (ref 3.4–7)
WBC # BLD AUTO: 8.4 10E3/UL (ref 4–11)

## 2024-10-04 PROCEDURE — 84156 ASSAY OF PROTEIN URINE: CPT

## 2024-10-04 PROCEDURE — 80069 RENAL FUNCTION PANEL: CPT

## 2024-10-04 PROCEDURE — 36415 COLL VENOUS BLD VENIPUNCTURE: CPT

## 2024-10-04 PROCEDURE — 85027 COMPLETE CBC AUTOMATED: CPT

## 2024-10-04 PROCEDURE — 82570 ASSAY OF URINE CREATININE: CPT

## 2024-10-04 PROCEDURE — 80158 DRUG ASSAY CYCLOSPORINE: CPT

## 2024-10-04 PROCEDURE — 84550 ASSAY OF BLOOD/URIC ACID: CPT

## 2024-10-04 PROCEDURE — 82043 UR ALBUMIN QUANTITATIVE: CPT

## 2024-10-07 ENCOUNTER — TELEPHONE (OUTPATIENT)
Dept: TRANSPLANT | Facility: CLINIC | Age: 54
End: 2024-10-07
Payer: COMMERCIAL

## 2024-10-07 DIAGNOSIS — Z94.0 KIDNEY TRANSPLANTED: Primary | ICD-10-CM

## 2024-10-07 DIAGNOSIS — Z48.298 AFTERCARE FOLLOWING ORGAN TRANSPLANT: ICD-10-CM

## 2024-10-07 DIAGNOSIS — R80.9 PROTEINURIA: ICD-10-CM

## 2024-10-07 DIAGNOSIS — T86.10 COMPLICATIONS, KIDNEY TRANSPLANT: ICD-10-CM

## 2024-10-07 NOTE — TELEPHONE ENCOUNTER
Bakari Hurtado MD [place]um, Teresa, RN Sam,    No need to continue to follow proteinuria on Mr. Romero.  Maybe just once a year would be fine.    [employer]

## 2024-10-09 ENCOUNTER — NURSE TRIAGE (OUTPATIENT)
Dept: FAMILY MEDICINE | Facility: CLINIC | Age: 54
End: 2024-10-09
Payer: COMMERCIAL

## 2024-10-09 NOTE — TELEPHONE ENCOUNTER
FYI - Status Update    Who is Calling: Patient    Update: Patient states he has stage 5 kidney failure and was vomiting last night, 10/8/24 and is wondering about Dr Lei's opinion dialysis or other follow up.     Does caller want a call/response back: Yes     Could we send this information to you in [employer] or would you prefer to receive a phone call?:   Patient would prefer a phone call   Okay to leave a detailed message?: Yes at Cell number on file:    Telephone Information:   Mobile [phone]

## 2024-10-10 NOTE — TELEPHONE ENCOUNTER
Reason for Disposition    Vomiting    Additional Information    Negative: Shock suspected (e.g., cold/pale/clammy skin, too weak to stand, low BP, rapid pulse)    Negative: Difficult to awaken or acting confused (e.g., disoriented, slurred speech)    Negative: Sounds like a life-threatening emergency to the triager    Negative: Vomiting occurs only while coughing    Negative: Pregnant < 20 Weeks and nausea/vomiting began in early pregnancy (i.e., 4-8 weeks pregnant)    Negative: Chest pain    Negative: Headache is main symptom    Negative: Vomiting red blood or black (coffee ground) material    Negative: Vomiting and hernia is more painful or swollen than usual    Negative: Recent head injury (within 3 days)    Negative: Recent abdominal injury (within 7 days)    Negative: Insulin-dependent diabetes and glucose > 240 mg/dL (13 mmol/L)    Negative: Severe pain in one eye    Negative: SEVERE vomiting (e.g., 6 or more times/day)  (Exception: Patient sounds well, is drinking liquids, does not sound dehydrated, and vomiting has lasted less than 24 hours.)    Negative: MODERATE vomiting (e.g., 3 - 5 times/day) and age > 60 years    Negative: Constant abdominal pain lasting > 2 hours    Negative: High-risk adult (e.g., brain tumor, V-P shunt, hernia)    Negative: Drinking very little and has signs of dehydration (e.g., no urine > 12 hours, very dry mouth, very lightheaded)    Negative: Patient sounds very sick or weak to the triager    Negative: Vomiting and abdomen looks much more swollen than usual    Negative: Vomiting contains bile (green color)    Negative: Fever > 103 F (39.4 C)    Negative: Fever > 101 F (38.3 C) and over 60 years of age    Negative: Fever > 100.0 F (37.8 C) and has a weak immune system (e.g., HIV positive, cancer chemo, organ transplant, splenectomy, chronic steroids)    Negative: Fever > 100.0 F (37.8 C) and bedridden (e.g., CVA, chronic illness, recovering from surgery)    Negative: Taking any of  "the following medications: digoxin (Lanoxin), lithium, theophylline, phenytoin (Dilantin)    Negative: MILD to MODERATE vomiting (e.g., 1-5 times/day) and lasts > 48 hours (2 days)    Negative: Fever present > 3 days (72 hours)    Negative: Patient wants to be seen    Negative: Vomiting a prescription medication    Negative: Substance use (drug use) or unhealthy alcohol use, known or suspected    Negative: Vomiting is a chronic symptom (recurrent or ongoing AND lasting > 4 weeks)    Answer Assessment - Initial Assessment Questions  1. VOMITING SEVERITY: \"How many times have you vomited in the past 24 hours?\"      - MILD:  1 - 2 times/day     - MODERATE: 3 - 5 times/day, decreased oral intake without significant weight loss or symptoms of dehydration     - SEVERE: 6 or more times/day, vomits everything or nearly everything, with significant weight loss, symptoms of dehydration       *No Answer*  2. ONSET: \"When did the vomiting begin?\"       *No Answer*  3. FLUIDS: \"What fluids or food have you vomited up today?\" \"Have you been able to keep any fluids down?\"      *No Answer*  4. ABDOMEN PAIN: \"Are your having any abdomen pain?\" If Yes : \"How bad is it and what does it feel like?\" (e.g., crampy, dull, intermittent, constant)       *No Answer*  5. DIARRHEA: \"Is there any diarrhea?\" If Yes, ask: \"How many times today?\"       *No Answer*  6. CONTACTS: \"Is there anyone else in the family with the same symptoms?\"       *No Answer*  7. CAUSE: \"What do you think is causing your vomiting?\"      *No Answer*  8. HYDRATION STATUS: \"Any signs of dehydration?\" (e.g., dry mouth [not only dry lips], too weak to stand) \"When did you last urinate?\"      *No Answer*  9. OTHER SYMPTOMS: \"Do you have any other symptoms?\" (e.g., fever, headache, vertigo, vomiting blood or coffee grounds, recent head injury)      *No Answer*  10. PREGNANCY: \"Is there any chance you are pregnant?\" \"When was your last menstrual period?\"        *No " Answer*    Protocols used: Vomiting-A-OH

## 2024-10-10 NOTE — TELEPHONE ENCOUNTER
"1. VOMITING SEVERITY: Last 24 hours 0 times vomited   2. ONSET: began Tues evening aprox 11 pm only had a small amount of hysical activity before becoming sick vomited x 2  3. FLUIDS: patient has no vomited since drinking fluids   4. ABDOMEN PAIN: no  5. DIARRHEA: no  6. CONTACTS: no  7. CAUSE: \"thinks d/t stage 5 kidney failure  8. HYDRATION STATUS: no dehydration  9. OTHER SYMPTOMS: headache, more frequently, small nausea yesterday  10. PREGNANCY: NA    Patient has questions about when he would need to start dialysis.     Says it is symptoms based.     Patient had episode recently.    Has appt on 10/17/2024    TRACI Lopez  [facility]  [phone]    [facility]   Monday  - Thursday 7 AM - 6 PM    Friday  7 AM - 5 PM     -Please call your clinic for assistance from a nurse after hours.   "

## 2024-10-17 ENCOUNTER — OFFICE VISIT (OUTPATIENT)
Dept: FAMILY MEDICINE | Facility: CLINIC | Age: 54
End: 2024-10-17
Payer: COMMERCIAL

## 2024-10-17 VITALS
HEIGHT: 72 IN | TEMPERATURE: 99.4 F | RESPIRATION RATE: 14 BRPM | HEART RATE: 77 BPM | OXYGEN SATURATION: 96 % | BODY MASS INDEX: 37.11 KG/M2 | WEIGHT: 274 LBS | DIASTOLIC BLOOD PRESSURE: 98 MMHG | SYSTOLIC BLOOD PRESSURE: 154 MMHG

## 2024-10-17 DIAGNOSIS — N18.5 STAGE 5 CHRONIC KIDNEY DISEASE NOT ON CHRONIC DIALYSIS (H): ICD-10-CM

## 2024-10-17 DIAGNOSIS — G47.419 PRIMARY NARCOLEPSY WITHOUT CATAPLEXY: ICD-10-CM

## 2024-10-17 DIAGNOSIS — I15.1 HTN, KIDNEY TRANSPLANT RELATED: ICD-10-CM

## 2024-10-17 DIAGNOSIS — Z94.0 HTN, KIDNEY TRANSPLANT RELATED: ICD-10-CM

## 2024-10-17 DIAGNOSIS — M1A.39X0 CHRONIC GOUT DUE TO RENAL IMPAIRMENT OF MULTIPLE SITES WITHOUT TOPHUS: ICD-10-CM

## 2024-10-17 DIAGNOSIS — R42 VERTIGO: Primary | ICD-10-CM

## 2024-10-17 PROBLEM — Z29.89 NEED FOR PNEUMOCYSTIS PROPHYLAXIS: Status: RESOLVED | Noted: 2023-06-27 | Resolved: 2024-10-17

## 2024-10-17 PROBLEM — R00.1 SINUS BRADYCARDIA BY ELECTROCARDIOGRAM: Status: RESOLVED | Noted: 2024-08-21 | Resolved: 2024-10-17

## 2024-10-17 PROCEDURE — 90472 IMMUNIZATION ADMIN EACH ADD: CPT | Performed by: INTERNAL MEDICINE

## 2024-10-17 PROCEDURE — 90673 RIV3 VACCINE NO PRESERV IM: CPT | Performed by: INTERNAL MEDICINE

## 2024-10-17 PROCEDURE — 90471 IMMUNIZATION ADMIN: CPT | Performed by: INTERNAL MEDICINE

## 2024-10-17 PROCEDURE — 90750 HZV VACC RECOMBINANT IM: CPT | Performed by: INTERNAL MEDICINE

## 2024-10-17 PROCEDURE — 99214 OFFICE O/P EST MOD 30 MIN: CPT | Mod: 25 | Performed by: INTERNAL MEDICINE

## 2024-10-17 RX ORDER — MECLIZINE HYDROCHLORIDE 25 MG/1
25 TABLET ORAL 3 TIMES DAILY PRN
Qty: 30 TABLET | Refills: 1 | Status: SHIPPED | OUTPATIENT
Start: 2024-10-17

## 2024-10-17 RX ORDER — METHYLPHENIDATE HYDROCHLORIDE 5 MG/1
5 TABLET ORAL 2 TIMES DAILY PRN
Qty: 60 TABLET | Refills: 0 | Status: SHIPPED | OUTPATIENT
Start: 2024-10-17

## 2024-10-17 RX ORDER — ONDANSETRON 4 MG/1
4 TABLET, ORALLY DISINTEGRATING ORAL EVERY 8 HOURS PRN
Qty: 20 TABLET | Refills: 1 | Status: SHIPPED | OUTPATIENT
Start: 2024-10-17

## 2024-10-17 NOTE — ASSESSMENT & PLAN NOTE
h/o LUKT in '08 at [facility] (previously on HD for ~3 months), baseline SCr ~3.  Original disease was IgA nephropathy  IS: cyclosporine 100/125mg BID, MMF 1g BID   - worsening proteinuria; ~2g/g  12/2023: Transplant biopsy pursued for worsening allograft function and worsening proteinuria -significant for chronic antibody mediated rejection, CD4 positive; significant chronic transplant glomerulopathy.  Background IgA findings.  Significant interstitial fibrosis and tubular atrophy   -Direction from transplant clinic not to pursue any specific treatments, felt more chronic related changes    1/2024: Reviewed biopsy results with patient.  Expressed more chronic, transplant related changes.  Recommending optimization of his blood pressure control of less than 130/80.  Need for future relisting for transplant once GFR below 20 mL/min.  Should explore other social resources revolving around potential kidney donors.  -Defer cyclosporine dosing to his transplant provider    5/2024: Defer any further albuminuria medication options to his transplant team, specifically whether candidate for SGLT2 inhibitor use  Has maintain optimal blood pressure control since amlodipine introduction  Discussed pretransplant and need for completing other screening, immunization recommendations  -Tdap and Shingrix today  Provided number to schedule his screening colonoscopy in near future  Need to see dermatologist for annual skin surveillance  Likely will need to see BMI lower, defer to his formal pretransplant evaluation to set his necessary pretransplant weight goal    8/2024: Referring to [employer] predialysis education  - eGFR ~15mL/min (progressive decline observed this calendar year)  -Discussed renal replacement modalities specifically home dialysis including peritoneal dialysis versus an in center hemodialysis  -Preference to pursue home therapies to allow for continued work opportunities    10/2024: Stable allograft function;  eGFR 16mL/min  -Transient episodes of endorse nausea sound to be more vertiginous in origin  -No other significant uremic features identified  -Intent on pursuing home therapy, peritoneal dialysis when indicated

## 2024-10-17 NOTE — PROGRESS NOTES
Assessment & Plan   Problem List Items Addressed This Visit          Endocrine    Chronic gout due to renal impairment of multiple sites without tophus     - on allopurinol 300mg BID, probenecid 500mg BID   - flares generally responsive to prednisone therapy    8/2024: Reducing allopurinol down to 300 mg daily given reduction in GFR              Circulatory    HTN, kidney transplant related     uncontrolled  current antihypertensive regimen:  metoprolol 75mg BID, clonidine 0.2mg BID, losartan 50mg, amlodipine 5mg daily, furosemide 40mg daily  regimen changes:   intolerance:  future titration/work-up plan:   8/2024: Consideration for elimination of losartan given reduced GFR; would consider direct vasodilator, either hydralazine or minoxidil in the future depending on blood pressure response            Urinary    Stage 5 chronic kidney disease not on chronic dialysis (H)     h/o LUKT in '08 at [facility] (previously on HD for ~3 months), baseline SCr ~3.  Original disease was IgA nephropathy  IS: cyclosporine 100/125mg BID, MMF 1g BID   - worsening proteinuria; ~2g/g  12/2023: Transplant biopsy pursued for worsening allograft function and worsening proteinuria -significant for chronic antibody mediated rejection, CD4 positive; significant chronic transplant glomerulopathy.  Background IgA findings.  Significant interstitial fibrosis and tubular atrophy   -Direction from transplant clinic not to pursue any specific treatments, felt more chronic related changes    1/2024: Reviewed biopsy results with patient.  Expressed more chronic, transplant related changes.  Recommending optimization of his blood pressure control of less than 130/80.  Need for future relisting for transplant once GFR below 20 mL/min.  Should explore other social resources revolving around potential kidney donors.  -Defer cyclosporine dosing to his transplant provider    5/2024: Defer any further albuminuria medication options to his transplant team,  specifically whether candidate for SGLT2 inhibitor use  Has maintain optimal blood pressure control since amlodipine introduction  Discussed pretransplant and need for completing other screening, immunization recommendations  -Tdap and Shingrix today  Provided number to schedule his screening colonoscopy in near future  Need to see dermatologist for annual skin surveillance  Likely will need to see BMI lower, defer to his formal pretransplant evaluation to set his necessary pretransplant weight goal    8/2024: Referring to [employer] predialysis education  - eGFR ~15mL/min (progressive decline observed this calendar year)  -Discussed renal replacement modalities specifically home dialysis including peritoneal dialysis versus an in center hemodialysis  -Preference to pursue home therapies to allow for continued work opportunities    10/2024: Stable allograft function; eGFR 16mL/min  -Transient episodes of endorse nausea sound to be more vertiginous in origin  -No other significant uremic features identified  -Intent on pursuing home therapy, peritoneal dialysis when indicated         Relevant Orders    Basic metabolic panel    Albumin level    Hemoglobin    Phosphorus       Other    Narcolepsy    Relevant Medications    methylphenidate (RITALIN) 5 MG tablet     Other Visit Diagnoses       Vertigo    -  Primary    Relevant Medications    meclizine (ANTIVERT) 25 MG tablet    ondansetron (ZOFRAN ODT) 4 MG ODT tab    methylphenidate (RITALIN) 5 MG tablet    Other Relevant Orders    INFLUENZA VACCINE TRIVALENT(FLUBLOK) (Completed)    ZOSTER RECOMBINANT ADJUVANTED (SHINGRIX) (Completed)                  FUTURE APPOINTMENTS:       - Follow-up visit in 6 weeks      Andreina Palacio is a 54 year old, presenting for the following health issues: Returns for follow-up.  Raise concerns about episodic nausea experiences past week.  Describes an episode at work where developed an acute feeling of nausea accompanied by a  general imbalance dizziness sensation.  Has not had any similar episodes in the past.  States symptoms lasted through the evening and into the next morning.  No similar symptoms since.  Weight stable.  Mild degree of worsening lower extremity swelling.  No dyspnea and orthopnea.  Did attend predialysis education through [employer], expresses continued interest in home dialysis eventually.  Kidney Problem (Would like to talk about new symptoms of fatigue and nausea.)        10/17/2024     2:44 PM   Additional Questions   Roomed by Deisy TIJERINA     Kidney Problem  This is a chronic problem. The current episode started more than 1 year ago.   History of Present Illness       CKD: He uses over the counter pain medication, including tylenol, a few times a week.    He eats 0-1 servings of fruits and vegetables daily.He consumes 1 sweetened beverage(s) daily.He exercises with enough effort to increase his heart rate 10 to 19 minutes per day.  He exercises with enough effort to increase his heart rate 3 or less days per week.   He is taking medications regularly.         Review of Systems  Constitutional, HEENT, cardiovascular, pulmonary, gi and gu systems are negative, except as otherwise noted.      Objective    BP (!) 162/98   Pulse 77   Temp 99.4  F (37.4  C)   Resp 14   Ht 1.829 m (6')   Wt 124.3 kg (274 lb)   SpO2 96%   BMI 37.16 kg/m    Body mass index is 37.16 kg/m .  Physical Exam   GENERAL: alert and no distress  NECK: no adenopathy, no asymmetry, masses, or scars  RESP: lungs clear to auscultation - no rales, rhonchi or wheezes  CV: regular rate and rhythm, normal S1 S2, no S3 or S4, no murmur, click or rub, no peripheral edema  ABDOMEN: soft, nontender, no hepatosplenomegaly, no masses and bowel sounds normal  MS: no gross musculoskeletal defects noted, no edema  Normal Muir-Hallpike testing without any provoked nystagmus or vertigo symptoms    Lab on 10/04/2024   Component Date Value Ref Range Status     WBC Count 10/04/2024 8.4  4.0 - 11.0 10e3/uL Final    RBC Count 10/04/2024 5.17  4.40 - 5.90 10e6/uL Final    Hemoglobin 10/04/2024 14.3  13.3 - 17.7 g/dL Final    Hematocrit 10/04/2024 43.0  40.0 - 53.0 % Final    MCV 10/04/2024 83  78 - 100 fL Final    MCH 10/04/2024 27.7  26.5 - 33.0 pg Final    MCHC 10/04/2024 33.3  31.5 - 36.5 g/dL Final    RDW 10/04/2024 12.8  10.0 - 15.0 % Final    Platelet Count 10/04/2024 174  150 - 450 10e3/uL Final    Sodium 10/04/2024 141  135 - 145 mmol/L Final    Potassium 10/04/2024 4.9  3.4 - 5.3 mmol/L Final    Chloride 10/04/2024 111 (H)  98 - 107 mmol/L Final    Carbon Dioxide (CO2) 10/04/2024 21 (L)  22 - 29 mmol/L Final    Anion Gap 10/04/2024 9  7 - 15 mmol/L Final    Urea Nitrogen 10/04/2024 72.8 (H)  6.0 - 20.0 mg/dL Final    Creatinine 10/04/2024 4.15 (H)  0.67 - 1.17 mg/dL Final    GFR Estimate 10/04/2024 16 (L)  >60 mL/min/1.73m2 Final    eGFR calculated using 2021 CKD-EPI equation.    Calcium 10/04/2024 8.9  8.8 - 10.4 mg/dL Final    Reference intervals for this test were updated on 7/16/2024 to reflect our healthy population more accurately. There may be differences in the flagging of prior results with similar values performed with this method. Those prior results can be interpreted in the context of the updated reference intervals.    Glucose 10/04/2024 111 (H)  70 - 99 mg/dL Final    Cyclosporine 10/04/2024 97  50 - 400 ug/L Final    Comment: Cyclosporine Reference Range (ug/L):    Kidney Transplant:  Pediatric  0-3 months post transplant: 175-200  3-6 months post transplant: 150-175  6-9 months post transplant: 125-150  9-12 months post transplant: 100-125  >12 months post transplant:     Adult  0-3 months post transplant: 175-200  3-6 months post transplant: 150-200  6-12 months post transplant: 125-150  >12 months post transplant:     Heart Transplant:  Pediatric  0-12 months post transplant: 200-250  >12 months post transplant: 100-125    Adult  0-3  months post transplant: 150-250  0-3 months post transplant: 150-250  3-6 months post transplant: 125-225  6-12 months post transplant: 100-200  >12 months post transplant:     Heart-Lung Block: 200-250    Lung Transplant:  0-12 months post transplant: 175-225  >12 months post transplant: 125-175    Liver Transplant:  0-6 months post transplant: 150-200  >6 months post transplant: 100-150    Pancreas Transplant:  0-6 months post transplant: 200-250  6-12                            months post transplant: 150-200  >12 months post transplant: 100-150    Bone Marrow Transplant: 200-400     Cyclosporine Last Dose Date 10/04/2024 10/3/2024   Final    Cyclosporine Last Dose Time 10/04/2024  8:30 PM   Final    Total Protein Urine mg/dL 10/04/2024 409.0    mg/dL Final    The reference ranges have not been established in urine protein. The results should be integrated into the clinical context for interpretation.    Total Protein Urine mg/mg Creat 10/04/2024 6.52 (H)  0.00 - 0.20 mg/mg Cr Final    Creatinine Urine mg/dL 10/04/2024 62.7  mg/dL Final    The reference ranges have not been established in urine creatinine. The results should be integrated into the clinical context for interpretation.    Albumin 10/04/2024 3.6  3.5 - 5.2 g/dL Final    Phosphorus 10/04/2024 5.2 (H)  2.5 - 4.5 mg/dL Final    Uric Acid 10/04/2024 8.6 (H)  3.4 - 7.0 mg/dL Final    Creatinine Urine mg/dL 10/04/2024 62.7  mg/dL Final    The reference ranges have not been established in urine creatinine. The results should be integrated into the clinical context for interpretation.  The reference ranges have not been established in urine creatinine. The results should be integrated into the clinical context for interpretation.    Albumin Urine mg/L 10/04/2024 2,787.0  mg/L Final    The reference ranges have not been established in urine albumin. The results should be integrated into the clinical context for interpretation.    Albumin Urine mg/g Cr  10/04/2024 4,444.98 (H)  0.00 - 17.00 mg/g Cr Final    Microalbuminuria is defined as an albumin:creatinine ratio of 17 to 299 for males and 25 to 299 for females. A ratio of albumin:creatinine of 300 or higher is indicative of overt proteinuria.  Due to biologic variability, positive results should be confirmed by a second, first-morning random or 24-hour timed urine specimen. If there is discrepancy, a third specimen is recommended. When 2 out of 3 results are in the microalbuminuria range, this is evidence for incipient nephropathy and warrants increased efforts at glucose control, blood pressure control, and institution of therapy with an angiotensin-converting-enzyme (ACE) inhibitor (if the patient can tolerate it).             Signed Electronically by: Eric Lei MD

## 2024-10-17 NOTE — ASSESSMENT & PLAN NOTE
uncontrolled  current antihypertensive regimen:  metoprolol 75mg BID, clonidine 0.2mg BID, losartan 50mg, amlodipine 5mg daily, furosemide 40mg daily  regimen changes:   intolerance:  future titration/work-up plan:   8/2024: Consideration for elimination of losartan given reduced GFR; would consider direct vasodilator, either hydralazine or minoxidil in the future depending on blood pressure response

## 2024-11-08 ENCOUNTER — TELEPHONE (OUTPATIENT)
Dept: TRANSPLANT | Facility: CLINIC | Age: 54
End: 2024-11-08
Payer: COMMERCIAL

## 2024-11-08 ENCOUNTER — LAB (OUTPATIENT)
Dept: LAB | Facility: CLINIC | Age: 54
End: 2024-11-08
Payer: COMMERCIAL

## 2024-11-08 DIAGNOSIS — Z94.0 KIDNEY REPLACED BY TRANSPLANT: ICD-10-CM

## 2024-11-08 DIAGNOSIS — Z48.298 AFTERCARE FOLLOWING ORGAN TRANSPLANT: ICD-10-CM

## 2024-11-08 DIAGNOSIS — Z94.0 KIDNEY TRANSPLANTED: Primary | ICD-10-CM

## 2024-11-08 DIAGNOSIS — Z92.25 STATUS POST RECENT IMMUNOSUPPRESSIVE THERAPY: ICD-10-CM

## 2024-11-08 DIAGNOSIS — Z79.899 ENCOUNTER FOR LONG-TERM (CURRENT) USE OF HIGH-RISK MEDICATION: ICD-10-CM

## 2024-11-08 DIAGNOSIS — Z94.83 PANCREAS REPLACED BY TRANSPLANT (H): ICD-10-CM

## 2024-11-08 DIAGNOSIS — T86.10 COMPLICATIONS, KIDNEY TRANSPLANT: ICD-10-CM

## 2024-11-08 LAB
ANION GAP SERPL CALCULATED.3IONS-SCNC: 12 MMOL/L (ref 7–15)
BUN SERPL-MCNC: 73.7 MG/DL (ref 6–20)
CALCIUM SERPL-MCNC: 8.7 MG/DL (ref 8.8–10.4)
CHLORIDE SERPL-SCNC: 110 MMOL/L (ref 98–107)
CREAT SERPL-MCNC: 4.86 MG/DL (ref 0.67–1.17)
CYCLOSPORINE BLD LC/MS/MS-MCNC: 90 UG/L (ref 50–400)
EGFRCR SERPLBLD CKD-EPI 2021: 13 ML/MIN/1.73M2
ERYTHROCYTE [DISTWIDTH] IN BLOOD BY AUTOMATED COUNT: 13.1 % (ref 10–15)
GLUCOSE SERPL-MCNC: 117 MG/DL (ref 70–99)
HCO3 SERPL-SCNC: 20 MMOL/L (ref 22–29)
HCT VFR BLD AUTO: 39.1 % (ref 40–53)
HGB BLD-MCNC: 13.1 G/DL (ref 13.3–17.7)
MCH RBC QN AUTO: 28.3 PG (ref 26.5–33)
MCHC RBC AUTO-ENTMCNC: 33.5 G/DL (ref 31.5–36.5)
MCV RBC AUTO: 84 FL (ref 78–100)
PLATELET # BLD AUTO: 191 10E3/UL (ref 150–450)
POTASSIUM SERPL-SCNC: 5 MMOL/L (ref 3.4–5.3)
RBC # BLD AUTO: 4.63 10E6/UL (ref 4.4–5.9)
SODIUM SERPL-SCNC: 142 MMOL/L (ref 135–145)
TME LAST DOSE: NORMAL H
TME LAST DOSE: NORMAL H
WBC # BLD AUTO: 7.9 10E3/UL (ref 4–11)

## 2024-11-08 PROCEDURE — 85027 COMPLETE CBC AUTOMATED: CPT

## 2024-11-08 PROCEDURE — 80048 BASIC METABOLIC PNL TOTAL CA: CPT

## 2024-11-08 PROCEDURE — 80158 DRUG ASSAY CYCLOSPORINE: CPT

## 2024-11-08 PROCEDURE — 36415 COLL VENOUS BLD VENIPUNCTURE: CPT

## 2024-11-08 NOTE — LETTER
PHYSICIAN ORDERS      DATE & TIME ISSUED: 2024 3:40 PM  PATIENT NAME: Hakeem Romero   : 1970     [facility] MR# [if applicable]: [id number]     DIAGNOSIS / ICD - 10 CODES  Kidney Transplanted (Z94.0)  After Care Following Organ Transplant (Z48.298)  Long Term Use of High-risk Medication (Z79.899)  Immunosuppressed Status (Z92.25)  Complications Kidney Transplant (T86.10)      Please recheck in 2-4 weeks:  CBC        We ask your assistance in obtaining the following laboratory tests for Solid Organ Transplant patients.      Please fax each result to [phone], same day as resulted/available    Critical lab results page [phone]  Any questions please call [phone].      .

## 2024-11-08 NOTE — TELEPHONE ENCOUNTER
PLAN:  Assess for any bleeding.  Nosebleeds?   Blood in urine?   Bloody stools?   Dark stools?   Recent surgery?    OUTCOME:  Denies any signs of bleeding.  Rechecking CBC in 2-4 weeks    Lab order sent to:  [facility]   Phone: [phone]   Fax: [phone]

## 2024-11-13 ENCOUNTER — TRANSFERRED RECORDS (OUTPATIENT)
Dept: HEALTH INFORMATION MANAGEMENT | Facility: CLINIC | Age: 54
End: 2024-11-13
Payer: COMMERCIAL

## 2024-11-22 ENCOUNTER — APPOINTMENT (OUTPATIENT)
Dept: LAB | Facility: CLINIC | Age: 54
End: 2024-11-22
Payer: COMMERCIAL

## 2024-11-26 NOTE — TELEPHONE ENCOUNTER
RECORDS STATUS - ALL OTHER DIAGNOSIS      RECORDS RECEIVED FROM: [facility] - Internal records   DATE RECEIVED: 11/26

## 2024-12-05 ENCOUNTER — OFFICE VISIT (OUTPATIENT)
Dept: FAMILY MEDICINE | Facility: CLINIC | Age: 54
End: 2024-12-05
Payer: COMMERCIAL

## 2024-12-05 VITALS
SYSTOLIC BLOOD PRESSURE: 217 MMHG | TEMPERATURE: 98.6 F | RESPIRATION RATE: 18 BRPM | OXYGEN SATURATION: 99 % | HEIGHT: 72 IN | HEART RATE: 72 BPM | WEIGHT: 280 LBS | BODY MASS INDEX: 37.93 KG/M2 | DIASTOLIC BLOOD PRESSURE: 104 MMHG

## 2024-12-05 DIAGNOSIS — N02.B9 IGA NEPHROPATHY: ICD-10-CM

## 2024-12-05 DIAGNOSIS — Z00.00 HEALTH CARE MAINTENANCE: ICD-10-CM

## 2024-12-05 DIAGNOSIS — E78.5 DYSLIPIDEMIA: ICD-10-CM

## 2024-12-05 DIAGNOSIS — N18.5 STAGE 5 CHRONIC KIDNEY DISEASE NOT ON CHRONIC DIALYSIS (H): Primary | ICD-10-CM

## 2024-12-05 DIAGNOSIS — Z94.0 KIDNEY REPLACED BY TRANSPLANT: ICD-10-CM

## 2024-12-05 DIAGNOSIS — Z94.0 HTN, KIDNEY TRANSPLANT RELATED: ICD-10-CM

## 2024-12-05 DIAGNOSIS — G47.419 PRIMARY NARCOLEPSY WITHOUT CATAPLEXY: ICD-10-CM

## 2024-12-05 DIAGNOSIS — M1A.39X0 CHRONIC GOUT DUE TO RENAL IMPAIRMENT OF MULTIPLE SITES WITHOUT TOPHUS: ICD-10-CM

## 2024-12-05 DIAGNOSIS — I15.1 HTN, KIDNEY TRANSPLANT RELATED: ICD-10-CM

## 2024-12-05 RX ORDER — CARVEDILOL 12.5 MG/1
12.5 TABLET ORAL 2 TIMES DAILY WITH MEALS
Qty: 180 TABLET | Refills: 3 | Status: SHIPPED | OUTPATIENT
Start: 2024-12-05

## 2024-12-05 RX ORDER — CYCLOSPORINE 25 MG/1
25 CAPSULE, LIQUID FILLED ORAL 2 TIMES DAILY
Qty: 180 CAPSULE | Refills: 3 | Status: SHIPPED | OUTPATIENT
Start: 2024-12-05

## 2024-12-05 RX ORDER — METOPROLOL TARTRATE 75 MG/1
1 TABLET ORAL 2 TIMES DAILY
Qty: 180 TABLET | Refills: 3 | Status: CANCELLED | OUTPATIENT
Start: 2024-12-05

## 2024-12-05 RX ORDER — CLONIDINE HYDROCHLORIDE 0.1 MG/1
0.1 TABLET ORAL 2 TIMES DAILY
Qty: 180 TABLET | Refills: 3 | Status: SHIPPED | OUTPATIENT
Start: 2024-12-05

## 2024-12-05 RX ORDER — PRAVASTATIN SODIUM 40 MG
40 TABLET ORAL DAILY
Qty: 90 TABLET | Refills: 3 | Status: SHIPPED | OUTPATIENT
Start: 2024-12-05

## 2024-12-05 RX ORDER — MINOXIDIL 2.5 MG/1
2.5 TABLET ORAL 2 TIMES DAILY
Qty: 180 TABLET | Refills: 1 | Status: SHIPPED | OUTPATIENT
Start: 2024-12-05

## 2024-12-05 RX ORDER — AMLODIPINE BESYLATE 5 MG/1
5 TABLET ORAL DAILY
Qty: 90 TABLET | Refills: 3 | Status: SHIPPED | OUTPATIENT
Start: 2024-12-05

## 2024-12-05 RX ORDER — MYCOPHENOLATE MOFETIL 250 MG/1
1000 CAPSULE ORAL 2 TIMES DAILY
Qty: 720 CAPSULE | Refills: 3 | Status: SHIPPED | OUTPATIENT
Start: 2024-12-05

## 2024-12-05 RX ORDER — METHYLPHENIDATE HYDROCHLORIDE 5 MG/1
5 TABLET ORAL 2 TIMES DAILY PRN
Qty: 60 TABLET | Refills: 0 | Status: SHIPPED | OUTPATIENT
Start: 2024-12-05

## 2024-12-05 NOTE — ASSESSMENT & PLAN NOTE
uncontrolled  current antihypertensive regimen:  metoprolol 75mg BID, clonidine 0.2mg BID, losartan 50mg, amlodipine 5mg daily, furosemide 40mg daily  regimen changes: replace metoprolol with carvedilol 12.5mg BID, adding minoxidil 2.5mg BID  intolerance:  future titration/work-up plan:   8/2024: Consideration for elimination of losartan given reduced GFR; would consider direct vasodilator, either hydralazine or minoxidil in the future depending on blood pressure response

## 2024-12-05 NOTE — PROGRESS NOTES
Assessment & Plan   Problem List Items Addressed This Visit          Endocrine    Dyslipidemia    Relevant Medications    pravastatin (PRAVACHOL) 40 MG tablet    Chronic gout due to renal impairment of multiple sites without tophus     - on allopurinol 300mg BID, probenecid 500mg BID   - flares generally responsive to prednisone therapy    8/2024: Reducing allopurinol down to 300 mg daily given reduction in GFR              Circulatory    HTN, kidney transplant related     uncontrolled  current antihypertensive regimen:  metoprolol 75mg BID, clonidine 0.2mg BID, losartan 50mg, amlodipine 5mg daily, furosemide 40mg daily  regimen changes: replace metoprolol with carvedilol 12.5mg BID, adding minoxidil 2.5mg BID  intolerance:  future titration/work-up plan:   8/2024: Consideration for elimination of losartan given reduced GFR; would consider direct vasodilator, either hydralazine or minoxidil in the future depending on blood pressure response         Relevant Medications    amLODIPine (NORVASC) 5 MG tablet    cloNIDine (CATAPRES) 0.1 MG tablet    carvedilol (COREG) 12.5 MG tablet    minoxidil (LONITEN) 2.5 MG tablet       Urinary    Stage 5 chronic kidney disease not on chronic dialysis (H) - Primary     h/o LUKT in '08 at [facility] (previously on HD for ~3 months), baseline SCr ~3.  Original disease was IgA nephropathy  IS: cyclosporine 100/125mg BID, MMF 1g BID   - worsening proteinuria; ~2g/g  12/2023: Transplant biopsy pursued for worsening allograft function and worsening proteinuria -significant for chronic antibody mediated rejection, CD4 positive; significant chronic transplant glomerulopathy.  Background IgA findings.  Significant interstitial fibrosis and tubular atrophy   -Direction from transplant clinic not to pursue any specific treatments, felt more chronic related changes    1/2024: Reviewed biopsy results with patient.  Expressed more chronic, transplant related changes.  Recommending optimization of his  blood pressure control of less than 130/80.  Need for future relisting for transplant once GFR below 20 mL/min.  Should explore other social resources revolving around potential kidney donors.  -Defer cyclosporine dosing to his transplant provider    5/2024: Defer any further albuminuria medication options to his transplant team, specifically whether candidate for SGLT2 inhibitor use  Has maintain optimal blood pressure control since amlodipine introduction  Discussed pretransplant and need for completing other screening, immunization recommendations  -Tdap and Shingrix today  Provided number to schedule his screening colonoscopy in near future  Need to see dermatologist for annual skin surveillance  Likely will need to see BMI lower, defer to his formal pretransplant evaluation to set his necessary pretransplant weight goal    8/2024: Referring to [employer] predialysis education  - eGFR ~15mL/min (progressive decline observed this calendar year)  -Discussed renal replacement modalities specifically home dialysis including peritoneal dialysis versus an in center hemodialysis  -Preference to pursue home therapies to allow for continued work opportunities    10/2024: Stable allograft function; eGFR 16mL/min  -Transient episodes of endorse nausea sound to be more vertiginous in origin  -No other significant uremic features identified  -Intent on pursuing home therapy, peritoneal dialysis when indicated    12/2024:  Stable allograft function, eGFR 12mL/min  -No further nausea emesis episodes like described at last visit  -Some increased degree of foot and ankle swelling; okay with increasing his furosemide by additional 40 mg tablet per day as needed  -No significant uremic features  -Did discuss future plans for preemptive home dialysis therapy introduction before becoming overtly sick.  Discussed timeframe for need of peritoneal catheter placement.  Would anticipate this transition when GFR less than 10          Relevant Orders    Albumin level    Basic metabolic panel    Hemoglobin    Parathyroid Hormone Intact    Phosphorus    Uric acid    IgA nephropathy    Relevant Medications    amLODIPine (NORVASC) 5 MG tablet    cloNIDine (CATAPRES) 0.1 MG tablet    pravastatin (PRAVACHOL) 40 MG tablet    carvedilol (COREG) 12.5 MG tablet    minoxidil (LONITEN) 2.5 MG tablet       Other    Kidney replaced by transplant    Relevant Medications    CELLCEPT (BRAND) 250 MG capsule    cycloSPORINE modified (GENERIC EQUIVALENT) 25 MG capsule    Narcolepsy    Relevant Medications    methylphenidate (RITALIN) 5 MG tablet     Other Visit Diagnoses       Health care maintenance        Relevant Orders    REVIEW OF HEALTH MAINTENANCE PROTOCOL ORDERS (Completed)                  BMI  Estimated body mass index is 37.97 kg/m  as calculated from the following:    Height as of this encounter: 1.829 m (6').    Weight as of this encounter: 127 kg (280 lb).       FUTURE APPOINTMENTS:       - Follow-up visit in 2 months      Andreina Palacio is a 54 year old, presenting for the following health issues: Returns for 2-month CKD follow-up.  No real noticeable changes compared to last visit.  No similar emesis episodes like he describes the time of last visit.  Not checking blood pressures at home.  Does describe some headache later in the day -typically near timeframe that he takes his evening blood pressure medications.  Some issues noted with some swelling in feet and ankles -aggravated when up on his feet during the day.  Kidney Problem        12/5/2024     4:00 PM   Additional Questions   Roomed by Deisy TIJERINA     Kidney Problem  This is a chronic problem. The current episode started more than 1 year ago.   History of Present Illness       CKD: He is not using over the counter pain medicine.     He eats 0-1 servings of fruits and vegetables daily.He consumes 0 sweetened beverage(s) daily.He exercises with enough effort to increase his heart rate 9  or less minutes per day.  He exercises with enough effort to increase his heart rate 3 or less days per week.   He is taking medications regularly.         Review of Systems  Constitutional, HEENT, cardiovascular, pulmonary, gi and gu systems are negative, except as otherwise noted.      Objective    BP (!) 223/129   Pulse 72   Temp 98.6  F (37  C)   Resp 18   Ht 1.829 m (6')   Wt 127 kg (280 lb)   SpO2 99%   BMI 37.97 kg/m    Body mass index is 37.97 kg/m .  Physical Exam   GENERAL: alert and no distress  NECK: no adenopathy, no asymmetry, masses, or scars  RESP: lungs clear to auscultation - no rales, rhonchi or wheezes  CV: regular rate and rhythm, normal S1 S2, no S3 or S4, no murmur, click or rub, no peripheral edema  ABDOMEN: soft, nontender, no hepatosplenomegaly, no masses and bowel sounds normal  MS: no gross musculoskeletal defects noted, no edema    Lab on 11/22/2024   Component Date Value Ref Range Status    Sodium 11/22/2024 142  135 - 145 mmol/L Final    Potassium 11/22/2024 4.7  3.4 - 5.3 mmol/L Final    Chloride 11/22/2024 110 (H)  98 - 107 mmol/L Final    Carbon Dioxide (CO2) 11/22/2024 21 (L)  22 - 29 mmol/L Final    Anion Gap 11/22/2024 11  7 - 15 mmol/L Final    Urea Nitrogen 11/22/2024 71.2 (H)  6.0 - 20.0 mg/dL Final    Creatinine 11/22/2024 4.85 (H)  0.67 - 1.17 mg/dL Final    GFR Estimate 11/22/2024 13 (L)  >60 mL/min/1.73m2 Final    eGFR calculated using 2021 CKD-EPI equation.    Calcium 11/22/2024 9.1  8.8 - 10.4 mg/dL Final    Reference intervals for this test were updated on 7/16/2024 to reflect our healthy population more accurately. There may be differences in the flagging of prior results with similar values performed with this method. Those prior results can be interpreted in the context of the updated reference intervals.    Glucose 11/22/2024 109 (H)  70 - 99 mg/dL Final    Albumin 11/22/2024 3.7  3.5 - 5.2 g/dL Final    Phosphorus 11/22/2024 6.3 (H)  2.5 - 4.5 mg/dL Final     WBC Count 11/22/2024 8.3  4.0 - 11.0 10e3/uL Final    RBC Count 11/22/2024 4.57  4.40 - 5.90 10e6/uL Final    Hemoglobin 11/22/2024 12.8 (L)  13.3 - 17.7 g/dL Final    Hematocrit 11/22/2024 39.1 (L)  40.0 - 53.0 % Final    MCV 11/22/2024 86  78 - 100 fL Final    MCH 11/22/2024 28.0  26.5 - 33.0 pg Final    MCHC 11/22/2024 32.7  31.5 - 36.5 g/dL Final    RDW 11/22/2024 13.3  10.0 - 15.0 % Final    Platelet Count 11/22/2024 172  150 - 450 10e3/uL Final           Signed Electronically by: Eric Lei MD

## 2024-12-05 NOTE — ASSESSMENT & PLAN NOTE
h/o LUKT in '08 at [facility] (previously on HD for ~3 months), baseline SCr ~3.  Original disease was IgA nephropathy  IS: cyclosporine 100/125mg BID, MMF 1g BID   - worsening proteinuria; ~2g/g  12/2023: Transplant biopsy pursued for worsening allograft function and worsening proteinuria -significant for chronic antibody mediated rejection, CD4 positive; significant chronic transplant glomerulopathy.  Background IgA findings.  Significant interstitial fibrosis and tubular atrophy   -Direction from transplant clinic not to pursue any specific treatments, felt more chronic related changes    1/2024: Reviewed biopsy results with patient.  Expressed more chronic, transplant related changes.  Recommending optimization of his blood pressure control of less than 130/80.  Need for future relisting for transplant once GFR below 20 mL/min.  Should explore other social resources revolving around potential kidney donors.  -Defer cyclosporine dosing to his transplant provider    5/2024: Defer any further albuminuria medication options to his transplant team, specifically whether candidate for SGLT2 inhibitor use  Has maintain optimal blood pressure control since amlodipine introduction  Discussed pretransplant and need for completing other screening, immunization recommendations  -Tdap and Shingrix today  Provided number to schedule his screening colonoscopy in near future  Need to see dermatologist for annual skin surveillance  Likely will need to see BMI lower, defer to his formal pretransplant evaluation to set his necessary pretransplant weight goal    8/2024: Referring to [employer] predialysis education  - eGFR ~15mL/min (progressive decline observed this calendar year)  -Discussed renal replacement modalities specifically home dialysis including peritoneal dialysis versus an in center hemodialysis  -Preference to pursue home therapies to allow for continued work opportunities    10/2024: Stable allograft function;  eGFR 16mL/min  -Transient episodes of endorse nausea sound to be more vertiginous in origin  -No other significant uremic features identified  -Intent on pursuing home therapy, peritoneal dialysis when indicated    12/2024:  Stable allograft function, eGFR 12mL/min  -No further nausea emesis episodes like described at last visit  -Some increased degree of foot and ankle swelling; okay with increasing his furosemide by additional 40 mg tablet per day as needed  -No significant uremic features  -Did discuss future plans for preemptive home dialysis therapy introduction before becoming overtly sick.  Discussed timeframe for need of peritoneal catheter placement.  Would anticipate this transition when GFR less than 10

## 2024-12-09 ENCOUNTER — ANCILLARY PROCEDURE (OUTPATIENT)
Dept: ULTRASOUND IMAGING | Facility: CLINIC | Age: 54
End: 2024-12-09
Attending: NURSE PRACTITIONER
Payer: COMMERCIAL

## 2024-12-09 ENCOUNTER — LAB (OUTPATIENT)
Dept: LAB | Facility: CLINIC | Age: 54
End: 2024-12-09
Attending: NURSE PRACTITIONER
Payer: COMMERCIAL

## 2024-12-09 ENCOUNTER — ANCILLARY PROCEDURE (OUTPATIENT)
Dept: CT IMAGING | Facility: CLINIC | Age: 54
End: 2024-12-09
Attending: NURSE PRACTITIONER
Payer: COMMERCIAL

## 2024-12-09 ENCOUNTER — OFFICE VISIT (OUTPATIENT)
Dept: CARDIOLOGY | Facility: CLINIC | Age: 54
End: 2024-12-09
Attending: NURSE PRACTITIONER
Payer: COMMERCIAL

## 2024-12-09 VITALS
WEIGHT: 277 LBS | SYSTOLIC BLOOD PRESSURE: 219 MMHG | BODY MASS INDEX: 37.57 KG/M2 | DIASTOLIC BLOOD PRESSURE: 110 MMHG | OXYGEN SATURATION: 97 % | HEART RATE: 77 BPM

## 2024-12-09 DIAGNOSIS — D84.9 IMMUNOSUPPRESSED STATUS (H): ICD-10-CM

## 2024-12-09 DIAGNOSIS — T86.12 FAILED KIDNEY TRANSPLANT: ICD-10-CM

## 2024-12-09 DIAGNOSIS — E78.5 DYSLIPIDEMIA: ICD-10-CM

## 2024-12-09 DIAGNOSIS — Z94.0 HTN, KIDNEY TRANSPLANT RELATED: ICD-10-CM

## 2024-12-09 DIAGNOSIS — Z79.899 ENCOUNTER FOR LONG-TERM (CURRENT) USE OF HIGH-RISK MEDICATION: ICD-10-CM

## 2024-12-09 DIAGNOSIS — K21.9 GASTROESOPHAGEAL REFLUX DISEASE: ICD-10-CM

## 2024-12-09 DIAGNOSIS — R80.9 PROTEINURIA: ICD-10-CM

## 2024-12-09 DIAGNOSIS — I15.1 HTN, KIDNEY TRANSPLANT RELATED: ICD-10-CM

## 2024-12-09 DIAGNOSIS — Z92.25 STATUS POST RECENT IMMUNOSUPPRESSIVE THERAPY: ICD-10-CM

## 2024-12-09 DIAGNOSIS — E66.9 OBESITY (BMI 30-39.9): ICD-10-CM

## 2024-12-09 DIAGNOSIS — N02.B9 IGA NEPHROPATHY: ICD-10-CM

## 2024-12-09 DIAGNOSIS — Z94.83 PANCREAS REPLACED BY TRANSPLANT (H): ICD-10-CM

## 2024-12-09 DIAGNOSIS — N18.5 STAGE 5 CHRONIC KIDNEY DISEASE NOT ON CHRONIC DIALYSIS (H): ICD-10-CM

## 2024-12-09 DIAGNOSIS — Z76.82 ORGAN TRANSPLANT CANDIDATE: ICD-10-CM

## 2024-12-09 DIAGNOSIS — I10 ESSENTIAL HYPERTENSION: ICD-10-CM

## 2024-12-09 DIAGNOSIS — Z94.0 KIDNEY TRANSPLANTED: ICD-10-CM

## 2024-12-09 DIAGNOSIS — Z94.0 KIDNEY REPLACED BY TRANSPLANT: ICD-10-CM

## 2024-12-09 DIAGNOSIS — Z01.818 PRE-TRANSPLANT EVALUATION FOR KIDNEY TRANSPLANT: ICD-10-CM

## 2024-12-09 DIAGNOSIS — I10 POORLY-CONTROLLED HYPERTENSION: ICD-10-CM

## 2024-12-09 DIAGNOSIS — Z01.818 PRE-TRANSPLANT EVALUATION FOR KIDNEY TRANSPLANT: Primary | ICD-10-CM

## 2024-12-09 DIAGNOSIS — T86.10 COMPLICATIONS, KIDNEY TRANSPLANT: ICD-10-CM

## 2024-12-09 DIAGNOSIS — D64.9 ANEMIA: ICD-10-CM

## 2024-12-09 DIAGNOSIS — Z48.298 AFTERCARE FOLLOWING ORGAN TRANSPLANT: ICD-10-CM

## 2024-12-09 LAB
ALBUMIN MFR UR ELPH: 666 MG/DL
ANION GAP SERPL CALCULATED.3IONS-SCNC: 10 MMOL/L (ref 7–15)
BUN SERPL-MCNC: 65.7 MG/DL (ref 6–20)
CALCIUM SERPL-MCNC: 9 MG/DL (ref 8.8–10.4)
CHLORIDE SERPL-SCNC: 108 MMOL/L (ref 98–107)
CREAT SERPL-MCNC: 4.79 MG/DL (ref 0.67–1.17)
CREAT UR-MCNC: 80.5 MG/DL
CREAT UR-MCNC: 81.9 MG/DL
CYCLOSPORINE BLD LC/MS/MS-MCNC: 63 UG/L (ref 50–400)
EGFRCR SERPLBLD CKD-EPI 2021: 14 ML/MIN/1.73M2
ERYTHROCYTE [DISTWIDTH] IN BLOOD BY AUTOMATED COUNT: 13 % (ref 10–15)
FERRITIN SERPL-MCNC: 600 NG/ML (ref 31–409)
GLUCOSE SERPL-MCNC: 95 MG/DL (ref 70–99)
HCO3 SERPL-SCNC: 23 MMOL/L (ref 22–29)
HCT VFR BLD AUTO: 34.9 % (ref 40–53)
HGB BLD-MCNC: 11.6 G/DL (ref 13.3–17.7)
IRON BINDING CAPACITY (ROCHE): 226 UG/DL (ref 240–430)
IRON SATN MFR SERPL: 44 % (ref 15–46)
IRON SERPL-MCNC: 99 UG/DL (ref 61–157)
MCH RBC QN AUTO: 28.2 PG (ref 26.5–33)
MCHC RBC AUTO-ENTMCNC: 33.2 G/DL (ref 31.5–36.5)
MCV RBC AUTO: 85 FL (ref 78–100)
MICROALBUMIN UR-MCNC: 4389 MG/L
MICROALBUMIN/CREAT UR: 5358.97 MG/G CR (ref 0–17)
PLATELET # BLD AUTO: 160 10E3/UL (ref 150–450)
POTASSIUM SERPL-SCNC: 4.9 MMOL/L (ref 3.4–5.3)
PROT/CREAT 24H UR: 8.27 MG/MG CR (ref 0–0.2)
RBC # BLD AUTO: 4.11 10E6/UL (ref 4.4–5.9)
SODIUM SERPL-SCNC: 141 MMOL/L (ref 135–145)
TME LAST DOSE: NORMAL H
TME LAST DOSE: NORMAL H
WBC # BLD AUTO: 7.6 10E3/UL (ref 4–11)

## 2024-12-09 PROCEDURE — 99000 SPECIMEN HANDLING OFFICE-LAB: CPT | Performed by: PATHOLOGY

## 2024-12-09 PROCEDURE — 80048 BASIC METABOLIC PNL TOTAL CA: CPT | Performed by: PATHOLOGY

## 2024-12-09 PROCEDURE — 83550 IRON BINDING TEST: CPT | Performed by: PATHOLOGY

## 2024-12-09 PROCEDURE — 85027 COMPLETE CBC AUTOMATED: CPT | Performed by: PATHOLOGY

## 2024-12-09 PROCEDURE — 82570 ASSAY OF URINE CREATININE: CPT | Performed by: NURSE PRACTITIONER

## 2024-12-09 PROCEDURE — 74176 CT ABD & PELVIS W/O CONTRAST: CPT | Performed by: STUDENT IN AN ORGANIZED HEALTH CARE EDUCATION/TRAINING PROGRAM

## 2024-12-09 PROCEDURE — 99213 OFFICE O/P EST LOW 20 MIN: CPT | Performed by: INTERNAL MEDICINE

## 2024-12-09 PROCEDURE — 93978 VASCULAR STUDY: CPT | Mod: GC | Performed by: STUDENT IN AN ORGANIZED HEALTH CARE EDUCATION/TRAINING PROGRAM

## 2024-12-09 PROCEDURE — 84156 ASSAY OF PROTEIN URINE: CPT | Performed by: PATHOLOGY

## 2024-12-09 PROCEDURE — 80158 DRUG ASSAY CYCLOSPORINE: CPT | Performed by: INTERNAL MEDICINE

## 2024-12-09 PROCEDURE — 36415 COLL VENOUS BLD VENIPUNCTURE: CPT | Performed by: PATHOLOGY

## 2024-12-09 PROCEDURE — 99204 OFFICE O/P NEW MOD 45 MIN: CPT | Mod: 25 | Performed by: INTERNAL MEDICINE

## 2024-12-09 PROCEDURE — 82728 ASSAY OF FERRITIN: CPT | Performed by: PATHOLOGY

## 2024-12-09 PROCEDURE — 83540 ASSAY OF IRON: CPT | Performed by: PATHOLOGY

## 2024-12-09 PROCEDURE — 82043 UR ALBUMIN QUANTITATIVE: CPT | Performed by: NURSE PRACTITIONER

## 2024-12-09 ASSESSMENT — PAIN SCALES - GENERAL: PAINLEVEL_OUTOF10: NO PAIN (0)

## 2024-12-09 NOTE — PATIENT INSTRUCTIONS
Plan:    Start taking BP meds.  Nephrology should be managing your BP.  Once you have been taking the meds for 2-3 weeks, you may schedule a dobutamine stress test.  Follow up in 2 years.      Your Care Team:  [facility]iology   Telephone Number     Leo Ocasio RN [phone]    After business hours: [phone], ask for cardiologist on-call   Non-procedure scheduling:    Michelle HERNANDEZ   [phone]   Procedure scheduling:    Dorothy Pollard   [phone]   Device Clinic (Pacemakers, ICDs, Loop Recorders)    During business hours: [phone]  After business hours:   [phone]- select option 4 and ask for job code [id number].       Cardiovascular Clinic:   [address] [place] [address]. [place] [address]      As always, thank you for trusting us with your health care needs!

## 2024-12-09 NOTE — LETTER
12/9/2024      RE: Hakeem Romero  [address]  [facility] [address]       Dear Colleague,    Thank you for the opportunity to participate in the care of your patient, Hakeem Romero, at the [facility]. Please see a copy of my visit note below.    History:    Hakeem Romero is a 54 year old man with end stage renal failure who is referred for a cardiac evaluation prior to being listed for renal transplantation.    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  Not on hemodialysis.   Essential hypertension  Dyslipidemia    His hypertension is not well controlled. His nephrologist has been managing his blood pressure, he has not started all of his antihypertensive medication since is waiting to receive the medications via mail order pharmacy.  He is contemplating going on peritoneal dialysis.  Moderate physical exertion really takes a toll on him and he feels physically ill with nausea and fatigue.  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    Patient denies having a significant history of cardiac disease.  He has significant peripheral edema for which she takes Lasix.  He denies chest pain, PND, orthopnea, palpitations, syncope or near-syncope.      In terms of his cardiac risk factors, no history of diabetes.  He does have a history of hypertension and hypertlipidemia.  He is a non smoker.  Family history is notable for premature coronary disease in his father and paternal grandfather who both had heart disease in their 50s and needed bypass surgery.      Current Outpatient Medications   Medication Sig Dispense Refill     allopurinol (ZYLOPRIM) 300 MG tablet Take 1 tablet (300 mg) by mouth daily. 90 tablet 3     amLODIPine (NORVASC) 5 MG tablet Take 1 tablet  (5 mg) by mouth daily. 90 tablet 3     carvedilol (COREG) 12.5 MG tablet Take 1 tablet (12.5 mg) by mouth 2 times daily (with meals). 180 tablet 3     CELLCEPT (BRAND) 250 MG capsule Take 4 capsules (1,000 mg) by mouth 2 times daily. 720 capsule 3     cloNIDine (CATAPRES) 0.1 MG tablet Take 1 tablet (0.1 mg) by mouth 2 times daily. 180 tablet 3     cycloSPORINE modified (GENERIC EQUIVALENT) 100 MG capsule Take 1 capsule (100 mg) by mouth 2 times daily 60 capsule 11     cycloSPORINE modified (GENERIC EQUIVALENT) 25 MG capsule Take 1 capsule (25 mg) by mouth 2 times daily. 180 capsule 3     furosemide (LASIX) 40 MG tablet Take 1 tablet (40 mg) by mouth daily. 90 tablet 3     losartan (COZAAR) 50 MG tablet Take 1 tablet (50 mg) by mouth daily       meclizine (ANTIVERT) 25 MG tablet Take 1 tablet (25 mg) by mouth 3 times daily as needed for dizziness. 30 tablet 1     methylphenidate (RITALIN) 5 MG tablet Take 1 tablet (5 mg) by mouth 2 times daily as needed (narcolepsy). 60 tablet 0     minoxidil (LONITEN) 2.5 MG tablet Take 1 tablet (2.5 mg) by mouth 2 times daily. 180 tablet 1     ondansetron (ZOFRAN ODT) 4 MG ODT tab Take 1 tablet (4 mg) by mouth every 8 hours as needed for nausea. 20 tablet 1     pravastatin (PRAVACHOL) 40 MG tablet Take 1 tablet (40 mg) by mouth daily. 90 tablet 3     predniSONE (DELTASONE) 10 MG tablet Take 40 mg by mouth as needed Gout Flair-ups       probenecid (BENEMID) 500 MG tablet Take 500 mg by mouth 2 times daily       sulfamethoxazole-trimethoprim (BACTRIM/SEPTRA) 400-80 MG per tablet TAKE 1 TABLET BY MOUTH  EVERY MON, WED, FRI MORNING 39 tablet 11     tadalafil (CIALIS) 10 MG tablet Take 10 mg by mouth daily as needed         Allergies - reviewed   No Known Allergies    Past history -reviewed  Past Medical History:   Diagnosis Date     Anemia in chronic kidney disease(285.21)      Dialysis patient (H)      Dyslipidemia      End stage kidney disease (H)      GERD (gastroesophageal reflux  disease)      Gout      High risk medication use      Hypertension      IgA nephropathy      Immunosuppressed status (H)      Kidney replaced by transplant      Narcolepsy      RAUL on CPAP      Secondary hyperparathyroidism (of renal origin)      Splenic calcification     h/o calcified splenic granulomas        Social history - reviewed  Social History     Tobacco Use     Smoking status: Never     Passive exposure: Never     Smokeless tobacco: Never   Vaping Use     Vaping status: Never Used   Substance Use Topics     Alcohol use: Yes     Alcohol/week: 3.3 standard drinks of alcohol     Types: 4 Standard drinks or equivalent per week     Comment: a few drinks a week     Drug use: No       Family history -reviewed  Family History   Problem Relation Age of Onset     C.A.D. Father      Hypertension Father      Diabetes Father      Colon Cancer Father 70     Myocardial Infarction Maternal Grandfather 38     C.A.D. Paternal Grandfather      Father with CAD in his 50s, PGF 50s both received CAB    ROS: non contributory on the 10-point review of system    Exam:     BP (!) 219/110 (BP Location: Right arm, Patient Position: Chair, Cuff Size: Adult Large)   Pulse 77   Wt 125.6 kg (277 lb)   SpO2 97%   BMI 37.57 kg/m    In general, the patient is in no apparent distress.      HEENT: Sclerae white, not injected.    Neck: No JVD. No thyromegaly  Heart: RRR. Normal S1, S2. No murmur, rub, click, or gallop.    Lungs: Clear bilaterally.  No rhonchi, wheezes, rales.   Extremities: No edema.  The pulses are 2+at the radial bilaterally.      I have independently reviewed this patient's relevant laboratory and cardiac data :    Recent Labs   Lab Test 01/11/24  0920 04/01/19  0810 05/22/18  1010   CHOL 169 167 167   HDL 29* 32* 38*   * 97 98   TRIG 170* 269*  269* 222*   CHOLHDLRATIO  --  5.2* 4.4      Recent Labs   Lab Test 08/15/24  1420   AST 13     Recent Labs   Lab Test 08/15/24  1420   ALT 12     Recent Labs   Lab Test  11/22/24  0956 11/08/24  0839    142   POTASSIUM 4.7 5.0   CHLORIDE 110* 110*   CO2 21* 20*   ANIONGAP 11 12   BUN 71.2* 73.7*   CR 4.85* 4.86*     Recent Labs   Lab Test 11/22/24  0956 11/08/24  0839   WBC 8.3 7.9   RBC 4.57 4.63   HGB 12.8* 13.1*   MCV 86 84    191     Recent Labs   Lab Test 08/15/24  1420 07/14/23  0725   A1C 6.0* 5.7*     ECG August 2024 - sinus bradycardia at 55    Echo 8/2024 - normal biventricular function, no significant valve dysfunction, no pericardial effusion    Coronary calcification - none on 6/ 2023 CT scan    Assessment and Plan:  54 year old patient with    Essential hypertension  Dyslipidemia  Normal biventricular function   Chronic renal disease, Stage V    Mr. Romero is being seen for cardiac evaluation prior to being listed for kidney transplant.  He has no known MI or heart failure.  He does have a family history of premature heart disease.  He does have cardiac risk factors including hypertension, dyslipidemia and chronic renal disease.  His exercise tolerance is limited from his stage V renal disease.  Based on all this I think undertaking a noninvasive stress test at this stage given that he is without symptoms concerning for angina would be reasonable. A coronary angiogram for definitive evaluation once he is on dialysis may not be an unreasonable approach to take given his risk factor burden.     His blood pressure needs some attention and he is working with his nephrologist to address this.  I have not made any changes to his medications but he seems to be on appropriate multidrug regimen. He was advised to monitor his BP after initiating the medications he has been prescribed.  He also is on a cholesterol medication for his lipids.  His cardiac function is normal.      Recommendations:   Continue current medications.   Dobutamine stress echocardiogram  Follow up after the stress test if abnormal, otherwise in 2 years      Farheen Dyer MD, MS  Professor of  Medicine  Cardiovascular Medicine      Please do not hesitate to contact me if you have any questions/concerns.     Sincerely,     Farheen Dyer MD

## 2024-12-09 NOTE — NURSING NOTE
Chief Complaint   Patient presents with    New Patient     NEW CARDIOLOGY     Vitals were taken and medications reconciled.    Marlon Main, EMT  10:47 AM

## 2024-12-09 NOTE — PROGRESS NOTES
History:    Hakeem Romero is a 54 year old man with end stage renal failure who is referred for a cardiac evaluation prior to being listed for renal transplantation.    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  Not on hemodialysis.   Essential hypertension  Dyslipidemia    His hypertension is not well controlled. His nephrologist has been managing his blood pressure, he has not started all of his antihypertensive medication since is waiting to receive the medications via mail order pharmacy.  He is contemplating going on peritoneal dialysis.  Moderate physical exertion really takes a toll on him and he feels physically ill with nausea and fatigue.  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    Patient denies having a significant history of cardiac disease.  He has significant peripheral edema for which she takes Lasix.  He denies chest pain, PND, orthopnea, palpitations, syncope or near-syncope.      In terms of his cardiac risk factors, no history of diabetes.  He does have a history of hypertension and hypertlipidemia.  He is a non smoker.  Family history is notable for premature coronary disease in his father and paternal grandfather who both had heart disease in their 50s and needed bypass surgery.      Current Outpatient Medications   Medication Sig Dispense Refill    allopurinol (ZYLOPRIM) 300 MG tablet Take 1 tablet (300 mg) by mouth daily. 90 tablet 3    amLODIPine (NORVASC) 5 MG tablet Take 1 tablet (5 mg) by mouth daily. 90 tablet 3    carvedilol (COREG) 12.5 MG tablet Take 1 tablet (12.5 mg) by mouth 2 times daily (with meals). 180 tablet 3    CELLCEPT (BRAND) 250 MG capsule Take 4 capsules (1,000 mg) by mouth 2 times daily. 720 capsule 3    cloNIDine (CATAPRES) 0.1 MG tablet Take 1 tablet (0.1 mg) by mouth 2 times daily. 180  tablet 3    cycloSPORINE modified (GENERIC EQUIVALENT) 100 MG capsule Take 1 capsule (100 mg) by mouth 2 times daily 60 capsule 11    cycloSPORINE modified (GENERIC EQUIVALENT) 25 MG capsule Take 1 capsule (25 mg) by mouth 2 times daily. 180 capsule 3    furosemide (LASIX) 40 MG tablet Take 1 tablet (40 mg) by mouth daily. 90 tablet 3    losartan (COZAAR) 50 MG tablet Take 1 tablet (50 mg) by mouth daily      meclizine (ANTIVERT) 25 MG tablet Take 1 tablet (25 mg) by mouth 3 times daily as needed for dizziness. 30 tablet 1    methylphenidate (RITALIN) 5 MG tablet Take 1 tablet (5 mg) by mouth 2 times daily as needed (narcolepsy). 60 tablet 0    minoxidil (LONITEN) 2.5 MG tablet Take 1 tablet (2.5 mg) by mouth 2 times daily. 180 tablet 1    ondansetron (ZOFRAN ODT) 4 MG ODT tab Take 1 tablet (4 mg) by mouth every 8 hours as needed for nausea. 20 tablet 1    pravastatin (PRAVACHOL) 40 MG tablet Take 1 tablet (40 mg) by mouth daily. 90 tablet 3    predniSONE (DELTASONE) 10 MG tablet Take 40 mg by mouth as needed Gout Flair-ups      probenecid (BENEMID) 500 MG tablet Take 500 mg by mouth 2 times daily      sulfamethoxazole-trimethoprim (BACTRIM/SEPTRA) 400-80 MG per tablet TAKE 1 TABLET BY MOUTH  EVERY MON, WED, FRI MORNING 39 tablet 11    tadalafil (CIALIS) 10 MG tablet Take 10 mg by mouth daily as needed         Allergies - reviewed   No Known Allergies    Past history -reviewed  Past Medical History:   Diagnosis Date    Anemia in chronic kidney disease(285.21)     Dialysis patient (H)     Dyslipidemia     End stage kidney disease (H)     GERD (gastroesophageal reflux disease)     Gout     High risk medication use     Hypertension     IgA nephropathy     Immunosuppressed status (H)     Kidney replaced by transplant     Narcolepsy     RAUL on CPAP     Secondary hyperparathyroidism (of renal origin)     Splenic calcification     h/o calcified splenic granulomas        Social history - reviewed  Social History     Tobacco  Use    Smoking status: Never     Passive exposure: Never    Smokeless tobacco: Never   Vaping Use    Vaping status: Never Used   Substance Use Topics    Alcohol use: Yes     Alcohol/week: 3.3 standard drinks of alcohol     Types: 4 Standard drinks or equivalent per week     Comment: a few drinks a week    Drug use: No       Family history -reviewed  Family History   Problem Relation Age of Onset    C.A.D. Father     Hypertension Father     Diabetes Father     Colon Cancer Father 70    Myocardial Infarction Maternal Grandfather 38    C.A.D. Paternal Grandfather      Father with CAD in his 50s, PGF 50s both received CAB    ROS: non contributory on the 10-point review of system    Exam:     BP (!) 219/110 (BP Location: Right arm, Patient Position: Chair, Cuff Size: Adult Large)   Pulse 77   Wt 125.6 kg (277 lb)   SpO2 97%   BMI 37.57 kg/m    In general, the patient is in no apparent distress.      HEENT: Sclerae white, not injected.    Neck: No JVD. No thyromegaly  Heart: RRR. Normal S1, S2. No murmur, rub, click, or gallop.    Lungs: Clear bilaterally.  No rhonchi, wheezes, rales.   Extremities: No edema.  The pulses are 2+at the radial bilaterally.      I have independently reviewed this patient's relevant laboratory and cardiac data :    Recent Labs   Lab Test 01/11/24  0920 04/01/19  0810 05/22/18  1010   CHOL 169 167 167   HDL 29* 32* 38*   * 97 98   TRIG 170* 269*  269* 222*   CHOLHDLRATIO  --  5.2* 4.4      Recent Labs   Lab Test 08/15/24  1420   AST 13     Recent Labs   Lab Test 08/15/24  1420   ALT 12     Recent Labs   Lab Test 11/22/24  0956 11/08/24  0839    142   POTASSIUM 4.7 5.0   CHLORIDE 110* 110*   CO2 21* 20*   ANIONGAP 11 12   BUN 71.2* 73.7*   CR 4.85* 4.86*     Recent Labs   Lab Test 11/22/24  0956 11/08/24  0839   WBC 8.3 7.9   RBC 4.57 4.63   HGB 12.8* 13.1*   MCV 86 84    191     Recent Labs   Lab Test 08/15/24  1420 07/14/23  0725   A1C 6.0* 5.7*     ECG August 2024 -  sinus bradycardia at 55    Echo 8/2024 - normal biventricular function, no significant valve dysfunction, no pericardial effusion    Coronary calcification - none on 6/ 2023 CT scan    Assessment and Plan:  54 year old patient with    Essential hypertension  Dyslipidemia  Normal biventricular function   Chronic renal disease, Stage V    Mr. Romero is being seen for cardiac evaluation prior to being listed for kidney transplant.  He has no known MI or heart failure.  He does have a family history of premature heart disease.  He does have cardiac risk factors including hypertension, dyslipidemia and chronic renal disease.  His exercise tolerance is limited from his stage V renal disease.  Based on all this I think undertaking a noninvasive stress test at this stage given that he is without symptoms concerning for angina would be reasonable. A coronary angiogram for definitive evaluation once he is on dialysis may not be an unreasonable approach to take given his risk factor burden.     His blood pressure needs some attention and he is working with his nephrologist to address this.  I have not made any changes to his medications but he seems to be on appropriate multidrug regimen. He was advised to monitor his BP after initiating the medications he has been prescribed.  He also is on a cholesterol medication for his lipids.  His cardiac function is normal.      Recommendations:   Continue current medications.   Dobutamine stress echocardiogram  Follow up after the stress test if abnormal, otherwise in 2 years      Farheen Dyer MD, MS  Professor of Medicine  Cardiovascular Medicine

## 2024-12-10 ENCOUNTER — TEAM CONFERENCE (OUTPATIENT)
Dept: TRANSPLANT | Facility: CLINIC | Age: 54
End: 2024-12-10
Payer: COMMERCIAL

## 2024-12-10 NOTE — TELEPHONE ENCOUNTER
Image Review Meeting    ATTENDEES: Dr. Clifton and coordinators     IMAGES REVIEWED: 12/9/24 CT a/p and 12/9/24 iliac US    DECISION: vessels suitable for transplant,   CT was also done for follow up on retroperitoneal Lymphadenopathy: okay for transplant no longer noted on current CT.     INCIDENTALS: No

## 2024-12-12 DIAGNOSIS — Z94.0 HTN, KIDNEY TRANSPLANT RELATED: ICD-10-CM

## 2024-12-12 DIAGNOSIS — I15.1 HTN, KIDNEY TRANSPLANT RELATED: ICD-10-CM

## 2024-12-12 RX ORDER — CLONIDINE HYDROCHLORIDE 0.1 MG/1
0.1 TABLET ORAL 2 TIMES DAILY
Qty: 180 TABLET | Refills: 3 | OUTPATIENT
Start: 2024-12-12

## 2024-12-12 NOTE — TELEPHONE ENCOUNTER
I told the pt that there were active refills but the patient states that [facility] said that the prescription was cancelled... Can this prescription be resent?

## 2024-12-13 ENCOUNTER — LAB (OUTPATIENT)
Dept: LAB | Facility: CLINIC | Age: 54
End: 2024-12-13
Attending: NURSE PRACTITIONER
Payer: COMMERCIAL

## 2024-12-13 ENCOUNTER — PRE VISIT (OUTPATIENT)
Dept: ONCOLOGY | Facility: CLINIC | Age: 54
End: 2024-12-13
Payer: COMMERCIAL

## 2024-12-13 VITALS
WEIGHT: 272 LBS | HEART RATE: 75 BPM | RESPIRATION RATE: 16 BRPM | OXYGEN SATURATION: 99 % | BODY MASS INDEX: 37.45 KG/M2

## 2024-12-13 DIAGNOSIS — N18.5 STAGE 5 CHRONIC KIDNEY DISEASE NOT ON CHRONIC DIALYSIS (H): ICD-10-CM

## 2024-12-13 LAB
ERYTHROCYTE [DISTWIDTH] IN BLOOD BY AUTOMATED COUNT: 13.2 % (ref 10–15)
HCT VFR BLD AUTO: 38 % (ref 40–53)
HGB BLD-MCNC: 12.5 G/DL (ref 13.3–17.7)
MCH RBC QN AUTO: 28 PG (ref 26.5–33)
MCHC RBC AUTO-ENTMCNC: 32.9 G/DL (ref 31.5–36.5)
MCV RBC AUTO: 85 FL (ref 78–100)
PLATELET # BLD AUTO: 188 10E3/UL (ref 150–450)
RBC # BLD AUTO: 4.47 10E6/UL (ref 4.4–5.9)
TOTAL PROTEIN SERUM FOR ELP: 5.6 G/DL (ref 6.4–8.3)
WBC # BLD AUTO: 9.5 10E3/UL (ref 4–11)

## 2024-12-13 PROCEDURE — 85018 HEMOGLOBIN: CPT

## 2024-12-13 PROCEDURE — 84155 ASSAY OF PROTEIN SERUM: CPT

## 2024-12-13 PROCEDURE — 84165 PROTEIN E-PHORESIS SERUM: CPT | Mod: 26 | Performed by: PATHOLOGY

## 2024-12-13 PROCEDURE — 36415 COLL VENOUS BLD VENIPUNCTURE: CPT

## 2024-12-13 PROCEDURE — 83521 IG LIGHT CHAINS FREE EACH: CPT

## 2024-12-13 PROCEDURE — 85041 AUTOMATED RBC COUNT: CPT

## 2024-12-13 PROCEDURE — 84165 PROTEIN E-PHORESIS SERUM: CPT | Mod: TC | Performed by: PATHOLOGY

## 2024-12-13 ASSESSMENT — PAIN SCALES - GENERAL: PAINLEVEL_OUTOF10: NO PAIN (0)

## 2024-12-13 NOTE — NURSING NOTE
Chief Complaint   Patient presents with    Labs Only     venipuncture     Marisol Velez RN on 12/13/2024 at 1:22 PM

## 2024-12-15 ENCOUNTER — APPOINTMENT (OUTPATIENT)
Dept: MRI IMAGING | Facility: CLINIC | Age: 54
End: 2024-12-15
Attending: EMERGENCY MEDICINE
Payer: COMMERCIAL

## 2024-12-15 ENCOUNTER — APPOINTMENT (OUTPATIENT)
Dept: CT IMAGING | Facility: CLINIC | Age: 54
End: 2024-12-15
Attending: EMERGENCY MEDICINE
Payer: COMMERCIAL

## 2024-12-15 ENCOUNTER — HOSPITAL ENCOUNTER (EMERGENCY)
Facility: CLINIC | Age: 54
Discharge: HOME | End: 2024-12-15
Attending: EMERGENCY MEDICINE | Admitting: EMERGENCY MEDICINE
Payer: COMMERCIAL

## 2024-12-15 ENCOUNTER — APPOINTMENT (OUTPATIENT)
Dept: CARDIOLOGY | Facility: CLINIC | Age: 54
End: 2024-12-15
Attending: EMERGENCY MEDICINE
Payer: COMMERCIAL

## 2024-12-15 VITALS
SYSTOLIC BLOOD PRESSURE: 110 MMHG | RESPIRATION RATE: 16 BRPM | OXYGEN SATURATION: 96 % | DIASTOLIC BLOOD PRESSURE: 65 MMHG

## 2024-12-15 DIAGNOSIS — R47.89 WORD FINDING DIFFICULTY: Primary | ICD-10-CM

## 2024-12-15 DIAGNOSIS — G45.9 TIA (TRANSIENT ISCHEMIC ATTACK): ICD-10-CM

## 2024-12-15 LAB
ALBUMIN SERPL BCG-MCNC: 3.1 G/DL (ref 3.5–5.2)
ALP SERPL-CCNC: 114 U/L (ref 40–150)
ALT SERPL W P-5'-P-CCNC: <5 U/L (ref 0–70)
ANION GAP SERPL CALCULATED.3IONS-SCNC: 14 MMOL/L (ref 7–15)
APTT PPP: 27 SECONDS (ref 22–38)
AST SERPL W P-5'-P-CCNC: 12 U/L (ref 0–45)
BASE EXCESS BLDV CALC-SCNC: -7 MMOL/L (ref -3–3)
BASOPHILS # BLD AUTO: 0 10E3/UL (ref 0–0.2)
BASOPHILS NFR BLD AUTO: 1 %
BILIRUB SERPL-MCNC: 0.3 MG/DL
BUN SERPL-MCNC: 67.5 MG/DL (ref 6–20)
CA-I BLD-MCNC: 4.7 MG/DL (ref 4.4–5.2)
CALCIUM SERPL-MCNC: 8.1 MG/DL (ref 8.8–10.4)
CHLORIDE SERPL-SCNC: 105 MMOL/L (ref 98–107)
CPB POCT: NO
CREAT SERPL-MCNC: 5.51 MG/DL (ref 0.67–1.17)
EGFRCR SERPLBLD CKD-EPI 2021: 12 ML/MIN/1.73M2
EOSINOPHIL # BLD AUTO: 0.2 10E3/UL (ref 0–0.7)
EOSINOPHIL NFR BLD AUTO: 3 %
ERYTHROCYTE [DISTWIDTH] IN BLOOD BY AUTOMATED COUNT: 13.1 % (ref 10–15)
GLUCOSE BLD-MCNC: 163 MG/DL (ref 70–99)
GLUCOSE SERPL-MCNC: 174 MG/DL (ref 70–99)
HCO3 BLDV-SCNC: 20 MMOL/L (ref 21–28)
HCO3 SERPL-SCNC: 18 MMOL/L (ref 22–29)
HCT VFR BLD AUTO: 30.5 % (ref 40–53)
HCT VFR BLD CALC: 32 % (ref 40–53)
HGB BLD-MCNC: 10.4 G/DL (ref 13.3–17.7)
HGB BLD-MCNC: 10.9 G/DL (ref 13.3–17.7)
HOLD SPECIMEN: NORMAL
HOLD SPECIMEN: NORMAL
IMM GRANULOCYTES # BLD: 0 10E3/UL
IMM GRANULOCYTES NFR BLD: 0 %
INR PPP: 1.09 (ref 0.85–1.15)
LACTATE SERPL-SCNC: 1.3 MMOL/L (ref 0.7–2)
LVEF ECHO: NORMAL
LYMPHOCYTES # BLD AUTO: 1 10E3/UL (ref 0.8–5.3)
LYMPHOCYTES NFR BLD AUTO: 12 %
MCH RBC QN AUTO: 28.7 PG (ref 26.5–33)
MCHC RBC AUTO-ENTMCNC: 34.1 G/DL (ref 31.5–36.5)
MCV RBC AUTO: 84 FL (ref 78–100)
MONOCYTES # BLD AUTO: 0.6 10E3/UL (ref 0–1.3)
MONOCYTES NFR BLD AUTO: 7 %
NEUTROPHILS # BLD AUTO: 6.5 10E3/UL (ref 1.6–8.3)
NEUTROPHILS NFR BLD AUTO: 77 %
NRBC # BLD AUTO: 0 10E3/UL
NRBC BLD AUTO-RTO: 0 /100
PCO2 BLDV: 40 MM HG (ref 40–50)
PH BLDV: 7.3 [PH] (ref 7.32–7.43)
PLATELET # BLD AUTO: 164 10E3/UL (ref 150–450)
PO2 BLDV: 53 MM HG (ref 25–47)
POTASSIUM BLD-SCNC: 4.1 MMOL/L (ref 3.4–5.3)
POTASSIUM SERPL-SCNC: 4.3 MMOL/L (ref 3.4–5.3)
PROT SERPL-MCNC: 5.1 G/DL (ref 6.4–8.3)
RBC # BLD AUTO: 3.63 10E6/UL (ref 4.4–5.9)
SAO2 % BLDV: 83 % (ref 70–75)
SODIUM BLD-SCNC: 138 MMOL/L (ref 135–145)
SODIUM SERPL-SCNC: 137 MMOL/L (ref 135–145)
TROPONIN T SERPL HS-MCNC: 49 NG/L
TROPONIN T SERPL HS-MCNC: 49 NG/L
TROPONIN T SERPL HS-MCNC: 52 NG/L
WBC # BLD AUTO: 8.4 10E3/UL (ref 4–11)

## 2024-12-15 PROCEDURE — 70450 CT HEAD/BRAIN W/O DYE: CPT

## 2024-12-15 PROCEDURE — 82330 ASSAY OF CALCIUM: CPT

## 2024-12-15 PROCEDURE — 36415 COLL VENOUS BLD VENIPUNCTURE: CPT | Performed by: EMERGENCY MEDICINE

## 2024-12-15 PROCEDURE — 99285 EMERGENCY DEPT VISIT HI MDM: CPT | Mod: 25 | Performed by: EMERGENCY MEDICINE

## 2024-12-15 PROCEDURE — 70547 MR ANGIOGRAPHY NECK W/O DYE: CPT

## 2024-12-15 PROCEDURE — 83605 ASSAY OF LACTIC ACID: CPT | Performed by: EMERGENCY MEDICINE

## 2024-12-15 PROCEDURE — 85041 AUTOMATED RBC COUNT: CPT | Performed by: EMERGENCY MEDICINE

## 2024-12-15 PROCEDURE — 99222 1ST HOSP IP/OBS MODERATE 55: CPT | Mod: GC | Performed by: PSYCHIATRY & NEUROLOGY

## 2024-12-15 PROCEDURE — 85049 AUTOMATED PLATELET COUNT: CPT | Performed by: EMERGENCY MEDICINE

## 2024-12-15 PROCEDURE — 84484 ASSAY OF TROPONIN QUANT: CPT | Performed by: EMERGENCY MEDICINE

## 2024-12-15 PROCEDURE — 70450 CT HEAD/BRAIN W/O DYE: CPT | Mod: 26 | Performed by: RADIOLOGY

## 2024-12-15 PROCEDURE — 85004 AUTOMATED DIFF WBC COUNT: CPT | Performed by: EMERGENCY MEDICINE

## 2024-12-15 PROCEDURE — 85730 THROMBOPLASTIN TIME PARTIAL: CPT | Performed by: EMERGENCY MEDICINE

## 2024-12-15 PROCEDURE — 70547 MR ANGIOGRAPHY NECK W/O DYE: CPT | Mod: 26 | Performed by: RADIOLOGY

## 2024-12-15 PROCEDURE — 250N000013 HC RX MED GY IP 250 OP 250 PS 637: Performed by: EMERGENCY MEDICINE

## 2024-12-15 PROCEDURE — 82947 ASSAY GLUCOSE BLOOD QUANT: CPT | Mod: 59 | Performed by: EMERGENCY MEDICINE

## 2024-12-15 PROCEDURE — 93010 ELECTROCARDIOGRAM REPORT: CPT | Performed by: EMERGENCY MEDICINE

## 2024-12-15 PROCEDURE — 999N000208 ECHOCARDIOGRAM COMPLETE

## 2024-12-15 PROCEDURE — 70544 MR ANGIOGRAPHY HEAD W/O DYE: CPT

## 2024-12-15 PROCEDURE — 93005 ELECTROCARDIOGRAM TRACING: CPT | Performed by: EMERGENCY MEDICINE

## 2024-12-15 PROCEDURE — 70551 MRI BRAIN STEM W/O DYE: CPT

## 2024-12-15 PROCEDURE — 70551 MRI BRAIN STEM W/O DYE: CPT | Mod: 26 | Performed by: RADIOLOGY

## 2024-12-15 PROCEDURE — 85610 PROTHROMBIN TIME: CPT | Performed by: EMERGENCY MEDICINE

## 2024-12-15 PROCEDURE — 99285 EMERGENCY DEPT VISIT HI MDM: CPT | Performed by: EMERGENCY MEDICINE

## 2024-12-15 RX ORDER — ASPIRIN 81 MG/1
324 TABLET, CHEWABLE ORAL ONCE
Status: COMPLETED | OUTPATIENT
Start: 2024-12-15 | End: 2024-12-15

## 2024-12-15 RX ORDER — IOPAMIDOL 755 MG/ML
67 INJECTION, SOLUTION INTRAVASCULAR ONCE
Status: COMPLETED | OUTPATIENT
Start: 2024-12-15 | End: 2024-12-15

## 2024-12-15 RX ORDER — ASPIRIN 81 MG/1
81 TABLET, CHEWABLE ORAL DAILY
Qty: 60 TABLET | Refills: 1 | Status: SHIPPED | OUTPATIENT
Start: 2024-12-15

## 2024-12-15 RX ADMIN — ASPIRIN 81 MG CHEWABLE TABLET 324 MG: 81 TABLET CHEWABLE at 13:55

## 2024-12-15 ASSESSMENT — ACTIVITIES OF DAILY LIVING (ADL)
ADLS_ACUITY_SCORE: 41

## 2024-12-15 NOTE — Clinical Note
December 15, 2024      To Whom It May Concern:      Hakeem Romero was seen in our Emergency Department today, 12/15/24.  I expect his condition to improve over the next *** days.  He may return to work/school when improved.    Sincerely,        Sushila Rosario MD

## 2024-12-15 NOTE — LETTER
December 15, 2024      To Whom It May Concern:      Hakeem Romero was seen in our Emergency Department today, 12/15/24. Please excuse him from work today and tomorrow 12/16/2024.    Sincerely,        Sushila Rosario MD

## 2024-12-15 NOTE — ED PROVIDER NOTES
[facility] ([facility])    12/15/24       ED PROVIDER NOTE     History     Chief Complaint   Patient presents with    Stroke     HPI  Hakeem Romero is a 54 year old male s/p kidney transplant in 2008 with recent graft failure and ongoing immunosuppression with past medical history of uncontrolled HTN, peripheral edema treated with Lasix, lymphadenopathy who by EMS as a possible stroke.  Apparently the patient has been feeling fatigued and weak for the past 3 days.  He has renal failure and is awaiting his second transplant.  This morning, he was driving into work as a [employer] .  He became very confused and suddenly was unable to use the GPS.  He missed the exit to the highway that he takes all the time.  Said he was very confused and try to get off the highway.  He then felt he had no idea where he was.  When he eventually made it to work, he got out of his vehicle and felt too weak to walk.  Someone at his work called 911 at about 06:53 am.    He states he feels more back to normal but he is still feeling weak all over    Past Medical History  Past Medical History:   Diagnosis Date    Anemia in chronic kidney disease(285.21)     Dialysis patient (H)     Dyslipidemia     End stage kidney disease (H)     GERD (gastroesophageal reflux disease)     Gout     High risk medication use     Hypertension     IgA nephropathy     Immunosuppressed status (H)     Kidney replaced by transplant     Narcolepsy     RAUL on CPAP     Secondary hyperparathyroidism (of renal origin)     Splenic calcification     h/o calcified splenic granulomas     Past Surgical History:   Procedure Laterality Date    HERNIORRHAPHY VENTRAL N/A 4/14/2023    Procedure: HERNIORRHAPHY, VENTRAL, OPEN;  Surgeon: Chrystal Harding DO;  Location: [facility] OR    s/p LDKT       aspirin (ASA) 81 MG chewable tablet  allopurinol (ZYLOPRIM) 300 MG tablet  amLODIPine (NORVASC) 5 MG tablet  carvedilol (COREG) 12.5 MG  tablet  CELLCEPT (BRAND) 250 MG capsule  cloNIDine (CATAPRES) 0.1 MG tablet  cycloSPORINE modified (GENERIC EQUIVALENT) 100 MG capsule  cycloSPORINE modified (GENERIC EQUIVALENT) 25 MG capsule  furosemide (LASIX) 40 MG tablet  losartan (COZAAR) 50 MG tablet  meclizine (ANTIVERT) 25 MG tablet  methylphenidate (RITALIN) 5 MG tablet  minoxidil (LONITEN) 2.5 MG tablet  ondansetron (ZOFRAN ODT) 4 MG ODT tab  pravastatin (PRAVACHOL) 40 MG tablet  predniSONE (DELTASONE) 10 MG tablet  probenecid (BENEMID) 500 MG tablet  sulfamethoxazole-trimethoprim (BACTRIM/SEPTRA) 400-80 MG per tablet  tadalafil (CIALIS) 10 MG tablet      No Known Allergies  Family History  Family History   Problem Relation Age of Onset    C.A.D. Father     Hypertension Father     Diabetes Father     Colon Cancer Father 70    Myocardial Infarction Maternal Grandfather 38    C.A.D. Paternal Grandfather      Social History   Social History     Tobacco Use    Smoking status: Never     Passive exposure: Never    Smokeless tobacco: Never   Vaping Use    Vaping status: Never Used   Substance Use Topics    Alcohol use: Yes     Alcohol/week: 3.3 standard drinks of alcohol     Types: 4 Standard drinks or equivalent per week     Comment: a few drinks a week    Drug use: No      A medically appropriate review of systems was performed with pertinent positives and negatives noted in the HPI, and all other systems negative.    Physical Exam   /65   Resp 16   SpO2 96%    Physical Exam  Physical Exam   Constitutional:   well nourished, well developed, resting comfortably   HENT:   Head: Normocephalic and atraumatic.   Eyes: Conjunctivae are normal. Pupils are equal, round, and reactive to light.   pharynx has no erythema or exudate, mucous membranes are moist  Neck:   no adenopathy, no bony tenderness  Cardiovascular: regular rate and rhythm without murmurs or gallops  Pulmonary/Chest: Clear to auscultation bilaterally, with no wheezes or retractions. No  respiratory distress.  GI: Soft with good bowel sounds.  Non-tender, non-distended, with no guarding, no rebound, no peritoneal signs.   Back:  No bony or CVA tenderness   Musculoskeletal:  no edema  Skin: Skin is warm and dry. No rash noted.   Neurological: alert and oriented to person, place, and time. Nonfocal exam, DOMINIQUE CN 2-12 grossly intact, full  strength, able to hold extremities up against gravity, speech is slow but clear  Psychiatric:  normal mood and affect.     ED Course, Procedures, & Data      Procedures              EKG Interpretation:      Interpreted by Sushila Rosario MD  Time reviewed:0810 am    Symptoms at time of EKG: see hpi   Rhythm: Normal sinus   Rate: Normal  Axis: Normal  Ectopy: Premature ventricular contractions (unifocal)  Conduction: Normal  ST Segments/ T Waves: No ST-T wave changes and No acute ischemic changes  Q Waves: None  Comparison to prior: Unchanged from 9/15/2024    Clinical Impression: Sinus rhythm, rate of 75 bpm with occasional PVCs and no acute ischemic changes     Results for orders placed or performed during the hospital encounter of 12/15/24   CT Head w/o Contrast     Status: None    Narrative    CT HEAD W/O CONTRAST 12/15/2024 8:35 AM    History:  Code Stroke to evaluate for potential thrombolysis and  thrombectomy. PLEASE READ IMMEDIATELY.     Comparison: None     Technique: Using multidetector thin collimation helical acquisition  technique, axial, coronal and sagittal CT images from the skull base  to the vertex were obtained without intravenous contrast.     Findings: There is no intracranial hemorrhage, mass effect or midline  shift. There is no focal loss of gray-white matter differentiation,  insular ribbon sign, or focally hyperdense artery to suggest acute  infarction. The ventricles are proportionate to the cerebral sulci.    The bony calvaria and the bones of the skull base appear normal. The  visualized portions of the paranasal sinuses and mastoid  air cells are  unremarkable.       Impression    Impression:   1. No acute intracranial hemorrhage.  2. ASPECT Score: 10/10.    [Stroke Code Result: No evidence of intraparenchymal hemorrhage]    Finding was identified on 12/15/2024 8:46 AM.     Dr. Rosario contacted by me on 12/15/2024 8:47 AM and verbalized  understanding of the result.    [facility] Stroke Code Communication Guidelines:  [place] ED: [phone] (EB ED)  [place] Inpatient: [phone] (Resident Pager)   or Shweta 'Stroke First Call Resident [[place]]' or Amcom [phone]  [facility] ED: [phone] ([facility] ED)  [facility] Inpatient: Page [phone]     I have personally reviewed the examination and initial interpretation  and I agree with the findings.    SIMRAN KHAN MD         SYSTEM ID:  [id number]   MR Brain w/o Contrast     Status: None (Preliminary result)    Impression    RESIDENT PRELIMINARY INTERPRETATION  Impression:   1. No evidence of acute infarction or intracranial hemorrhage.  2. No abnormal enhancing lesions.  3. Head MRA demonstrates no definitive aneurysm or stenosis of the  major intracranial arteries.  4. Neck MRA demonstrates patent major cervical arteries.   MRA Brain ([place]) wo Contrast     Status: None (Preliminary result)    Impression    RESIDENT PRELIMINARY INTERPRETATION  Impression:   1. No evidence of acute infarction or intracranial hemorrhage.  2. No abnormal enhancing lesions.  3. Head MRA demonstrates no definitive aneurysm or stenosis of the  major intracranial arteries.  4. Neck MRA demonstrates patent major cervical arteries.   MRA Neck (Carotids) wo Contrast     Status: None (Preliminary result)    Impression    RESIDENT PRELIMINARY INTERPRETATION  Impression:   1. No evidence of acute infarction or intracranial hemorrhage.  2. No abnormal enhancing lesions.  3. Head MRA demonstrates no definitive aneurysm or stenosis of the  major intracranial arteries.  4. Neck MRA demonstrates patent major cervical arteries.    INR     Status: Normal   Result Value Ref Range    INR 1.09 0.85 - 1.15   Partial thromboplastin time     Status: Normal   Result Value Ref Range    aPTT 27 22 - 38 Seconds   Troponin T, High Sensitivity     Status: Abnormal   Result Value Ref Range    Troponin T, High Sensitivity 52 (H) <=22 ng/L   Comprehensive metabolic panel     Status: Abnormal   Result Value Ref Range    Sodium 137 135 - 145 mmol/L    Potassium 4.3 3.4 - 5.3 mmol/L    Carbon Dioxide (CO2) 18 (L) 22 - 29 mmol/L    Anion Gap 14 7 - 15 mmol/L    Urea Nitrogen 67.5 (H) 6.0 - 20.0 mg/dL    Creatinine 5.51 (H) 0.67 - 1.17 mg/dL    GFR Estimate 12 (L) >60 mL/min/1.73m2    Calcium 8.1 (L) 8.8 - 10.4 mg/dL    Chloride 105 98 - 107 mmol/L    Glucose 174 (H) 70 - 99 mg/dL    Alkaline Phosphatase 114 40 - 150 U/L    AST 12 0 - 45 U/L    ALT <5 0 - 70 U/L    Protein Total 5.1 (L) 6.4 - 8.3 g/dL    Albumin 3.1 (L) 3.5 - 5.2 g/dL    Bilirubin Total 0.3 <=1.2 mg/dL   CBC with platelets and differential     Status: Abnormal   Result Value Ref Range    WBC Count 8.4 4.0 - 11.0 10e3/uL    RBC Count 3.63 (L) 4.40 - 5.90 10e6/uL    Hemoglobin 10.4 (L) 13.3 - 17.7 g/dL    Hematocrit 30.5 (L) 40.0 - 53.0 %    MCV 84 78 - 100 fL    MCH 28.7 26.5 - 33.0 pg    MCHC 34.1 31.5 - 36.5 g/dL    RDW 13.1 10.0 - 15.0 %    Platelet Count 164 150 - 450 10e3/uL    % Neutrophils 77 %    % Lymphocytes 12 %    % Monocytes 7 %    % Eosinophils 3 %    % Basophils 1 %    % Immature Granulocytes 0 %    NRBCs per 100 WBC 0 <1 /100    Absolute Neutrophils 6.5 1.6 - 8.3 10e3/uL    Absolute Lymphocytes 1.0 0.8 - 5.3 10e3/uL    Absolute Monocytes 0.6 0.0 - 1.3 10e3/uL    Absolute Eosinophils 0.2 0.0 - 0.7 10e3/uL    Absolute Basophils 0.0 0.0 - 0.2 10e3/uL    Absolute Immature Granulocytes 0.0 <=0.4 10e3/uL    Absolute NRBCs 0.0 10e3/uL   Lactic Acid Whole Blood w/ 1x repeat in 2 hrs when >2     Status: Normal   Result Value Ref Range    Lactic Acid, Initial 1.3 0.7 - 2.0 mmol/L   Extra  Tube ([place] Draw)     Status: None    Narrative    The following orders were created for panel order Extra Tube ([place] Draw).  Procedure                               Abnormality         Status                     ---------                               -----------         ------                     Extra Red Top Tube[[id number]]                               Final result                 Please view results for these tests on the individual orders.   Extra Red Top Tube     Status: None   Result Value Ref Range    Hold Specimen JIC    iStat Gases Electrolytes ICA Glucose Venous, POCT     Status: Abnormal   Result Value Ref Range    CPB Applied No     Hematocrit POCT 32 (L) 40 - 53 %    Calcium, Ionized Whole Blood POCT 4.7 4.4 - 5.2 mg/dL    Glucose Whole Blood POCT 163 (H) 70 - 99 mg/dL    Bicarbonate Venous POCT 20 (L) 21 - 28 mmol/L    Hemoglobin POCT 10.9 (L) 13.3 - 17.7 g/dL    Potassium POCT 4.1 3.4 - 5.3 mmol/L    Sodium POCT 138 135 - 145 mmol/L    pCO2 Venous POCT 40 40 - 50 mm Hg    pO2 Venous POCT 53 (H) 25 - 47 mm Hg    pH Venous POCT 7.30 (L) 7.32 - 7.43    O2 Sat, Venous POCT 83 (H) 70 - 75 %    Base Excess/Deficit (+/-) POCT -7.0 (L) -3.0 - 3.0 mmol/L   Troponin T, High Sensitivity     Status: Abnormal   Result Value Ref Range    Troponin T, High Sensitivity 49 (H) <=22 ng/L   Extra Tube     Status: None    Narrative    The following orders were created for panel order Extra Tube.  Procedure                               Abnormality         Status                     ---------                               -----------         ------                     Extra Purple Top Tube[[id number]]                            Final result                 Please view results for these tests on the individual orders.   Extra Purple Top Tube     Status: None   Result Value Ref Range    Hold Specimen [facility]    EKG 12-lead, tracing only     Status: None (Preliminary result)   Result Value Ref Range    Systolic Blood  Pressure  mmHg    Diastolic Blood Pressure  mmHg    Ventricular Rate 74 BPM    Atrial Rate 74 BPM    MS Interval 146 ms    QRS Duration 86 ms     ms    QTc 483 ms    P Axis -6 degrees    R AXIS -27 degrees    T Axis 93 degrees    Interpretation ECG       Sinus rhythm with occasional Premature ventricular complexes  Abnormal QRS-T angle, consider primary T wave abnormality  QTcB >= 480 msec  Abnormal ECG     EKG 12 lead     Status: None (Preliminary result)   Result Value Ref Range    Systolic Blood Pressure  mmHg    Diastolic Blood Pressure  mmHg    Ventricular Rate 75 BPM    Atrial Rate 75 BPM    MS Interval 152 ms    QRS Duration 86 ms     ms    QTc 491 ms    P Axis -8 degrees    R AXIS -29 degrees    T Axis 84 degrees    Interpretation ECG       Sinus rhythm with occasional Premature ventricular complexes  QTcB >= 480 msec  Abnormal ECG     Echocardiogram Complete     Status: None   Result Value Ref Range    LVEF  60-65%     Narrative    [id number]  [facility]  [id number]  [id number]^KYLE^TIFFANIE^L     [facility],[place]  Echocardiography Laboratory  [address] [place] [id number]     Name: CRYSTAL TEJEDA  MRN: [id number]  : 1970  Study Date: 12/15/2024 12:00 PM  Age: 54 yrs  Gender: Male  Patient Location: [facility]  Reason For Study: Cerebrovascular Incident  Ordering Physician: TIFFANIE WRIGHT  Performed By: Elaine Rosario     BSA: 2.4 m2  Height: 71 in  Weight: 272 lb  HR: 77  BP: 110/65 mmHg  ______________________________________________________________________________  ______________________________________________________________________________  Interpretation Summary  Global and regional left ventricular function is normal with an EF of 60-65%.  Global right ventricular function is normal.  The atrial septum is intact as assessed by agitated saline bubble study .  The inferior vena cava was normal in size with preserved respiratory  variability.     This study was  compared with the study from 8/15/24 .  No significant changes noted.  ______________________________________________________________________________  Left Ventricle  Global and regional left ventricular function is normal with an EF of 60-65%.  Left ventricular size is normal. Mild concentric wall thickening consistent  with left ventricular hypertrophy is present. Left ventricular diastolic  function is normal. No regional wall motion abnormalities are seen.     Right Ventricle  The right ventricle is normal size. Global right ventricular function is  normal.     Atria  Both atria appear normal. The atrial septum is intact as assessed by agitated  saline bubble study .     Mitral Valve  The mitral valve is normal.     Aortic Valve  Aortic valve is normal in structure and function. The aortic valve is  tricuspid.     Tricuspid Valve  The tricuspid valve is normal. Trace tricuspid insufficiency is present. The  peak velocity of the tricuspid regurgitant jet is not obtainable. Pulmonary  artery systolic pressure cannot be assessed.     Pulmonic Valve  The valve leaflets are not well visualized. On Doppler interrogation, there is  no significant stenosis or regurgitation.     Vessels  Normal diameter aortic root and proximal ascending aorta. The inferior vena  cava was normal in size with preserved respiratory variability.     Pericardium  No pericardial effusion is present.     Compared to Previous Study  This study was compared with the study from 8/15/24 . No significant changes  noted.  ______________________________________________________________________________  MMode/2D Measurements & Calculations     LVIDd: 3.6 cm  Ao root diam: 3.1 cm  asc Aorta Diam: 3.8 cm  LVOT diam: 2.4 cm  LVOT area: 4.5 cm2  Ao root diam index Ht(cm/m): 1.7  Ao root diam index BSA (cm/m2): 1.3  Asc Ao diam index BSA (cm/m2): 1.6  Asc Ao diam index Ht(cm/m): 2.1     Doppler Measurements & Calculations  MV E max patience: 87.0 cm/sec  MV A  max seamus: 52.4 cm/sec  MV E/A: 1.7  MV dec time: 0.16 sec  E/E' av.9  Lateral E/e': 8.6  Medial E/e': 11.1  RV S Seamus: 16.3 cm/sec     ______________________________________________________________________________  Report approved by: Judson José MD on 12/15/2024 01:19 PM         CBC with Platelets & Differential     Status: Abnormal    Narrative    The following orders were created for panel order CBC with Platelets & Differential.  Procedure                               Abnormality         Status                     ---------                               -----------         ------                     CBC with platelets and d...[[id number]]  Abnormal            Final result                 Please view results for these tests on the individual orders.     Medications   aspirin (ASA) chewable tablet 324 mg (has no administration in time range)   iopamidol (ISOVUE-370) solution 67 mL (67 mLs Intravenous Not Given 12/15/24 1248)     And   sodium chloride 0.9 % bag for CT scan flush use (100 mLs As instructed Not Given 12/15/24 1248)     Labs Ordered and Resulted from Time of ED Arrival to Time of ED Departure   TROPONIN T, HIGH SENSITIVITY - Abnormal       Result Value    Troponin T, High Sensitivity 52 (*)    COMPREHENSIVE METABOLIC PANEL - Abnormal    Sodium 137      Potassium 4.3      Carbon Dioxide (CO2) 18 (*)     Anion Gap 14      Urea Nitrogen 67.5 (*)     Creatinine 5.51 (*)     GFR Estimate 12 (*)     Calcium 8.1 (*)     Chloride 105      Glucose 174 (*)     Alkaline Phosphatase 114      AST 12      ALT <5      Protein Total 5.1 (*)     Albumin 3.1 (*)     Bilirubin Total 0.3     CBC WITH PLATELETS AND DIFFERENTIAL - Abnormal    WBC Count 8.4      RBC Count 3.63 (*)     Hemoglobin 10.4 (*)     Hematocrit 30.5 (*)     MCV 84      MCH 28.7      MCHC 34.1      RDW 13.1      Platelet Count 164      % Neutrophils 77      % Lymphocytes 12      % Monocytes 7      % Eosinophils 3      % Basophils 1      %  Immature Granulocytes 0      NRBCs per 100 WBC 0      Absolute Neutrophils 6.5      Absolute Lymphocytes 1.0      Absolute Monocytes 0.6      Absolute Eosinophils 0.2      Absolute Basophils 0.0      Absolute Immature Granulocytes 0.0      Absolute NRBCs 0.0     ISTAT GASES ELECTROLYTES ICA GLUCOSE VENOUS POCT - Abnormal    CPB Applied No      Hematocrit POCT 32 (*)     Calcium, Ionized Whole Blood POCT 4.7      Glucose Whole Blood POCT 163 (*)     Bicarbonate Venous POCT 20 (*)     Hemoglobin POCT 10.9 (*)     Potassium POCT 4.1      Sodium POCT 138      pCO2 Venous POCT 40      pO2 Venous POCT 53 (*)     pH Venous POCT 7.30 (*)     O2 Sat, Venous POCT 83 (*)     Base Excess/Deficit (+/-) POCT -7.0 (*)    TROPONIN T, HIGH SENSITIVITY - Abnormal    Troponin T, High Sensitivity 49 (*)    INR - Normal    INR 1.09     PARTIAL THROMBOPLASTIN TIME - Normal    aPTT 27     LACTIC ACID WHOLE BLOOD WITH 1X REPEAT IN 2 HR WHEN >2 - Normal    Lactic Acid, Initial 1.3     GLUCOSE MONITOR NURSING POCT   TROPONIN T, HIGH SENSITIVITY     MRA Neck (Carotids) wo Contrast   Preliminary Result   RESIDENT PRELIMINARY INTERPRETATION   Impression:    1. No evidence of acute infarction or intracranial hemorrhage.   2. No abnormal enhancing lesions.   3. Head MRA demonstrates no definitive aneurysm or stenosis of the   major intracranial arteries.   4. Neck MRA demonstrates patent major cervical arteries.      MRA Brain ([place]) wo Contrast   Preliminary Result   RESIDENT PRELIMINARY INTERPRETATION   Impression:    1. No evidence of acute infarction or intracranial hemorrhage.   2. No abnormal enhancing lesions.   3. Head MRA demonstrates no definitive aneurysm or stenosis of the   major intracranial arteries.   4. Neck MRA demonstrates patent major cervical arteries.      MR Brain w/o Contrast   Preliminary Result   RESIDENT PRELIMINARY INTERPRETATION   Impression:    1. No evidence of acute infarction or intracranial  hemorrhage.   2. No abnormal enhancing lesions.   3. Head MRA demonstrates no definitive aneurysm or stenosis of the   major intracranial arteries.   4. Neck MRA demonstrates patent major cervical arteries.      Echocardiogram Complete   Final Result      CT Head w/o Contrast   Final Result   Impression:    1. No acute intracranial hemorrhage.   2. ASPECT Score: 10/10.      [Stroke Code Result: No evidence of intraparenchymal hemorrhage]      Finding was identified on 12/15/2024 8:46 AM.       Dr. Rosario contacted by me on 12/15/2024 8:47 AM and verbalized   understanding of the result.      [facility] Stroke Code Communication Guidelines:   [place] ED: [phone] (EB ED)   [place] Inpatient: [phone] (Resident Pager)    or Shweta 'Stroke First Call Resident [[place]]' or Amcom [phone]   [facility] ED: [phone] ([facility] ED)   [facility] Inpatient: Page [phone]       I have personally reviewed the examination and initial interpretation   and I agree with the findings.      SIMRAN KHAN MD            SYSTEM ID:  [id number]             Critical care was not performed.     Medical Decision Making  The patient's presentation was of high complexity (an acute health issue posing potential threat to life or bodily function).    The patient's evaluation involved:  review of external note(s) from 2 sources (prior office visits)  ordering and/or review of 3+ test(s) in this encounter (see separate area of note for details)  discussion of management or test interpretation with another health professional (stroke neurology)    The patient's management necessitated moderate risk (prescription drug management including medications given in the ED) and high risk (a decision regarding hospitalization).    Assessment & Plan        I have reviewed the nursing notes.   Emergency Department course:  The patient was seen and examined at 0744 am on arrival.   Evaluated the patient and called a tier 1 stroke code shortly after his  arrival.  Patient underwent stat head CT scanning.  Head CT shows Impression:   1. No acute intracranial hemorrhage.  2. ASPECT Score: 10/10.    EKG shows a normal sinus rhythm, rate of 75 bpm with occasional PVCs  Laboratory studies show a normal lactate of 1.3.  Troponin T is elevated at 52. Repeat troponin is 49, ACS unlikely  Normal at 1.09.  CBC shows anemia, with a hemoglobin of 10.4.  WBC is normal at 8.4.  Comprehensive metabolic panel shows a creatinine of 5.51 with a GFR of 12, consistent with stage IV chronic kidney disease.  Carbon dioxide is low at 18 and glucose elevated at 174.    Stroke neurology recommends obtaining an MRI of the brain without contrast as well as an MRA of the head and neck without contrast.    MRI brain and MRA head and neck  shows Impression:   1. No evidence of acute infarction or intracranial hemorrhage.  2. No abnormal enhancing lesions.  3. Head MRA demonstrates no definitive aneurysm or stenosis of the  major intracranial arteries.  4. Neck MRA demonstrates patent major cervical arteries.    Echocardiogram shows Interpretation Summary  Global and regional left ventricular function is normal with an EF of 60-65%.  Global right ventricular function is normal.  The atrial septum is intact as assessed by agitated saline bubble study .  The inferior vena cava was normal in size with preserved respiratory  variability.   This study was compared with the study from 8/15/24 .  No significant changes noted.    I again spoke with stroke neurology at approximately 1340 pm.  They will order a Zio patch monitor as an outpatient as well as a neurology clinic follow-up.  Neurology recommends discharging the patient on a baby aspirin a day.  He also received a baby aspirin in the ED prior to discharge.    Hakeem Romero is a 54 year old male who presented with an episode of confusion while on the way to work today.  Extensive workup, including CT and MRI as well as echocardiogram, are  essentially unremarkable.  The patient was evaluated by stroke neurology and felt safe to be discharged home.  The Zio patch monitor will be mailed to him and he should wear it as directed.  He should follow-up in the neurology clinic within 1 to 3 weeks.  I also discussed reasons to return to the emergency department.  I have reviewed the findings, diagnosis, plan and need for follow up with the patient.    New Prescriptions    ASPIRIN (ASA) 81 MG CHEWABLE TABLET    Take 1 tablet (81 mg) by mouth daily.       Final diagnoses:   TIA (transient ischemic attack)     This note was created in part by the use of Dragon voice recognition dictation system. Inadvertent grammatical errors and typographical errors may still exist.  MD Sushila Carlton  [facility] EMERGENCY DEPARTMENT  12/15/2024     Sushila Rosario MD  12/15/24 1522

## 2024-12-15 NOTE — CONSULTS
"[facility]     Stroke Code Note          History of Present Illness     Chief Complaint: Stroke      Hakeem Romero is a 54 year old right-handed male who presents with lightheadedness, lower extremity weakness starting around 0530 this AM, 20-30 minutes after taking his medication. He had onset of symptoms on the drive to work. These are described as missing his exit, then missing the next 2-3 exits as well. He hit a few curbs as well, but did not injure himself or anyone else. When trying to say the name of the person helping him, he was only able to say nonsensical letters. Then on getting out of his car, felt weak in his legs bilaterally. Denied unilateral weakness, numbness, tingling, facial droop or drooling that he can recall. States that there was an episode of \"tunnel vision\" for a few minutes, but otherwise no double vision or other vision changes this AM.     Hx of kidney transplant in 2008, currently being evaluated for another transplant by heme/onc. Additional hx of HLD and HTN. No hx of surgery in the last 3 months, nor any hx of neurosurgery. Denies cancer history. Started on coreg and minoxidil for blood pressure in the last 1-2 weeks by Dr. Lei. States that he has felt off since then, nausea, vomiting and headaches occasionally. At home his BP runs in 200s, in the 180s systolic over the last week. Checks with an arm cough every few days.     On arrival to the ED, endorses continued slowing, but otherwise at his baseline. Denies any other symptoms. /63. Glucose 163.          Past Medical History     Stroke risk factors: CKD, HTN    Preadmission antithrombotic regimen: NA    Modified Macy Score (Pre-morbid)    -  0                   Assessment and Plan       1.  Concern for stroke vs toxic metabolic etiology of wordfinding difficulty/weakness  54M with PMHx of kidney transplant in 2008, HTN, HLD presenting for onset of lightheadedness, " "lower extremity weakness and word-finding difficulty on the way to work. LKN 0530, onset around 0615. NIHS score initially 1 for requiring repeated stimulation, but 0 on repeat. Overall concern for stroke is low given resolution of symptoms, low NIHS score. Given background of kidney failure requiring transplant re-evaluation, would favor a toxic-metabolic etiology. Would also recommend checking orthostatic vitals given description of lightheadedness. Cursory examination of chart history reveals that patient has been dealing with malaise and \"feeling hungover all the time\", ISO kidney failure. TIA remains possible. Will recommend further eval with MRI Brain WO and MRA H/N WO. Stroke neurology will continue to follow for now.      Intravenous Thrombolysis  Not given due to:   - minor/isolated/quickly resolving symptoms     Endovascular Treatment  Not initiated due to absence of proximal vessel occlusion     Plan:  -MRI Brain WO  -MRA H/N W/WO  -Toxic metabolic workup per ED (pt notes recent med changes 1-2 weeks ago)  -Orthostatic vitals  -Permissive HTN for now <220 systolic  -TTE to r/out cardiac pathology causing TIA  -If MRIs/echo are negative, will treat patient as TIA and recommend ASA 81mg every day with vascular neurology follow up.  -Stroke neurology will continue to follow for now.     ___________________________________________________________________  The Stroke Staff is Dr. Williamson.    Jose Manuel Calix MD  Neurology Resident  Pager:  Shweta  ___________________________________________________________________        Imaging/Labs   (personally reviewed [facility])    CT head: No signs of bleed. No obvious areas of ischemic stroke.   CTA head/neck: Not completed due to low NIHSS and active kidney failure         Physical Examination     BP: 110/65       Resp: 16           SpO2: 96 %   O2 Device: None (Room air)        Wt Readings from Last 2 Encounters:   12/13/24 123.4 kg (272 lb)   12/13/24 123.8 kg (273 lb) "       General Exam  General:  patient lying in bed without any acute distress    HEENT:  normocephalic/atraumatic  Pulmonary:  no respiratory distress  Abdomen:  soft  Skin:  warm/dry     Neuro Exam  Mental Status:  alert, oriented x 3, follows commands, speech clear and fluent, naming and repetition normal  Cranial Nerves:  visual fields intact, PERRL, EOMI with normal smooth pursuit, facial movements symmetric, hearing not formally tested but intact to conversation, palate elevation symmetric and uvula midline, no dysarthria, tongue protrusion midline  Motor:  normal muscle tone and bulk, no abnormal movements, able to move all limbs spontaneously, no pronator drift. No drift with uppers or lowers, at least antigravity in all extremities  Reflexes:  Deferred  Sensory:  light touch sensation intact and symmetric throughout upper and lower extremities, no extinction on double simultaneous stimulation   Coordination:  normal finger-to-nose and heel-to-shin bilaterally without dysmetria  Station/Gait:  Deferred        Stroke Scales   National Institutes of Health Stroke Scale  Exam Interval: Baseline   Score    Level of consciousness: (0)   Alert, keenly responsive    LOC questions: (0)   Answers both questions correctly    LOC commands: (0)   Performs both tasks correctly    Best gaze: (0)   Normal    Visual: (0)   No visual loss    Facial palsy: (0)   Normal symmetrical movements    Motor arm (left): (0)   No drift    Motor arm (right): (0)   No drift    Motor leg (left): (0)   No drift    Motor leg (right): (0)   No drift    Limb ataxia: (0)   Absent    Sensory: (0)   Normal- no sensory loss    Best language: (0)   Normal- no aphasia    Dysarthria: (0)   Normal    Extinction and inattention: (0)   No abnormality        Total Score:  0             Labs     CBC  Lab Results   Component Value Date    HGB 10.9 (L) 12/15/2024    HCT 32 (L) 12/15/2024    WBC 8.4 12/15/2024     12/15/2024       BMP  Lab Results  "  Component Value Date     12/15/2024    POTASSIUM 4.1 12/15/2024    CHLORIDE 108 (H) 12/09/2024    CO2 23 12/09/2024    BUN 65.7 (H) 12/09/2024    CR 4.79 (H) 12/09/2024     (H) 12/15/2024    RAFAEL 9.0 12/09/2024       INR  INR   Date Value Ref Range Status   08/15/2024 1.04 0.85 - 1.15 Final   12/11/2023 1.04 0.85 - 1.15 Final   11/07/2019 0.98 0.86 - 1.14 Final       Data   Stroke Code Data  (for stroke code without tele)  Stroke code activated     0746   First stroke provider response     0750   Last known normal     0530   Time of discovery (or onset of symptoms)     0615   Head CT read by Stroke Neuro Provider     0800   Was stroke code de-escalated?        0802        Clinically Significant Risk Factors Present on Admission                   # Hypertension: Noted on problem list      # Anemia: based on hgb <11       # Obesity: Estimated body mass index is 37.45 kg/m  as calculated from the following:    Height as of 12/13/24: 1.815 m (5' 11.46\").    Weight as of 12/13/24: 123.4 kg (272 lb).           # CKD, Stage 5 (GFR < 15): Will monitor and treat as appropriate  - last Cr =  N/A  - last GFR = N/A             " Attending Attestation (For Attendings USE Only)...

## 2024-12-15 NOTE — LETTER
[facility] EMERGENCY DEPARTMENT  [address] [address]  [phone]      December 15, 2024    Hakeem Romero  [address] [id number] [phone] (home)     : 1970      To Whom it may concern:    Hakeem Romero was seen in our Emergency Department today, December 15, 2024 for an injury that was reported to be work related.      For the next 1 days he should not work    The employee might be taking medication so that they cannot operate moving machinery or perform activities that require balancing or working above ground.    {Restrictions:270778465}    {WC RESTRICT:377896}    Sincerely,    Sushila Rosario MD

## 2024-12-15 NOTE — CODE/RAPID RESPONSE
Stroke Code Nurse-Responder Note    Arrival Time to Stroke Code: 0748    Stroke Code Team interventions:   - De-escalated at 08:02 by Dr. Parra    ED/Bedside Nurse providing handoff: HANY Yancey RN    Time left for CT: 08:01    Time arrived to next location (ED/Unit/IR): 0800    ED/Bedside Nurse given handoff (name/time): HANY Yancey RN    Arturo Miller, RN

## 2024-12-15 NOTE — DISCHARGE INSTRUCTIONS
Take a baby aspirin a day.  A Zio patch monitor will be sent to you in the mail.  Please wear this as directed.  Follow-up in the neurology clinic.  A referral has been made to help you with follow-up.  Please make an appointment to follow up with Neurology Clinic (phone: [phone]) in 1-3 weeks

## 2024-12-15 NOTE — PHARMACY-CONSULT NOTE
Pharmacy Stroke Code Response    Pharmacist responded as part of the Stroke Code Team activation to patient care area ED [facility]. The Stroke Team determined that the patient was not a candidate for IV thrombolytic therapy and the pharmacy team was dismissed at 0758.    Sebas Medrano, SamiaD, BCPS

## 2024-12-15 NOTE — ED TRIAGE NOTES
BIBA per EMS patient from home. Symptoms started 0630 while driving to work. Started having confusion and forgot how to use his GPS. Was unable to speak to coworker about his symptoms. Boss called 911 and had improvement with symptoms while waiting for EMS. Hemodynamically stable en route. BG was 202 en route. 4 mg of Zofran given for nausea. [place] was negative. Motor function was intact per EMS. ESRD patient not on dialysis, working on second kidney transplant.

## 2024-12-16 ENCOUNTER — TELEPHONE (OUTPATIENT)
Dept: TRANSPLANT | Facility: CLINIC | Age: 54
End: 2024-12-16
Payer: COMMERCIAL

## 2024-12-16 DIAGNOSIS — N32.89 BLADDER WALL THICKENING: ICD-10-CM

## 2024-12-16 DIAGNOSIS — Z76.82 ORGAN TRANSPLANT CANDIDATE: ICD-10-CM

## 2024-12-16 DIAGNOSIS — N18.6 ESRD (END STAGE RENAL DISEASE) (H): Primary | ICD-10-CM

## 2024-12-16 LAB
ALBUMIN SERPL ELPH-MCNC: 3.6 G/DL (ref 3.7–5.1)
ALPHA1 GLOB SERPL ELPH-MCNC: 0.3 G/DL (ref 0.2–0.4)
ALPHA2 GLOB SERPL ELPH-MCNC: 0.6 G/DL (ref 0.5–0.9)
ATRIAL RATE - MUSE: 74 BPM
ATRIAL RATE - MUSE: 75 BPM
B-GLOBULIN SERPL ELPH-MCNC: 0.7 G/DL (ref 0.6–1)
DIASTOLIC BLOOD PRESSURE - MUSE: NORMAL MMHG
DIASTOLIC BLOOD PRESSURE - MUSE: NORMAL MMHG
GAMMA GLOB SERPL ELPH-MCNC: 0.4 G/DL (ref 0.7–1.6)
INTERPRETATION ECG - MUSE: NORMAL
INTERPRETATION ECG - MUSE: NORMAL
KAPPA LC FREE SER-MCNC: 7.54 MG/DL (ref 0.33–1.94)
KAPPA LC FREE/LAMBDA FREE SER NEPH: 1.63 {RATIO} (ref 0.26–1.65)
LAMBDA LC FREE SERPL-MCNC: 4.64 MG/DL (ref 0.57–2.63)
LOCATION OF TASK: ABNORMAL
M PROTEIN SERPL ELPH-MCNC: 0 G/DL
P AXIS - MUSE: -6 DEGREES
P AXIS - MUSE: -8 DEGREES
PR INTERVAL - MUSE: 146 MS
PR INTERVAL - MUSE: 152 MS
PROT PATTERN SERPL ELPH-IMP: ABNORMAL
QRS DURATION - MUSE: 86 MS
QRS DURATION - MUSE: 86 MS
QT - MUSE: 436 MS
QT - MUSE: 440 MS
QTC - MUSE: 483 MS
QTC - MUSE: 491 MS
R AXIS - MUSE: -27 DEGREES
R AXIS - MUSE: -29 DEGREES
SYSTOLIC BLOOD PRESSURE - MUSE: NORMAL MMHG
SYSTOLIC BLOOD PRESSURE - MUSE: NORMAL MMHG
T AXIS - MUSE: 84 DEGREES
T AXIS - MUSE: 93 DEGREES
VENTRICULAR RATE- MUSE: 74 BPM
VENTRICULAR RATE- MUSE: 75 BPM

## 2024-12-16 NOTE — TELEPHONE ENCOUNTER
Called pt to update urology consult placed due to bladder wall thickening with concern for chronic outlet obstruction on CT a/p. Their clinic should be reaching out to arrange. Pt updated writer on recent ER visit. ED provider notes want pt to follow up with neurology, provided clinic number for pt to call to schedule. Pt wondering if any of his new meds could be the cause, encouraged him to discuss with prescribing provider/ pharmacy.  Pt verbalized understanding of information and has no further questions. Encouraged to reach out if questions arise.

## 2024-12-18 ENCOUNTER — MYC MEDICAL ADVICE (OUTPATIENT)
Dept: FAMILY MEDICINE | Facility: CLINIC | Age: 54
End: 2024-12-18

## 2024-12-18 ENCOUNTER — OFFICE VISIT (OUTPATIENT)
Dept: FAMILY MEDICINE | Facility: CLINIC | Age: 54
End: 2024-12-18
Payer: COMMERCIAL

## 2024-12-18 VITALS
BODY MASS INDEX: 38.69 KG/M2 | HEART RATE: 81 BPM | WEIGHT: 281 LBS | DIASTOLIC BLOOD PRESSURE: 86 MMHG | OXYGEN SATURATION: 98 % | SYSTOLIC BLOOD PRESSURE: 145 MMHG | RESPIRATION RATE: 16 BRPM | TEMPERATURE: 97.3 F

## 2024-12-18 DIAGNOSIS — N18.5 STAGE 5 CHRONIC KIDNEY DISEASE NOT ON CHRONIC DIALYSIS (H): ICD-10-CM

## 2024-12-18 DIAGNOSIS — I15.1 HTN, KIDNEY TRANSPLANT RELATED: ICD-10-CM

## 2024-12-18 DIAGNOSIS — Z94.0 HTN, KIDNEY TRANSPLANT RELATED: ICD-10-CM

## 2024-12-18 DIAGNOSIS — I10 ESSENTIAL HYPERTENSION: ICD-10-CM

## 2024-12-18 DIAGNOSIS — R41.0 CONFUSION: Primary | ICD-10-CM

## 2024-12-18 DIAGNOSIS — R60.0 EDEMA OF LOWER EXTREMITY: ICD-10-CM

## 2024-12-18 DIAGNOSIS — D84.9 IMMUNOSUPPRESSED STATUS (H): ICD-10-CM

## 2024-12-18 DIAGNOSIS — M62.81 GENERALIZED MUSCLE WEAKNESS: ICD-10-CM

## 2024-12-18 RX ORDER — CARVEDILOL 3.12 MG/1
TABLET ORAL
Qty: 154 TABLET | Refills: 0 | Status: SHIPPED | OUTPATIENT
Start: 2024-12-18 | End: 2025-01-17

## 2024-12-18 NOTE — LETTER
2024    Hakeem Romero   1970        To Whom it May Concern;    Please excuse Hakeem Romero from work 2024 due to illness. He is improved and clear to return to work following his appointment this morning 2024.    Sincerely,      Natalie Del Angel MD

## 2024-12-18 NOTE — PROGRESS NOTES
Assessment & Plan   Problem List Items Addressed This Visit       Immunosuppressed status (H)    HTN, kidney transplant related    Relevant Medications    carvedilol (COREG) 3.125 MG tablet    Stage 5 chronic kidney disease not on chronic dialysis (H)    Relevant Medications    carvedilol (COREG) 3.125 MG tablet    Edema of lower extremity     Other Visit Diagnoses       Confusion    -  Primary    Generalized muscle weakness        Essential hypertension        Relevant Medications    carvedilol (COREG) 3.125 MG tablet           New patient to me, 53 y/o s/p kidney transplant in 2008 with recent graft failure and ongoing immunosuppression with past medical history of uncontrolled HTN, peripheral edema treated with Lasix presents for ED follow-up.  ED note from 12/15 reviewed, essentially he had an episode of acute confusion and weakness 3 days ago with spontaneous resolution, CVA workup negative and it was thought he had TIA.  This was in the context of starting minoxidil and carvedilol a week prior.  He does not have a stroke history.  In the last week or 2, he started the above meds, doubled his Lasix dose, and experienced worsening uremia.  Suspect that this might have been a combination of poor perfusion paired with uremia effects.   He has had no recurrence of his confusion/weakness, and his nausea/nonspecific fatigue completely resolved with stopping the carvedilol yesterday.  He follows with primary care provider Dr. Lei who also is a nephrologist.  Called Dr. Lei to discuss plan going forward in light of tenuous state, balancing remaining kidney function/blood pressure and putting off need for PD cath placement until few months from now if possible.  I called Dr. Lei following the appointment and made the following plan in collaboration with him:     PLAN:   Stop minoxidil  Restart carvedilol at low-dose-3.125 mg twice daily, increasing dose every 3 to 7 days  Home blood pressure monitoring at  "least daily  Follow-up with me in 1 to 2 weeks  Continue to monitor for neurochanges-reviewed stroke symptoms, cognitive changes he should watch out for and report to us if they occur  Hold off on ordering PD cath placement for now  Follow-up with Dr. Lei scheduled for 2/7/25      BMI  Estimated body mass index is 38.69 kg/m  as calculated from the following:    Height as of 12/13/24: 1.815 m (5' 11.46\").    Weight as of this encounter: 127.5 kg (281 lb).       The longitudinal plan of care for the diagnosis(es)/condition(s) as documented were addressed during this visit. Due to the added complexity in care, I will continue to support Hakeem in the subsequent management and with ongoing continuity of care.        Andreina Palacio is a 54 year old, presenting for the following health issues:  Hospital F/U (Pt went to the [place] ER on 12/15 for a possible TIA)        12/18/2024     8:48 AM   Additional Questions   Roomed by Tonya     HPI     Started minoxidil and carvedilol last thurs (switched off metoprolol)  Went home early from work thurs feeling sick  Went [employer] shopping, felt fatigued, nauseous, vision impairment - light/dark coming in/out  Sun is when he went into work was disoriented, difficulty speaking, weak  Neuro workup at ED was negative  Discharged, still didn't feel well Monday    Stopped the minoxidil first - didn't take Sunday yuval  Stopped carvedilol yesterday - today feeling much better - no nausea/fatigue    No recurrence of confusion/disorientation     Self increased lasix - taking 2 lasix at once (80mg) qAM since 12/5    Not getting sick - no sore throat   No bowel/bladder issues    Planning to go in to work like normal        Objective    BP (!) 145/86   Pulse 81   Temp 97.3  F (36.3  C) (Tympanic)   Resp 16   Wt 127.5 kg (281 lb)   SpO2 98%   BMI 38.69 kg/m    Body mass index is 38.69 kg/m .  Physical Exam  Constitutional:       General: He is not in acute distress.     Appearance: " Normal appearance. He is not ill-appearing.   HENT:      Nose: Nose normal.      Mouth/Throat:      Mouth: Mucous membranes are moist.   Eyes:      General: No scleral icterus.     Conjunctiva/sclera: Conjunctivae normal.   Cardiovascular:      Rate and Rhythm: Normal rate and regular rhythm.      Heart sounds: Normal heart sounds.   Pulmonary:      Effort: Pulmonary effort is normal.      Breath sounds: Normal breath sounds.   Musculoskeletal:      Right lower leg: Edema (1+ pitting to the knee) present.      Left lower leg: Edema (1+ pitting to the knee) present.   Skin:     General: Skin is warm.   Neurological:      General: No focal deficit present.      Mental Status: He is alert.      Cranial Nerves: No cranial nerve deficit.      Motor: No weakness.      Gait: Gait normal.   Psychiatric:         Mood and Affect: Mood normal.         Behavior: Behavior normal.              Signed Electronically by: Natalie Del Angel MD

## 2024-12-30 ENCOUNTER — TELEPHONE (OUTPATIENT)
Dept: FAMILY MEDICINE | Facility: CLINIC | Age: 54
End: 2024-12-30
Payer: COMMERCIAL

## 2024-12-30 NOTE — TELEPHONE ENCOUNTER
----- Message from Natalie Del Angel sent at 12/27/2024  6:57 PM CST -----  Regarding: Neurology referral - does pt need EEG beforehand?  Hi team,    I saw this patient for follow-up Friday.  He is reporting symptoms that could be concerning for seizures at night.  He does have a neurology referral in place, but I was wondering if someone could reach out to the general neurology nurse team to see if they would recommend EEG be performed prior to a neurology appointment, and if so, would this be irregular or video EEG?    Thanks for the help,    Brooke

## 2024-12-31 ENCOUNTER — MYC MEDICAL ADVICE (OUTPATIENT)
Dept: FAMILY MEDICINE | Facility: CLINIC | Age: 54
End: 2024-12-31
Payer: COMMERCIAL

## 2024-12-31 ENCOUNTER — TELEPHONE (OUTPATIENT)
Dept: FAMILY MEDICINE | Facility: CLINIC | Age: 54
End: 2024-12-31
Payer: COMMERCIAL

## 2024-12-31 DIAGNOSIS — I15.1 HTN, KIDNEY TRANSPLANT RELATED: ICD-10-CM

## 2024-12-31 DIAGNOSIS — Z94.0 HTN, KIDNEY TRANSPLANT RELATED: ICD-10-CM

## 2024-12-31 NOTE — TELEPHONE ENCOUNTER
Forms/Letter Request    Type of form/letter: FMLA - Unknown      Do we have the form/letter: No,  clinic awaiting form    Who is the form from? [employer]    Where did/will the form come from? Will be faxed in, awaiting form    Patient called requesting PCP email for FMLA initiation purposes, informed pt we do not give out that information. Spoke with RN at  who confirmed that they are awaiting FMLA forms and will be in contact with patient.

## 2025-01-06 ENCOUNTER — OFFICE VISIT (OUTPATIENT)
Dept: FAMILY MEDICINE | Facility: CLINIC | Age: 55
End: 2025-01-06
Payer: COMMERCIAL

## 2025-01-06 ENCOUNTER — ANCILLARY PROCEDURE (OUTPATIENT)
Dept: GENERAL RADIOLOGY | Facility: CLINIC | Age: 55
End: 2025-01-06
Attending: NURSE PRACTITIONER
Payer: COMMERCIAL

## 2025-01-06 VITALS
WEIGHT: 294.7 LBS | BODY MASS INDEX: 41.26 KG/M2 | HEIGHT: 71 IN | RESPIRATION RATE: 20 BRPM | DIASTOLIC BLOOD PRESSURE: 62 MMHG | SYSTOLIC BLOOD PRESSURE: 124 MMHG | TEMPERATURE: 96.4 F | OXYGEN SATURATION: 98 % | HEART RATE: 61 BPM

## 2025-01-06 DIAGNOSIS — Z92.25 STATUS POST RECENT IMMUNOSUPPRESSIVE THERAPY: ICD-10-CM

## 2025-01-06 DIAGNOSIS — R60.0 EDEMA OF LOWER EXTREMITY: ICD-10-CM

## 2025-01-06 DIAGNOSIS — Z94.0 HTN, KIDNEY TRANSPLANT RELATED: ICD-10-CM

## 2025-01-06 DIAGNOSIS — I10 ESSENTIAL HYPERTENSION: ICD-10-CM

## 2025-01-06 DIAGNOSIS — R06.02 SHORTNESS OF BREATH: ICD-10-CM

## 2025-01-06 DIAGNOSIS — I15.1 HTN, KIDNEY TRANSPLANT RELATED: ICD-10-CM

## 2025-01-06 DIAGNOSIS — N18.5 STAGE 5 CHRONIC KIDNEY DISEASE NOT ON CHRONIC DIALYSIS (H): ICD-10-CM

## 2025-01-06 DIAGNOSIS — Z48.298 AFTERCARE FOLLOWING ORGAN TRANSPLANT: ICD-10-CM

## 2025-01-06 DIAGNOSIS — R06.02 SHORTNESS OF BREATH: Primary | ICD-10-CM

## 2025-01-06 DIAGNOSIS — Z79.899 ENCOUNTER FOR LONG-TERM (CURRENT) USE OF HIGH-RISK MEDICATION: ICD-10-CM

## 2025-01-06 DIAGNOSIS — Z94.0 KIDNEY REPLACED BY TRANSPLANT: ICD-10-CM

## 2025-01-06 DIAGNOSIS — E78.5 DYSLIPIDEMIA: ICD-10-CM

## 2025-01-06 LAB
ALBUMIN SERPL BCG-MCNC: 3.8 G/DL (ref 3.5–5.2)
ALP SERPL-CCNC: 129 U/L (ref 40–150)
ALT SERPL W P-5'-P-CCNC: 10 U/L (ref 0–70)
ANION GAP SERPL CALCULATED.3IONS-SCNC: 14 MMOL/L (ref 7–15)
AST SERPL W P-5'-P-CCNC: 11 U/L (ref 0–45)
BASOPHILS # BLD AUTO: 0 10E3/UL (ref 0–0.2)
BASOPHILS NFR BLD AUTO: 0 %
BILIRUB SERPL-MCNC: 0.3 MG/DL
BUN SERPL-MCNC: 99.2 MG/DL (ref 6–20)
CALCIUM SERPL-MCNC: 8.7 MG/DL (ref 8.8–10.4)
CHLORIDE SERPL-SCNC: 108 MMOL/L (ref 98–107)
CHOLEST SERPL-MCNC: 160 MG/DL
CREAT SERPL-MCNC: 5.7 MG/DL (ref 0.67–1.17)
EGFRCR SERPLBLD CKD-EPI 2021: 11 ML/MIN/1.73M2
EOSINOPHIL # BLD AUTO: 0.3 10E3/UL (ref 0–0.7)
EOSINOPHIL NFR BLD AUTO: 4 %
ERYTHROCYTE [DISTWIDTH] IN BLOOD BY AUTOMATED COUNT: 13.1 % (ref 10–15)
FASTING STATUS PATIENT QL REPORTED: NO
FASTING STATUS PATIENT QL REPORTED: NO
FLUAV RNA SPEC QL NAA+PROBE: NEGATIVE
FLUBV RNA RESP QL NAA+PROBE: NEGATIVE
GLUCOSE SERPL-MCNC: 124 MG/DL (ref 70–99)
HCO3 SERPL-SCNC: 19 MMOL/L (ref 22–29)
HCT VFR BLD AUTO: 28.7 % (ref 40–53)
HDLC SERPL-MCNC: 31 MG/DL
HGB BLD-MCNC: 9.2 G/DL (ref 13.3–17.7)
IMM GRANULOCYTES # BLD: 0 10E3/UL
IMM GRANULOCYTES NFR BLD: 0 %
LDLC SERPL CALC-MCNC: 100 MG/DL
LYMPHOCYTES # BLD AUTO: 1.2 10E3/UL (ref 0.8–5.3)
LYMPHOCYTES NFR BLD AUTO: 15 %
MCH RBC QN AUTO: 28.5 PG (ref 26.5–33)
MCHC RBC AUTO-ENTMCNC: 32.1 G/DL (ref 31.5–36.5)
MCV RBC AUTO: 89 FL (ref 78–100)
MONOCYTES # BLD AUTO: 0.8 10E3/UL (ref 0–1.3)
MONOCYTES NFR BLD AUTO: 9 %
NEUTROPHILS # BLD AUTO: 6 10E3/UL (ref 1.6–8.3)
NEUTROPHILS NFR BLD AUTO: 72 %
NONHDLC SERPL-MCNC: 129 MG/DL
PLATELET # BLD AUTO: 163 10E3/UL (ref 150–450)
POTASSIUM SERPL-SCNC: 5.3 MMOL/L (ref 3.4–5.3)
PROT SERPL-MCNC: 6.2 G/DL (ref 6.4–8.3)
RBC # BLD AUTO: 3.23 10E6/UL (ref 4.4–5.9)
RSV RNA SPEC NAA+PROBE: NEGATIVE
SARS-COV-2 RNA RESP QL NAA+PROBE: NEGATIVE
SODIUM SERPL-SCNC: 141 MMOL/L (ref 135–145)
TRIGL SERPL-MCNC: 145 MG/DL
WBC # BLD AUTO: 8.3 10E3/UL (ref 4–11)

## 2025-01-06 PROCEDURE — 71046 X-RAY EXAM CHEST 2 VIEWS: CPT | Mod: TC | Performed by: RADIOLOGY

## 2025-01-06 PROCEDURE — 80053 COMPREHEN METABOLIC PANEL: CPT | Performed by: NURSE PRACTITIONER

## 2025-01-06 PROCEDURE — 99214 OFFICE O/P EST MOD 30 MIN: CPT | Performed by: NURSE PRACTITIONER

## 2025-01-06 PROCEDURE — 85025 COMPLETE CBC W/AUTO DIFF WBC: CPT | Mod: QW | Performed by: NURSE PRACTITIONER

## 2025-01-06 PROCEDURE — 80061 LIPID PANEL: CPT | Performed by: NURSE PRACTITIONER

## 2025-01-06 PROCEDURE — 87637 SARSCOV2&INF A&B&RSV AMP PRB: CPT | Performed by: NURSE PRACTITIONER

## 2025-01-06 PROCEDURE — 36415 COLL VENOUS BLD VENIPUNCTURE: CPT | Performed by: NURSE PRACTITIONER

## 2025-01-06 RX ORDER — CARVEDILOL 3.12 MG/1
TABLET ORAL
Qty: 154 TABLET | Refills: 11 | OUTPATIENT
Start: 2025-01-06

## 2025-01-06 RX ORDER — METOPROLOL TARTRATE 75 MG/1
1 TABLET ORAL 2 TIMES DAILY
Status: SHIPPED
Start: 2025-01-06

## 2025-01-06 ASSESSMENT — ENCOUNTER SYMPTOMS: SHORTNESS OF BREATH: 1

## 2025-01-06 NOTE — TELEPHONE ENCOUNTER
Call patient based on [employer] message from this AM.    He does not feel he is having any fevers.  He is feeling more short of breath but since this has been gradual over time and is not an acute change.  He is having trouble laying flat.  He is not wearing his CPAP at night due to the worsened breathing troubles.  He notes he is having an acute issue with the tooth for which she is being seen this morning at the dentist.    Home BP  150s/80s in the past week  Taking old metoprolol and amlodipine, stopped carvedilol, stopped losartan but just last night. Clonidine is 0.1 mg    Described my concerns and that he needs evaluation today based on the worsening dyspnea, decreased urine output and overall new malaise. OK for sick visit in the clinic if he accepts this.  Will route to  for scheduling.    Natalie Del Angel MD     (0) independent

## 2025-01-06 NOTE — PROGRESS NOTES
Assessment & Plan     Shortness of breath  Increasing dyspnea on exertion and worsening edema, fatigue.  Lung sounds are clear, but chext xray shows small pleural effusion on the left.  Flu, Covid, RSV pcr negative.  CBC with stable WBC, RBC and Hgb/hematocrit trending down.  Weight gain of just over 20 pounds in last month.  Already increased lasix to 80 bid 2 weeks ago without noticeable effect, report decreased urination.  Discussed with Dr. Dave Lei and recommendation is to switch to bumex 3 mg bid.  Patient informed and prescription sent to pharmacy.  Recommend daily weights.  He is planning a trip to see his mother in [place] at the end of the week, by airplane.  Would like to do this before he starts dialysis.  Recommend wheelchair or electric cart for connection flight and letter provided. Follow up with Dr. Lei in 2 weeks.  Follow up sooner in the clinic if needed for worsening symptoms.  Ordered baseline lab work in anticipation of starting dialysis soon.     Wt Readings from Last 4 Encounters:   01/06/25 133.7 kg (294 lb 11.2 oz)   12/18/24 127.5 kg (281 lb)   12/13/24 123.4 kg (272 lb)   12/13/24 123.8 kg (273 lb)       - Influenza A/B, RSV and SARS-CoV2 PCR (COVID-19) Nose  - XR Chest 2 Views; Future  - CBC with Platelets & Differential; Future  - CBC with Platelets & Differential    Stage 5 chronic kidney disease not on chronic dialysis (H)  Progressively symptomatic, kidney function holding at GFR 11, cr 5.7. Discussed with PCP/nephrologist, as above will obtain baseline labs for  starting dialysis.  - Comprehensive metabolic panel; Future  - Comprehensive metabolic panel    Dyslipidemia  Stable on pravastatin  - Lipid panel reflex to direct LDL Non-fasting; Future  - Lipid panel reflex to direct LDL Non-fasting    Kidney replaced by transplant  Progressive symptoms, on transplant list    Aftercare following organ transplant  Followed by transplant team    BMI  Estimated body mass index is 40.57  "kg/m  as calculated from the following:    Height as of this encounter: 1.815 m (5' 11.46\").    Weight as of this encounter: 133.7 kg (294 lb 11.2 oz).             Andreina Palacio is a 54 year old, presenting for the following health issues:  Shortness of Breath (Shortness of breath, congestion, malaise, and decreased urine output x 1-2 weeks. Gradually getting worse. Currently waiting to start dialysis.)      1/6/2025     3:19 PM   Additional Questions   Roomed by Shameka NGUYEN, kidney transplant 2008  Lately edema worsening  More fatigue  Had to stop and rest when walking from car to work, not normal  Gets winded going up and down stairs  At night, congestion in sinuses that clears when up for the day  Hard to breathe with CPAP  Just harder to breathe in general, he thinks related to fluid retention  Wheezing by morning  Sometimes needs to sleep upright on the couch  No h/o of recurrent sinusitis  Decreased urination going fewer times each day and smaller amount  Planning on PD, describes holding pattern until point of moving forward  Feeling cold all the time  No sick contacts  BP adjustments  Carvedilol-didn't feel well affectwed eye sight, following day, episode of possibly TIA vs uremia and med changes  Back on metoprolol, clonidine and amlodipine  Up to 4 lasix daily, doesn't seem to be helping 160 mg  Weight increasing but eating less    Swelling past knees when up on feet last 2-3 months, worse last 2 weeks  Irregular PVCs?  Has zio monitor at home that he needs to place    Wt Readings from Last 4 Encounters:  01/06/25 : 133.7 kg (294 lb 11.2 oz)  12/18/24 : 127.5 kg (281 lb)  12/13/24 : 123.4 kg (272 lb)  12/13/24 : 123.8 kg (273 lb)        History of Present Illness       CKD: He uses over the counter pain medication, including aspirin, two times daily.    Hypertension: He presents for follow up of hypertension.  He does check blood pressure  regularly outside of the clinic. Outside blood pressures " "have been over 140/90. He does not follow a low salt diet.     He eats 0-1 servings of fruits and vegetables daily.He consumes 1 sweetened beverage(s) daily.He exercises with enough effort to increase his heart rate 9 or less minutes per day.  He exercises with enough effort to increase his heart rate 3 or less days per week.   He is taking medications regularly.                 Review of Systems    Review of Systems   Constitutional:  Positive for appetite change, fatigue and unexpected weight change.   HENT:  Positive for congestion.    Respiratory:  Positive for shortness of breath.    Cardiovascular:  Negative for palpitations.   Genitourinary:         Decrease urinary frequency           Objective    BP (!) 140/80 (BP Location: Right arm, Patient Position: Sitting, Cuff Size: Adult Large)   Pulse 61   Temp (!) 96.4  F (35.8  C) (Tympanic)   Resp 20   Ht 1.815 m (5' 11.46\")   Wt 133.7 kg (294 lb 11.2 oz)   SpO2 98%   BMI 40.57 kg/m    Body mass index is 40.57 kg/m .  Physical Exam  Constitutional:       Appearance: He is obese. He is ill-appearing.   HENT:      Head: Normocephalic and atraumatic.      Right Ear: Tympanic membrane normal.      Left Ear: Tympanic membrane normal.      Nose: Nose normal.   Eyes:      Pupils: Pupils are equal, round, and reactive to light.   Cardiovascular:      Rate and Rhythm: Normal rate. Rhythm irregular.   Pulmonary:      Effort: Pulmonary effort is normal. No respiratory distress.      Breath sounds: Normal breath sounds. No wheezing, rhonchi or rales.   Musculoskeletal:      Right lower le+ Pitting Edema present.      Left lower le+ Pitting Edema present.   Skin:     General: Skin is warm and dry.   Neurological:      Mental Status: He is alert and oriented to person, place, and time.            Results for orders placed or performed in visit on 25   XR Chest 2 Views     Status: None    Narrative    EXAM: XR CHEST 2 VIEWS  LOCATION: [facility]  DATE: 1/6/2025    INDICATION:  Shortness of breath  COMPARISON: 8/15/2020      Impression    IMPRESSION: Trace left pleural effusion. Low lung volumes but no evidence for CHF or pneumonia. Normal heart size.   Results for orders placed or performed in visit on 01/06/25   Influenza A/B, RSV and SARS-CoV2 PCR (COVID-19) Nose     Status: Normal    Specimen: Nose; Swab   Result Value Ref Range    Influenza A PCR Negative Negative    Influenza B PCR Negative Negative    RSV PCR Negative Negative    SARS CoV2 PCR Negative Negative    Narrative    Testing was performed using the Xpert Xpress CoV2/Flu/RSV Assay on the Cepheid GeneXpert Instrument. This test should be ordered for the detection of SARS-CoV2, influenza, and RSV viruses in individuals with signs and symptoms of respiratory tract infection. This test is for in vitro diagnostic use under the [place] FDA for laboratories certified under CLIA to perform high or moderate complexity testing. This test has been [place] FDA cleared. A negative result does not rule out the presence of PCR inhibitors in the specimen or target RNA in concentration below the limit of detection for the assay. If only one viral target is positive but coinfection with multiple targets is suspected, the sample should be re-tested with another FDA cleared, approved, or authorized test, if coninfection would change clinical management. This test was validated by the [facility]. These laboratories are certified under the Clinical Laboratory Improvement Amendments of 1988 (CLIA-88) as qualified to perfom high complexity laboratory testing.   Lipid panel reflex to direct LDL Non-fasting     Status: Abnormal   Result Value Ref Range    Cholesterol 160 <200 mg/dL    Triglycerides 145 <150 mg/dL    Direct Measure HDL 31 (L) >=40 mg/dL    LDL Cholesterol Calculated 100 (H) <100 mg/dL    Non HDL Cholesterol 129 <130 mg/dL    Patient Fasting > 8hrs? No     Narrative     Cholesterol  Desirable: < 200 mg/dL  Borderline High: 200 - 239 mg/dL  High: >= 240 mg/dL    Triglycerides  Normal: < 150 mg/dL  Borderline High: 150 - 199 mg/dL  High: 200-499 mg/dL  Very High: >= 500 mg/dL    Direct Measure HDL  Female: >= 50 mg/dL   Male: >= 40 mg/dL    LDL Cholesterol  Desirable: < 100 mg/dL  Above Desirable: 100 - 129 mg/dL   Borderline High: 130 - 159 mg/dL   High:  160 - 189 mg/dL   Very High: >= 190 mg/dL    Non HDL Cholesterol  Desirable: < 130 mg/dL  Above Desirable: 130 - 159 mg/dL  Borderline High: 160 - 189 mg/dL  High: 190 - 219 mg/dL  Very High: >= 220 mg/dL   Comprehensive metabolic panel     Status: Abnormal   Result Value Ref Range    Sodium 141 135 - 145 mmol/L    Potassium 5.3 3.4 - 5.3 mmol/L    Carbon Dioxide (CO2) 19 (L) 22 - 29 mmol/L    Anion Gap 14 7 - 15 mmol/L    Urea Nitrogen 99.2 (H) 6.0 - 20.0 mg/dL    Creatinine 5.70 (H) 0.67 - 1.17 mg/dL    GFR Estimate 11 (L) >60 mL/min/1.73m2    Calcium 8.7 (L) 8.8 - 10.4 mg/dL    Chloride 108 (H) 98 - 107 mmol/L    Glucose 124 (H) 70 - 99 mg/dL    Alkaline Phosphatase 129 40 - 150 U/L    AST 11 0 - 45 U/L    ALT 10 0 - 70 U/L    Protein Total 6.2 (L) 6.4 - 8.3 g/dL    Albumin 3.8 3.5 - 5.2 g/dL    Bilirubin Total 0.3 <=1.2 mg/dL    Patient Fasting > 8hrs? No    CBC with platelets and differential     Status: Abnormal   Result Value Ref Range    WBC Count 8.3 4.0 - 11.0 10e3/uL    RBC Count 3.23 (L) 4.40 - 5.90 10e6/uL    Hemoglobin 9.2 (L) 13.3 - 17.7 g/dL    Hematocrit 28.7 (L) 40.0 - 53.0 %    MCV 89 78 - 100 fL    MCH 28.5 26.5 - 33.0 pg    MCHC 32.1 31.5 - 36.5 g/dL    RDW 13.1 10.0 - 15.0 %    Platelet Count 163 150 - 450 10e3/uL    % Neutrophils 72 %    % Lymphocytes 15 %    % Monocytes 9 %    % Eosinophils 4 %    % Basophils 0 %    % Immature Granulocytes 0 %    Absolute Neutrophils 6.0 1.6 - 8.3 10e3/uL    Absolute Lymphocytes 1.2 0.8 - 5.3 10e3/uL    Absolute Monocytes 0.8 0.0 - 1.3 10e3/uL    Absolute Eosinophils 0.3 0.0  - 0.7 10e3/uL    Absolute Basophils 0.0 0.0 - 0.2 10e3/uL    Absolute Immature Granulocytes 0.0 <=0.4 10e3/uL   CBC with Platelets & Differential     Status: Abnormal    Narrative    The following orders were created for panel order CBC with Platelets & Differential.  Procedure                               Abnormality         Status                     ---------                               -----------         ------                     CBC with platelets and d...[[id number]]  Abnormal            Final result                 Please view results for these tests on the individual orders.     XR Chest 2 Views    Result Date: 1/7/2025  EXAM: XR CHEST 2 VIEWS LOCATION: [facility] DATE: 1/6/2025 INDICATION:  Shortness of breath COMPARISON: 8/15/2020     IMPRESSION: Trace left pleural effusion. Low lung volumes but no evidence for CHF or pneumonia. Normal heart size.         Signed Electronically by: NHUNG Ceron CNP

## 2025-01-06 NOTE — TELEPHONE ENCOUNTER
Patient scheduled for today at 3:30 pm with Sheri Izquierdo CNP. LETSGROOPhart message sent to patient.

## 2025-01-06 NOTE — LETTER
January 7, 2025      Hakeem Romero  [address] [address]        To Whom It May Concern,       Mr. Romero is under our care for progressing chronic disease and requires assistance and/or extra time with connection flight.  He would benefit from assistance by electric cart or wheelchair to make his connection flight to avoid exacerbating his medical condition.     Sincerely,        NHUNG Ceron CNP    Electronically signed

## 2025-01-07 ENCOUNTER — TELEPHONE (OUTPATIENT)
Dept: TRANSPLANT | Facility: CLINIC | Age: 55
End: 2025-01-07
Payer: COMMERCIAL

## 2025-01-07 ENCOUNTER — TELEPHONE (OUTPATIENT)
Dept: FAMILY MEDICINE | Facility: CLINIC | Age: 55
End: 2025-01-07
Payer: COMMERCIAL

## 2025-01-07 RX ORDER — BUMETANIDE 1 MG/1
3 TABLET ORAL
Qty: 180 TABLET | Refills: 11 | Status: SHIPPED | OUTPATIENT
Start: 2025-01-07 | End: 2025-01-08

## 2025-01-07 NOTE — TELEPHONE ENCOUNTER
General  Route to LPN    Reason for call: Pt would like a call back about his care     Call back needed? Yes    Return Call Needed  Same as documented in contacts section  When to return call?: Greater than one day: Route standard priority

## 2025-01-07 NOTE — TELEPHONE ENCOUNTER
General Call    Contacts       Contact Date/Time Type Contact Phone/Fax    01/07/2025 11:55 AM CST Phone (Incoming) Hakeem Romero (Self) [phone] (M)          Reason for Call:  xray results pleural effusion    What are your questions or concerns:  Pt is calling, he received his xray results with trace left pleural effusion. Pt is asking what the treatment will be, if any. Pt states he is having a root canal today at 1:30pm and will be traveling to [place] on 1/9/25 through 1/13/25.  Pt will be unavailable by phone from 1:30- 3pm today.    Please advise on xray results.    Date of last appointment with provider: 1/6/25    Could we send this information to you in [employer] or would you prefer to receive a phone call?:   Patient would prefer a phone call   Okay to leave a detailed message?: Yes at Cell number on file:    Telephone Information:   Mobile [phone]

## 2025-01-07 NOTE — TELEPHONE ENCOUNTER
I will talk to Robyn, but wanted to get this encounter started.  Can we please schedule an appointment with Dr. Lei the week of 1/20/25 for follow up for medication change, ESRD and discuss dialysis?  Thank you.

## 2025-01-07 NOTE — TELEPHONE ENCOUNTER
MEDICAL RECORDS REQUEST   [facility]  Urology Clinic  [address] [place] [address]  PHONE: [phone]  Fax: [phone]        FUTURE VISIT INFORMATION                                                   Hakeem Romero, JIAN: 1970 scheduled for future visit at [facility]    APPOINTMENT INFORMATION:  Date: 2025  Provider:  Gibran Abraham PA-C  Reason for Visit/Diagnosis: Pre kidney transplant: 24 CT a/p w/ Bladder wall thickening with concern for chronic outlet obtsruction.    REFERRAL INFORMATION:  Referring provider:  Tata Martinez NP in  [facility]      RECORDS REQUESTED FOR VISIT                                                     NOTES  STATUS/DETAILS   OFFICE NOTE from referring provider  yes, 08/15/2024 -- Tata Martinez NP in  [facility]   OFFICE NOTE from other specialist  yes, Cogan, Jacob, MD @ [facility]   MEDICATION LIST  yes   LABS     URINALYSIS (UA)  no   Images   Yes, 2024, 2023, 2023 -- CT ABD PELVIS  08/15/2024 -- XR CHEST  2023 -- CT RENAL     PRE-VISIT CHECKLIST      Joint diagnostic appointment coordinated correctly          (ensure right order & amount of time) Yes   RECORD COLLECTION COMPLETE Yes

## 2025-01-07 NOTE — TELEPHONE ENCOUNTER
Returned call to pt. Pt had some updates. He is having some complications, having labored breathing and other symptoms. Did Chest Xray, has some pleural effusion. Waiting to hear back from PCP. Pt reports colonoscopy was done, will send [facility] message with clinic fax to have records sent to us. Derm was done at outside clinic as well, pt will have records sent to us. Has urology in March and neurology in April.

## 2025-01-08 ENCOUNTER — TELEPHONE (OUTPATIENT)
Dept: FAMILY MEDICINE | Facility: CLINIC | Age: 55
End: 2025-01-08
Payer: COMMERCIAL

## 2025-01-08 DIAGNOSIS — N18.5 STAGE 5 CHRONIC KIDNEY DISEASE NOT ON CHRONIC DIALYSIS (H): ICD-10-CM

## 2025-01-08 DIAGNOSIS — R60.0 EDEMA OF LOWER EXTREMITY: ICD-10-CM

## 2025-01-08 RX ORDER — BUMETANIDE 1 MG/1
3 TABLET ORAL
Qty: 180 TABLET | Refills: 11 | Status: SHIPPED | OUTPATIENT
Start: 2025-01-08

## 2025-01-08 ASSESSMENT — ENCOUNTER SYMPTOMS
PALPITATIONS: 0
UNEXPECTED WEIGHT CHANGE: 1
FATIGUE: 1
APPETITE CHANGE: 1

## 2025-01-08 NOTE — TELEPHONE ENCOUNTER
Patient called stating [facility] does not have Bumex in stock - patient leaving town tomorrow morning and needing to  prescription tonight.    Routed to Tarun Hui per patient request.

## 2025-01-13 ENCOUNTER — TELEPHONE (OUTPATIENT)
Dept: FAMILY MEDICINE | Facility: CLINIC | Age: 55
End: 2025-01-13
Payer: COMMERCIAL

## 2025-01-13 NOTE — TELEPHONE ENCOUNTER
Intake for [facility] Admission    Contacts       Contact Date/Time Type Contact Phone/Fax    01/13/2025 10:25 AM CST Phone (Incoming) Mikey Craig (Other) [phone]          Reason for Call:  Required documents    What are your questions or concerns:  will need Demographic, Med/Allergy List, H&P, Hep B Panel, Quantiferon TB Blood Draw all faxed back to [facility] for Admission at [phone]    Ref ID#: [id number]    Mikey is working on Intake, can be contacted at [phone] if any questions/update. He was informed pt has upcoming appt on 1/24/2025 where these can be addressed.

## 2025-01-14 ENCOUNTER — MYC MEDICAL ADVICE (OUTPATIENT)
Dept: FAMILY MEDICINE | Facility: CLINIC | Age: 55
End: 2025-01-14
Payer: COMMERCIAL

## 2025-01-14 DIAGNOSIS — N18.5 STAGE 5 CHRONIC KIDNEY DISEASE NOT ON CHRONIC DIALYSIS (H): Primary | ICD-10-CM

## 2025-01-14 RX ORDER — METOLAZONE 5 MG/1
TABLET ORAL
Qty: 30 TABLET | Refills: 0 | Status: SHIPPED | OUTPATIENT
Start: 2025-01-14

## 2025-01-14 NOTE — TELEPHONE ENCOUNTER
Jessy from [employer] calling to request update on prior message. Requesting information STORMY Jiménez's phone number: [phone] ext. [phone]    Eliza Sevilla RN

## 2025-01-15 NOTE — TELEPHONE ENCOUNTER
General Call  Reason for Call:  [facility] has not received documents requested 01/13/2025    What are your questions or concerns:  [facility]rik [facility] is requesting patient demographic information, H&P from the last 12 months, medication list, allergy list, Hep B Panel, Quantiferon TB blood test. Provider has placed orders for these tests - patient needs to come in to complete these, needs appt. Please fax information as soon as possible.    Fax: [phone] [facility] Fax    Date of last appointment with provider: 01/06/2025    Could we send this information to you in [employer] or would you prefer to receive a phone call?:   would prefer a phone call   Okay to leave a detailed message?: Yes at Other phone number:  Jessy with [facility]: [phone] ext [phone]*

## 2025-01-16 NOTE — TELEPHONE ENCOUNTER
Sent Tervela message asking that he make appt for labs or get us lab results that Erik Chase is asking for.

## 2025-01-17 NOTE — TELEPHONE ENCOUNTER
Called and spoke with Jessy with [facility]. Told her pt still has not returned for labs. Did fax what we had so they could start working with his insurance company. She will call next week to get an update.

## 2025-01-20 ENCOUNTER — TELEPHONE (OUTPATIENT)
Dept: SCHEDULING | Facility: CLINIC | Age: 55
End: 2025-01-20
Payer: COMMERCIAL

## 2025-01-20 NOTE — TELEPHONE ENCOUNTER
FYI - Status Update    Who is Calling: patient    Update: Pt is following up regarding FMLA paperwork. Per TE back on 1/9, forms were given to PCP. States this needs to be send in asap due to it being denied since it was not received on time. Forms for [employer], contact phone is [phone].    Does caller want a call/response back: Yes     Could we send this information to you in [facility] or would you prefer to receive a phone call?:   Patient would prefer a phone call   Okay to leave a detailed message?: Yes at Cell number on file:    Telephone Information:   Mobile [phone]

## 2025-01-23 ENCOUNTER — TELEPHONE (OUTPATIENT)
Dept: FAMILY MEDICINE | Facility: CLINIC | Age: 55
End: 2025-01-23
Payer: COMMERCIAL

## 2025-01-23 NOTE — TELEPHONE ENCOUNTER
Called Jessy blankenship and LORE. Pt will be returning for ov and labs tomorrow. Will fax when information is ready.

## 2025-01-23 NOTE — TELEPHONE ENCOUNTER
General Call    Contacts       Contact Date/Time Type Contact Phone/Fax    01/23/2025 03:43 PM CST Phone (Incoming) Jessy/Referral Coordinator/Arsenio (Other) [phone]     Ext [phone]        Reason for Call:  Pls call back to discuss Pt care. Asked for YUSUF Zapata.

## 2025-01-27 NOTE — TELEPHONE ENCOUNTER
Quantiferon gold results routed to Arsenio Jiménez via [employer].   Call with Arsenio to inform TB results have been sent. Spoke with Lissett, who is also requesting Hep B panels and an H@P. These were printed and faxed.

## 2025-01-27 NOTE — TELEPHONE ENCOUNTER
Incoming call from Jessy with [facility] seeking update on 1/24 lab results. Notified her that TB gold is still in process, can fax finalized results when available.   Labs can be faxed to [phone] Attn: Jessy   Epidermal Sutures: 4-0 Prolene

## 2025-01-29 ENCOUNTER — TELEPHONE (OUTPATIENT)
Dept: FAMILY MEDICINE | Facility: CLINIC | Age: 55
End: 2025-01-29
Payer: COMMERCIAL

## 2025-01-29 DIAGNOSIS — Z94.0 KIDNEY REPLACED BY TRANSPLANT: ICD-10-CM

## 2025-01-29 RX ORDER — CYCLOSPORINE 100 MG/1
100 CAPSULE, LIQUID FILLED ORAL 2 TIMES DAILY
Qty: 180 CAPSULE | Refills: 3 | Status: SHIPPED | OUTPATIENT
Start: 2025-01-29

## 2025-01-29 NOTE — TELEPHONE ENCOUNTER
FYI - Status Update    Who is Calling: [facility] Admissions     Update: Referral for placement -[facility] received the medical forms for placement. Hep B total core is still needed -missing documents and is requesting a call back to indicate everything is needed.     Does caller want a call/response back: Yes     Could we send this information to you in [employer] or would you prefer to receive a phone call?:   Patient would prefer a phone call   Okay to leave a detailed message?: Yes at Other phone number:  Jessy - Admissions - [phone] [facility] [phone]

## 2025-01-30 ENCOUNTER — OFFICE VISIT (OUTPATIENT)
Dept: FAMILY MEDICINE | Facility: CLINIC | Age: 55
End: 2025-01-30
Payer: COMMERCIAL

## 2025-01-30 ENCOUNTER — TELEPHONE (OUTPATIENT)
Dept: FAMILY MEDICINE | Facility: CLINIC | Age: 55
End: 2025-01-30

## 2025-01-30 VITALS
RESPIRATION RATE: 18 BRPM | HEART RATE: 76 BPM | SYSTOLIC BLOOD PRESSURE: 172 MMHG | WEIGHT: 277.8 LBS | OXYGEN SATURATION: 96 % | DIASTOLIC BLOOD PRESSURE: 80 MMHG | BODY MASS INDEX: 37.63 KG/M2 | TEMPERATURE: 98.3 F | HEIGHT: 72 IN

## 2025-01-30 DIAGNOSIS — Z94.0 KIDNEY REPLACED BY TRANSPLANT: ICD-10-CM

## 2025-01-30 DIAGNOSIS — I15.1 HTN, KIDNEY TRANSPLANT RELATED: ICD-10-CM

## 2025-01-30 DIAGNOSIS — D84.9 IMMUNOSUPPRESSED STATUS: ICD-10-CM

## 2025-01-30 DIAGNOSIS — Z01.818 PREOP GENERAL PHYSICAL EXAM: Primary | ICD-10-CM

## 2025-01-30 DIAGNOSIS — Z92.25 STATUS POST RECENT IMMUNOSUPPRESSIVE THERAPY: ICD-10-CM

## 2025-01-30 DIAGNOSIS — Z94.83 PANCREAS REPLACED BY TRANSPLANT (H): ICD-10-CM

## 2025-01-30 DIAGNOSIS — Z48.298 AFTERCARE FOLLOWING ORGAN TRANSPLANT: ICD-10-CM

## 2025-01-30 DIAGNOSIS — N18.5 STAGE 5 CHRONIC KIDNEY DISEASE NOT ON CHRONIC DIALYSIS (H): ICD-10-CM

## 2025-01-30 DIAGNOSIS — Z94.0 HTN, KIDNEY TRANSPLANT RELATED: ICD-10-CM

## 2025-01-30 DIAGNOSIS — M1A.39X0 CHRONIC GOUT DUE TO RENAL IMPAIRMENT OF MULTIPLE SITES WITHOUT TOPHUS: ICD-10-CM

## 2025-01-30 DIAGNOSIS — G47.419 PRIMARY NARCOLEPSY WITHOUT CATAPLEXY: ICD-10-CM

## 2025-01-30 DIAGNOSIS — G47.33 OSA ON CPAP: ICD-10-CM

## 2025-01-30 DIAGNOSIS — E78.5 DYSLIPIDEMIA: ICD-10-CM

## 2025-01-30 DIAGNOSIS — Z79.899 ENCOUNTER FOR LONG-TERM (CURRENT) USE OF HIGH-RISK MEDICATION: ICD-10-CM

## 2025-01-30 LAB
ERYTHROCYTE [DISTWIDTH] IN BLOOD BY AUTOMATED COUNT: 13.4 % (ref 10–15)
HCT VFR BLD AUTO: 28 % (ref 40–53)
HGB BLD-MCNC: 9.1 G/DL (ref 13.3–17.7)
MCH RBC QN AUTO: 28.3 PG (ref 26.5–33)
MCHC RBC AUTO-ENTMCNC: 32.5 G/DL (ref 31.5–36.5)
MCV RBC AUTO: 87 FL (ref 78–100)
PLATELET # BLD AUTO: 170 10E3/UL (ref 150–450)
RBC # BLD AUTO: 3.21 10E6/UL (ref 4.4–5.9)
WBC # BLD AUTO: 9.1 10E3/UL (ref 4–11)

## 2025-01-30 PROCEDURE — 85027 COMPLETE CBC AUTOMATED: CPT | Performed by: NURSE PRACTITIONER

## 2025-01-30 PROCEDURE — 36415 COLL VENOUS BLD VENIPUNCTURE: CPT | Performed by: NURSE PRACTITIONER

## 2025-01-30 PROCEDURE — 80158 DRUG ASSAY CYCLOSPORINE: CPT | Performed by: NURSE PRACTITIONER

## 2025-01-30 PROCEDURE — 99214 OFFICE O/P EST MOD 30 MIN: CPT | Performed by: NURSE PRACTITIONER

## 2025-01-30 RX ORDER — AMLODIPINE BESYLATE 5 MG/1
5 TABLET ORAL DAILY
Qty: 7 TABLET | Refills: 0 | Status: SHIPPED | OUTPATIENT
Start: 2025-01-30

## 2025-01-30 RX ORDER — PENICILLIN V POTASSIUM 500 MG/1
TABLET, FILM COATED ORAL
COMMUNITY
Start: 2025-01-07

## 2025-01-30 NOTE — PROGRESS NOTES
Preoperative Evaluation  [facility]  [address] [address]  Phone: [phone]  Fax: [phone]  Primary Provider: Eric Lei MD  Pre-op Performing Provider: NHUNG Ceron CNP  Jan 30, 2025   {Provider  Link to PREOP SmartSet  REQUIRED to apply standard patient instructions and medication directions to the AVS :761084}  {ROOMER review and update patient entered surgical information if needed :401804}        1/30/2025   Surgical Information   What procedure is being done? dialysis catheter installation   Facility or Hospital where procedure/surgery will be performed: [facility]   Who is doing the procedure / surgery? unknown   Date of surgery / procedure: 02/03/2025   Time of surgery / procedure: 12:30   Where do you plan to recover after surgery? at home with family     Fax number for surgical facility: {:[phone]}    {Provider Charting Preference for Preop :138024}    Andreina Palacio is a 55 year old, presenting for the following:  Pre-Op Exam (DOS 02/03/2025, catheter placed for dialysis, [facility])          1/30/2025     3:24 PM   Additional Questions   Roomed by YUSUF Love     HPI related to upcoming procedure:     Scheduled for peritoneal dialysis catheter   Zio monitor, adjust medication?  15.4% ectopic beats, no metoprolol  Lost 30 pounds with new diuretc  Last week vomiting, didn't feel well, loss of appetite  Feeling better this week  Still fatigued, not as much sob  Holding metolazone    BP Readings from Last 6 Encounters:   01/30/25 (!) 172/80   01/24/25 (!) 157/81   01/06/25 124/62   12/27/24 (!) 180/110   12/18/24 (!) 145/86   12/15/24 110/65           1/30/2025   Pre-Op Questionnaire   Have you ever had a heart attack or stroke? No   Have you ever had surgery on your heart or blood vessels, such as a stent placement, a coronary artery bypass, or surgery on an artery in your head, neck, heart, or legs? No   Do you have  chest pain with activity? No   Do you have a history of heart failure? No   Do you currently have a cold, bronchitis or symptoms of other infection? No   Do you have a cough, shortness of breath, or wheezing? (!) YES at baseline   Do you or anyone in your family have previous history of blood clots? No   Do you or does anyone in your family have a serious bleeding problem such as prolonged bleeding following surgeries or cuts? No   Have you ever had problems with anemia or been told to take iron pills? (!) YES ***   Have you had any abnormal blood loss such as black, tarry or bloody stools? No   Have you ever had a blood transfusion? No   Are you willing to have a blood transfusion if it is medically needed before, during, or after your surgery? Yes   Have you or any of your relatives ever had problems with anesthesia? No   Do you have sleep apnea, excessive snoring or daytime drowsiness? (!) YES   Do you have a CPAP machine? Yes   Do you have any artifical heart valves or other implanted medical devices like a pacemaker, defibrillator, or continuous glucose monitor? No   Do you have artificial joints? No   Are you allergic to latex? No     Health Care Directive  Patient does not have a Health Care Directive: Discussed advance care planning with patient; information given to patient to review.    Preoperative Review of    reviewed - controlled substances reflected in medication list.  {Review [place]NPMP for all patients per [facility]CSI MNPMP Profile:021147}    {Chronic problem details (Optional) :790984}    Patient Active Problem List    Diagnosis Date Noted    Borderline diabetes mellitus 08/21/2024     Priority: Medium    Encounter for pre-transplant evaluation for kidney transplant 08/21/2024     Priority: Medium    Healthcare maintenance 05/31/2024     Priority: Medium    Class 2 severe obesity due to excess calories with serious comorbidity in adult (H) 01/04/2024     Priority: Medium    Ventral hernia without  obstruction or gangrene 03/30/2023     Priority: Medium     Added automatically from request for surgery [id number]      Obesity (BMI 30-39.9) 02/26/2023     Priority: Medium    IgA nephropathy 02/26/2023     Priority: Medium    Gastroesophageal reflux disease 02/26/2023     Priority: Medium    Edema of lower extremity 02/26/2023     Priority: Medium    Calculus of kidney 02/26/2023     Priority: Medium    Stage 5 chronic kidney disease not on chronic dialysis (H) 11/01/2022     Priority: Medium    Kidney replaced by transplant      Priority: Medium    Aftercare following organ transplant      Priority: Medium    Immunosuppressed status      Priority: Medium    HTN, kidney transplant related      Priority: Medium    Dyslipidemia      Priority: Medium    Chronic gout due to renal impairment of multiple sites without tophus      Priority: Medium    RAUL on CPAP      Priority: Medium     2010 RDI 13, CPAP titration 9      Narcolepsy      Priority: Medium     In 2010 mean sleep latency 9.8 with 2 SOREMP        Past Medical History:   Diagnosis Date    Anemia in chronic kidney disease(285.21)     Dialysis patient     Dyslipidemia     End stage kidney disease (H)     GERD (gastroesophageal reflux disease)     Gout     High risk medication use     Hypertension     IgA nephropathy     Immunosuppressed status     Kidney replaced by transplant     Narcolepsy     RAUL on CPAP     Secondary hyperparathyroidism (of renal origin)     Splenic calcification     h/o calcified splenic granulomas     Past Surgical History:   Procedure Laterality Date    HERNIORRHAPHY VENTRAL N/A 4/14/2023    Procedure: HERNIORRHAPHY, VENTRAL, OPEN;  Surgeon: Chrystal Harding DO;  Location: [facility] OR    s/p [facility]       Current Outpatient Medications   Medication Sig Dispense Refill    allopurinol (ZYLOPRIM) 300 MG tablet Take 1 tablet (300 mg) by mouth daily. 90 tablet 3    amLODIPine (NORVASC) 5 MG tablet Take 1 tablet (5 mg) by mouth daily. 90  tablet 3    aspirin (ASA) 81 MG chewable tablet Take 1 tablet (81 mg) by mouth daily. 60 tablet 1    bumetanide (BUMEX) 1 MG tablet Take 3 tablets (3 mg) by mouth 2 times daily. 180 tablet 11    CELLCEPT (BRAND) 250 MG capsule Take 4 capsules (1,000 mg) by mouth 2 times daily. 720 capsule 3    cloNIDine (CATAPRES) 0.1 MG tablet Take 1 tablet (0.1 mg) by mouth 2 times daily. 180 tablet 3    cycloSPORINE modified (GENERIC EQUIVALENT) 100 MG capsule TAKE 1 CAPSULE BY MOUTH TWICE  DAILY 180 capsule 3    cycloSPORINE modified (GENERIC EQUIVALENT) 25 MG capsule Take 1 capsule (25 mg) by mouth 2 times daily. 180 capsule 3    meclizine (ANTIVERT) 25 MG tablet Take 1 tablet (25 mg) by mouth 3 times daily as needed for dizziness. 30 tablet 1    methylphenidate (RITALIN) 5 MG tablet Take 1 tablet (5 mg) by mouth 2 times daily as needed (narcolepsy). 60 tablet 0    metolazone (ZAROXOLYN) 5 MG tablet Take 1 tablet weekly 30 tablet 0    Metoprolol Tartrate 75 MG TABS Take 1 tablet by mouth 2 times daily.      ondansetron (ZOFRAN ODT) 4 MG ODT tab Take 1 tablet (4 mg) by mouth every 8 hours as needed for nausea. 20 tablet 1    pravastatin (PRAVACHOL) 40 MG tablet Take 1 tablet (40 mg) by mouth daily. 90 tablet 3    predniSONE (DELTASONE) 10 MG tablet Take 40 mg by mouth as needed. Gout Flair-ups      probenecid (BENEMID) 500 MG tablet Take 500 mg by mouth 2 times daily      tadalafil (CIALIS) 10 MG tablet Take 10 mg by mouth daily as needed      penicillin V (VEETID) 500 MG tablet TAKE 2 NOW, THEN TAKE 1 TABLET(S) 4 TIMES A DAY UNTIL GONE (Patient not taking: Reported on 1/30/2025)         Allergies   Allergen Reactions    Morphine      Other reaction(s): Headache.  Nausea.    Other Reaction(s): Headache.  Nausea.        Social History     Tobacco Use    Smoking status: Never     Passive exposure: Never    Smokeless tobacco: Never   Substance Use Topics    Alcohol use: Yes     Alcohol/week: 3.3 standard drinks of alcohol      "Types: 4 Standard drinks or equivalent per week     Comment: a few drinks a week     {FAMILY HISTORY (Optional):473719248}  History   Drug Use No           {ROS Picklists (Optional):878027}    Objective    BP (!) 160/90 (BP Location: Right arm, Patient Position: Sitting, Cuff Size: Adult Large)   Pulse 76   Temp 98.3  F (36.8  C) (Tympanic)   Resp 18   Ht 1.822 m (5' 11.75\")   Wt 126 kg (277 lb 12.8 oz)   SpO2 96%   BMI 37.94 kg/m     Estimated body mass index is 37.94 kg/m  as calculated from the following:    Height as of this encounter: 1.822 m (5' 11.75\").    Weight as of this encounter: 126 kg (277 lb 12.8 oz).  Physical Exam  {Exam List :970628}    Recent Labs   Lab Test 01/24/25  1430 01/06/25  1613 12/27/24  1434 12/15/24  0800 12/15/24  0753 08/27/24  1057 08/15/24  1420   HGB  --  9.2* 10.4*   < > 10.4*   < > 14.0   PLT  --  163  --   --  164   < > 156   INR  --   --   --   --  1.09  --  1.04    141 141   < > 137   < > 142   POTASSIUM 4.6 5.3 5.5*   < > 4.3   < > 4.2   CR 4.95* 5.70* 5.39*  --  5.51*   < > 4.40*   A1C  --   --   --   --   --   --  6.0*    < > = values in this interval not displayed.        Diagnostics  {LABS:435778}   No EKG this visit, completed in the last 90 days.    Revised Cardiac Risk Index (RCRI)  The patient has the following serious cardiovascular risks for perioperative complications:   - Serum Creatinine >2.0 mg/dl = 1 point     RCRI Interpretation: {REVISED CARDIAC RISK INTERPRETATION :282473}         Signed Electronically by: NHUNG Ceron CNP  A copy of this evaluation report is provided to the requesting physician.    {Provider Resources  [facility] Guidelines  Revised Cardiac Risk Index :499393}   {Email feedback regarding this note to [email or web address]   :833209}  " "10 MG tablet Take 40 mg by mouth as needed. Gout Flair-ups      probenecid (BENEMID) 500 MG tablet Take 500 mg by mouth 2 times daily      tadalafil (CIALIS) 10 MG tablet Take 10 mg by mouth daily as needed      penicillin V (VEETID) 500 MG tablet TAKE 2 NOW, THEN TAKE 1 TABLET(S) 4 TIMES A DAY UNTIL GONE (Patient not taking: Reported on 1/30/2025)         Allergies   Allergen Reactions    Morphine      Other reaction(s): Headache.  Nausea.    Other Reaction(s): Headache.  Nausea.        Social History     Tobacco Use    Smoking status: Never     Passive exposure: Never    Smokeless tobacco: Never   Substance Use Topics    Alcohol use: Yes     Alcohol/week: 3.3 standard drinks of alcohol     Types: 4 Standard drinks or equivalent per week     Comment: a few drinks a week       History   Drug Use No             Review of Systems  CONSTITUTIONAL: NEGATIVE for fever, chills, Pos for change in weight (weight loss on diuretic)  INTEGUMENTARY/SKIN: NEGATIVE for worrisome rashes, moles or lesions  EYES: NEGATIVE for vision changes or irritation  ENT/MOUTH: NEGATIVE for ear, mouth and throat problems  RESP: NEGATIVE for significant cough. POS for dyspnea on exertion  BREAST: NEGATIVE for masses, tenderness or discharge  CV: NEGATIVE for chest pain, palpitations. Positive for edema  GI: NEGATIVE for nausea, abdominal pain, heartburn, or change in bowel habits  : NEGATIVE for frequency, dysuria, or hematuria.  Positive for decreased urination  MUSCULOSKELETAL: NEGATIVE for significant arthralgias or myalgia  NEURO: NEGATIVE for weakness, dizziness or paresthesias  ENDOCRINE: NEGATIVE for temperature intolerance, skin/hair changes  HEME: NEGATIVE for bleeding problems  PSYCHIATRIC: NEGATIVE for changes in mood or affect    Objective    BP (!) 160/90 (BP Location: Right arm, Patient Position: Sitting, Cuff Size: Adult Large)   Pulse 76   Temp 98.3  F (36.8  C) (Tympanic)   Resp 18   Ht 1.822 m (5' 11.75\")   Wt 126 kg " "(277 lb 12.8 oz)   SpO2 96%   BMI 37.94 kg/m     Estimated body mass index is 37.94 kg/m  as calculated from the following:    Height as of this encounter: 1.822 m (5' 11.75\").    Weight as of this encounter: 126 kg (277 lb 12.8 oz).  Physical Exam  GENERAL: alert and no distress  EYES: Eyes grossly normal to inspection, PERRL and conjunctivae and sclerae normal  HENT: ear canals and TM's normal, nose and mouth without ulcers or lesions  NECK: no adenopathy, no asymmetry, masses, or scars  RESP: lungs clear to auscultation - no rales, rhonchi or wheezes  CV: regular rate and rhythm, normal S1 S2, no S3 or S4, no murmur, click or rub, positive for peripheral edema  ABDOMEN: soft, nontender, no hepatosplenomegaly, no masses and bowel sounds normal  MS: no gross musculoskeletal defects noted, no edema  SKIN: no suspicious lesions or rashes on exposed skin, pale  NEURO: Normal strength and tone, mentation intact and speech normal  PSYCH: mentation appears normal, affect normal/bright    Recent Labs   Lab Test 01/24/25  1430 01/06/25  1613 12/27/24  1434 12/15/24  0800 12/15/24  0753 08/27/24  1057 08/15/24  1420   HGB  --  9.2* 10.4*   < > 10.4*   < > 14.0   PLT  --  163  --   --  164   < > 156   INR  --   --   --   --  1.09  --  1.04    141 141   < > 137   < > 142   POTASSIUM 4.6 5.3 5.5*   < > 4.3   < > 4.2   CR 4.95* 5.70* 5.39*  --  5.51*   < > 4.40*   A1C  --   --   --   --   --   --  6.0*    < > = values in this interval not displayed.        Diagnostics  Recent Results (from the past week)   ZIO PATCH MAIL OUT    Collection Time: 01/28/25 11:30 AM   Result Value Ref Range    Zio Prelim Results       Patient had a min HR of 58 bpm, max HR of 226 bpm, and avg HR of 75 bpm. Predominant underlying rhythm was Sinus Rhythm. Slight P wave morphology changes were noted. 1 run of Ventricular Tachycardia occurred lasting 11 beats with a max rate of 226 bpm (avg 176 bpm). 510 Supraventricular Tachycardia runs " occurred, the run with the fastest interval lasting 5 beats with a max rate of 169 bpm, the longest lasting 19.9 secs with an avg rate of 120 bpm. Isolated SVEs were rare (<1.0%), SVE Couplets were rare (<1.0%), and SVE Triplets were rare (<1.0%). Isolated VEs were frequent (15.4%, 014684), VE Couplets were rare (<1.0%, 495), and VE Triplets were rare (<1.0%, 5). Ventricular Bigeminy and Trigeminy were present.     Cyclosporine by Tandem Mass Spectrometry    Collection Time: 01/30/25  4:26 PM   Result Value Ref Range    Cyclosporine 34 (L) 50 - 400 ug/L    Cyclosporine Last Dose Date 1/30/2025     Cyclosporine Last Dose Time  5:00 AM    CBC with platelets    Collection Time: 01/30/25  4:26 PM   Result Value Ref Range    WBC Count 9.1 4.0 - 11.0 10e3/uL    RBC Count 3.21 (L) 4.40 - 5.90 10e6/uL    Hemoglobin 9.1 (L) 13.3 - 17.7 g/dL    Hematocrit 28.0 (L) 40.0 - 53.0 %    MCV 87 78 - 100 fL    MCH 28.3 26.5 - 33.0 pg    MCHC 32.5 31.5 - 36.5 g/dL    RDW 13.4 10.0 - 15.0 %    Platelet Count 170 150 - 450 10e3/uL      No EKG this visit, completed in the last 90 days.    Revised Cardiac Risk Index (RCRI)  The patient has the following serious cardiovascular risks for perioperative complications:   - Serum Creatinine >2.0 mg/dl = 1 point     RCRI Interpretation: 1 point: Class II (low risk - 0.9% complication rate)         Signed Electronically by: NHUNG Ceron CNP  A copy of this evaluation report is provided to the requesting physician.

## 2025-01-30 NOTE — TELEPHONE ENCOUNTER
Message had been left by Myrna requesting call back.    RN spoke with Arsenio - patient has been accepted into [facility].  Appointment scheduled for 2/10/25 at 8am.    Arsenio requesting call back if any additional questions or concerns from PCP.  Routing update to provider.

## 2025-01-30 NOTE — TELEPHONE ENCOUNTER
Order/Referral Request    [facility]    Orders being requested: ref for placement at facility [facility] - calling to provide update from ref    Reason service is needed/diagnosis: tanesha    When are orders needed by: asap    Has this been discussed with Provider: Yes    Does patient have a preference on a Group/Provider/Facility? [facility]    Does patient have an appointment scheduled?: No    Where to send orders:  n/a    Could we send this information to you in [place] or would you prefer to receive a phone call?:   Patient would prefer a phone call   Okay to leave a detailed message?: N/A at Other phone number:  Jessy  at [facility]  [phone] [facility] [id number]

## 2025-01-30 NOTE — TELEPHONE ENCOUNTER
Call with [facility] admissions. Jessy was not available. Admissions staff requesting hep B core lab results; pt had done in Aug 2024. Results are acceptable and were printed and faxed to [phone]. No other needs at this time. Admission paperwork will be sent to dialysis center.

## 2025-01-30 NOTE — PATIENT INSTRUCTIONS
How to Take Your Medication Before Surgery  Preoperative Medication Instructions   Antiplatelet or Anticoagulation Medication Instructions   - aspirin: Discontinue aspirin 7 days prior to procedure to reduce bleeding risk. It should be resumed postoperatively.     Additional Medication Instructions  Take all scheduled medications on the day of surgery EXCEPT for modifications listed below:   - Herbal medications and vitamins: DO NOT TAKE 14 days prior to surgery.   - Beta Blockers (atenolol, metoprolol, propranolol) : Continue taking on the day of surgery.   - Calcium Channel Blockers (amlodipine, diltiazem, felodipine): May be continued on the day of surgery.   - Diuretics (furosemide, hydrochlorothiazide, chlorothalidone): DO NOT TAKE on the day of surgery.   - Statins (atorvastatin, simvastatin, pravastatin) : Continue taking on the day of surgery.        Patient Education   Preparing for Your Surgery  For Adults  Getting started  In most cases, a nurse will call to review your health history and instructions. They will give you an arrival time based on your scheduled surgery time. Please be ready to share:  Your doctor's clinic name and phone number  Your medical, surgical, and anesthesia history  A list of allergies and sensitivities  A list of medicines, including herbal treatments and over-the-counter drugs  Whether the patient has a legal guardian (ask how to send us the papers in advance)  Note: You may not receive a call if you were seen at our PAC (Preoperative Assessment Center).  Please tell us if you're pregnant--or if there's any chance you might be pregnant. Some surgeries may injure a fetus (unborn baby), so they require a pregnancy test. Surgeries that are safe for a fetus don't always need a test, and you can choose whether to have one.   Preparing for surgery  Within 10 to 30 days of surgery: Have a pre-op exam (sometimes called an H&P, or History and Physical). This can be done at a clinic or  pre-operative center.  If you're having a , you may not need this exam. Talk to your care team.  At your pre-op exam, talk to your care team about all medicines you take. (This includes CBD oil and any drugs, such as THC, marijuana, and other forms of cannabis.) If you need to stop any medicine before surgery, ask when to start taking it again.  This is for your safety. Many medicines and drugs can make you bleed too much during surgery. Some change how well surgery (anesthesia) drugs work.  Call your insurance company to let them know you're having surgery. (If you don't have insurance, call [phone].)  Call your clinic if there's any change in your health. This includes a scrape or scratch near the surgery site, or any signs of a cold (sore throat, runny nose, cough, rash, fever).  Eating and drinking guidelines  For your safety: Unless your surgeon tells you otherwise, follow the guidelines below.  Eat and drink as normal until 8 hours before you arrive for surgery. After that, no food or milk. You can spit out gum when you arrive.  Drink clear liquids until 2 hours before you arrive. These are liquids you can see through, like water, Gatorade, and Propel Water. They also include plain black coffee and tea (no cream or milk).  No alcohol for 24 hours before you arrive. The night before surgery, stop any drinks that contain THC.  If your care team tells you to take medicine on the morning of surgery, it's okay to take it with a sip of water. No other medicines or drugs are allowed (including CBD oil)--follow your care team's instructions.  If you have questions the day of surgery, call your hospital or surgery center.   Preventing infection  Shower or bathe the night before and the morning of surgery. Follow the instructions your clinic gave you. (If no instructions, use regular soap.)  Don't shave or clip hair near your surgery site. We'll remove the hair if needed.  Don't smoke or vape the morning  of surgery. No chewing tobacco for 6 hours before you arrive. A nicotine patch is okay. You may spit out nicotine gum when you arrive.  For some surgeries, the surgeon will tell you to fully quit smoking and nicotine.  We will make every effort to keep you safe from infection. We will:  Clean our hands often with soap and water (or an alcohol-based hand rub).  Clean the skin at your surgery site with a special soap that kills germs.  Give you a special gown to keep you warm. (Cold raises the risk of infection.)  Wear hair covers, masks, gowns, and gloves during surgery.  Give antibiotic medicine, if prescribed. Not all surgeries need this medicine.  What to bring on the day of surgery  Photo ID and insurance card  Copy of your health care directive, if you have one  Glasses and hearing aids (bring cases)  You can't wear contacts during surgery  Inhaler and eye drops, if you use them (tell us about these when you arrive)  CPAP machine or breathing device, if you use them  A few personal items, if spending the night  If you have . . .  A pacemaker, ICD (cardiac defibrillator), or other implant: Bring the ID card.  An implanted stimulator: Bring the remote control.  A legal guardian: Bring a copy of the certified (court-stamped) guardianship papers.  Please remove any jewelry, including body piercings. Leave jewelry and other valuables at home.  If you're going home the day of surgery  You must have a responsible adult drive you home. They should stay with you overnight as well.  If you don't have someone to stay with you, and you aren't safe to go home alone, we may keep you overnight. Insurance often won't pay for this.  After surgery  If it's hard to control your pain or you need more pain medicine, please call your surgeon's office.  Questions?   If you have any questions for your care team, list them here:    ____________________________________________________________________________________________________________________________________________________________________________________________________________________________________________________________  For informational purposes only. Not to replace the advice of your health care provider. Copyright   2003, 2019 [facility]. All rights reserved. Clinically reviewed by Josue Hurley MD. [employer] [id number] - REV 08/24.

## 2025-01-31 LAB
CYCLOSPORINE BLD LC/MS/MS-MCNC: 34 UG/L (ref 50–400)
TME LAST DOSE: ABNORMAL H
TME LAST DOSE: ABNORMAL H

## 2025-02-08 DIAGNOSIS — I15.1 HTN, KIDNEY TRANSPLANT RELATED: ICD-10-CM

## 2025-02-08 DIAGNOSIS — Z94.0 HTN, KIDNEY TRANSPLANT RELATED: ICD-10-CM

## 2025-02-10 RX ORDER — AMLODIPINE BESYLATE 5 MG/1
5 TABLET ORAL DAILY
Qty: 30 TABLET | Refills: 0 | Status: SHIPPED | OUTPATIENT
Start: 2025-02-10

## 2025-02-18 ENCOUNTER — TELEPHONE (OUTPATIENT)
Dept: FAMILY MEDICINE | Facility: CLINIC | Age: 55
End: 2025-02-18
Payer: COMMERCIAL

## 2025-02-18 NOTE — TELEPHONE ENCOUNTER
Form rec'd from [facility]/routing to provider wall basket for completion.    DANA needed from patient.     MyC msg sent to pt informing fax rec'd.

## 2025-02-18 NOTE — TELEPHONE ENCOUNTER
Forms/Letter Request    Type of form/letter: FMLA - Intermittent    Number of days per episode:  7  Number of episodes per month:  1-2    Is Release of Information needed?: Yes  Was an DANA obtained?   Believes one is on file w/[employer]    Do we have the form/letter: No    Who is the form from? [employer] (if other please explain)    Where did/will the form come from? form was faxed in    When is form/letter needed by: ASAP    How would you like the form/letter returned: Fax : back to [employer]    Patient Notified form requests are processed in 5-7 business days:No    Could we send this information to you in [place] or would you prefer to receive a phone call?:   Patient would prefer a phone call   Okay to leave a detailed message?: Yes at Other phone number:  Libertad -Siri at [phone]

## 2025-02-18 NOTE — TELEPHONE ENCOUNTER
Called and spoke w/spouse and patient informing that patient signature is needed per [facility] protocol.    Patient is going to stop in on Wed 2/19/2025 to complete pg 6 & 7 on [facility] fax for Authorization to Obtain & Disclose Information,

## 2025-02-18 NOTE — TELEPHONE ENCOUNTER
[facility] is calling [employer] [facility] to have them resend forms; as they were not received on 2/11/2025 when initially sent.    Please send MyChart msg to patient letting pt/spouse know when/if forms were or weren't received.

## 2025-02-19 NOTE — TELEPHONE ENCOUNTER
Spouse Hakeem came in to sign the forms today. Placed back in Dave Lei's mailbox.    Fide Conner on 2/19/2025 at 12:02 PM

## 2025-02-20 NOTE — TELEPHONE ENCOUNTER
Called and spoke with Hakeem. PPW was faxed to [facility] like requested. Copy also mailed to pt.

## 2025-02-25 ENCOUNTER — PRE VISIT (OUTPATIENT)
Dept: UROLOGY | Facility: CLINIC | Age: 55
End: 2025-02-25
Payer: COMMERCIAL

## 2025-02-25 NOTE — TELEPHONE ENCOUNTER
Reason for visit: ESRD      Relevant information: Bladder wall thickening, referred on 12/16/24 via Tata Martinez NP for ESRD, and organ transplant eval    Records/imaging/labs/orders:  All records available    At Rooming:  Standard rooming      Abhishek Sommers  2/25/2025  11:59 AM

## 2025-02-26 ENCOUNTER — TELEPHONE (OUTPATIENT)
Dept: TRANSPLANT | Facility: CLINIC | Age: 55
End: 2025-02-26
Payer: COMMERCIAL

## 2025-02-26 ENCOUNTER — DOCUMENTATION ONLY (OUTPATIENT)
Dept: TRANSPLANT | Facility: CLINIC | Age: 55
End: 2025-02-26
Payer: COMMERCIAL

## 2025-02-26 NOTE — TELEPHONE ENCOUNTER
Called pt to discuss status on [facility]. Currently inactive needs derm, colonoscopy, urology, stress test, neurology follow up. Pt reports derm and colonoscopy done. Derm with [facility] in [place], [place]. Clinic number [phone]. Colonoscopy done in the fall as well through [facility], clinic number [phone]. Pt aware to keep eye out for DANA.

## 2025-02-27 NOTE — PROGRESS NOTES
"Subjective     REFERRING PROVIDER  Tata Martinez CNP    REASON FOR VISIT  Thickened bladder wall    HISTORY OF PRESENT ILLNESS  Mr. Romero is a pleasant 55 year old male with a past medical history significant for stage V kidney disease due to IgA nephropathy status post kidney transplant in 2008 now being evaluated for repeat transplant, obstructive sleep apnea on CPAP, obesity with BMI of 38, GERD, dyslipidemia, transplant related hypertension, lower extremity edema, narcolepsy, and history of kidney stones who presents today for his recent finding of a thickened bladder wall.  I personally reviewed the family practice visit note from 1/24/2025 in preparation for today's visit.    CT abdomen pelvis without IV contrast completed in December 2024 showed evidence of \"circumferential bladder wall thickening, which can be seen with chronic outlet obstruction.\"  Given this finding, was recommended to see urology to rule out significant BPH that could threaten a transplant kidney.    Today:  Urinates around 4-5 times per day   On a lot of diuretics   No baseline bothersome urinary symptoms   Gross hematuria only before diagnosis of IgA nephropath   No history of urinary retention   No history of kidney stones   No history of UTIs or prostatitis     REVIEW OF SYSTEMS  Review of Systems   Constitutional:  Negative for fatigue (On the rise) and unexpected weight change.   HENT:  Negative for hearing loss.    Eyes:  Negative for visual disturbance.   Respiratory:  Negative for shortness of breath.    Cardiovascular:  Negative for chest pain.   Gastrointestinal:  Positive for constipation (On and off, drinking less fluids). Negative for abdominal pain.   Genitourinary:  Negative for hematuria.   Musculoskeletal:  Negative for back pain.   Neurological:  Negative for dizziness and light-headedness.   Hematological:  Negative for adenopathy.   Psychiatric/Behavioral:  Positive for sleep disturbance (Some issues related to " his process for peritoneal dialysis).      Social History:  Denies any history of or current smoking     Family History:  Denies any known family history of urologic malignancy     Father with colon cancer     Objective     PHYSICAL EXAM  Physical Exam  Constitutional:       Appearance: Normal appearance.   HENT:      Head: Normocephalic and atraumatic.      Nose: No congestion.      Mouth/Throat:      Mouth: Mucous membranes are moist.   Eyes:      General:         Right eye: No discharge.         Left eye: No discharge.   Pulmonary:      Effort: No respiratory distress.   Abdominal:      General: There is no distension.   Musculoskeletal:         General: No deformity.   Skin:     General: Skin is warm and dry.   Neurological:      Mental Status: He is alert and oriented to person, place, and time.   Psychiatric:         Mood and Affect: Mood normal.         Behavior: Behavior normal.       LABORATORY  Lab Results   Component Value Date/Time    PSA 2.72 08/15/2024 02:20 PM      Latest Reference Range & Units 08/15/24 14:21   Color Urine Colorless, Straw, Light Yellow, Yellow  Light Yellow   Appearance Urine Clear  Clear   Glucose Urine Negative mg/dL 200 !   Bilirubin Urine Negative  Negative   Ketones Urine Negative mg/dL Negative   Specific Gravity Urine 1.003 - 1.035  1.018   pH Urine 5.0 - 7.0  6.0   Protein Albumin Urine Negative mg/dL 600 !   Urobilinogen mg/dL Normal, 2.0 mg/dL Normal   Nitrite Urine Negative  Negative   Blood Urine Negative  Small !   Leukocyte Esterase Urine Negative  Trace !   WBC Urine <=5 /HPF 6 (H)   RBC Urine <=2 /HPF 5 (H)   Squamous Epithelial /HPF Urine <=1 /HPF <1   Hyaline Casts <=2 /LPF 4 (H)   Granular Casts None Seen /LPF 1 (H)   Amorphous Crystals None Seen /HPF Few !   !: Data is abnormal  (H): Data is abnormally high    No urine culture associated with above urinalysis    IMAGING  I personally reviewed and interpreted the below CT abdomen pelvis without IV contrast which  does not show any evidence of obstructing urolithiasis, specifically no nephrolithiasis in the transplant kidney.  Some small calcifications in the native kidneys that do not appear to be true collecting system stones.  I do note the mild hydronephrosis and fat stranding around the transplant kidney which does appear to be stable based on previous scans.    Narrative & Impression   EXAMINATION: CT ABDOMEN PELVIS W/O CONTRAST, 12/9/2024 8:38 AM     INDICATION: Pt is in pre kidney transplant evaluation. Needs CT now to  follow up on enlarged lymph nodes on CT 06/2023 and also assessment  for arterial calcifications.; Kidney transplanted; Immunosuppressed  status (H); IgA nephropathy; Kidney replaced by transplant;  Pre-transplant evaluation for kidney transplant; Organ transplant  candidate; Essential hypertension; Dyslipidemia; HTN, kidney  transplant related     COMPARISON STUDY: CT 6/22/2023     TECHNIQUE: CT scan of the abdomen and pelvis was performed on  multidetector CT scanner using volumetric acquisition technique and  images were reconstructed in multiple planes with variable thickness  and reviewed on dedicated workstations.      CONTRAST: None      CT scan radiation dose is optimized to minimum requisite dose using  automated dose modulation techniques.     FINDINGS:     Lower thorax: Scattered subpleural nodular densities in the left lower  lobe.     Liver: No mass. No intrahepatic biliary ductal dilation.     Biliary System: Normal gallbladder. No extrahepatic biliary ductal  dilation.     Spleen: Normal. Scattered calcified granulomas.     Pancreas: No mass or pancreatic ductal dilation.     Adrenal glands: No mass or nodules     Kidneys: Bilateral native kidneys are markedly atrophic with  parenchymal calcifications and small cysts. No hydronephrosis. Right  lower quadrant renal transplant demonstrates perinephric stranding and  mild hydronephrosis.     Gastrointestinal tract :Normal appendix. Normal  caliber small bowel.   Colonic diverticulosis with no diverticulitis.      Retroperitoneum: Vascular calcifications of the aorta and iliac  arteries. No significant lymphadenopathy.     Pelvis: Circumferential bladder wall thickening, which can be seen  with chronic outlet obstruction.  Prostate and seminal vesicles are  within normal limits.     Osseous structures: No aggressive or acute osseous lesion.      Soft tissues: Umbilical hernia repair.                                                                      IMPRESSION:  1.  Right lower quadrant renal transplant demonstrates perinephric  stranding and mild hydronephrosis, similar to 6/22/2023.  2.  Bilateral native kidneys are markedly atrophic with several small  presumed cysts, also unchanged.  3.  No new abnormalities in the abdomen or pelvis.     TESTING  PVR: 60mL    Attempted a uroflow, but unfortunately Hakeem only urinated 139 mL so we cannot reliably use the information    Assessment & Plan   Bladder wall thickening  Microscopic hematuria    It was my pleasure to meet with Mr. Romero in the office today in regards to his recently discovered bladder wall thickening as well as his microscopic hematuria.  After reviewing his clinical history, I first discussed with him that the testing we did when he came in in regards to checking his postvoid residual and urinary stream rules out any concerning features from my perspective regards to the bladder wall thickening.  This was likely just due to slight underdistention, not any significant BPH.  If after his transplant he starts to notice he is having a really bad time urinating, we should see him back to discuss trialing an alpha-blocker or an outlet procedure, but nothing that would stop him from getting a transplant at this time.    We did however review that his recent urine sample from August 2024 showed evidence of microscopic hematuria. We reviewed possible etiologies of microscopic hematuria including  both benign and malignant causes. We discussed the American Urological Association's recommendation for workup of gross and microscopic hematuria (visible blood vs. 3 or more red blood cells on urinalysis) which would include a CT urogram, cystoscopy, and in the case of gross hematuria, a urine cytology. I described these 2-3 tests to Mr. Romero as well as the relative risks and benefits of each.    Unfortunately because of his lack of kidney function, we likely cannot get a CT urogram, so we will plan on an MR urogram for now.  After reviewing the above information, Mr. Romero expressed understanding and agreement with moving forward with the MR  Urogram and cystoscopy.     If the hematuria evaluation is negative, Mr. Romero should undergo a repeat urinalysis in one year. If this repeat urinalysis is also negative for microscopic hematuria, no further workup is needed. If there is persistent microscopic hematuria, we will need to enter into shared decision making regarding repeat evaluation with CT Urogram and cystoscopy vs. Observation.     Mr. Romero expressed understanding and agreement to the above discussion and plan and all of his questions were answered to his satisfaction.     PLAN  First available MR urogram  First available cystoscopy with me with Valium to be taken that day before hand    Signed by:    Gibran Abraham PA-C    I spent a total of 31 minutes spent on the date of the encounter doing chart review, history and exam, documentation, and further activities as noted above.

## 2025-03-03 ENCOUNTER — OFFICE VISIT (OUTPATIENT)
Dept: UROLOGY | Facility: CLINIC | Age: 55
End: 2025-03-03
Attending: NURSE PRACTITIONER
Payer: COMMERCIAL

## 2025-03-03 ENCOUNTER — PRE VISIT (OUTPATIENT)
Dept: UROLOGY | Facility: CLINIC | Age: 55
End: 2025-03-03

## 2025-03-03 VITALS — SYSTOLIC BLOOD PRESSURE: 161 MMHG | OXYGEN SATURATION: 98 % | DIASTOLIC BLOOD PRESSURE: 93 MMHG | HEART RATE: 72 BPM

## 2025-03-03 DIAGNOSIS — Z76.82 ORGAN TRANSPLANT CANDIDATE: ICD-10-CM

## 2025-03-03 DIAGNOSIS — N18.6 ESRD (END STAGE RENAL DISEASE) (H): ICD-10-CM

## 2025-03-03 DIAGNOSIS — N32.89 BLADDER WALL THICKENING: ICD-10-CM

## 2025-03-03 DIAGNOSIS — R31.21 ASYMPTOMATIC MICROSCOPIC HEMATURIA: Primary | ICD-10-CM

## 2025-03-03 PROCEDURE — 51798 US URINE CAPACITY MEASURE: CPT | Performed by: STUDENT IN AN ORGANIZED HEALTH CARE EDUCATION/TRAINING PROGRAM

## 2025-03-03 PROCEDURE — 3077F SYST BP >= 140 MM HG: CPT | Performed by: STUDENT IN AN ORGANIZED HEALTH CARE EDUCATION/TRAINING PROGRAM

## 2025-03-03 PROCEDURE — 3080F DIAST BP >= 90 MM HG: CPT | Performed by: STUDENT IN AN ORGANIZED HEALTH CARE EDUCATION/TRAINING PROGRAM

## 2025-03-03 PROCEDURE — 1126F AMNT PAIN NOTED NONE PRSNT: CPT | Performed by: STUDENT IN AN ORGANIZED HEALTH CARE EDUCATION/TRAINING PROGRAM

## 2025-03-03 PROCEDURE — 99244 OFF/OP CNSLTJ NEW/EST MOD 40: CPT | Mod: 25 | Performed by: STUDENT IN AN ORGANIZED HEALTH CARE EDUCATION/TRAINING PROGRAM

## 2025-03-03 RX ORDER — DIAZEPAM 10 MG/1
10 TABLET ORAL ONCE
Qty: 1 TABLET | Refills: 0 | Status: SHIPPED | OUTPATIENT
Start: 2025-03-03 | End: 2025-03-03

## 2025-03-03 RX ORDER — B COMPLEX, C NO.20/FOLIC ACID 1 MG
1 CAPSULE ORAL DAILY
COMMUNITY
Start: 2025-02-21

## 2025-03-03 ASSESSMENT — PAIN SCALES - GENERAL: PAINLEVEL_OUTOF10: NO PAIN (0)

## 2025-03-03 ASSESSMENT — ENCOUNTER SYMPTOMS
SHORTNESS OF BREATH: 0
FATIGUE: 0
ABDOMINAL PAIN: 0
LIGHT-HEADEDNESS: 0
BACK PAIN: 0
DIZZINESS: 0
CONSTIPATION: 1
UNEXPECTED WEIGHT CHANGE: 0
HEMATURIA: 0
SLEEP DISTURBANCE: 1
ADENOPATHY: 0

## 2025-03-03 NOTE — NURSING NOTE
Hakeem Romero is a 55 year old male patient that presents today in clinic for the following:    Chief Complaint   Patient presents with    Consult     Thickened bladder wall        The patient's allergies and medications were reviewed as noted. A set of vitals were recorded as noted without incident. The patient does not have any other questions for the provider.    Blood pressure (!) 161/93, pulse 72, SpO2 98%. There is no height or weight on file to calculate BMI.    Patient Active Problem List   Diagnosis    Kidney replaced by transplant    Aftercare following organ transplant    Immunosuppressed status    HTN, kidney transplant related    Dyslipidemia    Chronic gout due to renal impairment of multiple sites without tophus    RAUL on CPAP    Narcolepsy    Stage 5 chronic kidney disease not on chronic dialysis (H)    Obesity (BMI 30-39.9)    IgA nephropathy    Gastroesophageal reflux disease    Edema of lower extremity    Calculus of kidney    Ventral hernia without obstruction or gangrene    Class 2 severe obesity due to excess calories with serious comorbidity in adult (H)    Healthcare maintenance    Borderline diabetes mellitus    Encounter for pre-transplant evaluation for kidney transplant       Allergies   Allergen Reactions    Morphine      Other reaction(s): Headache.  Nausea.    Other Reaction(s): Headache.  Nausea.       Current Outpatient Medications   Medication Sig Dispense Refill    allopurinol (ZYLOPRIM) 300 MG tablet Take 1 tablet (300 mg) by mouth daily. 90 tablet 3    amLODIPine (NORVASC) 5 MG tablet TAKE 1 TABLET(5 MG) BY MOUTH DAILY 30 tablet 0    amLODIPine (NORVASC) 5 MG tablet Take 1 tablet (5 mg) by mouth daily. 90 tablet 2    aspirin (ASA) 81 MG chewable tablet Take 1 tablet (81 mg) by mouth daily. 60 tablet 1    bumetanide (BUMEX) 1 MG tablet Take 3 tablets (3 mg) by mouth 2 times daily. 180 tablet 11    CELLCEPT (BRAND) 250 MG capsule Take 4 capsules (1,000 mg) by mouth 2 times daily.  720 capsule 3    cloNIDine (CATAPRES) 0.1 MG tablet Take 1 tablet (0.1 mg) by mouth 2 times daily. 180 tablet 3    cycloSPORINE modified (GENERIC EQUIVALENT) 100 MG capsule TAKE 1 CAPSULE BY MOUTH TWICE  DAILY 180 capsule 3    cycloSPORINE modified (GENERIC EQUIVALENT) 25 MG capsule Take 1 capsule (25 mg) by mouth 2 times daily. 180 capsule 3    meclizine (ANTIVERT) 25 MG tablet Take 1 tablet (25 mg) by mouth 3 times daily as needed for dizziness. 30 tablet 1    methylphenidate (RITALIN) 5 MG tablet Take 1 tablet (5 mg) by mouth 2 times daily as needed (narcolepsy). 60 tablet 0    metolazone (ZAROXOLYN) 5 MG tablet Take 1 tablet weekly 30 tablet 0    Metoprolol Tartrate 75 MG TABS Take 1 tablet by mouth 2 times daily.      multivitamin RENAL (TRIPHROCAPS) 1 capsule capsule Take 1 capsule by mouth daily.      ondansetron (ZOFRAN ODT) 4 MG ODT tab Take 1 tablet (4 mg) by mouth every 8 hours as needed for nausea. 20 tablet 1    penicillin V (VEETID) 500 MG tablet       pravastatin (PRAVACHOL) 40 MG tablet Take 1 tablet (40 mg) by mouth daily. 90 tablet 3    predniSONE (DELTASONE) 10 MG tablet Take 40 mg by mouth as needed. Gout Flair-ups      probenecid (BENEMID) 500 MG tablet Take 500 mg by mouth 2 times daily      tadalafil (CIALIS) 10 MG tablet Take 10 mg by mouth daily as needed         Social History     Tobacco Use    Smoking status: Never     Passive exposure: Never    Smokeless tobacco: Never   Vaping Use    Vaping status: Never Used   Substance Use Topics    Alcohol use: Yes     Alcohol/week: 3.3 standard drinks of alcohol     Types: 4 Standard drinks or equivalent per week     Comment: a few drinks a week    Drug use: No       Rosa M Begum LPN  3/3/2025  2:44 PM

## 2025-03-03 NOTE — LETTER
"3/3/2025       RE: Hakeem Romero  [address]  [place] [address]     Dear Colleague,    Thank you for referring your patient, Hakeem Romero, to the [facility] UROLOGY CLINIC [place] at [facility]. Please see a copy of my visit note below.    Subjective    REFERRING PROVIDER  Tata Martinez CNP    REASON FOR VISIT  Thickened bladder wall    HISTORY OF PRESENT ILLNESS  Mr. Romero is a pleasant 55 year old male with a past medical history significant for stage V kidney disease due to IgA nephropathy status post kidney transplant in 2008 now being evaluated for repeat transplant, obstructive sleep apnea on CPAP, obesity with BMI of 38, GERD, dyslipidemia, transplant related hypertension, lower extremity edema, narcolepsy, and history of kidney stones who presents today for his recent finding of a thickened bladder wall.  I personally reviewed the family practice visit note from 1/24/2025 in preparation for today's visit.    CT abdomen pelvis without IV contrast completed in December 2024 showed evidence of \"circumferential bladder wall thickening, which can be seen with chronic outlet obstruction.\"  Given this finding, was recommended to see urology to rule out significant BPH that could threaten a transplant kidney.    Today:  Urinates around 4-5 times per day   On a lot of diuretics   No baseline bothersome urinary symptoms   Gross hematuria only before diagnosis of IgA nephropath   No history of urinary retention   No history of kidney stones   No history of UTIs or prostatitis     REVIEW OF SYSTEMS  Review of Systems   Constitutional:  Negative for fatigue (On the rise) and unexpected weight change.   HENT:  Negative for hearing loss.    Eyes:  Negative for visual disturbance.   Respiratory:  Negative for shortness of breath.    Cardiovascular:  Negative for chest pain.   Gastrointestinal:  Positive for constipation (On and off, drinking less " fluids). Negative for abdominal pain.   Genitourinary:  Negative for hematuria.   Musculoskeletal:  Negative for back pain.   Neurological:  Negative for dizziness and light-headedness.   Hematological:  Negative for adenopathy.   Psychiatric/Behavioral:  Positive for sleep disturbance (Some issues related to his process for peritoneal dialysis).      Social History:  Denies any history of or current smoking     Family History:  Denies any known family history of urologic malignancy     Father with colon cancer     Objective    PHYSICAL EXAM  Physical Exam  Constitutional:       Appearance: Normal appearance.   HENT:      Head: Normocephalic and atraumatic.      Nose: No congestion.      Mouth/Throat:      Mouth: Mucous membranes are moist.   Eyes:      General:         Right eye: No discharge.         Left eye: No discharge.   Pulmonary:      Effort: No respiratory distress.   Abdominal:      General: There is no distension.   Musculoskeletal:         General: No deformity.   Skin:     General: Skin is warm and dry.   Neurological:      Mental Status: He is alert and oriented to person, place, and time.   Psychiatric:         Mood and Affect: Mood normal.         Behavior: Behavior normal.       LABORATORY  Lab Results   Component Value Date/Time    PSA 2.72 08/15/2024 02:20 PM      Latest Reference Range & Units 08/15/24 14:21   Color Urine Colorless, Straw, Light Yellow, Yellow  Light Yellow   Appearance Urine Clear  Clear   Glucose Urine Negative mg/dL 200 !   Bilirubin Urine Negative  Negative   Ketones Urine Negative mg/dL Negative   Specific Gravity Urine 1.003 - 1.035  1.018   pH Urine 5.0 - 7.0  6.0   Protein Albumin Urine Negative mg/dL 600 !   Urobilinogen mg/dL Normal, 2.0 mg/dL Normal   Nitrite Urine Negative  Negative   Blood Urine Negative  Small !   Leukocyte Esterase Urine Negative  Trace !   WBC Urine <=5 /HPF 6 (H)   RBC Urine <=2 /HPF 5 (H)   Squamous Epithelial /HPF Urine <=1 /HPF <1   Hyaline  Casts <=2 /LPF 4 (H)   Granular Casts None Seen /LPF 1 (H)   Amorphous Crystals None Seen /HPF Few !   !: Data is abnormal  (H): Data is abnormally high    No urine culture associated with above urinalysis    IMAGING  I personally reviewed and interpreted the below CT abdomen pelvis without IV contrast which does not show any evidence of obstructing urolithiasis, specifically no nephrolithiasis in the transplant kidney.  Some small calcifications in the native kidneys that do not appear to be true collecting system stones.  I do note the mild hydronephrosis and fat stranding around the transplant kidney which does appear to be stable based on previous scans.    Narrative & Impression   EXAMINATION: CT ABDOMEN PELVIS W/O CONTRAST, 12/9/2024 8:38 AM     INDICATION: Pt is in pre kidney transplant evaluation. Needs CT now to  follow up on enlarged lymph nodes on CT 06/2023 and also assessment  for arterial calcifications.; Kidney transplanted; Immunosuppressed  status (H); IgA nephropathy; Kidney replaced by transplant;  Pre-transplant evaluation for kidney transplant; Organ transplant  candidate; Essential hypertension; Dyslipidemia; HTN, kidney  transplant related     COMPARISON STUDY: CT 6/22/2023     TECHNIQUE: CT scan of the abdomen and pelvis was performed on  multidetector CT scanner using volumetric acquisition technique and  images were reconstructed in multiple planes with variable thickness  and reviewed on dedicated workstations.      CONTRAST: None      CT scan radiation dose is optimized to minimum requisite dose using  automated dose modulation techniques.     FINDINGS:     Lower thorax: Scattered subpleural nodular densities in the left lower  lobe.     Liver: No mass. No intrahepatic biliary ductal dilation.     Biliary System: Normal gallbladder. No extrahepatic biliary ductal  dilation.     Spleen: Normal. Scattered calcified granulomas.     Pancreas: No mass or pancreatic ductal dilation.     Adrenal  glands: No mass or nodules     Kidneys: Bilateral native kidneys are markedly atrophic with  parenchymal calcifications and small cysts. No hydronephrosis. Right  lower quadrant renal transplant demonstrates perinephric stranding and  mild hydronephrosis.     Gastrointestinal tract :Normal appendix. Normal caliber small bowel.   Colonic diverticulosis with no diverticulitis.      Retroperitoneum: Vascular calcifications of the aorta and iliac  arteries. No significant lymphadenopathy.     Pelvis: Circumferential bladder wall thickening, which can be seen  with chronic outlet obstruction.  Prostate and seminal vesicles are  within normal limits.     Osseous structures: No aggressive or acute osseous lesion.      Soft tissues: Umbilical hernia repair.                                                                      IMPRESSION:  1.  Right lower quadrant renal transplant demonstrates perinephric  stranding and mild hydronephrosis, similar to 6/22/2023.  2.  Bilateral native kidneys are markedly atrophic with several small  presumed cysts, also unchanged.  3.  No new abnormalities in the abdomen or pelvis.     TESTING  PVR: 60mL    Attempted a uroflow, but unfortunately Hakeem only urinated 139 mL so we cannot reliably use the information    Assessment & Plan  Bladder wall thickening  Microscopic hematuria    It was my pleasure to meet with Mr. Romero in the office today in regards to his recently discovered bladder wall thickening as well as his microscopic hematuria.  After reviewing his clinical history, I first discussed with him that the testing we did when he came in in regards to checking his postvoid residual and urinary stream rules out any concerning features from my perspective regards to the bladder wall thickening.  This was likely just due to slight underdistention, not any significant BPH.  If after his transplant he starts to notice he is having a really bad time urinating, we should see him back to  discuss trialing an alpha-blocker or an outlet procedure, but nothing that would stop him from getting a transplant at this time.    We did however review that his recent urine sample from August 2024 showed evidence of microscopic hematuria. We reviewed possible etiologies of microscopic hematuria including both benign and malignant causes. We discussed the American Urological Association's recommendation for workup of gross and microscopic hematuria (visible blood vs. 3 or more red blood cells on urinalysis) which would include a CT urogram, cystoscopy, and in the case of gross hematuria, a urine cytology. I described these 2-3 tests to Mr. Romero as well as the relative risks and benefits of each.    Unfortunately because of his lack of kidney function, we likely cannot get a CT urogram, so we will plan on an MR urogram for now.  After reviewing the above information, Mr. Romero expressed understanding and agreement with moving forward with the MR  Urogram and cystoscopy.     If the hematuria evaluation is negative, Mr. Romero should undergo a repeat urinalysis in one year. If this repeat urinalysis is also negative for microscopic hematuria, no further workup is needed. If there is persistent microscopic hematuria, we will need to enter into shared decision making regarding repeat evaluation with CT Urogram and cystoscopy vs. Observation.     Mr. Romero expressed understanding and agreement to the above discussion and plan and all of his questions were answered to his satisfaction.     PLAN  First available MR urogram  First available cystoscopy with me with Valium to be taken that day before hand    Signed by:    Gibran Abraham PA-C    I spent a total of 31 minutes spent on the date of the encounter doing chart review, history and exam, documentation, and further activities as noted above.       Again, thank you for allowing me to participate in the care of your patient.      Sincerely,    Gibran Abraham  NINA

## 2025-03-05 ENCOUNTER — DOCUMENTATION ONLY (OUTPATIENT)
Dept: TRANSPLANT | Facility: CLINIC | Age: 55
End: 2025-03-05
Payer: COMMERCIAL

## 2025-03-06 NOTE — CONFIDENTIAL NOTE
Reason for visit: Word finding difficulty   Stroke    Referring Provider: Jose Manuel Calix MD   [facility]   Office Visit Notes: 12/15/2024       All IMAGING   STATUS/LOCATION   DATE   MRI/MRA Internal 12/15/2024    CT/CTA Internal 12/15/2024    LABS Internal    EEG External  [facility] 02/9/2011   EMG N/A    NEUROPSYCH   TEST: Internal  Stephani Jackson, Ph.D.,[facility] 7/15/2011

## 2025-03-14 ENCOUNTER — HOSPITAL ENCOUNTER (OUTPATIENT)
Dept: MRI IMAGING | Facility: CLINIC | Age: 55
Discharge: HOME | End: 2025-03-14
Attending: STUDENT IN AN ORGANIZED HEALTH CARE EDUCATION/TRAINING PROGRAM | Admitting: STUDENT IN AN ORGANIZED HEALTH CARE EDUCATION/TRAINING PROGRAM
Payer: COMMERCIAL

## 2025-03-14 DIAGNOSIS — Z76.82 ORGAN TRANSPLANT CANDIDATE: ICD-10-CM

## 2025-03-14 DIAGNOSIS — R31.21 ASYMPTOMATIC MICROSCOPIC HEMATURIA: ICD-10-CM

## 2025-03-14 PROCEDURE — 72197 MRI PELVIS W/O & W/DYE: CPT

## 2025-03-14 PROCEDURE — 255N000002 HC RX 255 OP 636: Performed by: STUDENT IN AN ORGANIZED HEALTH CARE EDUCATION/TRAINING PROGRAM

## 2025-03-14 PROCEDURE — A9585 GADOBUTROL INJECTION: HCPCS | Performed by: STUDENT IN AN ORGANIZED HEALTH CARE EDUCATION/TRAINING PROGRAM

## 2025-03-14 PROCEDURE — 74183 MRI ABD W/O CNTR FLWD CNTR: CPT

## 2025-03-14 RX ORDER — GADOBUTROL 604.72 MG/ML
10 INJECTION INTRAVENOUS ONCE
Status: COMPLETED | OUTPATIENT
Start: 2025-03-14 | End: 2025-03-14

## 2025-03-14 RX ADMIN — GADOBUTROL 10 ML: 604.72 INJECTION INTRAVENOUS at 10:02

## 2025-03-15 DIAGNOSIS — I15.1 HTN, KIDNEY TRANSPLANT RELATED: ICD-10-CM

## 2025-03-15 DIAGNOSIS — Z94.0 HTN, KIDNEY TRANSPLANT RELATED: ICD-10-CM

## 2025-03-17 RX ORDER — AMLODIPINE BESYLATE 5 MG/1
5 TABLET ORAL DAILY
Qty: 90 TABLET | Refills: 2 | Status: SHIPPED | OUTPATIENT
Start: 2025-03-17

## 2025-03-25 ENCOUNTER — MYC MEDICAL ADVICE (OUTPATIENT)
Dept: UROLOGY | Facility: CLINIC | Age: 55
End: 2025-03-25
Payer: COMMERCIAL

## 2025-03-25 DIAGNOSIS — N23 KIDNEY PAIN: Primary | ICD-10-CM

## 2025-03-27 ENCOUNTER — HOSPITAL ENCOUNTER (OUTPATIENT)
Dept: ULTRASOUND IMAGING | Facility: CLINIC | Age: 55
Discharge: HOME | End: 2025-03-27
Attending: STUDENT IN AN ORGANIZED HEALTH CARE EDUCATION/TRAINING PROGRAM
Payer: COMMERCIAL

## 2025-03-27 ENCOUNTER — ANCILLARY ORDERS (OUTPATIENT)
Dept: UROLOGY | Facility: CLINIC | Age: 55
End: 2025-03-27
Payer: COMMERCIAL

## 2025-03-27 DIAGNOSIS — N23 KIDNEY PAIN: ICD-10-CM

## 2025-03-27 DIAGNOSIS — N23 KIDNEY PAIN: Primary | ICD-10-CM

## 2025-03-27 PROCEDURE — 76776 US EXAM K TRANSPL W/DOPPLER: CPT

## 2025-04-10 ENCOUNTER — PRE VISIT (OUTPATIENT)
Dept: UROLOGY | Facility: CLINIC | Age: 55
End: 2025-04-10
Payer: COMMERCIAL

## 2025-04-10 NOTE — TELEPHONE ENCOUNTER
Reason for visit: Cystoscopy         Relevant information: ESRD, asymptomatic microhematuria    Records/imaging/labs/orders: MR urogram done 3/14/25    Pt called: no need for a call    At Rooming: ask symptoms    Abhishek Sommers  4/10/2025  8:37 AM

## 2025-04-11 NOTE — PROGRESS NOTES
HISTORY OF PRESENT ILLNESS:  Mr. Romero is a pleasant 55 year old male who I am seeing today for cystoscopy given his microscopic hematuria and bladder wall thickening discovered during his transplant clearance.  I met with him on 3/3/2025 at which time we reviewed the bladder wall thickening found on the CT abdomen pelvis without IV contrast from December, but also noted the microscopic hematuria seen on his urine sampling from August.  With that in mind recommended an MR urogram as well as cystoscopy with me.  Of note, Hakeem is taking Valium prior to the cystoscopy.    MR urogram completed on 3/14/2025 showed evidence of possible UPJ obstruction at the level of the right lower quadrant transplant kidney, so we got a renal ultrasound to watch this and to evaluate some abdominal pain. This did not show any concerning features.     PROCEDURE:    Mr. Romero was brought to the cystoscopy suite and placed in the lithotomy position. he was prepped and draped in the standard fashion.  A FLEXIBLE CYSTOSCOPE was inserted through the urethra and into the bladder.  Urethroscopy revealed multiple wide bore strictures throughout the distal and mid penile urethra, but just distal to the urethral sphincter, was evidence of a much narrower stricture, likely 8-10 Qatari that unfortunately could not be passed with the scope with mild pressure.  Pictures were taken, and the cystoscope was removed.  Mr. Romero tolerated the procedure well.    IMPRESSION:   Microscopic hematuria   Bladder wall thickening  Urethral strictures    It was my pleasure to speak with Hakeem in follow-up for his microscopic hematuria evaluatio.  Unfortunately, we were unable to complete his hematuria evaluation today given the dense urethral stricture encountered just distally to the urethral sphincter.  Ultimately we will need him to complete the cystoscopy again but this time with a pediatric cystoscope to more easily passed the stricture and evaluate the  bladder.  We will have him do this with one of my urologic surgeons with more experience using a pediatric cystoscope.  We will send another Valium, and he will need to present to the clinic in our at least before his appointment so that he can sign the consent and take the Valium just like he did today.    Mr. Romero expressed understanding and agreement to the above discussion and plan and all of his questions were answered to his satisfaction.     PLAN:   Repeat cystoscopy with either Dr. Abimael Zhang or Dr. Ruddy Rosenbaum with a pediatric cystoscope  Will show up an hour early to sign consent before taking his Valium    Signed by:    Gibran Abraham PA-C  [facility] [place] [address]

## 2025-04-16 ENCOUNTER — OFFICE VISIT (OUTPATIENT)
Dept: UROLOGY | Facility: CLINIC | Age: 55
End: 2025-04-16
Payer: COMMERCIAL

## 2025-04-16 VITALS
BODY MASS INDEX: 39.2 KG/M2 | DIASTOLIC BLOOD PRESSURE: 88 MMHG | SYSTOLIC BLOOD PRESSURE: 150 MMHG | WEIGHT: 280 LBS | HEIGHT: 71 IN | HEART RATE: 72 BPM | OXYGEN SATURATION: 96 %

## 2025-04-16 DIAGNOSIS — N99.112 POSTPROCEDURAL MEMBRANOUS URETHRAL STRICTURE: ICD-10-CM

## 2025-04-16 DIAGNOSIS — R31.21 ASYMPTOMATIC MICROSCOPIC HEMATURIA: Primary | ICD-10-CM

## 2025-04-16 PROCEDURE — 3079F DIAST BP 80-89 MM HG: CPT | Performed by: STUDENT IN AN ORGANIZED HEALTH CARE EDUCATION/TRAINING PROGRAM

## 2025-04-16 PROCEDURE — 52000 CYSTOURETHROSCOPY: CPT | Performed by: STUDENT IN AN ORGANIZED HEALTH CARE EDUCATION/TRAINING PROGRAM

## 2025-04-16 PROCEDURE — 1125F AMNT PAIN NOTED PAIN PRSNT: CPT | Performed by: STUDENT IN AN ORGANIZED HEALTH CARE EDUCATION/TRAINING PROGRAM

## 2025-04-16 PROCEDURE — 3077F SYST BP >= 140 MM HG: CPT | Performed by: STUDENT IN AN ORGANIZED HEALTH CARE EDUCATION/TRAINING PROGRAM

## 2025-04-16 RX ORDER — LIDOCAINE HYDROCHLORIDE 20 MG/ML
JELLY TOPICAL ONCE
Status: COMPLETED | OUTPATIENT
Start: 2025-04-16 | End: 2025-04-16

## 2025-04-16 RX ORDER — DIAZEPAM 10 MG/1
10 TABLET ORAL ONCE
Qty: 1 TABLET | Refills: 0 | Status: SHIPPED | OUTPATIENT
Start: 2025-04-16 | End: 2025-04-16

## 2025-04-16 RX ADMIN — LIDOCAINE HYDROCHLORIDE: 20 JELLY TOPICAL at 15:05

## 2025-04-16 ASSESSMENT — PAIN SCALES - GENERAL: PAINLEVEL_OUTOF10: MILD PAIN (1)

## 2025-04-16 NOTE — NURSING NOTE
"Chief Complaint   Patient presents with    Cystoscopy     Pt states here for cystoscopy     Has been doing peritoneal dialysis, pain is about a 1    Blood pressure (!) 150/88, pulse 72, height 1.803 m (5' 11\"), weight 127 kg (280 lb), SpO2 96%. Body mass index is 39.05 kg/m .    Patient Active Problem List   Diagnosis    Kidney replaced by transplant    Aftercare following organ transplant    Immunosuppressed status    HTN, kidney transplant related    Dyslipidemia    Chronic gout due to renal impairment of multiple sites without tophus    RAUL on CPAP    Narcolepsy    Stage 5 chronic kidney disease not on chronic dialysis (H)    Obesity (BMI 30-39.9)    IgA nephropathy    Gastroesophageal reflux disease    Edema of lower extremity    Calculus of kidney    Ventral hernia without obstruction or gangrene    Class 2 severe obesity due to excess calories with serious comorbidity in adult (H)    Healthcare maintenance    Borderline diabetes mellitus    Encounter for pre-transplant evaluation for kidney transplant       Allergies   Allergen Reactions    Morphine      Other reaction(s): Headache.  Nausea.    Other Reaction(s): Headache.  Nausea.       Current Outpatient Medications   Medication Sig Dispense Refill    allopurinol (ZYLOPRIM) 300 MG tablet Take 1 tablet (300 mg) by mouth daily. 90 tablet 3    amLODIPine (NORVASC) 5 MG tablet TAKE 1 TABLET(5 MG) BY MOUTH DAILY 90 tablet 2    amLODIPine (NORVASC) 5 MG tablet Take 1 tablet (5 mg) by mouth daily. 90 tablet 2    aspirin (ASA) 81 MG chewable tablet Take 1 tablet (81 mg) by mouth daily. 60 tablet 1    bumetanide (BUMEX) 1 MG tablet Take 3 tablets (3 mg) by mouth 2 times daily. 180 tablet 11    CELLCEPT (BRAND) 250 MG capsule Take 4 capsules (1,000 mg) by mouth 2 times daily. 720 capsule 3    cloNIDine (CATAPRES) 0.1 MG tablet Take 1 tablet (0.1 mg) by mouth 2 times daily. 180 tablet 3    cycloSPORINE modified (GENERIC EQUIVALENT) 100 MG capsule TAKE 1 CAPSULE BY " MOUTH TWICE  DAILY 180 capsule 3    cycloSPORINE modified (GENERIC EQUIVALENT) 25 MG capsule Take 1 capsule (25 mg) by mouth 2 times daily. 180 capsule 3    meclizine (ANTIVERT) 25 MG tablet Take 1 tablet (25 mg) by mouth 3 times daily as needed for dizziness. 30 tablet 1    methylphenidate (RITALIN) 5 MG tablet Take 1 tablet (5 mg) by mouth 2 times daily as needed (narcolepsy). 60 tablet 0    metolazone (ZAROXOLYN) 5 MG tablet Take 1 tablet weekly 30 tablet 0    Metoprolol Tartrate 75 MG TABS Take 1 tablet by mouth 2 times daily.      multivitamin RENAL (TRIPHROCAPS) 1 capsule capsule Take 1 capsule by mouth daily.      ondansetron (ZOFRAN ODT) 4 MG ODT tab Take 1 tablet (4 mg) by mouth every 8 hours as needed for nausea. 20 tablet 1    penicillin V (VEETID) 500 MG tablet       pravastatin (PRAVACHOL) 40 MG tablet Take 1 tablet (40 mg) by mouth daily. 90 tablet 3    predniSONE (DELTASONE) 10 MG tablet Take 40 mg by mouth as needed. Gout Flair-ups      probenecid (BENEMID) 500 MG tablet Take 500 mg by mouth 2 times daily      tadalafil (CIALIS) 10 MG tablet Take 10 mg by mouth daily as needed         Social History     Tobacco Use    Smoking status: Never     Passive exposure: Never    Smokeless tobacco: Never   Vaping Use    Vaping status: Never Used   Substance Use Topics    Alcohol use: Yes     Alcohol/week: 3.3 standard drinks of alcohol     Types: 4 Standard drinks or equivalent per week     Comment: a few drinks a week    Drug use: No       What to expect after the procedure reviewed with patient: yes    Abhishek Sommers  2025  1:50 PM     Invasive Procedure Safety Checklist:    Procedure: cystoscopy    Action: Complete sections and checkboxes as appropriate.    Pre-procedure:  1. Patient ID Verified with 2 identifiers (Gabrielle and  or MRN) : YES    2. Procedure and site verified with patient/designee (when able) : YES    3. Accurate consent documentation in medical record : YES    4. H&P (or appropriate  assessment) documented in medical record : YES  H&P must be up to 30 days prior to procedure an updated within 24 hours of                 Procedure as applicable.     5. Relevant diagnostic and radiology test results appropriately labeled and displayed as applicable : YES    6. Blood products, implants, devices, and/or special equipment available for the procedure as applicable : YES    7. Procedure site(s) marked with provider initials [Exclusions: None] : NO    8. Marking not required. Reason : Yes  Procedure does not require site marking    Time Out:     Time-Out performed immediately prior to starting procedure, including verbal and active participation of all team members addressing: YES    1. Correct patient identity.  2. Confirmed that the correct side and site are marked.  3. An accurate procedure to be done.  4. Agreement on the procedure to be done.  5. Correct patient position.  6. Relevant images and results are properly labeled and appropriately displayed.  7. The need to administer antibiotics or fluids for irrigation purposes during the procedure as applicable.  8. Safety precautions based on patient history or medication use.    During Procedure: Verification of correct person, site, and procedure occurs any time the responsibility for care of the patient is transferred to another member of the care team.      The following medication was given:     MEDICATION:  Uro-jet  ROUTE: Intra-urethral   SITE: Urethra  DOSE: 10 mL 2% lidocaine  LOT #: [id number]  : [employer]  EXPIRATION DATE: 11-26  NDC#: [address]-71   Was there drug waste? No    Prior to administration, verified patient identity using patient's name and date of birth.  Due to administration, patient instructed to remain in clinic for 15 minutes  afterwards, and to report any adverse reaction to me immediately.      Drug Amount Wasted:  None.  Vial/Syringe: Single dose vial    Abhishek Sommers  April 16, 2025

## 2025-04-16 NOTE — LETTER
4/16/2025       RE: Hakeem Romero  [address]  [facility] [address]     Dear Colleague,    Thank you for referring your patient, Hakeem Romero, to the [facility] UROLOGY CLINIC [place] at [facility]. Please see a copy of my visit note below.    HISTORY OF PRESENT ILLNESS:  Mr. Romero is a pleasant 55 year old male who I am seeing today for cystoscopy given his microscopic hematuria and bladder wall thickening discovered during his transplant clearance.  I met with him on 3/3/2025 at which time we reviewed the bladder wall thickening found on the CT abdomen pelvis without IV contrast from December, but also noted the microscopic hematuria seen on his urine sampling from August.  With that in mind recommended an MR urogram as well as cystoscopy with me.  Of note, Hakeem is taking Valium prior to the cystoscopy.    MR urogram completed on 3/14/2025 showed evidence of possible UPJ obstruction at the level of the right lower quadrant transplant kidney, so we got a renal ultrasound to watch this and to evaluate some abdominal pain. This did not show any concerning features.     PROCEDURE:    Mr. Romero was brought to the cystoscopy suite and placed in the lithotomy position. he was prepped and draped in the standard fashion.  A FLEXIBLE CYSTOSCOPE was inserted through the urethra and into the bladder.  Urethroscopy revealed multiple wide bore strictures throughout the distal and mid penile urethra, but just distal to the urethral sphincter, was evidence of a much narrower stricture, likely 8-10 Croatian that unfortunately could not be passed with the scope with mild pressure.  Pictures were taken, and the cystoscope was removed.  Mr. Romero tolerated the procedure well.    IMPRESSION:   Microscopic hematuria   Bladder wall thickening  Urethral strictures    It was my pleasure to speak with Hakeem in follow-up for his microscopic hematuria evaluatio.  Unfortunately, we  were unable to complete his hematuria evaluation today given the dense urethral stricture encountered just distally to the urethral sphincter.  Ultimately we will need him to complete the cystoscopy again but this time with a pediatric cystoscope to more easily passed the stricture and evaluate the bladder.  We will have him do this with one of my urologic surgeons with more experience using a pediatric cystoscope.  We will send another Valium, and he will need to present to the clinic in our at least before his appointment so that he can sign the consent and take the Valium just like he did today.    Mr. Romero expressed understanding and agreement to the above discussion and plan and all of his questions were answered to his satisfaction.     PLAN:   Repeat cystoscopy with either Dr. Abimael Zhang or Dr. Ruddy Rosenbaum with a pediatric cystoscope  Will show up an hour early to sign consent before taking his Valium    Signed by:    Gibran Abraham PA-C  [facility]  [address] [place] [address]       Again, thank you for allowing me to participate in the care of your patient.      Sincerely,    Gibran Abraham PA-C

## 2025-04-28 ENCOUNTER — PRE VISIT (OUTPATIENT)
Dept: NEUROLOGY | Facility: CLINIC | Age: 55
End: 2025-04-28

## 2025-05-12 ENCOUNTER — RESULTS FOLLOW-UP (OUTPATIENT)
Dept: FAMILY MEDICINE | Facility: CLINIC | Age: 55
End: 2025-05-12

## 2025-05-12 ENCOUNTER — OFFICE VISIT (OUTPATIENT)
Dept: FAMILY MEDICINE | Facility: CLINIC | Age: 55
End: 2025-05-12
Payer: COMMERCIAL

## 2025-05-12 VITALS
SYSTOLIC BLOOD PRESSURE: 164 MMHG | DIASTOLIC BLOOD PRESSURE: 80 MMHG | RESPIRATION RATE: 16 BRPM | BODY MASS INDEX: 38.02 KG/M2 | TEMPERATURE: 97.9 F | HEIGHT: 71 IN | OXYGEN SATURATION: 99 % | HEART RATE: 78 BPM | WEIGHT: 271.6 LBS

## 2025-05-12 DIAGNOSIS — Z01.818 PREOP GENERAL PHYSICAL EXAM: ICD-10-CM

## 2025-05-12 DIAGNOSIS — G47.33 OSA ON CPAP: ICD-10-CM

## 2025-05-12 DIAGNOSIS — N18.6 ESRD (END STAGE RENAL DISEASE) ON DIALYSIS (H): Primary | ICD-10-CM

## 2025-05-12 DIAGNOSIS — R60.0 EDEMA OF LOWER EXTREMITY: ICD-10-CM

## 2025-05-12 DIAGNOSIS — Z94.0 HTN, KIDNEY TRANSPLANT RELATED: ICD-10-CM

## 2025-05-12 DIAGNOSIS — Z99.2 ESRD (END STAGE RENAL DISEASE) ON DIALYSIS (H): Primary | ICD-10-CM

## 2025-05-12 DIAGNOSIS — Z94.0 KIDNEY REPLACED BY TRANSPLANT: ICD-10-CM

## 2025-05-12 DIAGNOSIS — I15.1 HTN, KIDNEY TRANSPLANT RELATED: ICD-10-CM

## 2025-05-12 PROCEDURE — 3079F DIAST BP 80-89 MM HG: CPT | Performed by: NURSE PRACTITIONER

## 2025-05-12 PROCEDURE — 93000 ELECTROCARDIOGRAM COMPLETE: CPT | Performed by: NURSE PRACTITIONER

## 2025-05-12 PROCEDURE — 99214 OFFICE O/P EST MOD 30 MIN: CPT | Performed by: NURSE PRACTITIONER

## 2025-05-12 PROCEDURE — 3077F SYST BP >= 140 MM HG: CPT | Performed by: NURSE PRACTITIONER

## 2025-05-12 RX ORDER — TERAZOSIN 2 MG/1
2 CAPSULE ORAL
COMMUNITY
Start: 2025-03-28

## 2025-05-12 RX ORDER — GENTAMICIN SULFATE 1 MG/G
CREAM TOPICAL DAILY
COMMUNITY
Start: 2025-03-16

## 2025-05-12 RX ORDER — ACETAMINOPHEN 160 MG
1 TABLET,DISINTEGRATING ORAL DAILY
COMMUNITY

## 2025-05-12 RX ORDER — CALCIUM ACETATE 667 MG/1
667 CAPSULE ORAL
COMMUNITY
Start: 2025-03-27

## 2025-05-12 NOTE — PATIENT INSTRUCTIONS
How to Take Your Medication Before Surgery  Preoperative Medication Instructions   Take all scheduled medications on the day of surgery EXCEPT for modifications listed below:   - Herbal medications and vitamins: DO NOT TAKE 14 days prior to surgery.   - Beta Blockers (atenolol, metoprolol, propranolol) : Continue taking on the day of surgery.   - Calcium Channel Blockers (amlodipine, diltiazem, felodipine): May be continued on the day of surgery.   - Diuretics (furosemide, hydrochlorothiazide, chlorothalidone): DO NOT TAKE on the day of surgery.   - Statins (atorvastatin, simvastatin, pravastatin) : Continue taking on the day of surgery.   -Continue Terazosin day of surgery       Patient Education   Preparing for Your Surgery  For Adults  Getting started  In most cases, a nurse will call to review your health history and instructions. They will give you an arrival time based on your scheduled surgery time. Please be ready to share:  Your doctor's clinic name and phone number  Your medical, surgical, and anesthesia history  A list of allergies and sensitivities  A list of medicines, including herbal treatments and over-the-counter drugs  Whether the patient has a legal guardian (ask how to send us the papers in advance)  Note: You may not receive a call if you were seen at our PAC (Preoperative Assessment Center).  Please tell us if you're pregnant--or if there's any chance you might be pregnant. Some surgeries may injure a fetus (unborn baby), so they require a pregnancy test. Surgeries that are safe for a fetus don't always need a test, and you can choose whether to have one.   Preparing for surgery  Within 10 to 30 days of surgery: Have a pre-op exam (sometimes called an H&P, or History and Physical). This can be done at a clinic or pre-operative center.  If you're having a , you may not need this exam. Talk to your care team.  At your pre-op exam, talk to your care team about all medicines you take.  (This includes CBD oil and any drugs, such as THC, marijuana, and other forms of cannabis.) If you need to stop any medicine before surgery, ask when to start taking it again.  This is for your safety. Many medicines and drugs can make you bleed too much during surgery. Some change how well surgery (anesthesia) drugs work.  Call your insurance company to let them know you're having surgery. (If you don't have insurance, call [phone].)  Call your clinic if there's any change in your health. This includes a scrape or scratch near the surgery site, or any signs of a cold (sore throat, runny nose, cough, rash, fever).  Eating and drinking guidelines  For your safety: Unless your surgeon tells you otherwise, follow the guidelines below.  Eat and drink as normal until 8 hours before you arrive for surgery. After that, no food or milk. You can spit out gum when you arrive.  Drink clear liquids until 2 hours before you arrive. These are liquids you can see through, like water, Gatorade, and Propel Water. They also include plain black coffee and tea (no cream or milk).  No alcohol for 24 hours before you arrive. The night before surgery, stop any drinks that contain THC.  If your care team tells you to take medicine on the morning of surgery, it's okay to take it with a sip of water. No other medicines or drugs are allowed (including CBD oil)--follow your care team's instructions.  If you have questions the day of surgery, call your hospital or surgery center.   Preventing infection  Shower or bathe the night before and the morning of surgery. Follow the instructions your clinic gave you. (If no instructions, use regular soap.)  Don't shave or clip hair near your surgery site. We'll remove the hair if needed.  Don't smoke or vape the morning of surgery. No chewing tobacco for 6 hours before you arrive. A nicotine patch is okay. You may spit out nicotine gum when you arrive.  For some surgeries, the surgeon will tell you  to fully quit smoking and nicotine.  We will make every effort to keep you safe from infection. We will:  Clean our hands often with soap and water (or an alcohol-based hand rub).  Clean the skin at your surgery site with a special soap that kills germs.  Give you a special gown to keep you warm. (Cold raises the risk of infection.)  Wear hair covers, masks, gowns, and gloves during surgery.  Give antibiotic medicine, if prescribed. Not all surgeries need this medicine.  What to bring on the day of surgery  Photo ID and insurance card  Copy of your health care directive, if you have one  Glasses and hearing aids (bring cases)  You can't wear contacts during surgery  Inhaler and eye drops, if you use them (tell us about these when you arrive)  CPAP machine or breathing device, if you use them  A few personal items, if spending the night  If you have . . .  A pacemaker, ICD (cardiac defibrillator), or other implant: Bring the ID card.  An implanted stimulator: Bring the remote control.  A legal guardian: Bring a copy of the certified (court-stamped) guardianship papers.  Please remove any jewelry, including body piercings. Leave jewelry and other valuables at home.  If you're going home the day of surgery  You must have a responsible adult drive you home. They should stay with you overnight as well.  If you don't have someone to stay with you, and you aren't safe to go home alone, we may keep you overnight. Insurance often won't pay for this.  After surgery  If it's hard to control your pain or you need more pain medicine, please call your surgeon's office.  Questions?   If you have any questions for your care team, list them here:   ____________________________________________________________________________________________________________________________________________________________________________________________________________________________________________________________  For informational purposes only.  Not to replace the advice of your health care provider. Copyright   2003, 2019 [facility]. All rights reserved. Clinically reviewed by Josue Hurley MD. [employer] [id number] - REV 08/24.

## 2025-05-12 NOTE — PROGRESS NOTES
Preoperative Evaluation  [facility] [place]  [address] [address]  Phone: [phone]  Fax: [phone]  Primary Provider: Eric Lei MD  Pre-op Performing Provider: NHUNG Ceron CNP  May 12, 2025             5/12/2025   Surgical Information   What procedure is being done? cathetr replacement   Facility or Hospital where procedure/surgery will be performed: [facility]   Who is doing the procedure / surgery? Winnie Harding   Date of surgery / procedure: May 14   Time of surgery / procedure: 1000   Where do you plan to recover after surgery? at home with family     Fax number for surgical facility: Note does not need to be faxed, will be available electronically in Epic.    Assessment & Plan     The proposed surgical procedure is considered INTERMEDIATE risk.    Preop general physical exam  Hakeem high risk for intermittent risk procedure and cleared for peritoneal dialysis catheter exchange under general anesthesia.    - EKG 12-lead complete w/read - Clinics    ESRD (end stage renal disease) on dialysis (H)  Cleared for procedure.    Kidney replaced by transplant  Cleared for procedure.    HTN, kidney transplant related  Elevated blood pressure, chronically uncontrolled.  On recheck today blood pressure 164/80.  Ran out of terazosin with recent medication change.  Has medication at the pharmacy.  Continues on amlodipine, Bumex.    Edema of lower extremity  Chronic pitting bilateral lower extremity edema improved on Bumex but still with pitting edema.  Recommend compression stockings.    RAUL on CPAP  Adherent to CPAP           Risks and Recommendations  The patient has the following additional risks and recommendations for perioperative complications:  Obstructive Sleep Apnea:   Adherent to CPAP  Anemia/Bleeding/Clotting:    - Anemia and does not require treatment prior to surgery. Monitor hemoglobin postoperatively   -CKD, end stage renal  disease    Preoperative Medication Instructions  Take all scheduled medications on the day of surgery EXCEPT for modifications listed below:   - Herbal medications and vitamins: DO NOT TAKE 14 days prior to surgery.   - Beta Blockers (atenolol, metoprolol, propranolol) : Continue taking on the day of surgery.   - Calcium Channel Blockers (amlodipine, diltiazem, felodipine): May be continued on the day of surgery.   - Diuretics (furosemide, hydrochlorothiazide, chlorothalidone): DO NOT TAKE on the day of surgery.   - Statins (atorvastatin, simvastatin, pravastatin) : Continue taking on the day of surgery.   -Continue Terazosin day of surgery    Recommendation  Approval given to proceed with proposed procedure, without further diagnostic evaluation.        Andreina Palacio is a 55 year old, presenting for the following:  Pre-Op Exam (Catheter replacement for dialysis, DOS 05/14/2025, [facility], Dr. Harding)          5/12/2025    10:28 AM   Additional Questions   Roomed by YUSUF Love     HPI: 4 exchanges throughout the night, getting a 'slow drain' alert and has to get up half hour 4 times per night, also scope to look at scar tissue as cause of problem  Ran our of terozosin-not taking today, refill already at pharmacy. Dose was increased    Labs at [facility] today          5/12/2025   Pre-Op Questionnaire   Have you ever had a heart attack or stroke? No   Have you ever had surgery on your heart or blood vessels, such as a stent placement, a coronary artery bypass, or surgery on an artery in your head, neck, heart, or legs? No   Do you have chest pain with activity? No   Do you have a history of heart failure? No   Do you currently have a cold, bronchitis or symptoms of other infection? No   Do you have a cough, shortness of breath, or wheezing? No   Do you or anyone in your family have previous history of blood clots? No   Do you or does anyone in your family have a serious bleeding problem such as prolonged bleeding following  surgeries or cuts? No   Have you ever had problems with anemia or been told to take iron pills? (!) YES    Have you had any abnormal blood loss such as black, tarry or bloody stools? No   Have you ever had a blood transfusion? No   Are you willing to have a blood transfusion if it is medically needed before, during, or after your surgery? Yes   Have you or any of your relatives ever had problems with anesthesia? No   Do you have sleep apnea, excessive snoring or daytime drowsiness? (!) YES-wears cpap   Do you have a CPAP machine? Yes   Do you have any artifical heart valves or other implanted medical devices like a pacemaker, defibrillator, or continuous glucose monitor? No   Do you have artificial joints? No   Are you allergic to latex? No     Advance Care Planning    Discussed advance care planning with patient; however, patient declined at this time.    Preoperative Review of    reviewed - no record of controlled substances prescribed.          Patient Active Problem List    Diagnosis Date Noted    Borderline diabetes mellitus 08/21/2024     Priority: Medium    Encounter for pre-transplant evaluation for kidney transplant 08/21/2024     Priority: Medium    Healthcare maintenance 05/31/2024     Priority: Medium    Class 2 severe obesity due to excess calories with serious comorbidity in adult (H) 01/04/2024     Priority: Medium    Ventral hernia without obstruction or gangrene 03/30/2023     Priority: Medium     Added automatically from request for surgery [id number]      Obesity (BMI 30-39.9) 02/26/2023     Priority: Medium    IgA nephropathy 02/26/2023     Priority: Medium    Gastroesophageal reflux disease 02/26/2023     Priority: Medium    Edema of lower extremity 02/26/2023     Priority: Medium    Calculus of kidney 02/26/2023     Priority: Medium    Stage 5 chronic kidney disease not on chronic dialysis (H) 11/01/2022     Priority: Medium    Kidney replaced by transplant      Priority: Medium    Aftercare  following organ transplant      Priority: Medium    Immunosuppressed status      Priority: Medium    HTN, kidney transplant related      Priority: Medium    Dyslipidemia      Priority: Medium    Chronic gout due to renal impairment of multiple sites without tophus      Priority: Medium    RAUL on CPAP      Priority: Medium     2010 RDI 13, CPAP titration 9      Narcolepsy      Priority: Medium     In 2010 mean sleep latency 9.8 with 2 SOREMP        Past Medical History:   Diagnosis Date    Anemia in chronic kidney disease(285.21)     Dialysis patient     Dyslipidemia     End stage kidney disease (H)     GERD (gastroesophageal reflux disease)     Gout     High risk medication use     Hypertension     IgA nephropathy     Immunosuppressed status     Kidney replaced by transplant     Narcolepsy     RAUL on CPAP     Secondary hyperparathyroidism (of renal origin)     Splenic calcification     h/o calcified splenic granulomas     Past Surgical History:   Procedure Laterality Date    HERNIORRHAPHY VENTRAL N/A 4/14/2023    Procedure: HERNIORRHAPHY, VENTRAL, OPEN;  Surgeon: Chrystal Harding DO;  Location: [facility] OR    s/p LDKT       Current Outpatient Medications   Medication Sig Dispense Refill    allopurinol (ZYLOPRIM) 300 MG tablet Take 1 tablet (300 mg) by mouth daily. 90 tablet 3    amLODIPine (NORVASC) 5 MG tablet TAKE 1 TABLET(5 MG) BY MOUTH DAILY 90 tablet 2    amLODIPine (NORVASC) 5 MG tablet Take 1 tablet (5 mg) by mouth daily. 90 tablet 2    aspirin (ASA) 81 MG chewable tablet Take 1 tablet (81 mg) by mouth daily. 60 tablet 1    bumetanide (BUMEX) 1 MG tablet Take 3 tablets (3 mg) by mouth 2 times daily. 180 tablet 11    calcium acetate (PHOSLO) 667 MG CAPS capsule Take 667 mg by mouth.      CELLCEPT (BRAND) 250 MG capsule Take 4 capsules (1,000 mg) by mouth 2 times daily. 720 capsule 3    Cholecalciferol (VITAMIN D3) 50 MCG (2000 UT) CAPS Take 1 capsule by mouth daily.      cloNIDine (CATAPRES) 0.1 MG tablet  Take 1 tablet (0.1 mg) by mouth 2 times daily. 180 tablet 3    cycloSPORINE modified (GENERIC EQUIVALENT) 100 MG capsule TAKE 1 CAPSULE BY MOUTH TWICE  DAILY 180 capsule 3    cycloSPORINE modified (GENERIC EQUIVALENT) 25 MG capsule Take 1 capsule (25 mg) by mouth 2 times daily. 180 capsule 3    gentamicin (GARAMYCIN) 0.1 % external cream Apply topically daily.      meclizine (ANTIVERT) 25 MG tablet Take 1 tablet (25 mg) by mouth 3 times daily as needed for dizziness. 30 tablet 1    methylphenidate (RITALIN) 5 MG tablet Take 1 tablet (5 mg) by mouth 2 times daily as needed (narcolepsy). 60 tablet 0    Metoprolol Tartrate 75 MG TABS Take 1 tablet by mouth 2 times daily.      multivitamin RENAL (TRIPHROCAPS) 1 capsule capsule Take 1 capsule by mouth daily.      ondansetron (ZOFRAN ODT) 4 MG ODT tab Take 1 tablet (4 mg) by mouth every 8 hours as needed for nausea. 20 tablet 1    pravastatin (PRAVACHOL) 40 MG tablet Take 1 tablet (40 mg) by mouth daily. 90 tablet 3    predniSONE (DELTASONE) 10 MG tablet Take 40 mg by mouth as needed. Gout Flair-ups      terazosin (HYTRIN) 2 MG capsule 2 mg. Taking 2 capsules daily      metolazone (ZAROXOLYN) 5 MG tablet Take 1 tablet weekly (Patient not taking: Reported on 5/12/2025) 30 tablet 0    penicillin V (VEETID) 500 MG tablet       probenecid (BENEMID) 500 MG tablet Take 500 mg by mouth 2 times daily (Patient not taking: Reported on 5/12/2025)      tadalafil (CIALIS) 10 MG tablet Take 10 mg by mouth daily as needed (Patient not taking: Reported on 5/12/2025)         Allergies   Allergen Reactions    Morphine      Other reaction(s): Headache.  Nausea.    Other Reaction(s): Headache.  Nausea.        Social History     Tobacco Use    Smoking status: Never     Passive exposure: Never    Smokeless tobacco: Never   Substance Use Topics    Alcohol use: Yes     Alcohol/week: 3.3 standard drinks of alcohol     Types: 4 Standard drinks or equivalent per week     Comment: a few drinks a  "week     Family History   Problem Relation Age of Onset    C.A.D. Father     Hypertension Father     Diabetes Father     Colon Cancer Father 70    Myocardial Infarction Maternal Grandfather 38    C.A.D. Paternal Grandfather      History   Drug Use No             Review of Systems  CONSTITUTIONAL: NEGATIVE for fever, chills, change in weight  INTEGUMENTARY/SKIN: NEGATIVE for worrisome rashes, moles or lesions  EYES: NEGATIVE for vision changes or irritation  ENT/MOUTH: NEGATIVE for ear, mouth and throat problems  RESP: NEGATIVE for significant cough or SOB  BREAST: NEGATIVE for masses, tenderness or discharge  CV: NEGATIVE for chest pain, palpitations or peripheral edema  GI: NEGATIVE for nausea, abdominal pain, heartburn, or change in bowel habits  : NEGATIVE for frequency, dysuria, or hematuria  MUSCULOSKELETAL: NEGATIVE for significant arthralgias or myalgia  NEURO: NEGATIVE for weakness, dizziness or paresthesias  ENDOCRINE: NEGATIVE for temperature intolerance, skin/hair changes  HEME: NEGATIVE for bleeding problems  PSYCHIATRIC: NEGATIVE for changes in mood or affect    Objective    BP (!) 160/80 (BP Location: Right arm, Patient Position: Sitting, Cuff Size: Adult Large)   Pulse 78   Temp 97.9  F (36.6  C) (Tympanic)   Resp 16   Ht 1.803 m (5' 11\")   Wt 123.2 kg (271 lb 9.6 oz)   SpO2 99%   BMI 37.88 kg/m     Estimated body mass index is 37.88 kg/m  as calculated from the following:    Height as of this encounter: 1.803 m (5' 11\").    Weight as of this encounter: 123.2 kg (271 lb 9.6 oz).  Physical Exam  GENERAL: alert and no distress  EYES: Eyes grossly normal to inspection, PERRL and conjunctivae and sclerae normal  HENT: ear canals and TM's normal, nose and mouth without ulcers or lesions  NECK: no adenopathy, no asymmetry, masses, or scars  RESP: lungs clear to auscultation - no rales, rhonchi or wheezes  CV: regular rate and rhythm, normal S1 S2, no S3 or S4, no murmur, click or rub, POS for " pitting edema  ABDOMEN: soft, nontender, no hepatosplenomegaly, no masses and bowel sounds normal  MS: no gross musculoskeletal defects noted, no edema  SKIN: no suspicious lesions or rashes  NEURO: Normal strength and tone, mentation intact and speech normal  PSYCH: mentation appears normal, affect normal    Recent Labs   Lab Test 01/30/25  1626 01/24/25  1430 01/06/25  1613 12/15/24  0800 12/15/24  0753 08/27/24  1057 08/15/24  1420   HGB 9.1*  --  9.2*   < > 10.4*   < > 14.0     --  163  --  164   < > 156   INR  --   --   --   --  1.09  --  1.04   NA  --  140 141   < > 137   < > 142   POTASSIUM  --  4.6 5.3   < > 4.3   < > 4.2   CR  --  4.95* 5.70*   < > 5.51*   < > 4.40*   A1C  --   --   --   --   --   --  6.0*    < > = values in this interval not displayed.        Diagnostics  No labs were ordered during this visit.   EKG required for uncontrolled hypertension and not completed in the last 90 days.   EKG: appears normal, NSR, normal axis, normal intervals, no acute ST/T changes c/w ischemia, no LVH by voltage criteria, unchanged from previous tracings    Revised Cardiac Risk Index (RCRI)  The patient has the following serious cardiovascular risks for perioperative complications:   - Serum Creatinine >2.0 mg/dl = 1 point     RCRI Interpretation: 1 point: Class II (low risk - 0.9% complication rate)         Signed Electronically by: NHUNG Ceron CNP  A copy of this evaluation report is provided to the requesting physician.

## 2025-06-12 ENCOUNTER — TELEPHONE (OUTPATIENT)
Dept: TRANSPLANT | Facility: CLINIC | Age: 55
End: 2025-06-12
Payer: COMMERCIAL

## 2025-06-12 NOTE — TELEPHONE ENCOUNTER
Called Hakeem who reports he has been on Dialysis since February 17, 2025  States he dializes at   [facility]   T: [phone]    Dialysis Center wants to cut back on immunosuppression.  Per Hakeem Lei wants Transplant to decide.  Hakeem reports about 1 liter UO in 24 hours.  [facility] will discuss with Dr Hurtado.

## 2025-06-20 ENCOUNTER — RESULTS FOLLOW-UP (OUTPATIENT)
Dept: TRANSPLANT | Facility: CLINIC | Age: 55
End: 2025-06-20

## 2025-06-20 PROBLEM — N18.6 ESRD (END STAGE RENAL DISEASE) ON DIALYSIS (H): Status: ACTIVE | Noted: 2022-11-01

## 2025-06-20 PROBLEM — Z99.2 ESRD (END STAGE RENAL DISEASE) ON DIALYSIS (H): Status: ACTIVE | Noted: 2022-11-01

## 2025-06-20 PROCEDURE — 80048 BASIC METABOLIC PNL TOTAL CA: CPT | Performed by: INTERNAL MEDICINE

## 2025-06-23 ENCOUNTER — TELEPHONE (OUTPATIENT)
Dept: TRANSPLANT | Facility: CLINIC | Age: 55
End: 2025-06-23
Payer: COMMERCIAL

## 2025-06-23 NOTE — TELEPHONE ENCOUNTER
Received call from pt wondering if he can be seen by neurology sooner then 10/2025. Pt reports he spoke with neurology clinic and that was the soonest offered. Will send message to neurology clinic to see if anything can be done to see pt sooner. Discussed it is important for pt to complete this follow up as recommended. Was scheduled for visit in May but pt cancelled visit. Pt verbalized understanding of information and has no further questions. Encouraged to reach out if questions arise.

## 2025-06-24 ENCOUNTER — PRE VISIT (OUTPATIENT)
Dept: UROLOGY | Facility: CLINIC | Age: 55
End: 2025-06-24
Payer: COMMERCIAL

## 2025-06-24 NOTE — TELEPHONE ENCOUNTER
Reason for visit: cystoscopy     Relevant information: peds scope; pt is coming 1 hour early to sign consent/take valium    Records/imaging/labs/orders: all records available     Pt called: No need for a call    At Rooming: jovan Rivera  6/24/2025  2:01 PM

## 2025-07-07 ENCOUNTER — TELEPHONE (OUTPATIENT)
Dept: TRANSPLANT | Facility: CLINIC | Age: 55
End: 2025-07-07

## 2025-07-07 NOTE — TELEPHONE ENCOUNTER
Patient called and said Mother might be a possible donor match and Patient has questions for coordinator. Patient would like to be called as soon as possible-No other details provided.

## 2025-07-09 ENCOUNTER — OFFICE VISIT (OUTPATIENT)
Dept: NEUROLOGY | Facility: CLINIC | Age: 55
End: 2025-07-09
Payer: COMMERCIAL

## 2025-07-09 VITALS
SYSTOLIC BLOOD PRESSURE: 174 MMHG | BODY MASS INDEX: 35.87 KG/M2 | RESPIRATION RATE: 16 BRPM | OXYGEN SATURATION: 96 % | HEIGHT: 71 IN | DIASTOLIC BLOOD PRESSURE: 89 MMHG | WEIGHT: 256.2 LBS | HEART RATE: 69 BPM

## 2025-07-09 DIAGNOSIS — N31.9 NEUROGENIC BLADDER: Primary | ICD-10-CM

## 2025-07-09 DIAGNOSIS — G45.9 TIA (TRANSIENT ISCHEMIC ATTACK): Primary | ICD-10-CM

## 2025-07-09 DIAGNOSIS — R47.89 WORD FINDING DIFFICULTY: ICD-10-CM

## 2025-07-09 RX ORDER — DIAZEPAM 10 MG/1
10 TABLET ORAL ONCE
Qty: 1 TABLET | Refills: 0 | Status: SHIPPED | OUTPATIENT
Start: 2025-07-09 | End: 2025-07-09

## 2025-07-09 ASSESSMENT — PAIN SCALES - GENERAL: PAINLEVEL_OUTOF10: NO PAIN (0)

## 2025-07-09 NOTE — NURSING NOTE
"Chief Complaint   Patient presents with    New Patient     BP (!) 174/89 (BP Location: Left arm, Patient Position: Sitting, Cuff Size: Adult Large)   Pulse 69   Resp 16   Ht 1.803 m (5' 11\")   Wt 116.2 kg (256 lb 3.2 oz)   SpO2 96%   BMI 35.73 kg/m      NAPOLEON KRISHNA    "

## 2025-07-09 NOTE — PATIENT INSTRUCTIONS
Continue Aspirin 81 mg daily  Continue Pravastatin 40 mg daily  Keep optimizing Blood pressure with PCP  Follow up as needed.      Stroke & Endovascular RN Care Coordinators:    Kimberlyn Arndt, RN, BSN  Abi Varma, RN, CNRN, SCRN    If you have any questions please contact the RN Care Coordinators at [phone], option 1.     Thank you for choosing [facility] for your health care needs.

## 2025-07-09 NOTE — LETTER
7/9/2025       RE: Hakeem Romero  [address]  [place] [address]     Dear Colleague,    Thank you for referring your patient, Hakeem Romero, to the [facility] [place] at [facility]. Please see a copy of my visit note below.    _____    35 minutes spent by me on the date of the encounter doing chart review, review of outside records, review of test results, interpretation of tests, patient visit, and documentation   _____________________________________________________      [facility]   [phone]  __________________________________________________________         History of Present Illness   Chief Complaint: Patient presents with:  New Patient      54-year-old male with a history of kidney transplant in 2008. His medical history is notable for hyperlipidemia and long-standing hypertension ( better controlled now). He was admitted to ED back on December 2024  after an episodes of confusion, word finding difficulties and generalized weakness more on BL Les.  Neurologic exam in the ED was normal at that time. Stroke work up including CT, CTA,head and neck, MRI brain, TTE and zio patch were unremarkable. LDL was 100 01/2025. Of note this symptoms occurred few days after he initiated on 2 new medications (coreg and minoxidil).        We discussed the stroke work up results, the differential and management plan thoroughly with the patient. While the exact cause of the event remains unclear, we will continue to work on optimizing his risk profile and secondary prevention strategy. He was started on Aspirin 81 mg for recurrent stroke prevention. Since being discharged, he did not experience any new stroke symptoms.    Modified [place] Scale  Score: 0-No symptoms    Stroke Evaluation Summarized:  New results resulted and reviewed by me today are in BOLD below.  I personally reviewed the following neuroimaging studies today  and the comments above reflect my own personal interpretation of the images: images: CT head, CTA head/neck, MRI brain, MRA head, and MRA neck    MRI/Head CT No acute stroke    Intracranial Vasculature No LVO or flow limiting stenosis   Cervical Vasculature No LVO or flow limiting stenosis     Echocardiogram No source of clots. Normal EF ( 60-65%)   EKG/Telemetry No atrial fibrillation    Other Testing Not Applicable      Labs Lab Results   Component Value Date     (H) 01/06/2025    A1C 6.0 (H) 08/15/2024    CTROPT 49 (H) 12/15/2024    INR 1.09 12/15/2024    INR 1.04 08/15/2024               Home Medications     Current Outpatient Medications   Medication Sig Dispense Refill     allopurinol (ZYLOPRIM) 300 MG tablet Take 1 tablet (300 mg) by mouth daily. 90 tablet 3     amLODIPine (NORVASC) 5 MG tablet Take 1 tablet (5 mg) by mouth daily. 90 tablet 2     aspirin (ASA) 81 MG chewable tablet Take 1 tablet (81 mg) by mouth daily. 60 tablet 1     bumetanide (BUMEX) 1 MG tablet Take 3 tablets (3 mg) by mouth 2 times daily. 180 tablet 11     calcium acetate (PHOSLO) 667 MG CAPS capsule Take 667 mg by mouth.       CELLCEPT (BRAND) 250 MG capsule Take 2 capsules (500 mg) by mouth 2 times daily. 360 capsule 0     Cholecalciferol (VITAMIN D3) 50 MCG (2000 UT) CAPS Take 1 capsule by mouth daily.       cloNIDine (CATAPRES) 0.1 MG tablet Take 1 tablet (0.1 mg) by mouth 2 times daily. 180 tablet 3     cycloSPORINE modified (GENERIC EQUIVALENT) 100 MG capsule TAKE 1 CAPSULE BY MOUTH TWICE  DAILY 180 capsule 3     cycloSPORINE modified (GENERIC EQUIVALENT) 25 MG capsule Take 1 capsule (25 mg) by mouth 2 times daily. 180 capsule 3     meclizine (ANTIVERT) 25 MG tablet Take 1 tablet (25 mg) by mouth 3 times daily as needed for dizziness. 30 tablet 1     methylphenidate (RITALIN) 5 MG tablet Take 1 tablet (5 mg) by mouth 2 times daily as needed (narcolepsy). 60 tablet 0     Metoprolol Tartrate 75 MG TABS Take 1 tablet by mouth 2  "times daily.       multivitamin RENAL (TRIPHROCAPS) 1 capsule capsule Take 1 capsule by mouth daily.       ondansetron (ZOFRAN ODT) 4 MG ODT tab Take 1 tablet (4 mg) by mouth every 8 hours as needed for nausea. 20 tablet 1     pravastatin (PRAVACHOL) 40 MG tablet Take 1 tablet (40 mg) by mouth daily. 90 tablet 3     predniSONE (DELTASONE) 10 MG tablet Take 40 mg by mouth as needed. Gout Flair-ups       terazosin (HYTRIN) 2 MG capsule 2 mg. Taking 2 capsules daily       gentamicin (GARAMYCIN) 0.1 % external cream Apply topically daily.       No current facility-administered medications for this visit.            Physical Examination     Physical Exam    Estimated body mass index is 35.73 kg/m  as calculated from the following:    Height as of this encounter: 1.803 m (5' 11\").    Weight as of this encounter: 116.2 kg (256 lb 3.2 oz).    BP (!) 174/89 (BP Location: Left arm, Patient Position: Sitting, Cuff Size: Adult Large)   Pulse 69   Resp 16   Ht 1.803 m (5' 11\")   Wt 116.2 kg (256 lb 3.2 oz)   SpO2 96%   BMI 35.73 kg/m         General Exam  General: Sitting up in chair in no acute distress  Cardio:  RRR  Pulmonary:  no respiratory distress    Neurologic:  Mental Status:  alert, oriented x 3, follows commands, speech clear and fluent, naming and repetition normal  Cranial Nerves:  visual fields intact, PERRL, EOMI with normal smooth pursuit, facial sensation intact and symmetric, facial movements symmetric, hearing not formally tested but intact to conversation, palate elevation symmetric and uvula midline, no dysarthria, shoulder shrug strong bilaterally, tongue protrusion midline  Motor:  normal muscle tone and bulk, no abnormal movements, able to move all limbs spontaneously, strength 5/5 throughout upper and lower extremities, no pronator drift    Sensory:  light touch sensation intact and symmetric throughout upper and lower extremities, no extinction on double simultaneous stimulation   Coordination:  " normal finger-to-nose and heel-to-shin bilaterally without dysmetria, rapid alternating movements symmetric  Station/Gait:  normal width, turn, arm swing           Screenings and Questionnaires:     Tobacco:    Tobacco Use      Smoking status: Never        Passive exposure: Never      Smokeless tobacco: Never      Sleep Apnea:       Depression:      4/16/2025     1:50 PM 3/3/2025     2:42 PM   PHQ-2 ( 1999 [employer])   Q1: Little interest or pleasure in doing things 0 0   Q2: Feeling down, depressed or hopeless 0 0   PHQ-2 Score 0 0       Stroke Recovery and Risk Factors:      5/23/2025    10:16 AM 7/6/2025     2:02 PM   Stroke Questionnaire   Residual effects: Any residual effects from stroke?  No, I feel totally back to how I was before the stroke   Level of independence: Could you live alone? Yes Yes   Quality of Life: Rating (0-100%) 60 75   Quality of Life: What work now or before stroke Employed full time Employed part time   Quality of Life: Current work status On short term disability Currently working   Quality of Life: How do you get around Drive myself Drive myself   Quality of Life: Living situation before stroke With family or significant other With family or significant other   Quality of Life: Living situation now With family or significant other With family or significant other   Medication: How do you take your meds Myself, from individual bottles Myself, from individual bottles   Medication: Do you ever miss or forget meds Rarely Rarely   Risk Factor: Checking blood pressure at home Yes Yes   Risk Factor: Usual blood pressure numbers 160/90 150/80   Risk Factor: Checking blood sugar at home No No   Risk Factor: Second-hand smoke at home No No   Risk Factor: How much caffeine per day 1 soda    Who completed this questionnaire? The patient independently The patient independently             Assessment and Plan       1. Possible TIA: Episode of generalized weakness, confusion and slurred speech back on  December 2024. Stroke work up was  mainly unremarkable except for his LDL that was 100. We discussed the stroke work up results, the differential and management plan thoroughly with the patient. While the exact cause of the event remains unclear, we will continue to work on optimizing his risk profile and secondary prevention strategy. He was started on Aspirin 81 mg daily and Pravastatin 40 mg daily for recurrent stroke prevention. Since being discharged, he did not experience any new stroke symptoms. Will follo  up as needed.       Stroke Education provided.  He will call us with any questions.  For any acute neurologic deficits he was advised to  go directly to the hospital rather than call the clinic.      Kevin Rader MD  Neurology  07/09/2025         ____________________________________________________________________    Billing:  Please note: for coding purposes this visit should be billed only as established and not new, since this patient was seen by my same subspecialty team ([facility]) during the hospitalization that this visit is a follow up for.     35 minutes time spent by me today doing chart review, history and exam, documentation and further activities per the note              Again, thank you for allowing me to participate in the care of your patient.      Sincerely,    Kevin Rader MD

## 2025-07-09 NOTE — TELEPHONE ENCOUNTER
Returned call to pt. Reports mom and brother are getting tested. Reports mom did questionnaire and got the impression that she was ruled out due to age. MC message sent to pt with clinic number. Encouraged pts mom to call to connect with donor team. Pt wondering what is left for his transplant status. Has neurology appt

## 2025-07-09 NOTE — PROGRESS NOTES
_____    35 minutes spent by me on the date of the encounter doing chart review, review of outside records, review of test results, interpretation of tests, patient visit, and documentation   _____________________________________________________      [facility]   [phone]  __________________________________________________________         History of Present Illness   Chief Complaint: Patient presents with:  New Patient      54-year-old male with a history of kidney transplant in 2008. His medical history is notable for hyperlipidemia and long-standing hypertension ( better controlled now). He was admitted to ED back on December 2024  after an episodes of confusion, word finding difficulties and generalized weakness more on BL Les.  Neurologic exam in the ED was normal at that time. Stroke work up including CT, CTA,head and neck, MRI brain, TTE and zio patch were unremarkable. LDL was 100 01/2025. Of note this symptoms occurred few days after he initiated on 2 new medications (coreg and minoxidil).        We discussed the stroke work up results, the differential and management plan thoroughly with the patient. While the exact cause of the event remains unclear, we will continue to work on optimizing his risk profile and secondary prevention strategy. He was started on Aspirin 81 mg for recurrent stroke prevention. Since being discharged, he did not experience any new stroke symptoms.    Modified [place] Scale  Score: 0-No symptoms    Stroke Evaluation Summarized:  New results resulted and reviewed by me today are in BOLD below.  I personally reviewed the following neuroimaging studies today and the comments above reflect my own personal interpretation of the images: images: CT head, CTA head/neck, MRI brain, MRA head, and MRA neck    MRI/Head CT No acute stroke    Intracranial Vasculature No LVO or flow limiting stenosis   Cervical Vasculature No LVO or flow limiting stenosis      Echocardiogram No source of clots. Normal EF ( 60-65%)   EKG/Telemetry No atrial fibrillation    Other Testing Not Applicable      Labs Lab Results   Component Value Date     (H) 01/06/2025    A1C 6.0 (H) 08/15/2024    CTROPT 49 (H) 12/15/2024    INR 1.09 12/15/2024    INR 1.04 08/15/2024               Home Medications     Current Outpatient Medications   Medication Sig Dispense Refill    allopurinol (ZYLOPRIM) 300 MG tablet Take 1 tablet (300 mg) by mouth daily. 90 tablet 3    amLODIPine (NORVASC) 5 MG tablet Take 1 tablet (5 mg) by mouth daily. 90 tablet 2    aspirin (ASA) 81 MG chewable tablet Take 1 tablet (81 mg) by mouth daily. 60 tablet 1    bumetanide (BUMEX) 1 MG tablet Take 3 tablets (3 mg) by mouth 2 times daily. 180 tablet 11    calcium acetate (PHOSLO) 667 MG CAPS capsule Take 667 mg by mouth.      CELLCEPT (BRAND) 250 MG capsule Take 2 capsules (500 mg) by mouth 2 times daily. 360 capsule 0    Cholecalciferol (VITAMIN D3) 50 MCG (2000 UT) CAPS Take 1 capsule by mouth daily.      cloNIDine (CATAPRES) 0.1 MG tablet Take 1 tablet (0.1 mg) by mouth 2 times daily. 180 tablet 3    cycloSPORINE modified (GENERIC EQUIVALENT) 100 MG capsule TAKE 1 CAPSULE BY MOUTH TWICE  DAILY 180 capsule 3    cycloSPORINE modified (GENERIC EQUIVALENT) 25 MG capsule Take 1 capsule (25 mg) by mouth 2 times daily. 180 capsule 3    meclizine (ANTIVERT) 25 MG tablet Take 1 tablet (25 mg) by mouth 3 times daily as needed for dizziness. 30 tablet 1    methylphenidate (RITALIN) 5 MG tablet Take 1 tablet (5 mg) by mouth 2 times daily as needed (narcolepsy). 60 tablet 0    Metoprolol Tartrate 75 MG TABS Take 1 tablet by mouth 2 times daily.      multivitamin RENAL (TRIPHROCAPS) 1 capsule capsule Take 1 capsule by mouth daily.      ondansetron (ZOFRAN ODT) 4 MG ODT tab Take 1 tablet (4 mg) by mouth every 8 hours as needed for nausea. 20 tablet 1    pravastatin (PRAVACHOL) 40 MG tablet Take 1 tablet (40 mg) by mouth daily. 90  "tablet 3    predniSONE (DELTASONE) 10 MG tablet Take 40 mg by mouth as needed. Gout Flair-ups      terazosin (HYTRIN) 2 MG capsule 2 mg. Taking 2 capsules daily      gentamicin (GARAMYCIN) 0.1 % external cream Apply topically daily.       No current facility-administered medications for this visit.            Physical Examination     Physical Exam    Estimated body mass index is 35.73 kg/m  as calculated from the following:    Height as of this encounter: 1.803 m (5' 11\").    Weight as of this encounter: 116.2 kg (256 lb 3.2 oz).    BP (!) 174/89 (BP Location: Left arm, Patient Position: Sitting, Cuff Size: Adult Large)   Pulse 69   Resp 16   Ht 1.803 m (5' 11\")   Wt 116.2 kg (256 lb 3.2 oz)   SpO2 96%   BMI 35.73 kg/m         General Exam  General: Sitting up in chair in no acute distress  Cardio:  RRR  Pulmonary:  no respiratory distress    Neurologic:  Mental Status:  alert, oriented x 3, follows commands, speech clear and fluent, naming and repetition normal  Cranial Nerves:  visual fields intact, PERRL, EOMI with normal smooth pursuit, facial sensation intact and symmetric, facial movements symmetric, hearing not formally tested but intact to conversation, palate elevation symmetric and uvula midline, no dysarthria, shoulder shrug strong bilaterally, tongue protrusion midline  Motor:  normal muscle tone and bulk, no abnormal movements, able to move all limbs spontaneously, strength 5/5 throughout upper and lower extremities, no pronator drift    Sensory:  light touch sensation intact and symmetric throughout upper and lower extremities, no extinction on double simultaneous stimulation   Coordination:  normal finger-to-nose and heel-to-shin bilaterally without dysmetria, rapid alternating movements symmetric  Station/Gait:  normal width, turn, arm swing           Screenings and Questionnaires:     Tobacco:    Tobacco Use      Smoking status: Never        Passive exposure: Never      Smokeless tobacco: " Never      Sleep Apnea:       Depression:      4/16/2025     1:50 PM 3/3/2025     2:42 PM   PHQ-2 ( 1999 [employer])   Q1: Little interest or pleasure in doing things 0 0   Q2: Feeling down, depressed or hopeless 0 0   PHQ-2 Score 0 0       Stroke Recovery and Risk Factors:      5/23/2025    10:16 AM 7/6/2025     2:02 PM   Stroke Questionnaire   Residual effects: Any residual effects from stroke?  No, I feel totally back to how I was before the stroke   Level of independence: Could you live alone? Yes Yes   Quality of Life: Rating (0-100%) 60 75   Quality of Life: What work now or before stroke Employed full time Employed part time   Quality of Life: Current work status On short term disability Currently working   Quality of Life: How do you get around Drive myself Drive myself   Quality of Life: Living situation before stroke With family or significant other With family or significant other   Quality of Life: Living situation now With family or significant other With family or significant other   Medication: How do you take your meds Myself, from individual bottles Myself, from individual bottles   Medication: Do you ever miss or forget meds Rarely Rarely   Risk Factor: Checking blood pressure at home Yes Yes   Risk Factor: Usual blood pressure numbers 160/90 150/80   Risk Factor: Checking blood sugar at home No No   Risk Factor: Second-hand smoke at home No No   Risk Factor: How much caffeine per day 1 soda    Who completed this questionnaire? The patient independently The patient independently             Assessment and Plan       1. Possible TIA: Episode of generalized weakness, confusion and slurred speech back on December 2024. Stroke work up was  mainly unremarkable except for his LDL that was 100. We discussed the stroke work up results, the differential and management plan thoroughly with the patient. While the exact cause of the event remains unclear, we will continue to work on optimizing his risk profile and  secondary prevention strategy. He was started on Aspirin 81 mg daily and Pravastatin 40 mg daily for recurrent stroke prevention. Since being discharged, he did not experience any new stroke symptoms. Will follo  up as needed.       Stroke Education provided.  He will call us with any questions.  For any acute neurologic deficits he was advised to  go directly to the hospital rather than call the clinic.      Kevin Rader MD  Neurology  07/09/2025         ____________________________________________________________________    Billing:  Please note: for coding purposes this visit should be billed only as established and not new, since this patient was seen by my same subspecialty team ([facility]) during the hospitalization that this visit is a follow up for.     35 minutes time spent by me today doing chart review, history and exam, documentation and further activities per the note

## 2025-07-13 NOTE — PROGRESS NOTES
UROLOGY OUTPATIENT CLINIC NOTE    Name: Hakeem Romero    MRN: [id number]   YOB: 1970  Date of Encounter: 07/14/25              History of Present Illness:   Mr. Hakeem Romero is a 55 year old male seen for cystoscopy. 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    12/2024: CT showed circumferential bladder wall thickening. He was referred to Urology to r/o BPH which could compromise another renal transplant.    3/3/25: initial visit with Russell Abraham. UA shows microhematuria.  Urinates around 4-5 times per day   On a lot of diuretics   No baseline bothersome urinary symptoms   Gross hematuria only before diagnosis of IgA nephropath   No history of urinary retention   No history of kidney stones   No history of UTIs or prostatitis     4/16/25: cysto with Russell Abraham.   A FLEXIBLE CYSTOSCOPE was inserted through the urethra and into the bladder.  Urethroscopy revealed multiple wide bore strictures throughout the distal and mid penile urethra, but just distal to the urethral sphincter, was evidence of a much narrower stricture, likely 8-10 Sw[place] that unfortunately could not be passed with the scope with mild pressure.  Pictures were taken, and the cystoscope was removed.  Mr. Romero tolerated the procedure well.     7/14/25: here for cysto. He has been dialysis-dependent since February of this year. Previously he used a peritoneal dialysis catheter but this wasn't draining well so was removed. Now has a chest port. He still makes urine. Endorses weak stream but no other significant LUTS.    FLEXIBLE CYSTOSCOPY (July 14, 2025)  After informed consent was obtained, the patient was brought to the procedure room and placed in the supine position. The genitalia and perineum were prepped and draped in a sterile fashion. A 16F flexible cystoscope was introduced through a well lubricated urethra. Findings and  interventions were as noted below.    Anterior urethra: annular narrowings throughout the bulbar urethra - approximately 18-20F but accommodates the cystoscope. At the bulbomembranous junction is a 8-10F stricture which cannot be passed with the scope. This appears short and right at the level of the external sphincter.  Posterior urethra: not evaluated  Bladder: not evaluated         Past Medical History:     Past Medical History:   Diagnosis Date    Anemia in chronic kidney disease(285.21)     Dialysis patient     Dyslipidemia     End stage kidney disease (H)     GERD (gastroesophageal reflux disease)     Gout     High risk medication use     Hypertension     IgA nephropathy     Immunosuppressed status     Kidney replaced by transplant     Narcolepsy     RAUL on CPAP     Secondary hyperparathyroidism (of renal origin)     Splenic calcification     h/o calcified splenic granulomas          Past Surgical History:     Past Surgical History:   Procedure Laterality Date    BIOPSY  2008    Kidney    HERNIORRHAPHY VENTRAL N/A 04/14/2023    Procedure: HERNIORRHAPHY, VENTRAL, OPEN;  Surgeon: Chrystal Harding DO;  Location: [facility]    IR CVC TUNNEL W2 CATH W/O PORT  05/23/2025    s/p LDKT      TRANSPLANT  8/2008    Kidney          Social History:     Social History     Tobacco Use    Smoking status: Never     Passive exposure: Never    Smokeless tobacco: Never   Substance Use Topics    Alcohol use: Yes     Alcohol/week: 3.3 standard drinks of alcohol     Types: 4 Standard drinks or equivalent per week     Comment: a few drinks a week          Family History:     Family History   Problem Relation Age of Onset    C.A.D. Father     Hypertension Father     Diabetes Father     Colon Cancer Father     Myocardial Infarction Maternal Grandfather 38    C.A.D. Paternal Grandfather           Allergies:     Allergies   Allergen Reactions    Morphine      Other reaction(s): Headache.  Nausea.          Medications:     Current  Outpatient Medications   Medication Sig Dispense Refill    allopurinol (ZYLOPRIM) 300 MG tablet Take 1 tablet (300 mg) by mouth daily. 90 tablet 3    amLODIPine (NORVASC) 5 MG tablet Take 1 tablet (5 mg) by mouth daily. 90 tablet 2    aspirin (ASA) 81 MG chewable tablet Take 1 tablet (81 mg) by mouth daily. 60 tablet 1    bumetanide (BUMEX) 1 MG tablet Take 3 tablets (3 mg) by mouth 2 times daily. 180 tablet 11    calcium acetate (PHOSLO) 667 MG CAPS capsule Take 667 mg by mouth.      CELLCEPT (BRAND) 250 MG capsule Take 2 capsules (500 mg) by mouth 2 times daily. 360 capsule 0    Cholecalciferol (VITAMIN D3) 50 MCG (2000 UT) CAPS Take 1 capsule by mouth daily.      cloNIDine (CATAPRES) 0.1 MG tablet Take 1 tablet (0.1 mg) by mouth 2 times daily. 180 tablet 3    cycloSPORINE modified (GENERIC EQUIVALENT) 100 MG capsule TAKE 1 CAPSULE BY MOUTH TWICE  DAILY 180 capsule 3    cycloSPORINE modified (GENERIC EQUIVALENT) 25 MG capsule Take 1 capsule (25 mg) by mouth 2 times daily. 180 capsule 3    gentamicin (GARAMYCIN) 0.1 % external cream Apply topically daily.      meclizine (ANTIVERT) 25 MG tablet Take 1 tablet (25 mg) by mouth 3 times daily as needed for dizziness. 30 tablet 1    methylphenidate (RITALIN) 5 MG tablet Take 1 tablet (5 mg) by mouth 2 times daily as needed (narcolepsy). 60 tablet 0    Metoprolol Tartrate 75 MG TABS Take 1 tablet by mouth 2 times daily.      multivitamin RENAL (TRIPHROCAPS) 1 capsule capsule Take 1 capsule by mouth daily.      ondansetron (ZOFRAN ODT) 4 MG ODT tab Take 1 tablet (4 mg) by mouth every 8 hours as needed for nausea. 20 tablet 1    pravastatin (PRAVACHOL) 40 MG tablet Take 1 tablet (40 mg) by mouth daily. 90 tablet 3    predniSONE (DELTASONE) 10 MG tablet Take 40 mg by mouth as needed. Gout Flair-ups      terazosin (HYTRIN) 2 MG capsule 2 mg. Taking 2 capsules daily       No current facility-administered medications for this visit.          Review of Systems:    ROS: 14-point  "review of systems was negative aside from that noted in the HPI. Additional pertinent positives are listed below.          Physical Exam:   BP (!) 182/100 (BP Location: Right arm, Patient Position: Sitting, Cuff Size: Adult Large)   Pulse 77   SpO2 98%   Estimated body mass index is 35.73 kg/m  as calculated from the following:    Height as of 7/9/25: 1.803 m (5' 11\").    Weight as of 7/9/25: 116.2 kg (256 lb 3.2 oz).  General: Pleasant male in no apparent distress.  Resp: No respiratory distress  CV: RRR  Abdomen: Soft, nontender, nondistended  : normal genitalia       Labs:    All laboratory data reviewed with patient    Creatinine   Date Value Ref Range Status   06/20/2025 4.94 (H) 0.67 - 1.17 mg/dL Final   01/24/2025 4.95 (H) 0.67 - 1.17 mg/dL Final   01/06/2025 5.70 (H) 0.67 - 1.17 mg/dL Final   12/27/2024 5.39 (H) 0.67 - 1.17 mg/dL Final   12/15/2024 5.51 (H) 0.67 - 1.17 mg/dL Final   11/07/2019 1.67 (H) 0.66 - 1.25 mg/dL Final   11/24/2008 2.21 (H) 0.66 - 1.25 mg/dL Final     Comment:     New IDMS-traceable calibration  beginning 5/1/08   10/17/2008 2.62 (H) 0.66 - 1.25 mg/dL Final     Comment:     New IDMS-traceable calibration  beginning 5/1/08   10/13/2008 2.35 (H) 0.66 - 1.25 mg/dL Final     Comment:     New IDMS-traceable calibration  beginning 5/1/08   10/12/2008 2.41 (H) 0.66 - 1.25 mg/dL Final     Comment:     New IDMS-traceable calibration  beginning 5/1/08       Imaging:    All imaging reviewed with patient.    MRU (3/14/25):  RLQ transplant kidney with stable moderate hydronephrosis, abruptly transitioning to normal caliber at the ureteropelvic junction. No evidence of an obstructing stone or mass. Tiny benign simple cyst in the transplant kidney, requiring no follow-up. Severely atrophic native kidneys containing a few small simple cysts, requiring no follow-up.     Transplant renal US (3/27/25):  1.  The right lower quadrant renal transplant demonstrates mild hydronephrosis.  2.  Borderline " intrarenal transplant resistive indices.  3.  Elevated renal vein velocity at the hilum, suspicious for stenosis.      Outside records:    I spent 10 minutes reviewing outside records.         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 He has incidentally noted microhematuria as well as a cystoscopy notable for a diffusely narrow bulbar urethra and tighter stricture at the bulbomembranous junction.    Discussed interventions for stricture. This will be necessary before he can be cleared for renal transplant. Best case for durability and success will be urethroplasty, with or without a buccal graft. We will likely do a mucosectomy for the most narrow portion at the proximal bulbar urethra and may need a buccal graft since the bulbar urethra is diffusely narrow.  We will complete cystoscopy at the time of urethroplasty to complete microhematuria workup.  Surgery at [facility] given dialysis needs.    Abimael Zhang MD  Genitourinary Reconstructive Surgery Fellow    Additional Coding Information:    Problems:  4 -- one undiagnosed new problem with uncertain prognosis    Data Reviewed  Review of external notes as documented above     Level of risk:  4 -- minor surgery with patient or procedure risks    Time spent:  45 minutes spent by me on the date of the encounter doing chart review, history and exam, documentation and further activities per the note. This was outside of the planned cystoscopy procedure.

## 2025-07-14 ENCOUNTER — OFFICE VISIT (OUTPATIENT)
Dept: UROLOGY | Facility: CLINIC | Age: 55
End: 2025-07-14
Payer: COMMERCIAL

## 2025-07-14 VITALS — HEART RATE: 77 BPM | DIASTOLIC BLOOD PRESSURE: 100 MMHG | OXYGEN SATURATION: 98 % | SYSTOLIC BLOOD PRESSURE: 182 MMHG

## 2025-07-14 DIAGNOSIS — N99.112 POSTPROCEDURAL MEMBRANOUS URETHRAL STRICTURE: Primary | ICD-10-CM

## 2025-07-14 PROCEDURE — 3080F DIAST BP >= 90 MM HG: CPT | Performed by: STUDENT IN AN ORGANIZED HEALTH CARE EDUCATION/TRAINING PROGRAM

## 2025-07-14 PROCEDURE — 1126F AMNT PAIN NOTED NONE PRSNT: CPT | Performed by: STUDENT IN AN ORGANIZED HEALTH CARE EDUCATION/TRAINING PROGRAM

## 2025-07-14 PROCEDURE — 52000 CYSTOURETHROSCOPY: CPT | Performed by: STUDENT IN AN ORGANIZED HEALTH CARE EDUCATION/TRAINING PROGRAM

## 2025-07-14 PROCEDURE — 3077F SYST BP >= 140 MM HG: CPT | Performed by: STUDENT IN AN ORGANIZED HEALTH CARE EDUCATION/TRAINING PROGRAM

## 2025-07-14 PROCEDURE — 99204 OFFICE O/P NEW MOD 45 MIN: CPT | Mod: 25 | Performed by: STUDENT IN AN ORGANIZED HEALTH CARE EDUCATION/TRAINING PROGRAM

## 2025-07-14 RX ORDER — ACETAMINOPHEN 325 MG/1
975 TABLET ORAL ONCE
OUTPATIENT
Start: 2025-07-14 | End: 2025-07-14

## 2025-07-14 RX ORDER — CEFAZOLIN SODIUM 2 G/50ML
2 SOLUTION INTRAVENOUS SEE ADMIN INSTRUCTIONS
OUTPATIENT
Start: 2025-07-14

## 2025-07-14 RX ORDER — DIAZEPAM 5 MG/1
10 TABLET ORAL ONCE
Status: COMPLETED | OUTPATIENT
Start: 2025-07-14 | End: 2025-07-14

## 2025-07-14 RX ORDER — CEFAZOLIN SODIUM 2 G/50ML
2 SOLUTION INTRAVENOUS
OUTPATIENT
Start: 2025-07-14

## 2025-07-14 RX ORDER — LIDOCAINE HYDROCHLORIDE 20 MG/ML
JELLY TOPICAL ONCE
Status: COMPLETED | OUTPATIENT
Start: 2025-07-14 | End: 2025-07-14

## 2025-07-14 RX ORDER — ACETAMINOPHEN 650 MG/1
650 SUPPOSITORY RECTAL ONCE
OUTPATIENT
Start: 2025-07-14

## 2025-07-14 RX ADMIN — LIDOCAINE HYDROCHLORIDE 10 ML: 20 JELLY TOPICAL at 16:45

## 2025-07-14 RX ADMIN — DIAZEPAM 10 MG: 5 TABLET ORAL at 16:45

## 2025-07-14 ASSESSMENT — PAIN SCALES - GENERAL: PAINLEVEL_OUTOF10: NO PAIN (0)

## 2025-07-14 NOTE — PATIENT INSTRUCTIONS
"AFTER YOUR CYSTOSCOPY        You have just completed a cystoscopy, or \"cysto\", which allowed your physician to learn more about your bladder (or to remove a stent placed after surgery). We suggest that you continue to avoid caffeine, fruit juice, and alcohol for the next 24 hours, however, you are encouraged to return to your normal activities.         A few things that are considered normal after your cystoscopy:     * Small amount of bleeding (or spotting) that clears within the next 24 hours     * Slight burning sensation with urination     * Sensation to of needing to void more frequently     * The feeling of \"air\" in your urine     * Mild discomfort that is relieved with Tylenol        Please contact our office promptly if you:     * Develop a fever above 101 degrees     * Are unable to urinate     * Develop bright red blood that does not stop     * Severe pain or swelling         Please contact our office with any concerns or questions @[phone]    It was a pleasure meeting with you today.  Thank you for allowing me and my team the privilege of caring for you today.  YOU are the reason we are here, and I truly hope we provided you with the excellent service you deserve.  Please let us know if there is anything else we can do for you so that we can be sure you are leaving completely satisfied with your care experience.        "

## 2025-07-14 NOTE — LETTER
7/14/2025       RE: Hakeem Romero  [address]  [facility] [address]     Dear Colleague,    Thank you for referring your patient, Hakeem Romero, to the [facility] [place] at [facility]. Please see a copy of my visit note below.    UROLOGY OUTPATIENT CLINIC NOTE    Name: Hakeem Romero    MRN: [id number]   YOB: 1970  Date of Encounter: 07/14/25              History of Present Illness:   Mr. Hakeem Romero is a 55 year old male seen for cystoscopy. 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    12/2024: CT showed circumferential bladder wall thickening. He was referred to Urology to r/o BPH which could compromise another renal transplant.    3/3/25: initial visit with Russell Abraham. UA shows microhematuria.  Urinates around 4-5 times per day   On a lot of diuretics   No baseline bothersome urinary symptoms   Gross hematuria only before diagnosis of IgA nephropath   No history of urinary retention   No history of kidney stones   No history of UTIs or prostatitis     4/16/25: cysto with Russell Abraham.   A FLEXIBLE CYSTOSCOPE was inserted through the urethra and into the bladder.  Urethroscopy revealed multiple wide bore strictures throughout the distal and mid penile urethra, but just distal to the urethral sphincter, was evidence of a much narrower stricture, likely 8-10 [place] that unfortunately could not be passed with the scope with mild pressure.  Pictures were taken, and the cystoscope was removed.  Mr. Romero tolerated the procedure well.     7/14/25: here for cysto. He has been dialysis-dependent since February of this year. Previously he used a peritoneal dialysis catheter but this wasn't draining well so was removed. Now has a chest port. He still makes urine. Endorses weak stream but no other significant LUTS.    FLEXIBLE CYSTOSCOPY  (July 14, 2025)  After informed consent was obtained, the patient was brought to the procedure room and placed in the supine position. The genitalia and perineum were prepped and draped in a sterile fashion. A 16F flexible cystoscope was introduced through a well lubricated urethra. Findings and interventions were as noted below.    Anterior urethra: annular narrowings throughout the bulbar urethra - approximately 18-20F but accommodates the cystoscope. At the bulbomembranous junction is a 8-10F stricture which cannot be passed with the scope. This appears short and right at the level of the external sphincter.  Posterior urethra: not evaluated  Bladder: not evaluated         Past Medical History:     Past Medical History:   Diagnosis Date     Anemia in chronic kidney disease(285.21)      Dialysis patient      Dyslipidemia      End stage kidney disease (H)      GERD (gastroesophageal reflux disease)      Gout      High risk medication use      Hypertension      IgA nephropathy      Immunosuppressed status      Kidney replaced by transplant      Narcolepsy      RAUL on CPAP      Secondary hyperparathyroidism (of renal origin)      Splenic calcification     h/o calcified splenic granulomas          Past Surgical History:     Past Surgical History:   Procedure Laterality Date     BIOPSY  2008    Kidney     HERNIORRHAPHY VENTRAL N/A 04/14/2023    Procedure: HERNIORRHAPHY, VENTRAL, OPEN;  Surgeon: Chrystal Harding DO;  Location: [facility] OR     IR CVC TUNNEL W2 CATH W/O PORT  05/23/2025     s/p LDKT       TRANSPLANT  8/2008    Kidney          Social History:     Social History     Tobacco Use     Smoking status: Never     Passive exposure: Never     Smokeless tobacco: Never   Substance Use Topics     Alcohol use: Yes     Alcohol/week: 3.3 standard drinks of alcohol     Types: 4 Standard drinks or equivalent per week     Comment: a few drinks a week          Family History:     Family History   Problem Relation Age of  Onset     C.A.D. Father      Hypertension Father      Diabetes Father      Colon Cancer Father      Myocardial Infarction Maternal Grandfather 38     C.A.D. Paternal Grandfather           Allergies:     Allergies   Allergen Reactions     Morphine      Other reaction(s): Headache.  Nausea.          Medications:     Current Outpatient Medications   Medication Sig Dispense Refill     allopurinol (ZYLOPRIM) 300 MG tablet Take 1 tablet (300 mg) by mouth daily. 90 tablet 3     amLODIPine (NORVASC) 5 MG tablet Take 1 tablet (5 mg) by mouth daily. 90 tablet 2     aspirin (ASA) 81 MG chewable tablet Take 1 tablet (81 mg) by mouth daily. 60 tablet 1     bumetanide (BUMEX) 1 MG tablet Take 3 tablets (3 mg) by mouth 2 times daily. 180 tablet 11     calcium acetate (PHOSLO) 667 MG CAPS capsule Take 667 mg by mouth.       CELLCEPT (BRAND) 250 MG capsule Take 2 capsules (500 mg) by mouth 2 times daily. 360 capsule 0     Cholecalciferol (VITAMIN D3) 50 MCG (2000 UT) CAPS Take 1 capsule by mouth daily.       cloNIDine (CATAPRES) 0.1 MG tablet Take 1 tablet (0.1 mg) by mouth 2 times daily. 180 tablet 3     cycloSPORINE modified (GENERIC EQUIVALENT) 100 MG capsule TAKE 1 CAPSULE BY MOUTH TWICE  DAILY 180 capsule 3     cycloSPORINE modified (GENERIC EQUIVALENT) 25 MG capsule Take 1 capsule (25 mg) by mouth 2 times daily. 180 capsule 3     gentamicin (GARAMYCIN) 0.1 % external cream Apply topically daily.       meclizine (ANTIVERT) 25 MG tablet Take 1 tablet (25 mg) by mouth 3 times daily as needed for dizziness. 30 tablet 1     methylphenidate (RITALIN) 5 MG tablet Take 1 tablet (5 mg) by mouth 2 times daily as needed (narcolepsy). 60 tablet 0     Metoprolol Tartrate 75 MG TABS Take 1 tablet by mouth 2 times daily.       multivitamin RENAL (TRIPHROCAPS) 1 capsule capsule Take 1 capsule by mouth daily.       ondansetron (ZOFRAN ODT) 4 MG ODT tab Take 1 tablet (4 mg) by mouth every 8 hours as needed for nausea. 20 tablet 1      "pravastatin (PRAVACHOL) 40 MG tablet Take 1 tablet (40 mg) by mouth daily. 90 tablet 3     predniSONE (DELTASONE) 10 MG tablet Take 40 mg by mouth as needed. Gout Flair-ups       terazosin (HYTRIN) 2 MG capsule 2 mg. Taking 2 capsules daily       No current facility-administered medications for this visit.          Review of Systems:    ROS: 14-point review of systems was negative aside from that noted in the HPI. Additional pertinent positives are listed below.          Physical Exam:   BP (!) 182/100 (BP Location: Right arm, Patient Position: Sitting, Cuff Size: Adult Large)   Pulse 77   SpO2 98%   Estimated body mass index is 35.73 kg/m  as calculated from the following:    Height as of 7/9/25: 1.803 m (5' 11\").    Weight as of 7/9/25: 116.2 kg (256 lb 3.2 oz).  General: Pleasant male in no apparent distress.  Resp: No respiratory distress  CV: RRR  Abdomen: Soft, nontender, nondistended  : normal genitalia       Labs:    All laboratory data reviewed with patient    Creatinine   Date Value Ref Range Status   06/20/2025 4.94 (H) 0.67 - 1.17 mg/dL Final   01/24/2025 4.95 (H) 0.67 - 1.17 mg/dL Final   01/06/2025 5.70 (H) 0.67 - 1.17 mg/dL Final   12/27/2024 5.39 (H) 0.67 - 1.17 mg/dL Final   12/15/2024 5.51 (H) 0.67 - 1.17 mg/dL Final   11/07/2019 1.67 (H) 0.66 - 1.25 mg/dL Final   11/24/2008 2.21 (H) 0.66 - 1.25 mg/dL Final     Comment:     New IDMS-traceable calibration  beginning 5/1/08   10/17/2008 2.62 (H) 0.66 - 1.25 mg/dL Final     Comment:     New IDMS-traceable calibration  beginning 5/1/08   10/13/2008 2.35 (H) 0.66 - 1.25 mg/dL Final     Comment:     New IDMS-traceable calibration  beginning 5/1/08   10/12/2008 2.41 (H) 0.66 - 1.25 mg/dL Final     Comment:     New IDMS-traceable calibration  beginning 5/1/08       Imaging:    All imaging reviewed with patient.    MRU (3/14/25):  RLQ transplant kidney with stable moderate hydronephrosis, abruptly transitioning to normal caliber at the ureteropelvic " junction. No evidence of an obstructing stone or mass. Tiny benign simple cyst in the transplant kidney, requiring no follow-up. Severely atrophic native kidneys containing a few small simple cysts, requiring no follow-up.     Transplant renal US (3/27/25):  1.  The right lower quadrant renal transplant demonstrates mild hydronephrosis.  2.  Borderline intrarenal transplant resistive indices.  3.  Elevated renal vein velocity at the hilum, suspicious for stenosis.      Outside records:    I spent 10 minutes reviewing outside records.         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 He has incidentally noted microhematuria as well as a cystoscopy notable for a diffusely narrow bulbar urethra and tighter stricture at the bulbomembranous junction.    Discussed interventions for stricture. This will be necessary before he can be cleared for renal transplant. Best case for durability and success will be urethroplasty, with or without a buccal graft. We will likely do a mucosectomy for the most narrow portion at the proximal bulbar urethra and may need a buccal graft since the bulbar urethra is diffusely narrow.  We will complete cystoscopy at the time of urethroplasty to complete microhematuria workup.  Surgery at [facility] given dialysis needs.    Abimael Zhang MD  Genitourinary Reconstructive Surgery Fellow    Additional Coding Information:    Problems:  4 -- one undiagnosed new problem with uncertain prognosis    Data Reviewed  Review of external notes as documented above     Level of risk:  4 -- minor surgery with patient or procedure risks    Time spent:  45 minutes spent by me on the date of the encounter doing chart review, history and exam, documentation and further activities per the note. This was outside of the planned cystoscopy procedure.    Again, thank you for allowing me to  participate in the care of your patient.      Sincerely,    Abimael Zhang MD

## 2025-07-14 NOTE — NURSING NOTE
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 There is no height or weight on file to calculate BMI.    Patient Active Problem List   Diagnosis    Kidney replaced by transplant    Aftercare following organ transplant    Immunosuppressed status    HTN, kidney transplant related    Dyslipidemia    Chronic gout due to renal impairment of multiple sites without tophus    RAUL on CPAP    Narcolepsy    ESRD (end stage renal disease) on dialysis (H)    Obesity (BMI 30-39.9)    IgA nephropathy    Gastroesophageal reflux disease    Edema of lower extremity    Calculus of kidney    Ventral hernia without obstruction or gangrene    Class 2 severe obesity due to excess calories with serious comorbidity in adult (H)    Healthcare maintenance    Borderline diabetes mellitus    Encounter for pre-transplant evaluation for kidney transplant       Allergies   Allergen Reactions    Morphine      Other reaction(s): Headache.  Nausea.       Current Outpatient Medications   Medication Sig Dispense Refill    allopurinol (ZYLOPRIM) 300 MG tablet Take 1 tablet (300 mg) by mouth daily. 90 tablet 3    amLODIPine (NORVASC) 5 MG tablet Take 1 tablet (5 mg) by mouth daily. 90 tablet 2    aspirin (ASA) 81 MG chewable tablet Take 1 tablet (81 mg) by mouth daily. 60 tablet 1    bumetanide (BUMEX) 1 MG tablet Take 3 tablets (3 mg) by mouth 2 times daily. 180 tablet 11    calcium acetate (PHOSLO) 667 MG CAPS capsule Take 667 mg by mouth.      CELLCEPT (BRAND) 250 MG capsule Take 2 capsules (500 mg) by mouth 2 times daily. 360 capsule 0    Cholecalciferol (VITAMIN D3) 50 MCG (2000 UT) CAPS Take 1 capsule by mouth daily.      cloNIDine (CATAPRES) 0.1 MG tablet Take 1 tablet (0.1 mg) by mouth 2 times daily. 180 tablet 3    cycloSPORINE modified (GENERIC EQUIVALENT) 100  MG capsule TAKE 1 CAPSULE BY MOUTH TWICE  DAILY 180 capsule 3    cycloSPORINE modified (GENERIC EQUIVALENT) 25 MG capsule Take 1 capsule (25 mg) by mouth 2 times daily. 180 capsule 3    meclizine (ANTIVERT) 25 MG tablet Take 1 tablet (25 mg) by mouth 3 times daily as needed for dizziness. 30 tablet 1    methylphenidate (RITALIN) 5 MG tablet Take 1 tablet (5 mg) by mouth 2 times daily as needed (narcolepsy). 60 tablet 0    Metoprolol Tartrate 75 MG TABS Take 1 tablet by mouth 2 times daily.      multivitamin RENAL (TRIPHROCAPS) 1 capsule capsule Take 1 capsule by mouth daily.      ondansetron (ZOFRAN ODT) 4 MG ODT tab Take 1 tablet (4 mg) by mouth every 8 hours as needed for nausea. 20 tablet 1    pravastatin (PRAVACHOL) 40 MG tablet Take 1 tablet (40 mg) by mouth daily. 90 tablet 3    predniSONE (DELTASONE) 10 MG tablet Take 40 mg by mouth as needed. Gout Flair-ups      terazosin (HYTRIN) 2 MG capsule 2 mg. Taking 2 capsules daily      gentamicin (GARAMYCIN) 0.1 % external cream Apply topically daily.         Social History     Tobacco Use    Smoking status: Never     Passive exposure: Never    Smokeless tobacco: Never   Vaping Use    Vaping status: Never Used   Substance Use Topics    Alcohol use: Yes     Alcohol/week: 3.3 standard drinks of alcohol     Types: 4 Standard drinks or equivalent per week     Comment: a few drinks a week    Drug use: No       Invasive Procedure Safety Checklist:    Procedure: Cystoscopy    Action: Complete sections and checkboxes as appropriate.    Pre-procedure:  1. Patient ID Verified with 2 identifiers (Gabrielle and  or MRN) : YES    2. Procedure and site verified with patient/designee (when able) : YES    3. Accurate consent documentation in medical record : YES    4. H&P (or appropriate assessment) documented in medical record : N/A  H&P must be up to 30 days prior to procedure an updated within 24 hours of                 Procedure as applicable.     5. Relevant diagnostic and  radiology test results appropriately labeled and displayed as applicable : YES    6. Blood products, implants, devices, and/or special equipment available for the procedure as applicable : YES    7. Procedure site(s) marked with provider initials [Exclusions: none] : NO    8. Marking not required. Reason : Yes  Procedure does not require site marking    Time Out:     Time-Out performed immediately prior to starting procedure, including verbal and active participation of all team members addressing: YES    1. Correct patient identity.  2. Confirmed that the correct side and site are marked.  3. An accurate procedure to be done.  4. Agreement on the procedure to be done.  5. Correct patient position.  6. Relevant images and results are properly labeled and appropriately displayed.  7. The need to administer antibiotics or fluids for irrigation purposes during the procedure as applicable.  8. Safety precautions based on patient history or medication use.    During Procedure: Verification of correct person, site, and procedure occurs any time the responsibility for care of the patient is transferred to another member of the care team.    The following medication was given:     MEDICATION:  Lidocaine without epinephrine 2% jelly  ROUTE: urethral   SITE: urethral   DOSE: 10 mL  LOT #: [id number]  : [employer]  EXPIRATION DATE: 12/26  NDC#: [address]-3   Was there drug waste? No    Prior to med admin, verified patient identity using patient's name and date of birth.  Due to med administration, patient instructed to remain in clinic for 15 minutes  afterwards, and to report any adverse reaction to me immediately.    Drug Amount Wasted:  None.  Vial/Syringe: Syringe    The following medication was given:     MEDICATION:  Diazepam  ROUTE: PO  SITE: mouth  DOSE: 10 mg  LOT #: [id number]  : [employer]  EXPIRATION DATE: 10/2026  NDC#: [id number]   Was there drug waste?  No    Rosa M Begum LPN  7/14/2025  4:40 PM

## 2025-07-15 ENCOUNTER — TELEPHONE (OUTPATIENT)
Dept: UROLOGY | Facility: CLINIC | Age: 55
End: 2025-07-15
Payer: COMMERCIAL

## 2025-07-15 NOTE — TELEPHONE ENCOUNTER
Left message for patient regarding scheduling surgery with Dr. Rosenbaum     Direct call back number given  Ph: [phone]      Nidhi Robbins on 7/15/2025 at 2:50 PM

## 2025-07-29 ENCOUNTER — TELEPHONE (OUTPATIENT)
Dept: UROLOGY | Facility: CLINIC | Age: 55
End: 2025-07-29
Payer: COMMERCIAL

## 2025-07-29 DIAGNOSIS — R39.89 SUSPECTED UTI: ICD-10-CM

## 2025-07-29 DIAGNOSIS — N99.112 POSTPROCEDURAL MEMBRANOUS URETHRAL STRICTURE: Primary | ICD-10-CM

## 2025-07-29 NOTE — TELEPHONE ENCOUNTER
"Pre Op Teaching Flowsheet     Relevant Diagnosis:  Urethral stricture  Surgical procedure:  urethroplasty with buccal graft    Date of Surgery: 9/11/25  Provider: Dr. Rosenbaum  Location of surgery:  [facility] OR: [facility], [address] [place] [id number]    Post op appt: 9/29/25 with ALISA Duran    H&P: PAC on 8/29/25  UA/UC: Lab appt on 8/29/25  Bowel Prep: NA    Medications: Discussed   Blood thinners: baby asa   Diabetic medications: na   Does pt take: na                          Phentermine                          Ozempic                          Wegovy (Semaglutide)    If yes, \"Hold for 7 days before procedure.  Please consult your prescribing provider if you have questions about holding this medication.\"  Soap: Yes understands  Reviewed when to start clear liquids and when to start NPO: Yes  : Yes  24 hour observation: Yes    Patient demonstrates understanding of the following regarding infection Prevention:  Surgical procedure site care taught: Yes  Signs and symptoms of infection taught: Yes    Post-op follow-up:  Discussed how to contact the hospital, nurse, and clinic scheduling staff if necessary. Yes   Information Packet sent: Monik    Motivation Level: High  Asks Questions: Yes  Eager to Learn:  Yes  Cooperative:  Yes  Receptive (willing/able to accept information):   Yes    Pt or family member expressed understanding: Yes    Winnie Sanford RN  7/29/2025  3:56 PM       "

## 2025-08-10 ENCOUNTER — HEALTH MAINTENANCE LETTER (OUTPATIENT)
Age: 55
End: 2025-08-10

## 2025-08-18 DIAGNOSIS — N18.6 ESRD (END STAGE RENAL DISEASE) (H): Primary | ICD-10-CM

## 2025-08-18 DIAGNOSIS — Z12.5 PROSTATE CANCER SCREENING: ICD-10-CM

## 2025-08-18 DIAGNOSIS — Z76.82 ORGAN TRANSPLANT CANDIDATE: ICD-10-CM

## 2025-08-20 ENCOUNTER — DOCUMENTATION ONLY (OUTPATIENT)
Dept: UROLOGY | Facility: CLINIC | Age: 55
End: 2025-08-20
Payer: COMMERCIAL

## 2025-08-26 ENCOUNTER — TELEPHONE (OUTPATIENT)
Dept: CARDIOLOGY | Facility: CLINIC | Age: 55
End: 2025-08-26
Payer: COMMERCIAL

## 2025-08-26 ENCOUNTER — PRE VISIT (OUTPATIENT)
Dept: UROLOGY | Facility: CLINIC | Age: 55
End: 2025-08-26
Payer: COMMERCIAL

## 2025-08-29 ENCOUNTER — PRE VISIT (OUTPATIENT)
Dept: SURGERY | Facility: CLINIC | Age: 55
End: 2025-08-29

## 2025-08-29 PROCEDURE — 99000 SPECIMEN HANDLING OFFICE-LAB: CPT | Performed by: PATHOLOGY

## 2025-08-29 PROCEDURE — 87086 URINE CULTURE/COLONY COUNT: CPT | Performed by: UROLOGY

## 2025-09-02 ENCOUNTER — HOSPITAL ENCOUNTER (OUTPATIENT)
Dept: CARDIOLOGY | Facility: CLINIC | Age: 55
Discharge: HOME | End: 2025-09-02
Attending: INTERNAL MEDICINE
Payer: COMMERCIAL

## 2025-09-02 VITALS — HEART RATE: 58 BPM | SYSTOLIC BLOOD PRESSURE: 186 MMHG | DIASTOLIC BLOOD PRESSURE: 89 MMHG

## 2025-09-02 DIAGNOSIS — I10 POORLY-CONTROLLED HYPERTENSION: ICD-10-CM

## 2025-09-02 DIAGNOSIS — Z76.82 ORGAN TRANSPLANT CANDIDATE: ICD-10-CM

## 2025-09-02 DIAGNOSIS — Z01.818 PRE-TRANSPLANT EVALUATION FOR KIDNEY TRANSPLANT: ICD-10-CM

## 2025-09-02 LAB
CV STRESS CURRENT BP HE: NORMAL
CV STRESS CURRENT HR HE: 52
CV STRESS CURRENT HR HE: 52
CV STRESS CURRENT HR HE: 53
CV STRESS CURRENT HR HE: 54
CV STRESS CURRENT HR HE: 56
CV STRESS CURRENT HR HE: 57
CV STRESS CURRENT HR HE: 58
CV STRESS CURRENT HR HE: 59
CV STRESS CURRENT HR HE: 60
CV STRESS CURRENT HR HE: 60
CV STRESS CURRENT HR HE: 61
CV STRESS CURRENT HR HE: 62
CV STRESS CURRENT HR HE: 67
CV STRESS CURRENT HR HE: 67
CV STRESS DEVIATION TIME HE: NORMAL
CV STRESS ECHO PERCENT HR HE: NORMAL
CV STRESS EXERCISE STAGE HE: NORMAL
CV STRESS EXERCISE STAGE REACHED HE: NORMAL
CV STRESS FINAL RESTING BP HE: NORMAL
CV STRESS FINAL RESTING HR HE: 58
CV STRESS MAX HR HE: 68
CV STRESS MAX TREADMILL GRADE HE: 0
CV STRESS MAX TREADMILL SPEED HE: 0
CV STRESS PEAK DIA BP HE: NORMAL
CV STRESS PEAK SYS BP HE: NORMAL
CV STRESS PHASE HE: NORMAL
CV STRESS PROTOCOL HE: NORMAL
CV STRESS REASON STOPPED HE: NORMAL
CV STRESS RESTING PT POSITION HE: NORMAL
CV STRESS RESTING PT POSITION HE: NORMAL
CV STRESS ST DEVIATION AMOUNT HE: NORMAL
CV STRESS ST DEVIATION ELEVATION HE: NORMAL
CV STRESS ST EVELATION AMOUNT HE: NORMAL
CV STRESS SYMPTOMS HE: NORMAL
CV STRESS TEST TYPE HE: NORMAL
CV STRESS TOTAL STAGE TIME MIN 1 HE: NORMAL
STRESS ECHO BASELINE DIASTOLIC HE: 94
STRESS ECHO BASELINE HR: NORMAL
STRESS ECHO BASELINE SYSTOLIC BP: 188
STRESS ECHO LAST STRESS DIASTOLIC BP: 101
STRESS ECHO LAST STRESS HR: 58
STRESS ECHO LAST STRESS SYSTOLIC BP: 213
STRESS ECHO POST ESTIMATED WORKLOAD: 1
STRESS ECHO POST EXERCISE DUR MIN: 7
STRESS ECHO POST EXERCISE DUR SEC: 20
STRESS ECHO TARGET HR: 140

## 2025-09-02 PROCEDURE — 255N000002 HC RX 255 OP 636: Performed by: STUDENT IN AN ORGANIZED HEALTH CARE EDUCATION/TRAINING PROGRAM

## 2025-09-02 PROCEDURE — 250N000011 HC RX IP 250 OP 636: Performed by: STUDENT IN AN ORGANIZED HEALTH CARE EDUCATION/TRAINING PROGRAM

## 2025-09-02 PROCEDURE — 999N000208 ECHO STRESS ECHOCARDIOGRAM

## 2025-09-02 PROCEDURE — 258N000003 HC RX IP 258 OP 636: Performed by: STUDENT IN AN ORGANIZED HEALTH CARE EDUCATION/TRAINING PROGRAM

## 2025-09-02 PROCEDURE — 250N000009 HC RX 250: Performed by: STUDENT IN AN ORGANIZED HEALTH CARE EDUCATION/TRAINING PROGRAM

## 2025-09-02 RX ORDER — ATROPINE SULFATE 0.4 MG/ML
.2-.4 AMPUL (ML) INJECTION
Status: DISCONTINUED | OUTPATIENT
Start: 2025-09-02 | End: 2025-09-03 | Stop reason: HOSPADM

## 2025-09-02 RX ORDER — METOPROLOL TARTRATE 1 MG/ML
1-5 INJECTION, SOLUTION INTRAVENOUS
Status: ACTIVE | OUTPATIENT
Start: 2025-09-02 | End: 2025-09-02

## 2025-09-02 RX ORDER — DOBUTAMINE HYDROCHLORIDE 200 MG/100ML
10-50 INJECTION INTRAVENOUS CONTINUOUS
Status: ACTIVE | OUTPATIENT
Start: 2025-09-02 | End: 2025-09-02

## 2025-09-02 RX ORDER — LIDOCAINE 40 MG/G
CREAM TOPICAL
Status: DISCONTINUED | OUTPATIENT
Start: 2025-09-02 | End: 2025-09-03 | Stop reason: HOSPADM

## 2025-09-02 RX ADMIN — PERFLUTREN 2 ML (DILUTED): 6.52 INJECTION, SUSPENSION INTRAVENOUS at 14:12

## 2025-09-02 RX ADMIN — METOPROLOL TARTRATE 5 MG: 5 INJECTION INTRAVENOUS at 14:11

## 2025-09-02 RX ADMIN — DEXTROSE 10 MCG/KG/MIN: 50 INJECTION, SOLUTION INTRAVENOUS at 14:00

## 2025-09-04 DIAGNOSIS — Z94.0 HTN, KIDNEY TRANSPLANT RELATED: ICD-10-CM

## 2025-09-04 DIAGNOSIS — I15.1 HTN, KIDNEY TRANSPLANT RELATED: ICD-10-CM

## 2025-09-04 RX ORDER — CLONIDINE HYDROCHLORIDE 0.1 MG/1
0.1 TABLET ORAL 2 TIMES DAILY
Qty: 180 TABLET | Refills: 0 | Status: SHIPPED | OUTPATIENT
Start: 2025-09-04

## (undated) DEVICE — NDL BX MONOPTY 18GAX16CM 121816

## (undated) RX ORDER — METOPROLOL TARTRATE 1 MG/ML
INJECTION, SOLUTION INTRAVENOUS
Status: DISPENSED
Start: 2025-09-02

## (undated) RX ORDER — FENTANYL CITRATE 50 UG/ML
INJECTION, SOLUTION INTRAMUSCULAR; INTRAVENOUS
Status: DISPENSED
Start: 2023-12-11

## (undated) RX ORDER — SODIUM CHLORIDE 9 MG/ML
INJECTION, SOLUTION INTRAVENOUS
Status: DISPENSED
Start: 2023-12-11

## (undated) RX ORDER — LIDOCAINE HYDROCHLORIDE 20 MG/ML
JELLY TOPICAL
Status: DISPENSED
Start: 2025-04-16

## (undated) RX ORDER — LIDOCAINE HYDROCHLORIDE 20 MG/ML
JELLY TOPICAL
Status: DISPENSED
Start: 2025-07-14

## (undated) RX ORDER — HYDRALAZINE HYDROCHLORIDE 20 MG/ML
INJECTION INTRAMUSCULAR; INTRAVENOUS
Status: DISPENSED
Start: 2023-12-11

## (undated) RX ORDER — ATROPINE SULFATE 0.4 MG/ML
AMPUL (ML) INJECTION
Status: DISPENSED
Start: 2025-09-02

## (undated) RX ORDER — DOBUTAMINE HYDROCHLORIDE 200 MG/100ML
INJECTION INTRAVENOUS
Status: DISPENSED
Start: 2025-09-02

## (undated) RX ORDER — DIAZEPAM 5 MG/1
TABLET ORAL
Status: DISPENSED
Start: 2025-07-14

## (undated) RX ORDER — LIDOCAINE HYDROCHLORIDE 10 MG/ML
INJECTION, SOLUTION EPIDURAL; INFILTRATION; INTRACAUDAL; PERINEURAL
Status: DISPENSED
Start: 2023-12-11